# Patient Record
Sex: FEMALE | Race: WHITE | HISPANIC OR LATINO | Employment: FULL TIME | URBAN - METROPOLITAN AREA
[De-identification: names, ages, dates, MRNs, and addresses within clinical notes are randomized per-mention and may not be internally consistent; named-entity substitution may affect disease eponyms.]

---

## 2017-05-09 ENCOUNTER — ALLSCRIPTS OFFICE VISIT (OUTPATIENT)
Dept: OTHER | Facility: OTHER | Age: 37
End: 2017-05-09

## 2017-05-09 DIAGNOSIS — R63.5 ABNORMAL WEIGHT GAIN: ICD-10-CM

## 2017-05-09 DIAGNOSIS — R74.8 ABNORMAL LEVELS OF OTHER SERUM ENZYMES: ICD-10-CM

## 2017-05-09 DIAGNOSIS — E78.1 PURE HYPERGLYCERIDEMIA: ICD-10-CM

## 2017-05-09 DIAGNOSIS — E04.1 NONTOXIC SINGLE THYROID NODULE: ICD-10-CM

## 2017-05-09 DIAGNOSIS — N92.0 EXCESSIVE AND FREQUENT MENSTRUATION WITH REGULAR CYCLE: ICD-10-CM

## 2017-05-09 DIAGNOSIS — N93.9 ABNORMAL UTERINE AND VAGINAL BLEEDING, UNSPECIFIED: ICD-10-CM

## 2017-05-09 DIAGNOSIS — D64.9 ANEMIA: ICD-10-CM

## 2017-05-09 DIAGNOSIS — R53.83 OTHER FATIGUE: ICD-10-CM

## 2017-05-09 DIAGNOSIS — E66.9 OBESITY: ICD-10-CM

## 2017-05-09 LAB — HCG, QUALITATIVE (HISTORICAL): NEGATIVE

## 2017-05-14 ENCOUNTER — HOSPITAL ENCOUNTER (OUTPATIENT)
Facility: HOSPITAL | Age: 37
Setting detail: OBSERVATION
Discharge: HOME | End: 2017-05-15
Attending: EMERGENCY MEDICINE | Admitting: FAMILY MEDICINE
Payer: COMMERCIAL

## 2017-05-14 ENCOUNTER — APPOINTMENT (OUTPATIENT)
Dept: LAB | Age: 37
End: 2017-05-14
Payer: COMMERCIAL

## 2017-05-14 ENCOUNTER — TRANSCRIBE ORDERS (OUTPATIENT)
Dept: ADMINISTRATIVE | Facility: HOSPITAL | Age: 37
End: 2017-05-14

## 2017-05-14 DIAGNOSIS — N93.9 ABNORMAL UTERINE BLEEDING (AUB): ICD-10-CM

## 2017-05-14 DIAGNOSIS — N92.0 EXCESSIVE AND FREQUENT MENSTRUATION WITH REGULAR CYCLE: ICD-10-CM

## 2017-05-14 DIAGNOSIS — N93.8 DYSFUNCTIONAL UTERINE BLEEDING: Primary | ICD-10-CM

## 2017-05-14 DIAGNOSIS — R63.5 ABNORMAL WEIGHT GAIN: ICD-10-CM

## 2017-05-14 DIAGNOSIS — R53.83 OTHER FATIGUE: ICD-10-CM

## 2017-05-14 DIAGNOSIS — D64.9 ANEMIA: ICD-10-CM

## 2017-05-14 DIAGNOSIS — D64.9 SYMPTOMATIC ANEMIA: ICD-10-CM

## 2017-05-14 PROBLEM — F32.A DEPRESSION: Chronic | Status: ACTIVE | Noted: 2017-05-14

## 2017-05-14 PROBLEM — R74.01 TRANSAMINITIS: Chronic | Status: ACTIVE | Noted: 2017-05-14

## 2017-05-14 PROBLEM — E04.1 THYROID NODULE: Chronic | Status: ACTIVE | Noted: 2017-05-14

## 2017-05-14 LAB
ABO GROUP BLD BPU: NORMAL
ABO GROUP BLD: NORMAL
ALBUMIN SERPL BCP-MCNC: 3.4 G/DL (ref 3.5–5)
ALP SERPL-CCNC: 104 U/L (ref 46–116)
ALT SERPL W P-5'-P-CCNC: 98 U/L (ref 12–78)
ANION GAP SERPL CALCULATED.3IONS-SCNC: 6 MMOL/L (ref 4–13)
AST SERPL W P-5'-P-CCNC: 122 U/L (ref 5–45)
ATRIAL RATE: 110 BPM
BASOPHILS # BLD AUTO: 0.03 THOUSANDS/ΜL (ref 0–0.1)
BASOPHILS NFR BLD AUTO: 1 % (ref 0–1)
BILIRUB SERPL-MCNC: 0.37 MG/DL (ref 0.2–1)
BLD GP AB SCN SERPL QL: NEGATIVE
BPU ID: NORMAL
BUN SERPL-MCNC: 9 MG/DL (ref 5–25)
CALCIUM SERPL-MCNC: 8.4 MG/DL (ref 8.3–10.1)
CHLORIDE SERPL-SCNC: 106 MMOL/L (ref 100–108)
CO2 SERPL-SCNC: 27 MMOL/L (ref 21–32)
CREAT SERPL-MCNC: 0.74 MG/DL (ref 0.6–1.3)
EOSINOPHIL # BLD AUTO: 0.2 THOUSAND/ΜL (ref 0–0.61)
EOSINOPHIL NFR BLD AUTO: 3 % (ref 0–6)
ERYTHROCYTE [DISTWIDTH] IN BLOOD BY AUTOMATED COUNT: 16.5 % (ref 11.6–15.1)
EST. AVERAGE GLUCOSE BLD GHB EST-MCNC: 108 MG/DL
GFR SERPL CREATININE-BSD FRML MDRD: >60 ML/MIN/1.73SQ M
GLUCOSE P FAST SERPL-MCNC: 90 MG/DL (ref 65–99)
HBA1C MFR BLD: 5.4 % (ref 4.2–6.3)
HCT VFR BLD AUTO: 23.9 % (ref 34.8–46.1)
HGB BLD-MCNC: 6.8 G/DL (ref 11.5–15.4)
LYMPHOCYTES # BLD AUTO: 1.7 THOUSANDS/ΜL (ref 0.6–4.47)
LYMPHOCYTES NFR BLD AUTO: 28 % (ref 14–44)
MCH RBC QN AUTO: 21.1 PG (ref 26.8–34.3)
MCHC RBC AUTO-ENTMCNC: 28.5 G/DL (ref 31.4–37.4)
MCV RBC AUTO: 74 FL (ref 82–98)
MONOCYTES # BLD AUTO: 0.52 THOUSAND/ΜL (ref 0.17–1.22)
MONOCYTES NFR BLD AUTO: 9 % (ref 4–12)
NEUTROPHILS # BLD AUTO: 3.67 THOUSANDS/ΜL (ref 1.85–7.62)
NEUTS SEG NFR BLD AUTO: 59 % (ref 43–75)
NRBC BLD AUTO-RTO: 0 /100 WBCS
P AXIS: 73 DEGREES
PLATELET # BLD AUTO: 275 THOUSANDS/UL (ref 149–390)
PMV BLD AUTO: 11 FL (ref 8.9–12.7)
POTASSIUM SERPL-SCNC: 4 MMOL/L (ref 3.5–5.3)
PR INTERVAL: 130 MS
PROT SERPL-MCNC: 7.3 G/DL (ref 6.4–8.2)
QRS AXIS: 71 DEGREES
QRSD INTERVAL: 78 MS
QT INTERVAL: 342 MS
QTC INTERVAL: 462 MS
RBC # BLD AUTO: 3.22 MILLION/UL (ref 3.81–5.12)
RH BLD: POSITIVE
SODIUM SERPL-SCNC: 139 MMOL/L (ref 136–145)
SPECIMEN EXPIRATION DATE: NORMAL
T WAVE AXIS: 70 DEGREES
TSH SERPL DL<=0.05 MIU/L-ACNC: 1.33 UIU/ML (ref 0.36–3.74)
UNIT DISPENSE STATUS: NORMAL
UNIT PRODUCT CODE: NORMAL
UNIT RH: NORMAL
VENTRICULAR RATE: 110 BPM
WBC # BLD AUTO: 6.14 THOUSAND/UL (ref 4.31–10.16)

## 2017-05-14 PROCEDURE — 86901 BLOOD TYPING SEROLOGIC RH(D): CPT | Performed by: EMERGENCY MEDICINE

## 2017-05-14 PROCEDURE — 86900 BLOOD TYPING SEROLOGIC ABO: CPT | Performed by: EMERGENCY MEDICINE

## 2017-05-14 PROCEDURE — 36415 COLL VENOUS BLD VENIPUNCTURE: CPT

## 2017-05-14 PROCEDURE — 99284 EMERGENCY DEPT VISIT MOD MDM: CPT

## 2017-05-14 PROCEDURE — 80053 COMPREHEN METABOLIC PANEL: CPT

## 2017-05-14 PROCEDURE — 84443 ASSAY THYROID STIM HORMONE: CPT

## 2017-05-14 PROCEDURE — 36430 TRANSFUSION BLD/BLD COMPNT: CPT

## 2017-05-14 PROCEDURE — 81025 URINE PREGNANCY TEST: CPT | Performed by: EMERGENCY MEDICINE

## 2017-05-14 PROCEDURE — P9021 RED BLOOD CELLS UNIT: HCPCS

## 2017-05-14 PROCEDURE — 86923 COMPATIBILITY TEST ELECTRIC: CPT

## 2017-05-14 PROCEDURE — 83036 HEMOGLOBIN GLYCOSYLATED A1C: CPT

## 2017-05-14 PROCEDURE — 82306 VITAMIN D 25 HYDROXY: CPT

## 2017-05-14 PROCEDURE — 85025 COMPLETE CBC W/AUTO DIFF WBC: CPT

## 2017-05-14 PROCEDURE — 93005 ELECTROCARDIOGRAM TRACING: CPT

## 2017-05-14 PROCEDURE — 86850 RBC ANTIBODY SCREEN: CPT | Performed by: EMERGENCY MEDICINE

## 2017-05-14 RX ORDER — MEDROXYPROGESTERONE ACETATE 10 MG/1
10 TABLET ORAL DAILY
Status: DISCONTINUED | OUTPATIENT
Start: 2017-05-15 | End: 2017-05-15 | Stop reason: HOSPADM

## 2017-05-14 RX ORDER — ESCITALOPRAM OXALATE 10 MG/1
10 TABLET ORAL DAILY
Status: DISCONTINUED | OUTPATIENT
Start: 2017-05-15 | End: 2017-05-15 | Stop reason: HOSPADM

## 2017-05-14 RX ORDER — ESCITALOPRAM OXALATE 10 MG/1
10 TABLET ORAL DAILY
COMMUNITY
End: 2018-03-20 | Stop reason: SDUPTHER

## 2017-05-14 RX ORDER — KETOROLAC TROMETHAMINE 30 MG/ML
15 INJECTION, SOLUTION INTRAMUSCULAR; INTRAVENOUS ONCE
Status: COMPLETED | OUTPATIENT
Start: 2017-05-14 | End: 2017-05-14

## 2017-05-14 RX ADMIN — KETOROLAC TROMETHAMINE 15 MG: 30 INJECTION, SOLUTION INTRAMUSCULAR at 16:56

## 2017-05-15 ENCOUNTER — APPOINTMENT (OUTPATIENT)
Dept: ULTRASOUND IMAGING | Facility: HOSPITAL | Age: 37
End: 2017-05-15
Payer: COMMERCIAL

## 2017-05-15 ENCOUNTER — APPOINTMENT (OUTPATIENT)
Dept: RADIOLOGY | Facility: HOSPITAL | Age: 37
End: 2017-05-15
Attending: FAMILY MEDICINE
Payer: COMMERCIAL

## 2017-05-15 VITALS
TEMPERATURE: 98.9 F | BODY MASS INDEX: 40.41 KG/M2 | DIASTOLIC BLOOD PRESSURE: 59 MMHG | RESPIRATION RATE: 20 BRPM | HEIGHT: 62 IN | OXYGEN SATURATION: 96 % | WEIGHT: 219.58 LBS | HEART RATE: 81 BPM | SYSTOLIC BLOOD PRESSURE: 128 MMHG

## 2017-05-15 LAB
25(OH)D3 SERPL-MCNC: 15 NG/ML (ref 30–100)
ALBUMIN SERPL BCP-MCNC: 3.1 G/DL (ref 3.5–5)
ALP SERPL-CCNC: 96 U/L (ref 46–116)
ALT SERPL W P-5'-P-CCNC: 85 U/L (ref 12–78)
ANION GAP SERPL CALCULATED.3IONS-SCNC: 8 MMOL/L (ref 4–13)
APTT PPP: 33 SECONDS (ref 23–35)
AST SERPL W P-5'-P-CCNC: 97 U/L (ref 5–45)
BASOPHILS # BLD AUTO: 0.02 THOUSANDS/ΜL (ref 0–0.1)
BASOPHILS NFR BLD AUTO: 0 % (ref 0–1)
BILIRUB SERPL-MCNC: 0.56 MG/DL (ref 0.2–1)
BUN SERPL-MCNC: 13 MG/DL (ref 5–25)
CALCIUM SERPL-MCNC: 8.6 MG/DL (ref 8.3–10.1)
CHLORIDE SERPL-SCNC: 108 MMOL/L (ref 100–108)
CO2 SERPL-SCNC: 26 MMOL/L (ref 21–32)
CREAT SERPL-MCNC: 0.71 MG/DL (ref 0.6–1.3)
EOSINOPHIL # BLD AUTO: 0.21 THOUSAND/ΜL (ref 0–0.61)
EOSINOPHIL NFR BLD AUTO: 3 % (ref 0–6)
ERYTHROCYTE [DISTWIDTH] IN BLOOD BY AUTOMATED COUNT: 17.8 % (ref 11.6–15.1)
ERYTHROCYTE [DISTWIDTH] IN BLOOD BY AUTOMATED COUNT: 18.1 % (ref 11.6–15.1)
GFR SERPL CREATININE-BSD FRML MDRD: >60 ML/MIN/1.73SQ M
GLUCOSE SERPL-MCNC: 93 MG/DL (ref 65–140)
HCT VFR BLD AUTO: 25.7 % (ref 34.8–46.1)
HCT VFR BLD AUTO: 25.7 % (ref 34.8–46.1)
HGB BLD-MCNC: 7.6 G/DL (ref 11.5–15.4)
HGB BLD-MCNC: 7.7 G/DL (ref 11.5–15.4)
INR PPP: 1.15 (ref 0.86–1.16)
LYMPHOCYTES # BLD AUTO: 1.65 THOUSANDS/ΜL (ref 0.6–4.47)
LYMPHOCYTES NFR BLD AUTO: 26 % (ref 14–44)
MCH RBC QN AUTO: 22.6 PG (ref 26.8–34.3)
MCH RBC QN AUTO: 23 PG (ref 26.8–34.3)
MCHC RBC AUTO-ENTMCNC: 29.6 G/DL (ref 31.4–37.4)
MCHC RBC AUTO-ENTMCNC: 30 G/DL (ref 31.4–37.4)
MCV RBC AUTO: 76 FL (ref 82–98)
MCV RBC AUTO: 77 FL (ref 82–98)
MONOCYTES # BLD AUTO: 0.44 THOUSAND/ΜL (ref 0.17–1.22)
MONOCYTES NFR BLD AUTO: 7 % (ref 4–12)
NEUTROPHILS # BLD AUTO: 4.07 THOUSANDS/ΜL (ref 1.85–7.62)
NEUTS SEG NFR BLD AUTO: 64 % (ref 43–75)
NRBC BLD AUTO-RTO: 0 /100 WBCS
PLATELET # BLD AUTO: 237 THOUSANDS/UL (ref 149–390)
PLATELET # BLD AUTO: 245 THOUSANDS/UL (ref 149–390)
PMV BLD AUTO: 10.5 FL (ref 8.9–12.7)
PMV BLD AUTO: 10.7 FL (ref 8.9–12.7)
POTASSIUM SERPL-SCNC: 3.6 MMOL/L (ref 3.5–5.3)
PROT SERPL-MCNC: 6.8 G/DL (ref 6.4–8.2)
PROTHROMBIN TIME: 14.7 SECONDS (ref 12.1–14.4)
RBC # BLD AUTO: 3.35 MILLION/UL (ref 3.81–5.12)
RBC # BLD AUTO: 3.37 MILLION/UL (ref 3.81–5.12)
SODIUM SERPL-SCNC: 142 MMOL/L (ref 136–145)
WBC # BLD AUTO: 5.39 THOUSAND/UL (ref 4.31–10.16)
WBC # BLD AUTO: 6.42 THOUSAND/UL (ref 4.31–10.16)

## 2017-05-15 PROCEDURE — 76856 US EXAM PELVIC COMPLETE: CPT

## 2017-05-15 PROCEDURE — P9021 RED BLOOD CELLS UNIT: HCPCS

## 2017-05-15 PROCEDURE — 76830 TRANSVAGINAL US NON-OB: CPT

## 2017-05-15 PROCEDURE — 86704 HEP B CORE ANTIBODY TOTAL: CPT | Performed by: FAMILY MEDICINE

## 2017-05-15 PROCEDURE — 85610 PROTHROMBIN TIME: CPT | Performed by: FAMILY MEDICINE

## 2017-05-15 PROCEDURE — 86705 HEP B CORE ANTIBODY IGM: CPT | Performed by: FAMILY MEDICINE

## 2017-05-15 PROCEDURE — 87340 HEPATITIS B SURFACE AG IA: CPT | Performed by: FAMILY MEDICINE

## 2017-05-15 PROCEDURE — 85025 COMPLETE CBC W/AUTO DIFF WBC: CPT | Performed by: FAMILY MEDICINE

## 2017-05-15 PROCEDURE — 85027 COMPLETE CBC AUTOMATED: CPT | Performed by: FAMILY MEDICINE

## 2017-05-15 PROCEDURE — 86803 HEPATITIS C AB TEST: CPT | Performed by: FAMILY MEDICINE

## 2017-05-15 PROCEDURE — 85730 THROMBOPLASTIN TIME PARTIAL: CPT | Performed by: FAMILY MEDICINE

## 2017-05-15 PROCEDURE — 80053 COMPREHEN METABOLIC PANEL: CPT | Performed by: FAMILY MEDICINE

## 2017-05-15 RX ORDER — FERROUS SULFATE 325(65) MG
325 TABLET ORAL 2 TIMES DAILY WITH MEALS
Qty: 60 TABLET | Refills: 0 | Status: SHIPPED | OUTPATIENT
Start: 2017-05-15 | End: 2018-11-07 | Stop reason: ALTCHOICE

## 2017-05-15 RX ORDER — FERROUS SULFATE 325(65) MG
325 TABLET ORAL 2 TIMES DAILY WITH MEALS
Status: DISCONTINUED | OUTPATIENT
Start: 2017-05-15 | End: 2017-05-15 | Stop reason: HOSPADM

## 2017-05-15 RX ORDER — MEDROXYPROGESTERONE ACETATE 10 MG/1
10 TABLET ORAL DAILY
Qty: 5 TABLET | Refills: 0 | Status: SHIPPED | OUTPATIENT
Start: 2017-05-15 | End: 2018-11-07 | Stop reason: ALTCHOICE

## 2017-05-15 RX ORDER — ERGOCALCIFEROL 1.25 MG/1
50000 CAPSULE ORAL WEEKLY
Qty: 8 CAPSULE | Refills: 0 | Status: SHIPPED | OUTPATIENT
Start: 2017-05-15 | End: 2018-11-07 | Stop reason: ALTCHOICE

## 2017-05-15 RX ORDER — ERGOCALCIFEROL 1.25 MG/1
50000 CAPSULE ORAL WEEKLY
Status: DISCONTINUED | OUTPATIENT
Start: 2017-05-15 | End: 2017-05-15 | Stop reason: HOSPADM

## 2017-05-15 RX ADMIN — MEDROXYPROGESTERONE ACETATE 10 MG: 10 TABLET ORAL at 08:26

## 2017-05-15 RX ADMIN — ESCITALOPRAM OXALATE 10 MG: 10 TABLET ORAL at 08:25

## 2017-05-15 RX ADMIN — ERGOCALCIFEROL 50000 UNITS: 1.25 CAPSULE ORAL at 14:49

## 2017-05-16 LAB
HBV CORE AB SER QL: NORMAL
HBV CORE IGM SER QL: NORMAL
HBV SURFACE AG SER QL: NORMAL
HCV AB SER QL: NORMAL

## 2017-05-17 ENCOUNTER — APPOINTMENT (OUTPATIENT)
Dept: LAB | Age: 37
End: 2017-05-17
Payer: COMMERCIAL

## 2017-05-17 ENCOUNTER — TRANSCRIBE ORDERS (OUTPATIENT)
Dept: ADMINISTRATIVE | Age: 37
End: 2017-05-17

## 2017-05-17 DIAGNOSIS — N93.9 ABNORMAL UTERINE BLEEDING (AUB): ICD-10-CM

## 2017-05-17 DIAGNOSIS — D64.9 SYMPTOMATIC ANEMIA: ICD-10-CM

## 2017-05-17 LAB
BASOPHILS # BLD AUTO: 0.03 THOUSANDS/ΜL (ref 0–0.1)
BASOPHILS NFR BLD AUTO: 1 % (ref 0–1)
EOSINOPHIL # BLD AUTO: 0.25 THOUSAND/ΜL (ref 0–0.61)
EOSINOPHIL NFR BLD AUTO: 4 % (ref 0–6)
ERYTHROCYTE [DISTWIDTH] IN BLOOD BY AUTOMATED COUNT: 18.6 % (ref 11.6–15.1)
HCT VFR BLD AUTO: 25.7 % (ref 34.8–46.1)
HGB BLD-MCNC: 7.6 G/DL (ref 11.5–15.4)
LYMPHOCYTES # BLD AUTO: 1.64 THOUSANDS/ΜL (ref 0.6–4.47)
LYMPHOCYTES NFR BLD AUTO: 25 % (ref 14–44)
MCH RBC QN AUTO: 22.8 PG (ref 26.8–34.3)
MCHC RBC AUTO-ENTMCNC: 29.6 G/DL (ref 31.4–37.4)
MCV RBC AUTO: 77 FL (ref 82–98)
MONOCYTES # BLD AUTO: 0.61 THOUSAND/ΜL (ref 0.17–1.22)
MONOCYTES NFR BLD AUTO: 9 % (ref 4–12)
NEUTROPHILS # BLD AUTO: 4.02 THOUSANDS/ΜL (ref 1.85–7.62)
NEUTS SEG NFR BLD AUTO: 61 % (ref 43–75)
NRBC BLD AUTO-RTO: 0 /100 WBCS
PLATELET # BLD AUTO: 244 THOUSANDS/UL (ref 149–390)
PMV BLD AUTO: 10.7 FL (ref 8.9–12.7)
RBC # BLD AUTO: 3.34 MILLION/UL (ref 3.81–5.12)
WBC # BLD AUTO: 6.58 THOUSAND/UL (ref 4.31–10.16)

## 2017-05-17 PROCEDURE — 36415 COLL VENOUS BLD VENIPUNCTURE: CPT

## 2017-05-17 PROCEDURE — 85025 COMPLETE CBC W/AUTO DIFF WBC: CPT

## 2017-05-18 ENCOUNTER — ALLSCRIPTS OFFICE VISIT (OUTPATIENT)
Dept: OTHER | Facility: OTHER | Age: 37
End: 2017-05-18

## 2017-06-02 LAB
ABO GROUP BLD BPU: NORMAL
BPU ID: NORMAL
UNIT DISPENSE STATUS: NORMAL
UNIT PRODUCT CODE: NORMAL
UNIT RH: NORMAL

## 2017-06-05 ENCOUNTER — APPOINTMENT (OUTPATIENT)
Dept: LAB | Age: 37
End: 2017-06-05
Payer: COMMERCIAL

## 2017-06-05 ENCOUNTER — TRANSCRIBE ORDERS (OUTPATIENT)
Dept: ADMINISTRATIVE | Age: 37
End: 2017-06-05

## 2017-06-05 DIAGNOSIS — N93.9 VAGINAL HEMORRHAGE: Primary | ICD-10-CM

## 2017-06-05 DIAGNOSIS — N93.9 VAGINAL HEMORRHAGE: ICD-10-CM

## 2017-06-05 LAB
ERYTHROCYTE [DISTWIDTH] IN BLOOD BY AUTOMATED COUNT: 22.8 % (ref 11.6–15.1)
HCT VFR BLD AUTO: 29.3 % (ref 34.8–46.1)
HGB BLD-MCNC: 8.5 G/DL (ref 11.5–15.4)
MCH RBC QN AUTO: 23.1 PG (ref 26.8–34.3)
MCHC RBC AUTO-ENTMCNC: 29 G/DL (ref 31.4–37.4)
MCV RBC AUTO: 80 FL (ref 82–98)
PLATELET # BLD AUTO: 331 THOUSANDS/UL (ref 149–390)
PMV BLD AUTO: 11.4 FL (ref 8.9–12.7)
RBC # BLD AUTO: 3.68 MILLION/UL (ref 3.81–5.12)
WBC # BLD AUTO: 9.47 THOUSAND/UL (ref 4.31–10.16)

## 2017-06-05 PROCEDURE — 85027 COMPLETE CBC AUTOMATED: CPT

## 2017-06-05 PROCEDURE — 36415 COLL VENOUS BLD VENIPUNCTURE: CPT

## 2017-06-09 ENCOUNTER — ALLSCRIPTS OFFICE VISIT (OUTPATIENT)
Dept: OTHER | Facility: OTHER | Age: 37
End: 2017-06-09

## 2017-06-09 DIAGNOSIS — D64.9 ANEMIA: ICD-10-CM

## 2017-07-05 ENCOUNTER — ALLSCRIPTS OFFICE VISIT (OUTPATIENT)
Dept: OTHER | Facility: OTHER | Age: 37
End: 2017-07-05

## 2017-10-14 DIAGNOSIS — D64.9 ANEMIA: ICD-10-CM

## 2018-01-13 VITALS
SYSTOLIC BLOOD PRESSURE: 132 MMHG | HEIGHT: 62 IN | WEIGHT: 221 LBS | BODY MASS INDEX: 40.67 KG/M2 | TEMPERATURE: 98 F | HEART RATE: 80 BPM | DIASTOLIC BLOOD PRESSURE: 80 MMHG | RESPIRATION RATE: 20 BRPM

## 2018-01-13 VITALS
HEIGHT: 62 IN | TEMPERATURE: 98.1 F | DIASTOLIC BLOOD PRESSURE: 78 MMHG | WEIGHT: 225.13 LBS | SYSTOLIC BLOOD PRESSURE: 118 MMHG | HEART RATE: 90 BPM | BODY MASS INDEX: 41.43 KG/M2 | RESPIRATION RATE: 16 BRPM

## 2018-01-13 VITALS
DIASTOLIC BLOOD PRESSURE: 76 MMHG | RESPIRATION RATE: 16 BRPM | WEIGHT: 221.38 LBS | HEART RATE: 92 BPM | BODY MASS INDEX: 40.74 KG/M2 | TEMPERATURE: 99.2 F | HEIGHT: 62 IN | SYSTOLIC BLOOD PRESSURE: 126 MMHG

## 2018-01-15 VITALS
SYSTOLIC BLOOD PRESSURE: 124 MMHG | HEART RATE: 86 BPM | BODY MASS INDEX: 40.3 KG/M2 | WEIGHT: 219 LBS | DIASTOLIC BLOOD PRESSURE: 64 MMHG | RESPIRATION RATE: 16 BRPM | TEMPERATURE: 99.1 F | HEIGHT: 62 IN

## 2018-03-20 DIAGNOSIS — F32.A DEPRESSION, UNSPECIFIED DEPRESSION TYPE: Primary | ICD-10-CM

## 2018-03-20 RX ORDER — ESCITALOPRAM OXALATE 10 MG/1
TABLET ORAL
Qty: 90 TABLET | Refills: 1 | Status: SHIPPED | OUTPATIENT
Start: 2018-03-20 | End: 2018-11-07 | Stop reason: ALTCHOICE

## 2018-11-07 ENCOUNTER — TELEPHONE (OUTPATIENT)
Dept: FAMILY MEDICINE CLINIC | Facility: CLINIC | Age: 38
End: 2018-11-07

## 2018-11-07 ENCOUNTER — HOSPITAL ENCOUNTER (INPATIENT)
Facility: HOSPITAL | Age: 38
LOS: 1 days | Discharge: HOME | DRG: 812 | End: 2018-11-09
Attending: EMERGENCY MEDICINE | Admitting: INTERNAL MEDICINE
Payer: COMMERCIAL

## 2018-11-07 ENCOUNTER — APPOINTMENT (EMERGENCY)
Dept: ULTRASOUND IMAGING | Facility: HOSPITAL | Age: 38
DRG: 812 | End: 2018-11-07
Payer: COMMERCIAL

## 2018-11-07 DIAGNOSIS — N93.9 ABNORMAL UTERINE BLEEDING (AUB): ICD-10-CM

## 2018-11-07 DIAGNOSIS — D62 ACUTE BLOOD LOSS ANEMIA: Primary | ICD-10-CM

## 2018-11-07 PROBLEM — D50.9 IRON DEFICIENCY ANEMIA: Status: ACTIVE | Noted: 2018-11-07

## 2018-11-07 LAB
ABO GROUP BLD: NORMAL
ALBUMIN SERPL BCP-MCNC: 3.3 G/DL (ref 3.5–5)
ALP SERPL-CCNC: 112 U/L (ref 46–116)
ALT SERPL W P-5'-P-CCNC: 97 U/L (ref 12–78)
ANION GAP SERPL CALCULATED.3IONS-SCNC: 8 MMOL/L (ref 4–13)
APTT PPP: 35 SECONDS (ref 24–36)
AST SERPL W P-5'-P-CCNC: 82 U/L (ref 5–45)
BACTERIA UR QL AUTO: ABNORMAL /HPF
BASOPHILS # BLD AUTO: 0.01 THOUSANDS/ΜL (ref 0–0.1)
BASOPHILS NFR BLD AUTO: 0 % (ref 0–1)
BILIRUB SERPL-MCNC: 0.3 MG/DL (ref 0.2–1)
BILIRUB UR QL STRIP: NEGATIVE
BLD GP AB SCN SERPL QL: NEGATIVE
BUN SERPL-MCNC: 9 MG/DL (ref 5–25)
CALCIUM SERPL-MCNC: 9.1 MG/DL (ref 8.3–10.1)
CHLORIDE SERPL-SCNC: 104 MMOL/L (ref 100–108)
CLARITY UR: CLEAR
CO2 SERPL-SCNC: 26 MMOL/L (ref 21–32)
COLOR UR: YELLOW
CREAT SERPL-MCNC: 0.66 MG/DL (ref 0.6–1.3)
EOSINOPHIL # BLD AUTO: 0.17 THOUSAND/ΜL (ref 0–0.61)
EOSINOPHIL NFR BLD AUTO: 3 % (ref 0–6)
ERYTHROCYTE [DISTWIDTH] IN BLOOD BY AUTOMATED COUNT: 19.6 % (ref 11.6–15.1)
GFR SERPL CREATININE-BSD FRML MDRD: 112 ML/MIN/1.73SQ M
GLUCOSE SERPL-MCNC: 97 MG/DL (ref 65–140)
GLUCOSE UR STRIP-MCNC: NEGATIVE MG/DL
HCT VFR BLD AUTO: 20.4 % (ref 34.8–46.1)
HCT VFR BLD AUTO: 28.3 % (ref 34.8–46.1)
HGB BLD-MCNC: 5.5 G/DL (ref 11.5–15.4)
HGB BLD-MCNC: 8.3 G/DL (ref 11.5–15.4)
HGB UR QL STRIP.AUTO: ABNORMAL
IMM GRANULOCYTES # BLD AUTO: 0.03 THOUSAND/UL (ref 0–0.2)
IMM GRANULOCYTES NFR BLD AUTO: 0 % (ref 0–2)
INR PPP: 1.15 (ref 0.86–1.17)
KETONES UR STRIP-MCNC: ABNORMAL MG/DL
LEUKOCYTE ESTERASE UR QL STRIP: NEGATIVE
LYMPHOCYTES # BLD AUTO: 1.45 THOUSANDS/ΜL (ref 0.6–4.47)
LYMPHOCYTES NFR BLD AUTO: 21 % (ref 14–44)
MCH RBC QN AUTO: 19.8 PG (ref 26.8–34.3)
MCHC RBC AUTO-ENTMCNC: 27 G/DL (ref 31.4–37.4)
MCV RBC AUTO: 73 FL (ref 82–98)
MONOCYTES # BLD AUTO: 0.63 THOUSAND/ΜL (ref 0.17–1.22)
MONOCYTES NFR BLD AUTO: 9 % (ref 4–12)
NEUTROPHILS # BLD AUTO: 4.5 THOUSANDS/ΜL (ref 1.85–7.62)
NEUTS SEG NFR BLD AUTO: 67 % (ref 43–75)
NITRITE UR QL STRIP: NEGATIVE
NON-SQ EPI CELLS URNS QL MICRO: ABNORMAL /HPF
NRBC BLD AUTO-RTO: 0 /100 WBCS
PH UR STRIP.AUTO: 6 [PH] (ref 4.5–8)
PLATELET # BLD AUTO: 232 THOUSANDS/UL (ref 149–390)
PMV BLD AUTO: 10.1 FL (ref 8.9–12.7)
POTASSIUM SERPL-SCNC: 4.1 MMOL/L (ref 3.5–5.3)
PROT SERPL-MCNC: 7.2 G/DL (ref 6.4–8.2)
PROT UR STRIP-MCNC: NEGATIVE MG/DL
PROTHROMBIN TIME: 14.4 SECONDS (ref 11.8–14.2)
RBC # BLD AUTO: 2.78 MILLION/UL (ref 3.81–5.12)
RBC #/AREA URNS AUTO: ABNORMAL /HPF
RH BLD: POSITIVE
SODIUM SERPL-SCNC: 138 MMOL/L (ref 136–145)
SP GR UR STRIP.AUTO: 1.02 (ref 1–1.03)
SPECIMEN EXPIRATION DATE: NORMAL
TSH SERPL DL<=0.05 MIU/L-ACNC: 2.08 UIU/ML (ref 0.36–3.74)
UROBILINOGEN UR QL STRIP.AUTO: 0.2 E.U./DL
WBC # BLD AUTO: 6.79 THOUSAND/UL (ref 4.31–10.16)
WBC #/AREA URNS AUTO: ABNORMAL /HPF

## 2018-11-07 PROCEDURE — 86901 BLOOD TYPING SEROLOGIC RH(D): CPT | Performed by: EMERGENCY MEDICINE

## 2018-11-07 PROCEDURE — 80053 COMPREHEN METABOLIC PANEL: CPT | Performed by: EMERGENCY MEDICINE

## 2018-11-07 PROCEDURE — 86900 BLOOD TYPING SEROLOGIC ABO: CPT | Performed by: EMERGENCY MEDICINE

## 2018-11-07 PROCEDURE — 84443 ASSAY THYROID STIM HORMONE: CPT | Performed by: PHYSICIAN ASSISTANT

## 2018-11-07 PROCEDURE — 85610 PROTHROMBIN TIME: CPT | Performed by: EMERGENCY MEDICINE

## 2018-11-07 PROCEDURE — 85014 HEMATOCRIT: CPT | Performed by: PHYSICIAN ASSISTANT

## 2018-11-07 PROCEDURE — 85025 COMPLETE CBC W/AUTO DIFF WBC: CPT | Performed by: EMERGENCY MEDICINE

## 2018-11-07 PROCEDURE — 86920 COMPATIBILITY TEST SPIN: CPT

## 2018-11-07 PROCEDURE — 36415 COLL VENOUS BLD VENIPUNCTURE: CPT | Performed by: EMERGENCY MEDICINE

## 2018-11-07 PROCEDURE — 99285 EMERGENCY DEPT VISIT HI MDM: CPT

## 2018-11-07 PROCEDURE — 81001 URINALYSIS AUTO W/SCOPE: CPT | Performed by: EMERGENCY MEDICINE

## 2018-11-07 PROCEDURE — 99220 PR INITIAL OBSERVATION CARE/DAY 70 MINUTES: CPT | Performed by: INTERNAL MEDICINE

## 2018-11-07 PROCEDURE — 86850 RBC ANTIBODY SCREEN: CPT | Performed by: EMERGENCY MEDICINE

## 2018-11-07 PROCEDURE — 85018 HEMOGLOBIN: CPT | Performed by: PHYSICIAN ASSISTANT

## 2018-11-07 PROCEDURE — 85730 THROMBOPLASTIN TIME PARTIAL: CPT | Performed by: EMERGENCY MEDICINE

## 2018-11-07 PROCEDURE — P9021 RED BLOOD CELLS UNIT: HCPCS

## 2018-11-07 PROCEDURE — 76856 US EXAM PELVIC COMPLETE: CPT

## 2018-11-07 PROCEDURE — 76830 TRANSVAGINAL US NON-OB: CPT

## 2018-11-07 RX ORDER — NICOTINE 21 MG/24HR
1 PATCH, TRANSDERMAL 24 HOURS TRANSDERMAL DAILY
Status: DISCONTINUED | OUTPATIENT
Start: 2018-11-07 | End: 2018-11-09 | Stop reason: HOSPADM

## 2018-11-07 RX ORDER — SENNOSIDES 8.6 MG
1 TABLET ORAL DAILY
Status: DISCONTINUED | OUTPATIENT
Start: 2018-11-07 | End: 2018-11-09 | Stop reason: HOSPADM

## 2018-11-07 RX ORDER — ONDANSETRON 2 MG/ML
4 INJECTION INTRAMUSCULAR; INTRAVENOUS EVERY 6 HOURS PRN
Status: DISCONTINUED | OUTPATIENT
Start: 2018-11-07 | End: 2018-11-09 | Stop reason: HOSPADM

## 2018-11-07 RX ORDER — CALCIUM CARBONATE 200(500)MG
1000 TABLET,CHEWABLE ORAL DAILY PRN
Status: DISCONTINUED | OUTPATIENT
Start: 2018-11-07 | End: 2018-11-09 | Stop reason: HOSPADM

## 2018-11-07 NOTE — ED PROVIDER NOTES
History  Chief Complaint   Patient presents with    Vaginal Bleeding     pt presents ambulatory with c/o mentrual cycle x 3 weeks, c/o weakness and dizziness over past few days  hx transfusion      26-year-old female comes in for evaluation of vaginal bleeding  patient states approximately 3 weeks ago she began to have vaginal bleeding as got increasingly worse over the last 3 weeks  Patient had a similar episode approximately 1 year ago she had a Mirena placed and has seem to fix the problem however 3 weeks ago the bleeding began again        History provided by:  Patient   used: No    Vaginal Bleeding   Quality:  Dark red and clots  Severity:  Moderate  Onset quality:  Gradual  Duration:  3 weeks  Timing:  Constant  Progression:  Worsening  Chronicity:  New  Menstrual history:  Irregular  Possible pregnancy: no    Context: spontaneously    Associated symptoms: dizziness and fatigue    Associated symptoms: no abdominal pain, no back pain and no fever    Dizziness:     Severity:  Severe    Duration:  1 day    Timing:  Constant    Progression:  Worsening  Fatigue:     Severity:  Moderate    Duration:  1 day    Timing:  Constant    Progression:  Worsening  Risk factors: no hx of endometriosis, no ovarian torsion and no terminated pregnancies        Prior to Admission Medications   Prescriptions Last Dose Informant Patient Reported? Taking? Fe Cbn-Fe Gluc-FA-B12-C-DSS (FE 90 PLUS PO)   Yes No   Sig: Take by mouth   ergocalciferol (VITAMIN D2) 50,000 units   No No   Sig: Take 1 capsule by mouth once a week for 30 days   escitalopram (LEXAPRO) 10 mg tablet   No No   Sig: TAKE 1 TABLET DAILY     ferrous sulfate 325 (65 Fe) mg tablet   No No   Sig: Take 1 tablet by mouth 2 (two) times a day with meals for 30 days   medroxyPROGESTERone (PROVERA) 10 mg tablet   No No   Sig: Take 1 tablet by mouth daily for 5 days      Facility-Administered Medications: None       Past Medical History:   Diagnosis Date    Anemia        Past Surgical History:   Procedure Laterality Date    TONSILLECTOMY      TUBAL LIGATION      2006       Family History   Problem Relation Age of Onset    Heart disease Father      I have reviewed and agree with the history as documented  Social History   Substance Use Topics    Smoking status: Current Some Day Smoker     Types: Cigarettes    Smokeless tobacco: Never Used    Alcohol use Yes      Comment: on the weekends        Review of Systems   Constitutional: Positive for fatigue  Negative for fever  HENT: Negative for congestion and ear pain  Eyes: Negative for discharge and redness  Respiratory: Negative for apnea, cough, shortness of breath and wheezing  Cardiovascular: Negative for chest pain  Gastrointestinal: Negative for abdominal pain and diarrhea  Endocrine: Negative for cold intolerance and polydipsia  Genitourinary: Positive for vaginal bleeding  Negative for difficulty urinating and hematuria  Musculoskeletal: Negative for arthralgias and back pain  Skin: Negative for color change and rash  Allergic/Immunologic: Negative for environmental allergies and immunocompromised state  Neurological: Positive for dizziness  Negative for numbness and headaches  Hematological: Negative for adenopathy  Does not bruise/bleed easily  Psychiatric/Behavioral: Negative for agitation and behavioral problems  Physical Exam  Physical Exam   Constitutional: She is oriented to person, place, and time  Vital signs are normal  She appears well-developed and well-nourished  Non-toxic appearance  HENT:   Head: Normocephalic and atraumatic  Right Ear: Tympanic membrane and external ear normal    Left Ear: Tympanic membrane and external ear normal    Nose: Nose normal  No rhinorrhea, sinus tenderness or nasal deformity  Mouth/Throat: Uvula is midline and oropharynx is clear and moist  Normal dentition  Eyes: Pupils are equal, round, and reactive to light  Conjunctivae, EOM and lids are normal  Right eye exhibits no discharge  Left eye exhibits no discharge  Neck: Trachea normal and normal range of motion  Neck supple  No JVD present  Carotid bruit is not present  Cardiovascular: Normal rate, regular rhythm, intact distal pulses and normal pulses  No extrasystoles are present  PMI is not displaced  Pulmonary/Chest: Effort normal and breath sounds normal  No accessory muscle usage  No respiratory distress  She has no wheezes  She has no rhonchi  She has no rales  Abdominal: Soft  Normal appearance and bowel sounds are normal  She exhibits no mass  There is no tenderness  There is no rigidity, no rebound and no guarding  Musculoskeletal:        Right shoulder: She exhibits normal range of motion, no bony tenderness, no swelling and no deformity  Cervical back: Normal  She exhibits normal range of motion, no tenderness, no bony tenderness and no deformity  Lymphadenopathy:     She has no cervical adenopathy  She has no axillary adenopathy  Neurological: She is alert and oriented to person, place, and time  She has normal strength and normal reflexes  No cranial nerve deficit or sensory deficit  GCS eye subscore is 4  GCS verbal subscore is 5  GCS motor subscore is 6  Skin: Skin is warm and dry  No rash noted  Psychiatric: She has a normal mood and affect  Her speech is normal and behavior is normal    Nursing note and vitals reviewed        Vital Signs  ED Triage Vitals [11/07/18 1057]   Temp Pulse Respirations Blood Pressure SpO2   -- (!) 106 18 152/72 100 %      Temp Source Heart Rate Source Patient Position - Orthostatic VS BP Location FiO2 (%)   Oral Monitor -- -- --      Pain Score       --           Vitals:    11/07/18 1057 11/07/18 1200   BP: 152/72 128/58   Pulse: (!) 106 90       Visual Acuity      ED Medications  Medications - No data to display    Diagnostic Studies  Results Reviewed     Procedure Component Value Units Date/Time Comprehensive metabolic panel [[id number]]  (Abnormal) Collected:  11/07/18 1135    Lab Status:  Final result Specimen:  Blood from Arm, Left Updated:  11/07/18 1205     Sodium 138 mmol/L      Potassium 4 1 mmol/L      Chloride 104 mmol/L      CO2 26 mmol/L      ANION GAP 8 mmol/L      BUN 9 mg/dL      Creatinine 0 66 mg/dL      Glucose 97 mg/dL      Calcium 9 1 mg/dL      AST 82 (H) U/L      ALT 97 (H) U/L      Alkaline Phosphatase 112 U/L      Total Protein 7 2 g/dL      Albumin 3 3 (L) g/dL      Total Bilirubin 0 30 mg/dL      eGFR 112 ml/min/1 73sq m     Narrative:         National Kidney Disease Education Program recommendations are as follows:  GFR calculation is accurate only with a steady state creatinine  Chronic Kidney disease less than 60 ml/min/1 73 sq  meters  Kidney failure less than 15 ml/min/1 73 sq  meters      Urine Microscopic [[id number]]  (Abnormal) Collected:  11/07/18 1145    Lab Status:  Final result Specimen:  Urine from Urine, Clean Catch Updated:  11/07/18 1159     RBC, UA 20-30 (A) /hpf      WBC, UA 0-1 (A) /hpf      Epithelial Cells Occasional /hpf      Bacteria, UA Occasional /hpf     UA w Reflex to Microscopic w Reflex to Culture [[id number]]  (Abnormal) Collected:  11/07/18 1145    Lab Status:  Final result Specimen:  Urine from Urine, Clean Catch Updated:  11/07/18 1157     Color, UA Yellow     Clarity, UA Clear     Specific Gravity, UA 1 025     pH, UA 6 0     Leukocytes, UA Negative     Nitrite, UA Negative     Protein, UA Negative mg/dl      Glucose, UA Negative mg/dl      Ketones, UA Trace (A) mg/dl      Urobilinogen, UA 0 2 E U /dl      Bilirubin, UA Negative     Blood, UA Large (A)    CBC and differential [[id number]]  (Abnormal) Collected:  11/07/18 1135    Lab Status:  Final result Specimen:  Blood from Arm, Left Updated:  11/07/18 1155     WBC 6 79 Thousand/uL      RBC 2 78 (L) Million/uL      Hemoglobin 5 5 (LL) g/dL      Hematocrit 20 4 (L) %      MCV 73 (L) fL      MCH 19 8 (L) pg      MCHC 27 0 (L) g/dL      RDW 19 6 (H) %      MPV 10 1 fL      Platelets 221 Thousands/uL      nRBC 0 /100 WBCs      Neutrophils Relative 67 %      Immat GRANS % 0 %      Lymphocytes Relative 21 %      Monocytes Relative 9 %      Eosinophils Relative 3 %      Basophils Relative 0 %      Neutrophils Absolute 4 50 Thousands/µL      Immature Grans Absolute 0 03 Thousand/uL      Lymphocytes Absolute 1 45 Thousands/µL      Monocytes Absolute 0 63 Thousand/µL      Eosinophils Absolute 0 17 Thousand/µL      Basophils Absolute 0 01 Thousands/µL     Protime-INR [[id number]]  (Abnormal) Collected:  11/07/18 1135    Lab Status:  Final result Specimen:  Blood from Arm, Left Updated:  11/07/18 1155     Protime 14 4 (H) seconds      INR 1 15    APTT [[id number]]  (Normal) Collected:  11/07/18 1135    Lab Status:  Final result Specimen:  Blood from Arm, Left Updated:  11/07/18 1155     PTT 35 seconds                  US pelvis complete w transvaginal    (Results Pending)              Procedures  Procedures       Phone Contacts  ED Phone Contact    ED Course                               MDM  Number of Diagnoses or Management Options  Abnormal uterine bleeding (AUB): new and requires workup  Acute blood loss anemia: new and requires workup     Amount and/or Complexity of Data Reviewed  Clinical lab tests: ordered and reviewed  Tests in the radiology section of CPT®: ordered and reviewed  Tests in the medicine section of CPT®: ordered and reviewed  Review and summarize past medical records: yes  Discuss the patient with other providers: yes    Patient Progress  Patient progress: stable    CritCare Time    Disposition  Final diagnoses:   Acute blood loss anemia   Abnormal uterine bleeding (AUB)     Time reflects when diagnosis was documented in both MDM as applicable and the Disposition within this note     Time User Action Codes Description Comment    11/7/2018 12:36 PM Agustín SHIPLEY Add [D62] Acute blood loss anemia 11/7/2018 12:36 PM Millie Tejada Add [N93 9] Abnormal uterine bleeding (AUB)       ED Disposition     ED Disposition Condition Comment    Admit  Case was discussed with dr Luz Maria Sal and the patient's admission status was agreed to be Admission Status: observation status to the service of Dr Luz Maria Sal   Follow-up Information    None         Patient's Medications   Discharge Prescriptions    No medications on file     No discharge procedures on file      ED Provider  Electronically Signed by           Sue Ross DO  11/07/18 1246

## 2018-11-07 NOTE — ASSESSMENT & PLAN NOTE
· POA  Intermittent, heavy vaginal bleeding with clots over the last 3 weeks  Patient reports in the last 1 week using 1 full box of tampons and 1 package of sanitary napkins  · Prior hx of same about 1 year ago-- had Mirena placed and sx improved-- has had discussions with her OB about ablation/hysterectomy  · Pelvic US: Top normal slightly heterogeneous somewhat vascular endometrium  Medial related to phase of menstrual cycle  Apparent fundal uterine leiomyoma    · Will consult OB/GYN

## 2018-11-07 NOTE — ASSESSMENT & PLAN NOTE
· POA  Evidenced by fatigue, weakness, tachycardia, SOB   hgb 5 5 on arrival  · Baseline hgb appears to be between 7-8  · Admit  · Transfuse 2U PRBC now; check H/H after; transfuse to keep hgb > or = to 7 5

## 2018-11-07 NOTE — LETTER
[facility]  [address] [address]  Dept: [phone]    November 9, 2018     Patient: Dixon Hernandez   YOB: 1980   Date of Visit: 11/7/2018       To Whom it May Concern:    Dixon Hernandez is under my professional care  She was seen in the hospital from 11/7/2018   to 11/09/18  She may return to work on November 14, 2018  If you have any questions or concerns, please don't hesitate to call           Sincerely,          Paulette Saint, MD

## 2018-11-07 NOTE — ASSESSMENT & PLAN NOTE
· Known prior hx  · Was previously on Rx Iron-- tolerated well until insurance no longer covered-- switched to OTC iron supplements and did not tolerated 2/2 nausea  · Encourage continued PO iron; consider Venofer while inpatient

## 2018-11-07 NOTE — TELEPHONE ENCOUNTER
Spoke with patient, has history of low blood count   We do not have any appointments available, she is going to ER but did schedule f/u with you on Thurs am

## 2018-11-07 NOTE — H&P
H&P- Marvin Mahan 1980, 43 y o  female MRN: [id number]    Unit/Bed#: ED 17 Encounter: [id number]    Primary Care Provider: Ollie Mckinney DO   Date and time admitted to hospital: 11/7/2018 10:59 AM    * Symptomatic anemia   Assessment & Plan    · POA  Evidenced by fatigue, weakness, tachycardia, SOB  hgb 5 5 on arrival  · Baseline hgb appears to be between 7-8  · Admit  · Transfuse 2U PRBC now; check H/H after; transfuse to keep hgb > or = to 7 5     Abnormal uterine bleeding (AUB)   Assessment & Plan    · POA  Intermittent, heavy vaginal bleeding with clots over the last 3 weeks  Patient reports in the last 1 week using 1 full box of tampons and 1 package of sanitary napkins  · Prior hx of same about 1 year ago-- had Mirena placed and sx improved-- has had discussions with her OB about ablation/hysterectomy  · Pelvic US: Top normal slightly heterogeneous somewhat vascular endometrium  Medial related to phase of menstrual cycle  Apparent fundal uterine leiomyoma  · Will consult OB/GYN     Iron deficiency anemia   Assessment & Plan    · Known prior hx  · Was previously on Rx Iron-- tolerated well until insurance no longer covered-- switched to OTC iron supplements and did not tolerated 2/2 nausea  · Encourage continued PO iron; consider Venofer while inpatient          VTE Prophylaxis: Pharmacologic VTE Prophylaxis contraindicated due to vaginal bleeding  / sequential compression device   Code Status: Level 1-- Full Code  POLST: There is no POLST form on file for this patient (pre-hospital)  Discussion with family: family at bedside     Anticipated Length of Stay:  Patient will be admitted on an Observation basis with an anticipated length of stay of  < 2 midnights  Justification for Hospital Stay: tx vaginal bleeding    Total Time for Visit, including Counseling / Coordination of Care: 30 minutes  Greater than 50% of this total time spent on direct patient counseling and coordination of care      Chief Complaint:   Vaginal bleeding    History of Present Illness:    Sherrell Izquierdo is a 43 y o  female with PMH significant for iron deficiency anemia and AUB tx with Mirena presents to the ED for complaints of vaginal bleeding x3 weeks  Patient reports this started as her normal period  Over the last 3 weeks, she has bleeding almost daily  She states that there are a large amount of clots  She repots in the last week, she has gone through 1 box of tampons and 1 package of sanitary napkins  Today, she knew some thing was wrong when she was getting SOB walking up the stairs and feeling like her heart was racing  No associated abdominal pain/vaginal pain aside from some cramping  She also admits to worsening fatigue over the week  She had a similar episode about 1 year ago and the decision was made to place an IUD  Her sx improved for the most part with this intervention  No hx of coagulopathy, or family hx of coagulopathy to patient's knowledge  She had a tubal ligation in 2006  Review of Systems:    Review of Systems   Constitutional: Positive for fatigue  Negative for appetite change  HENT: Negative  Eyes: Negative  Respiratory: Positive for shortness of breath  Cardiovascular: Positive for palpitations  Gastrointestinal: Negative  Endocrine: Negative  Genitourinary: Positive for menstrual problem  Musculoskeletal: Negative  Skin: Positive for pallor  Neurological: Positive for weakness  Hematological: Negative  Psychiatric/Behavioral: Negative  Past Medical and Surgical History:     Past Medical History:   Diagnosis Date    Anemia        Past Surgical History:   Procedure Laterality Date    TONSILLECTOMY      TUBAL LIGATION      2006       Meds/Allergies:    Prior to Admission medications    Medication Sig Start Date End Date Taking?  Authorizing Provider   Ruth Cbn-Fe Gluc-FA-B12-C-DSS (FE 90 PLUS PO) Take by mouth    Historical Provider, MD   ergocalciferol (VITAMIN D2) 50,000 units Take 1 capsule by mouth once a week for 30 days 5/15/17 11/7/18  [facility] DO Lexy   escitalopram (LEXAPRO) 10 mg tablet TAKE 1 TABLET DAILY  3/20/18 11/7/18  Shandra Beaver MD   ferrous sulfate 325 (65 Fe) mg tablet Take 1 tablet by mouth 2 (two) times a day with meals for 30 days 5/15/17 11/7/18  Solo Aldrich DO   medroxyPROGESTERone (PROVERA) 10 mg tablet Take 1 tablet by mouth daily for 5 days 5/15/17 11/7/18  Solo Aldrich DO     I have reviewed home medications with patient personally  Allergies: No Known Allergies    Social History:     Marital Status: /Civil Union   Occupation: employee of [employer]  Patient Pre-hospital Living Situation: home with family  Patient Pre-hospital Level of Mobility: independent  Patient Pre-hospital Diet Restrictions: none  Substance Use History:   History   Alcohol Use    Yes     Comment: on the weekends     History   Smoking Status    Current Some Day Smoker    Types: Cigarettes   Smokeless Tobacco    Never Used     History   Drug Use No       Family History:    non-contributory    Physical Exam:     Vitals:   Blood Pressure: 133/60 (11/07/18 1338)  Pulse: 102 (11/07/18 1338)  Temperature: 99 °F (37 2 °C) (11/07/18 1338)  Temp Source: Oral (11/07/18 1338)  Respirations: 20 (11/07/18 1338)  Weight - Scale: 97 5 kg (215 lb) (11/07/18 1134)  SpO2: 100 % (11/07/18 1338)    Physical Exam   Constitutional: She is oriented to person, place, and time  She appears well-developed and well-nourished  No distress  HENT:   Head: Normocephalic and atraumatic  Mouth/Throat: No oropharyngeal exudate  Eyes: Pupils are equal, round, and reactive to light  Conjunctivae and EOM are normal  No scleral icterus  Conjunctiva pale   Neck: No JVD present  Cardiovascular: Regular rhythm  Murmur (flow murmur) heard  tachycardia   Pulmonary/Chest: Effort normal and breath sounds normal  No respiratory distress  She has no wheezes   She has no rales  Abdominal: Soft  Bowel sounds are normal  She exhibits no distension  There is no tenderness  There is no rebound  Musculoskeletal: She exhibits no edema or tenderness  Neurological: She is alert and oriented to person, place, and time  She displays normal reflexes  No cranial nerve deficit  She exhibits normal muscle tone  Skin: Skin is warm and dry  She is not diaphoretic  There is pallor  Psychiatric: She has a normal mood and affect  Her behavior is normal        Additional Data:     Lab Results: I have personally reviewed pertinent reports  Results from last 7 days  Lab Units 11/07/18  1135   WBC Thousand/uL 6 79   HEMOGLOBIN g/dL 5 5*   HEMATOCRIT % 20 4*   PLATELETS Thousands/uL 232   NEUTROS ABS Thousands/µL 4 50   NEUTROS PCT % 67   LYMPHS PCT % 21   MONOS PCT % 9   EOS PCT % 3       Results from last 7 days  Lab Units 11/07/18  1135   POTASSIUM mmol/L 4 1   CHLORIDE mmol/L 104   CO2 mmol/L 26   BUN mg/dL 9   CREATININE mg/dL 0 66   ANION GAP mmol/L 8   CALCIUM mg/dL 9 1   ALBUMIN g/dL 3 3*   TOTAL BILIRUBIN mg/dL 0 30   ALK PHOS U/L 112   ALT U/L 97*   AST U/L 82*       Results from last 7 days  Lab Units 11/07/18  1135   INR  1 15                   Imaging: I have personally reviewed pertinent reports  US pelvis complete w transvaginal   Final Result by Gigi Márquez MD (11/07 1302)       Top normal slightly heterogeneous somewhat vascular endometrium  Medial related to phase of menstrual cycle  Apparent fundal uterine leiomyoma  Workstation performed: [id number]             EKG, Pathology, and Other Studies Reviewed on Admission:   · EKG: unavailable; telemetry reveals sinus tachycardia    Allscripts / Epic Records Reviewed: Yes     ** Please Note: This note has been constructed using a voice recognition system   **

## 2018-11-08 LAB
ABO GROUP BLD BPU: NORMAL
ABO GROUP BLD BPU: NORMAL
ANION GAP SERPL CALCULATED.3IONS-SCNC: 10 MMOL/L (ref 4–13)
BPU ID: NORMAL
BPU ID: NORMAL
BUN SERPL-MCNC: 8 MG/DL (ref 5–25)
CALCIUM SERPL-MCNC: 9.2 MG/DL (ref 8.3–10.1)
CHLORIDE SERPL-SCNC: 105 MMOL/L (ref 100–108)
CO2 SERPL-SCNC: 25 MMOL/L (ref 21–32)
CREAT SERPL-MCNC: 0.7 MG/DL (ref 0.6–1.3)
CROSSMATCH: NORMAL
CROSSMATCH: NORMAL
ERYTHROCYTE [DISTWIDTH] IN BLOOD BY AUTOMATED COUNT: 20.2 % (ref 11.6–15.1)
GFR SERPL CREATININE-BSD FRML MDRD: 109 ML/MIN/1.73SQ M
GLUCOSE P FAST SERPL-MCNC: 102 MG/DL (ref 65–99)
GLUCOSE SERPL-MCNC: 102 MG/DL (ref 65–140)
HCT VFR BLD AUTO: 27.1 % (ref 34.8–46.1)
HCT VFR BLD AUTO: 31.3 % (ref 34.8–46.1)
HGB BLD-MCNC: 7.9 G/DL (ref 11.5–15.4)
HGB BLD-MCNC: 9.5 G/DL (ref 11.5–15.4)
MCH RBC QN AUTO: 22.5 PG (ref 26.8–34.3)
MCHC RBC AUTO-ENTMCNC: 29.2 G/DL (ref 31.4–37.4)
MCV RBC AUTO: 77 FL (ref 82–98)
PLATELET # BLD AUTO: 235 THOUSANDS/UL (ref 149–390)
PMV BLD AUTO: 10.6 FL (ref 8.9–12.7)
POTASSIUM SERPL-SCNC: 4 MMOL/L (ref 3.5–5.3)
RBC # BLD AUTO: 3.51 MILLION/UL (ref 3.81–5.12)
SODIUM SERPL-SCNC: 140 MMOL/L (ref 136–145)
UNIT DISPENSE STATUS: NORMAL
UNIT DISPENSE STATUS: NORMAL
UNIT PRODUCT CODE: NORMAL
UNIT PRODUCT CODE: NORMAL
UNIT RH: NORMAL
UNIT RH: NORMAL
WBC # BLD AUTO: 6.09 THOUSAND/UL (ref 4.31–10.16)

## 2018-11-08 PROCEDURE — P9040 RBC LEUKOREDUCED IRRADIATED: HCPCS

## 2018-11-08 PROCEDURE — 80048 BASIC METABOLIC PNL TOTAL CA: CPT | Performed by: PHYSICIAN ASSISTANT

## 2018-11-08 PROCEDURE — 85027 COMPLETE CBC AUTOMATED: CPT | Performed by: PHYSICIAN ASSISTANT

## 2018-11-08 PROCEDURE — 85018 HEMOGLOBIN: CPT | Performed by: PHYSICIAN ASSISTANT

## 2018-11-08 PROCEDURE — 85014 HEMATOCRIT: CPT | Performed by: PHYSICIAN ASSISTANT

## 2018-11-08 PROCEDURE — 99232 SBSQ HOSP IP/OBS MODERATE 35: CPT | Performed by: PHYSICIAN ASSISTANT

## 2018-11-08 PROCEDURE — 30233N1 TRANSFUSION OF NONAUTOLOGOUS RED BLOOD CELLS INTO PERIPHERAL VEIN, PERCUTANEOUS APPROACH: ICD-10-PCS | Performed by: INTERNAL MEDICINE

## 2018-11-08 RX ORDER — MEGESTROL ACETATE 40 MG/1
40 TABLET ORAL 4 TIMES DAILY
Status: DISCONTINUED | OUTPATIENT
Start: 2018-11-08 | End: 2018-11-09 | Stop reason: HOSPADM

## 2018-11-08 RX ADMIN — MEGESTROL ACETATE 40 MG: 40 TABLET ORAL at 21:56

## 2018-11-08 RX ADMIN — CONJUGATED ESTROGENS 25 MG: 25 INJECTION, POWDER, LYOPHILIZED, FOR SOLUTION INTRAMUSCULAR; INTRAVENOUS at 16:38

## 2018-11-08 RX ADMIN — MEGESTROL ACETATE 40 MG: 40 TABLET ORAL at 16:42

## 2018-11-08 NOTE — PLAN OF CARE
DISCHARGE PLANNING     Discharge to home or other facility with appropriate resources Progressing        HEMATOLOGIC - ADULT     Maintains hematologic stability Progressing        INFECTION - ADULT     Absence or prevention of progression during hospitalization Progressing     Absence of fever/infection during neutropenic period Progressing        Knowledge Deficit     Patient/family/caregiver demonstrates understanding of disease process, treatment plan, medications, and discharge instructions Progressing        Nutrition/Hydration-ADULT     Nutrient/Hydration intake appropriate for improving, restoring or maintaining nutritional needs Progressing        PAIN - ADULT     Verbalizes/displays adequate comfort level or baseline comfort level Progressing        Potential for Falls     Patient will remain free of falls Progressing        SAFETY ADULT     Maintain or return to baseline ADL function Progressing     Maintain or return mobility status to optimal level Progressing     Patient will remain free of falls Progressing

## 2018-11-08 NOTE — ASSESSMENT & PLAN NOTE
· POA  Evidenced by fatigue, weakness, tachycardia, SOB   hgb 5 5 on arrival-- improved to 8 3, then dropped again to 7 9-- patient is still feeling weak/fatigued and is still having heavy vaginal bleeding  · Baseline hgb appears to be between 7-8  · Admit  · Will give another 1U of PRBC to keep patient's hgb stable

## 2018-11-08 NOTE — ASSESSMENT & PLAN NOTE
· Known prior hx  · Was previously on Rx Iron-- tolerated well until insurance no longer covered-- switched to OTC iron supplements and did not tolerated 2/2 nausea  · Encouraged PO iron

## 2018-11-08 NOTE — CONSULTS
Consultation - Gynecology  [facility] 43 y o  female MRN: [id number]  Unit/Bed#: -01 Encounter: [id number]      Consults      Chief Complaint   Patient presents with    Vaginal Bleeding     pt presents ambulatory with c/o mentrual cycle x 3 weeks, c/o weakness and dizziness over past few days  hx transfusion      HAd Mirena placed about 2 years ago for menorrhagia, helped some  History of Present Illness   Physician Requesting Consult: Litzy Lopez MD  Reason for Consult / Principal Problem: Anemia secondary to menorrhagia  Subspeciality: General GYN        Review of Systems    Neg except as noted:    No no longer with CP or SOB  NO N/V/D  No Abd Pain  No Extremety Pain  No significant weight changes    Historical Information   Past Medical History:   Diagnosis Date    Anemia     Chest tightness or pressure     8/19/2013     Past Surgical History:   Procedure Laterality Date    TONSILLECTOMY      TUBAL LIGATION      2006     OB History   No data available     Family History   Problem Relation Age of Onset    Heart disease Father      Social History   History   Alcohol Use    Yes     Comment: on the weekends/ socially     History   Drug Use No     History   Smoking Status    Current Some Day Smoker    Types: Cigarettes   Smokeless Tobacco    Never Used       Meds/Allergies   Current Facility-Administered Medications   Medication Dose Route Frequency    calcium carbonate (TUMS) chewable tablet 1,000 mg  1,000 mg Oral Daily PRN    megestrol (MEGACE) tablet 40 mg  40 mg Oral 4x Daily    nicotine (NICODERM CQ) 14 mg/24hr TD 24 hr patch 1 patch  1 patch Transdermal Daily    ondansetron (ZOFRAN) injection 4 mg  4 mg Intravenous Q6H PRN    senna (SENOKOT) tablet 8 6 mg  1 tablet Oral Daily         No Known Allergies    Objective   Vitals: Blood pressure 136/70, pulse 87, temperature 98 3 °F (36 8 °C), resp   rate 18, height 5' 1" (1 549 m), weight 101 kg (223 lb 5 2 oz), last menstrual period 10/17/2018, SpO2 97 %  Body mass index is 42 2 kg/m²        Intake/Output Summary (Last 24 hours) at 11/08/18 1640  Last data filed at 11/08/18 1500   Gross per 24 hour   Intake              325 ml   Output                0 ml   Net              325 ml       Invasive Devices     Peripheral Intravenous Line            Peripheral IV 11/08/18 Right Hand less than 1 day                Physical Exam    Lab Results:   Admission on 11/07/2018   Component Date Value    WBC 11/07/2018 6 79     RBC 11/07/2018 2 78*    Hemoglobin 11/07/2018 5 5*    Hematocrit 11/07/2018 20 4*    MCV 11/07/2018 73*    MCH 11/07/2018 19 8*    MCHC 11/07/2018 27 0*    RDW 11/07/2018 19 6*    MPV 11/07/2018 10 1     Platelets 36/78/4141 232     nRBC 11/07/2018 0     Neutrophils Relative 11/07/2018 67     Immat GRANS % 11/07/2018 0     Lymphocytes Relative 11/07/2018 21     Monocytes Relative 11/07/2018 9     Eosinophils Relative 11/07/2018 3     Basophils Relative 11/07/2018 0     Neutrophils Absolute 11/07/2018 4 50     Immature Grans Absolute 11/07/2018 0 03     Lymphocytes Absolute 11/07/2018 1 45     Monocytes Absolute 11/07/2018 0 63     Eosinophils Absolute 11/07/2018 0 17     Basophils Absolute 11/07/2018 0 01     Sodium 11/07/2018 138     Potassium 11/07/2018 4 1     Chloride 11/07/2018 104     CO2 11/07/2018 26     ANION GAP 11/07/2018 8     BUN 11/07/2018 9     Creatinine 11/07/2018 0 66     Glucose 11/07/2018 97     Calcium 11/07/2018 9 1     AST 11/07/2018 82*    ALT 11/07/2018 97*    Alkaline Phosphatase 11/07/2018 112     Total Protein 11/07/2018 7 2     Albumin 11/07/2018 3 3*    Total Bilirubin 11/07/2018 0 30     eGFR 11/07/2018 112     Protime 11/07/2018 14 4*    INR 11/07/2018 1 15     PTT 11/07/2018 35     ABO Grouping 11/07/2018 O     Rh Factor 11/07/2018 Positive     Antibody Screen 11/07/2018 Negative     Specimen Expiration Date 11/07/2018 [id number]     Color, UA 11/07/2018 Yellow     Clarity, UA 11/07/2018 Clear     Specific Gravity, UA 11/07/2018 1 025     pH, UA 11/07/2018 6 0     Leukocytes, UA 11/07/2018 Negative     Nitrite, UA 11/07/2018 Negative     Protein, UA 11/07/2018 Negative     Glucose, UA 11/07/2018 Negative     Ketones, UA 11/07/2018 Trace*    Urobilinogen, UA 11/07/2018 0 2     Bilirubin, UA 11/07/2018 Negative     Blood, UA 11/07/2018 Large*    RBC, UA 11/07/2018 20-30*    WBC, UA 11/07/2018 0-1*    Epithelial Cells 11/07/2018 Occasional     Bacteria, UA 11/07/2018 Occasional     Unit Product Code 11/08/2018 U5271G36     Unit Number 11/08/2018 [id number]     Unit ABO 11/08/2018 O     Unit RH 11/08/2018 POS     Crossmatch 11/08/2018 Compatible     Unit Dispense Status 11/08/2018 Presumed Trans     Unit Product Code 11/08/2018 J9304T71     Unit Number 11/08/2018 [id number]     Unit ABO 11/08/2018 O     Unit RH 11/08/2018 POS     Crossmatch 11/08/2018 Compatible     Unit Dispense Status 11/08/2018 Presumed Trans     TSH 3RD GENERATON 11/07/2018 2 075     Hemoglobin 11/07/2018 8 3*    Hematocrit 11/07/2018 28 3*    WBC 11/08/2018 6 09     RBC 11/08/2018 3 51*    Hemoglobin 11/08/2018 7 9*    Hematocrit 11/08/2018 27 1*    MCV 11/08/2018 77*    MCH 11/08/2018 22 5*    MCHC 11/08/2018 29 2*    RDW 11/08/2018 20 2*    Platelets 38/52/8583 235     MPV 11/08/2018 10 6     Sodium 11/08/2018 140     Potassium 11/08/2018 4 0     Chloride 11/08/2018 105     CO2 11/08/2018 25     ANION GAP 11/08/2018 10     BUN 11/08/2018 8     Creatinine 11/08/2018 0 70     Glucose 11/08/2018 102     Glucose, Fasting 11/08/2018 102*    Calcium 11/08/2018 9 2     eGFR 11/08/2018 109     Unit Product Code 11/08/2018 E7207S45     Unit Number 11/08/2018 [id number]     Unit ABO 11/08/2018 O     Unit RH 11/08/2018 POS     Crossmatch 11/08/2018 Compatible     Unit Dispense Status 11/08/2018 Issued      Imaging Studies: I have personally reviewed pertinent reports  and Images in PACS  EKG, Pathology, and Other Studies: I have personally reviewed pertinent reports  Assessment/Plan     Discussed ineffectiveness of IUD at controlling patient's bleeding and my confirmation of IUD like device  on ultrasound, though not noted by radiologist,    Discussed plan of using 1 dose of IV premarin FOR stabilization of endometrium and then Megace to control bleeding until sampling can be done in office after which plans for definitive management will be evaluated  The patient is in agreement  Counseling / Coordination of Care  Total floor / unit time spent today30 minutes  minutes  Greater than 50% of total time was spent with the patient and / or family counseling and / or coordination of care   A description of the counseling / coordination of care    Juliet Diaz MD  11/8/2018  4:40 PM

## 2018-11-08 NOTE — MALNUTRITION/BMI
This medical record reflects one or more clinical indicators suggestive of malnutrition and/or morbid obesity  BMI Findings:  BMI Classifications: Morbid Obesity 40-44 9     Body mass index is 42 2 kg/m²  See Nutrition note dated 11/8/18 for additional details  Completed nutrition assessment is viewable in the nutrition documentation

## 2018-11-08 NOTE — PROGRESS NOTES
Progress Note - Nicki Georges 1980, 43 y o  female MRN: [id number]    Unit/Bed#: -01 Encounter: [id number]    Primary Care Provider: Evangelist Price DO   Date and time admitted to hospital: 11/7/2018 10:59 AM    * Symptomatic anemia   Assessment & Plan    · POA  Evidenced by fatigue, weakness, tachycardia, SOB  hgb 5 5 on arrival-- improved to 8 3, then dropped again to 7 9-- patient is still feeling weak/fatigued and is still having heavy vaginal bleeding  · Baseline hgb appears to be between 7-8  · Admit  · Will give another 1U of PRBC to keep patient's hgb stable     Abnormal uterine bleeding (AUB)   Assessment & Plan    · POA  Intermittent, heavy vaginal bleeding with clots over the last 3 weeks  Patient reports in the last 1 week using 1 full box of tampons and 1 package of sanitary napkins  Still with heavy bleeding and clots overnight  · Prior hx of same about 1 year ago-- had Mirena placed and sx improved-- has had discussions with her OB about ablation/hysterectomy  · Pelvic US: Top normal slightly heterogeneous somewhat vascular endometrium  Medial related to phase of menstrual cycle  Apparent fundal uterine leiomyoma  · Will consult OB/GYN-- appreciate their input      Iron deficiency anemia   Assessment & Plan    · Known prior hx  · Was previously on Rx Iron-- tolerated well until insurance no longer covered-- switched to OTC iron supplements and did not tolerated 2/2 nausea  · Encouraged PO iron       VTE Pharmacologic Prophylaxis:   Pharmacologic: Pharmacologic VTE Prophylaxis contraindicated due to vaginal bleeding  Mechanical VTE Prophylaxis in Place: No    Patient Centered Rounds: I have performed bedside rounds with nursing staff today  Discussions with Specialists or Other Care Team Provider: CM, OB    Education and Discussions with Family / Patient: patient at bedside    Time Spent for Care: 30 minutes    More than 50% of total time spent on counseling and coordination of care as described above  Current Length of Stay: 0 day(s)    Current Patient Status: Observation   Certification Statement: The patient will continue to require additional inpatient hospital stay due to need for stabilization of hemoglobin, OB/Gyn consult    Discharge Plan: d/c home when medically stable  Pending OB/Gyn evaluation and hemoglobin stabilization  Hopeful d/c 24-48 hours    Code Status: Level 1 - Full Code      Subjective:   Patient feels improved, though still fatigued  Still with very heavy vaginal bleeding with clots-- does not feel there has been any improvement  No chest pains/SOB  Palpitations resolved  Objective:     Vitals:   Temp (24hrs), Av 6 °F (37 °C), Min:98 3 °F (36 8 °C), Max:99 °F (37 2 °C)    Temp:  [98 3 °F (36 8 °C)-99 °F (37 2 °C)] 98 6 °F (37 °C)  HR:  [] 86  Resp:  [18-20] 18  BP: (110-133)/(53-67) 111/59  SpO2:  [96 %-100 %] 98 %  Body mass index is 42 2 kg/m²  Input and Output Summary (last 24 hours): Intake/Output Summary (Last 24 hours) at 18 1140  Last data filed at 18 0001   Gross per 24 hour   Intake                0 ml   Output                0 ml   Net                0 ml       Physical Exam:     Physical Exam   Constitutional: She is oriented to person, place, and time  No distress  HENT:   Head: Normocephalic and atraumatic  Mouth/Throat: No oropharyngeal exudate  Eyes: Pupils are equal, round, and reactive to light  Conjunctivae and EOM are normal  No scleral icterus  Neck: No JVD present  Cardiovascular: Normal rate and regular rhythm  Exam reveals no gallop and no friction rub  No murmur heard  Pulmonary/Chest: Effort normal and breath sounds normal  No respiratory distress  She has no wheezes  She has no rales  Abdominal: Soft  Bowel sounds are normal  She exhibits no distension  There is no tenderness  There is no rebound  Musculoskeletal: She exhibits no edema or tenderness     Neurological: She is alert and oriented to person, place, and time  She displays normal reflexes  No cranial nerve deficit  She exhibits normal muscle tone  Skin: Skin is warm and dry  She is not diaphoretic  There is pallor  Psychiatric: She has a normal mood and affect  Her behavior is normal      Additional Data:     Labs:      Results from last 7 days  Lab Units 11/08/18  0521  11/07/18  1135   WBC Thousand/uL 6 09  --  6 79   HEMOGLOBIN g/dL 7 9*  < > 5 5*   HEMATOCRIT % 27 1*  < > 20 4*   PLATELETS Thousands/uL 235  --  232   NEUTROS PCT %  --   --  67   LYMPHS PCT %  --   --  21   MONOS PCT %  --   --  9   EOS PCT %  --   --  3   < > = values in this interval not displayed  Results from last 7 days  Lab Units 11/08/18  0521 11/07/18  1135   POTASSIUM mmol/L 4 0 4 1   CHLORIDE mmol/L 105 104   CO2 mmol/L 25 26   BUN mg/dL 8 9   CREATININE mg/dL 0 70 0 66   ANION GAP mmol/L 10 8   CALCIUM mg/dL 9 2 9 1   ALBUMIN g/dL  --  3 3*   TOTAL BILIRUBIN mg/dL  --  0 30   ALK PHOS U/L  --  112   ALT U/L  --  97*   AST U/L  --  82*       Results from last 7 days  Lab Units 11/07/18  1135   INR  1 15                       * I Have Reviewed All Lab Data Listed Above  * Additional Pertinent Lab Tests Reviewed: Yris 65 Admission Reviewed    Imaging:    Imaging Reports Reviewed Today Include: transvaginal US  Imaging Personally Reviewed by Myself Includes:  Transvaginal US    Recent Cultures (last 7 days):           Last 24 Hours Medication List:     Current Facility-Administered Medications:  calcium carbonate 1,000 mg Oral Daily PRN Danelle Garibay PA-C   nicotine 1 patch Transdermal Daily Danelle Garibay PA-C   ondansetron 4 mg Intravenous Q6H PRN Danelle Garibay PA-C   senna 1 tablet Oral Daily Danelle Garibay PA-C        Today, Patient Was Seen By: Piter Vogt PA-C    ** Please Note: Dictation voice to text software may have been used in the creation of this document   **

## 2018-11-08 NOTE — ASSESSMENT & PLAN NOTE
· POA  Intermittent, heavy vaginal bleeding with clots over the last 3 weeks  Patient reports in the last 1 week using 1 full box of tampons and 1 package of sanitary napkins  Still with heavy bleeding and clots overnight  · Prior hx of same about 1 year ago-- had Mirena placed and sx improved-- has had discussions with her OB about ablation/hysterectomy  · Pelvic US: Top normal slightly heterogeneous somewhat vascular endometrium  Medial related to phase of menstrual cycle  Apparent fundal uterine leiomyoma    · Will consult OB/GYN-- appreciate their input

## 2018-11-09 VITALS
OXYGEN SATURATION: 99 % | RESPIRATION RATE: 18 BRPM | HEIGHT: 61 IN | SYSTOLIC BLOOD PRESSURE: 125 MMHG | DIASTOLIC BLOOD PRESSURE: 58 MMHG | WEIGHT: 223.33 LBS | TEMPERATURE: 98.7 F | HEART RATE: 77 BPM | BODY MASS INDEX: 42.16 KG/M2

## 2018-11-09 LAB
ABO GROUP BLD BPU: NORMAL
BPU ID: NORMAL
CROSSMATCH: NORMAL
HCT VFR BLD AUTO: 29.9 % (ref 34.8–46.1)
HCT VFR BLD AUTO: 32 % (ref 34.8–46.1)
HGB BLD-MCNC: 9 G/DL (ref 11.5–15.4)
HGB BLD-MCNC: 9.7 G/DL (ref 11.5–15.4)
UNIT DISPENSE STATUS: NORMAL
UNIT PRODUCT CODE: NORMAL
UNIT RH: NORMAL

## 2018-11-09 PROCEDURE — 85018 HEMOGLOBIN: CPT | Performed by: INTERNAL MEDICINE

## 2018-11-09 PROCEDURE — 99238 HOSP IP/OBS DSCHRG MGMT 30/<: CPT | Performed by: INTERNAL MEDICINE

## 2018-11-09 PROCEDURE — 85014 HEMATOCRIT: CPT | Performed by: INTERNAL MEDICINE

## 2018-11-09 RX ORDER — NICOTINE 21 MG/24HR
1 PATCH, TRANSDERMAL 24 HOURS TRANSDERMAL DAILY
Qty: 28 PATCH | Refills: 0 | Status: SHIPPED | OUTPATIENT
Start: 2018-11-10 | End: 2018-11-13

## 2018-11-09 RX ADMIN — MEGESTROL ACETATE 40 MG: 40 TABLET ORAL at 08:31

## 2018-11-09 NOTE — ASSESSMENT & PLAN NOTE
· POA  Intermittent, heavy vaginal bleeding with clots over the last 3 weeks  Patient reports in the last 1 week using 1 full box of tampons and 1 package of sanitary napkins  Still with heavy bleeding and clots overnight  · Prior hx of same about 1 year ago-- had Mirena placed and sx improved-- has had discussions with her OB about ablation/hysterectomy  · Pelvic US: Top normal slightly heterogeneous somewhat vascular endometrium  Medial related to phase of menstrual cycle  Apparent fundal uterine leiomyoma    · Hemoglobin stable  · Case reviewed with Dr babs dodson  · Patient will be followed by Dr Erwin Montero on November 13th for further management

## 2018-11-09 NOTE — DISCHARGE SUMMARY
Discharge- Hira Matters 1980, 43 y o  female MRN: [id number]    Unit/Bed#: -01 Encounter: [id number]    Primary Care Provider: Lewis Prabhakar DO   Date and time admitted to hospital: 11/7/2018 10:59 AM        Abnormal uterine bleeding (AUB)   Assessment & Plan    · POA  Intermittent, heavy vaginal bleeding with clots over the last 3 weeks  Patient reports in the last 1 week using 1 full box of tampons and 1 package of sanitary napkins  Still with heavy bleeding and clots overnight  · Prior hx of same about 1 year ago-- had Mirena placed and sx improved-- has had discussions with her OB about ablation/hysterectomy  · Pelvic US: Top normal slightly heterogeneous somewhat vascular endometrium  Medial related to phase of menstrual cycle  Apparent fundal uterine leiomyoma    · Hemoglobin stable  · Case reviewed with Dr babs dodson  · Patient will be followed by Dr Kitty Pierce on November 13th for further management  · Please note patient had a dramatic and unexpected improvement in her abnormal uterine bleeding with stabilization of her hemoglobin and had drop in bleeding this was unexpected and patient required inpatient hospitalization due to her need for multiple units of packed red blood cell transfusion     * Symptomatic anemia   Assessment & Plan    · Hemoglobin stable  · Patient with outpatient gyn follow-up next week       Discharging Physician / Practitioner: Fabiola Payne MD  PCP: Lewis Prabhakar DO  Admission Date:   Admission Orders     Ordered        11/08/18 1807  Inpatient Admission  Once         11/07/18 1238  Place in Observation (expected length of stay for this patient is less than two midnights)  Once             Discharge Date: 11/09/18    Resolved Problems  Date Reviewed: 11/9/2018    None          Consultations During Hospital Stay:  · Gyn    Procedures Performed:     · Pelvic ultrasound top normal slightly heterogeneous somewhat vascular endometrium apparent fundal uterine leiomyoma    Significant Findings / Test Results:     · None    Incidental Findings:   · None     Test Results Pending at Discharge (will require follow up): · None     Outpatient Tests Requested:  · None    Complications:  None    Reason for Admission:  Vaginal bleeding    Hospital Course:     Ian Gonzalez is a 43 y o  female patient who originally presented to the hospital on 11/7/2018 due to vaginal bleeding  Patient with history of vaginal bleeding off and on for some time  She had IUD placed by family practice physician 2 years goes to [facility] fully controlled the bleeding  Originally this was working well with patient with several weeks of recurrent bleeding  Came to hospital with anemia requiring blood transfusions  Hemoglobin has been stable case was reviewed GI any will follow as outpatient in the office on November 13th for definitive plan regarding her abnormal uterine bleeding    Please see above list of diagnoses and related plan for additional information  Condition at Discharge: good     Discharge Day Visit / Exam:     Subjective:  My bleeding is much better  Vitals: Blood Pressure: 125/58 (11/09/18 0748)  Pulse: 77 (11/09/18 0748)  Temperature: 98 7 °F (37 1 °C) (11/09/18 0748)  Temp Source: Oral (11/09/18 0748)  Respirations: 18 (11/09/18 0748)  Height: 5' 1" (154 9 cm) (11/08/18 1140)  Weight - Scale: 101 kg (223 lb 5 2 oz) (11/07/18 1512)  SpO2: 99 % (11/09/18 0748)  Exam:   Physical Exam   Constitutional: She is oriented to person, place, and time  No distress  Cardiovascular: Normal rate  Exam reveals no gallop and no friction rub  No murmur heard  Pulmonary/Chest: Effort normal  No respiratory distress  She has no wheezes  She has no rales  Abdominal: Soft  She exhibits no distension  There is no tenderness  There is no rebound and no guarding  Musculoskeletal: She exhibits no edema  Neurological: She is alert and oriented to person, place, and time     Skin: She is not diaphoretic  Discharge instructions/Information to patient and family:   See after visit summary for information provided to patient and family  Provisions for Follow-Up Care:  See after visit summary for information related to follow-up care and any pertinent home health orders  Disposition:     Home    For Discharges to [address] [facility]:   · Not Applicable to this Patient - Not Applicable to this Patient    Planned Readmission: no     Discharge Statement:  I spent 30 minutes discharging the patient  This time was spent on the day of discharge  I had direct contact with the patient on the day of discharge  Greater than 50% of the total time was spent examining patient, answering all patient questions, arranging and discussing plan of care with patient as well as directly providing post-discharge instructions  Additional time then spent on discharge activities  Discharge Medications:  See after visit summary for reconciled discharge medications provided to patient and family        ** Please Note: This note has been constructed using a voice recognition system **

## 2018-11-13 ENCOUNTER — TRANSITIONAL CARE MANAGEMENT (OUTPATIENT)
Dept: FAMILY MEDICINE CLINIC | Facility: CLINIC | Age: 38
End: 2018-11-13

## 2018-11-13 ENCOUNTER — OFFICE VISIT (OUTPATIENT)
Dept: OBGYN CLINIC | Facility: CLINIC | Age: 38
End: 2018-11-13
Payer: COMMERCIAL

## 2018-11-13 VITALS
DIASTOLIC BLOOD PRESSURE: 74 MMHG | SYSTOLIC BLOOD PRESSURE: 124 MMHG | WEIGHT: 221 LBS | HEIGHT: 61 IN | BODY MASS INDEX: 41.72 KG/M2

## 2018-11-13 DIAGNOSIS — N93.9 ABNORMAL UTERINE BLEEDING: Primary | ICD-10-CM

## 2018-11-13 DIAGNOSIS — T83.32XA INTRAUTERINE CONTRACEPTIVE DEVICE THREADS LOST, INITIAL ENCOUNTER: ICD-10-CM

## 2018-11-13 DIAGNOSIS — D50.0 IRON DEFICIENCY ANEMIA DUE TO CHRONIC BLOOD LOSS: ICD-10-CM

## 2018-11-13 PROCEDURE — 99214 OFFICE O/P EST MOD 30 MIN: CPT | Performed by: OBSTETRICS & GYNECOLOGY

## 2018-11-13 NOTE — PROGRESS NOTES
Assessment/Plan     ER follow up    1  AUB: Likely expelled IUD  Will return to office for EMB  Patient is strongly considering hysterectomy     2  Anemia: s/p transfusion during hospitalization  Hgb 9 7 at discharge  3  Missing IUD: Not seen on recent [address]  Will order KUB to confirm suspected expulsion      CHIEF COMPLAINT: Hospital f/up for AUB      Subjective     Link Steen is a 43 y o  female who presents for hospital follow up  She was recently admitted for symptomatic anemia with a Hgb of 5 5 requiring blood transfusion and IV estrogen for management of heavy uterine bleeding  She states her periods have been heavy like this for the past 2 years  They last about 15 days on average despite Mirena IUD being in place since July 2017  She passes large clots and requires use of pads and tampons to avoid soaking her clothes  She is very frustrated with her bleeding as this is not the first time she has required hospitalization for this issue  ROS:   She denies fevers, chills, chest pain, shortness of breath, nausea, vomiting, abdominal pain  All other systems negative unless stated in HPI    Patient Active Problem List   Diagnosis    Symptomatic anemia    Abnormal uterine bleeding (AUB)    Thyroid nodule    Transaminitis    Depression    Iron deficiency anemia       The following portions of the patient's history were reviewed and updated as appropriate: allergies, current medications, past family history, past medical history, past social history and past surgical history  /74   Ht 5' 1" (1 549 m)   Wt 100 kg (221 lb)   LMP 10/17/2018   BMI 41 76 kg/m²     GEN: The patient was alert and oriented x3, pleasant well-appearing female in no acute distress  CV: Regular rate   PULM: Nonlabored respirations  : normal appearing external female genitalia  On bimanual exam, no cervical motion or uterine tenderness  Uterus is about 8 weeks in size with excellent lateral mobility   IUD strings not palpable  INTEG: Warm, dry, no rash  NEURO: No focal deficits  PSYCH: Appropriate mood and affect    IMAGING: TVUS images reviewed  IUD not visualized  Small fibroid noted  No other obvious abnormalities present

## 2018-12-04 ENCOUNTER — TELEPHONE (OUTPATIENT)
Dept: OBGYN CLINIC | Facility: CLINIC | Age: 38
End: 2018-12-04

## 2018-12-04 DIAGNOSIS — N93.9 ABNORMAL UTERINE BLEEDING: Primary | ICD-10-CM

## 2018-12-04 RX ORDER — NORGESTIMATE AND ETHINYL ESTRADIOL 0.25-0.035
1 KIT ORAL DAILY
Qty: 84 TABLET | Refills: 1 | Status: SHIPPED | OUTPATIENT
Start: 2018-12-04 | End: 2018-12-31 | Stop reason: HOSPADM

## 2018-12-04 NOTE — TELEPHONE ENCOUNTER
Patient has hx of anemia and is scheduled for [facility] on 12/11/18  States she started bleeding on 11/19 it was just blood tinge until 11/22  On 11/22 she started with heavy vaginal bleeding states it is on and off but at times she is saturating a pad and tampon in one hour  She is not symptomatic at this time but is feeling tired  Would like a call from provider to discuss Embx and what she should do  She believes she cannot have biopsy if she is bleeding  Routing to provider to call patient

## 2018-12-04 NOTE — PROGRESS NOTES
Patient called office, having heavy bleeding since 11/19  Very worried about bleeding so much that she might need another transfusion  Discussed starting an OCP taper to slow her current episode of bleeding and control future bleeding  Patient agreed  Will rescheduled EMB from 12/11 if still having heavy bleeding at that time  Patient cautioned about potential nausea with first 2 days of OCP taper

## 2018-12-05 ENCOUNTER — TELEPHONE (OUTPATIENT)
Dept: OBGYN CLINIC | Facility: CLINIC | Age: 38
End: 2018-12-05

## 2018-12-05 NOTE — TELEPHONE ENCOUNTER
Patient calling states pharmacist told her to check to make sure you wanted her to take OCP  She is a smoker and he was concerned with this  She know its only for a short time period, but wanted to make sure it was okay  Routing to provider for advise

## 2018-12-06 NOTE — TELEPHONE ENCOUNTER
LM on voicemail patient is to take OCP because it is only for a short period of time to get her bleeding to stop  Not planning on keeping on long term

## 2018-12-06 NOTE — TELEPHONE ENCOUNTER
Yes, because its for a short period just to get her bleeding to stop  Not planning on keeping her on it long term

## 2018-12-11 ENCOUNTER — PROCEDURE VISIT (OUTPATIENT)
Dept: OBGYN CLINIC | Facility: CLINIC | Age: 38
End: 2018-12-11
Payer: COMMERCIAL

## 2018-12-11 VITALS — WEIGHT: 222.8 LBS | DIASTOLIC BLOOD PRESSURE: 84 MMHG | SYSTOLIC BLOOD PRESSURE: 150 MMHG | BODY MASS INDEX: 42.1 KG/M2

## 2018-12-11 DIAGNOSIS — N93.9 ABNORMAL UTERINE BLEEDING (AUB): Primary | ICD-10-CM

## 2018-12-11 DIAGNOSIS — Z12.4 SCREENING FOR MALIGNANT NEOPLASM OF CERVIX: ICD-10-CM

## 2018-12-11 LAB — SL AMB POCT URINE HCG: NEGATIVE

## 2018-12-11 PROCEDURE — 88305 TISSUE EXAM BY PATHOLOGIST: CPT | Performed by: PATHOLOGY

## 2018-12-11 PROCEDURE — G0145 SCR C/V CYTO,THINLAYER,RESCR: HCPCS | Performed by: OBSTETRICS & GYNECOLOGY

## 2018-12-11 PROCEDURE — 87624 HPV HI-RISK TYP POOLED RSLT: CPT | Performed by: OBSTETRICS & GYNECOLOGY

## 2018-12-11 PROCEDURE — 58100 BIOPSY OF UTERUS LINING: CPT | Performed by: OBSTETRICS & GYNECOLOGY

## 2018-12-11 PROCEDURE — 81025 URINE PREGNANCY TEST: CPT | Performed by: OBSTETRICS & GYNECOLOGY

## 2018-12-11 NOTE — PROGRESS NOTES
Endometrial biopsy  Date/Time: 12/11/2018 3:47 PM  Performed by: Terese Cruz  Authorized by: Terese Cruz     Consent:     Consent obtained:  Verbal and written    Consent given by:  Patient    Procedural risks discussed:  Bleeding, failure rate, infection and possible continued pain    Patient questions answered: yes      Patient agrees, verbalizes understanding, and wants to proceed: yes    Indication:     Indications: Other disorder of menstruation and other abnormal bleeding from female genital tract    Procedure:     Procedure: endometrial biopsy with Pipelle      A bivalve speculum was placed in the vagina: yes      Cervix cleaned and prepped: yes      Uterus sounded: yes      Uterus sound depth (cm):  8    Specimen collected: specimen collected and sent to pathology      Patient tolerated procedure well with no complications: yes    Findings:     Uterus size:  6-8 weeks    Cervix: normal    Comments:      Pap collected prior to procedure  Patient tolerated procedure well

## 2018-12-21 LAB
HPV HR 12 DNA CVX QL NAA+PROBE: NEGATIVE
HPV16 DNA CVX QL NAA+PROBE: NEGATIVE
HPV18 DNA CVX QL NAA+PROBE: NEGATIVE
LAB AP GYN PRIMARY INTERPRETATION: NORMAL
Lab: NORMAL

## 2018-12-28 ENCOUNTER — HOSPITAL ENCOUNTER (INPATIENT)
Facility: HOSPITAL | Age: 38
LOS: 1 days | Discharge: HOME | DRG: 742 | End: 2018-12-31
Attending: EMERGENCY MEDICINE | Admitting: OBSTETRICS & GYNECOLOGY
Payer: COMMERCIAL

## 2018-12-28 ENCOUNTER — TELEPHONE (OUTPATIENT)
Dept: OBGYN CLINIC | Facility: CLINIC | Age: 38
End: 2018-12-28

## 2018-12-28 ENCOUNTER — APPOINTMENT (EMERGENCY)
Dept: RADIOLOGY | Facility: HOSPITAL | Age: 38
DRG: 742 | End: 2018-12-28
Payer: COMMERCIAL

## 2018-12-28 DIAGNOSIS — D25.1 FIBROIDS, INTRAMURAL: ICD-10-CM

## 2018-12-28 DIAGNOSIS — Z90.710 STATUS POST LAPAROSCOPIC HYSTERECTOMY: Primary | ICD-10-CM

## 2018-12-28 DIAGNOSIS — N93.9 ABNORMAL UTERINE BLEEDING (AUB): ICD-10-CM

## 2018-12-28 DIAGNOSIS — R10.32 ACUTE LEFT LOWER QUADRANT PAIN: ICD-10-CM

## 2018-12-28 DIAGNOSIS — R10.2 PELVIC PAIN IN FEMALE: ICD-10-CM

## 2018-12-28 DIAGNOSIS — D64.9 ANEMIA: ICD-10-CM

## 2018-12-28 DIAGNOSIS — N93.9 VAGINAL BLEEDING: ICD-10-CM

## 2018-12-28 LAB
ALBUMIN SERPL BCP-MCNC: 3.4 G/DL (ref 3.5–5)
ALP SERPL-CCNC: 107 U/L (ref 46–116)
ALT SERPL W P-5'-P-CCNC: 66 U/L (ref 12–78)
ANION GAP SERPL CALCULATED.3IONS-SCNC: 8 MMOL/L (ref 4–13)
AST SERPL W P-5'-P-CCNC: 95 U/L (ref 5–45)
BACTERIA UR QL AUTO: ABNORMAL /HPF
BASOPHILS # BLD AUTO: 0.04 THOUSANDS/ΜL (ref 0–0.1)
BASOPHILS NFR BLD AUTO: 0 % (ref 0–1)
BILIRUB SERPL-MCNC: 0.43 MG/DL (ref 0.2–1)
BILIRUB UR QL STRIP: NEGATIVE
BUN SERPL-MCNC: 10 MG/DL (ref 5–25)
CALCIUM SERPL-MCNC: 8.6 MG/DL (ref 8.3–10.1)
CHLORIDE SERPL-SCNC: 106 MMOL/L (ref 100–108)
CLARITY UR: ABNORMAL
CO2 SERPL-SCNC: 23 MMOL/L (ref 21–32)
COLOR UR: ABNORMAL
COLOR, POC: YELLOW
CREAT SERPL-MCNC: 0.72 MG/DL (ref 0.6–1.3)
EOSINOPHIL # BLD AUTO: 0.24 THOUSAND/ΜL (ref 0–0.61)
EOSINOPHIL NFR BLD AUTO: 2 % (ref 0–6)
ERYTHROCYTE [DISTWIDTH] IN BLOOD BY AUTOMATED COUNT: 17.8 % (ref 11.6–15.1)
EXT PREG TEST URINE: NEGATIVE
GFR SERPL CREATININE-BSD FRML MDRD: 106 ML/MIN/1.73SQ M
GLUCOSE SERPL-MCNC: 98 MG/DL (ref 65–140)
GLUCOSE UR STRIP-MCNC: NEGATIVE MG/DL
HCT VFR BLD AUTO: 27.3 % (ref 34.8–46.1)
HGB BLD-MCNC: 7.7 G/DL (ref 11.5–15.4)
HGB UR QL STRIP.AUTO: ABNORMAL
IMM GRANULOCYTES # BLD AUTO: 0.03 THOUSAND/UL (ref 0–0.2)
IMM GRANULOCYTES NFR BLD AUTO: 0 % (ref 0–2)
KETONES UR STRIP-MCNC: NEGATIVE MG/DL
LEUKOCYTE ESTERASE UR QL STRIP: ABNORMAL
LYMPHOCYTES # BLD AUTO: 1.42 THOUSANDS/ΜL (ref 0.6–4.47)
LYMPHOCYTES NFR BLD AUTO: 12 % (ref 14–44)
MCH RBC QN AUTO: 21 PG (ref 26.8–34.3)
MCHC RBC AUTO-ENTMCNC: 28.2 G/DL (ref 31.4–37.4)
MCV RBC AUTO: 75 FL (ref 82–98)
MONOCYTES # BLD AUTO: 0.86 THOUSAND/ΜL (ref 0.17–1.22)
MONOCYTES NFR BLD AUTO: 7 % (ref 4–12)
NEUTROPHILS # BLD AUTO: 9.11 THOUSANDS/ΜL (ref 1.85–7.62)
NEUTS SEG NFR BLD AUTO: 79 % (ref 43–75)
NITRITE UR QL STRIP: NEGATIVE
NON-SQ EPI CELLS URNS QL MICRO: ABNORMAL /HPF
NRBC BLD AUTO-RTO: 0 /100 WBCS
PH UR STRIP.AUTO: 5.5 [PH] (ref 4.5–8)
PLATELET # BLD AUTO: 208 THOUSANDS/UL (ref 149–390)
PMV BLD AUTO: 10.8 FL (ref 8.9–12.7)
POTASSIUM SERPL-SCNC: 3.7 MMOL/L (ref 3.5–5.3)
PROT SERPL-MCNC: 7.9 G/DL (ref 6.4–8.2)
PROT UR STRIP-MCNC: ABNORMAL MG/DL
RBC # BLD AUTO: 3.66 MILLION/UL (ref 3.81–5.12)
RBC #/AREA URNS AUTO: ABNORMAL /HPF
SODIUM SERPL-SCNC: 137 MMOL/L (ref 136–145)
SP GR UR STRIP.AUTO: >=1.03 (ref 1–1.03)
UROBILINOGEN UR QL STRIP.AUTO: 0.2 E.U./DL
WBC # BLD AUTO: 11.7 THOUSAND/UL (ref 4.31–10.16)
WBC #/AREA URNS AUTO: ABNORMAL /HPF

## 2018-12-28 PROCEDURE — 36415 COLL VENOUS BLD VENIPUNCTURE: CPT | Performed by: EMERGENCY MEDICINE

## 2018-12-28 PROCEDURE — 76856 US EXAM PELVIC COMPLETE: CPT

## 2018-12-28 PROCEDURE — 96374 THER/PROPH/DIAG INJ IV PUSH: CPT

## 2018-12-28 PROCEDURE — 99285 EMERGENCY DEPT VISIT HI MDM: CPT

## 2018-12-28 PROCEDURE — 81001 URINALYSIS AUTO W/SCOPE: CPT

## 2018-12-28 PROCEDURE — 74177 CT ABD & PELVIS W/CONTRAST: CPT

## 2018-12-28 PROCEDURE — 81025 URINE PREGNANCY TEST: CPT | Performed by: EMERGENCY MEDICINE

## 2018-12-28 PROCEDURE — 85025 COMPLETE CBC W/AUTO DIFF WBC: CPT | Performed by: EMERGENCY MEDICINE

## 2018-12-28 PROCEDURE — 96361 HYDRATE IV INFUSION ADD-ON: CPT

## 2018-12-28 PROCEDURE — 87086 URINE CULTURE/COLONY COUNT: CPT

## 2018-12-28 PROCEDURE — 76830 TRANSVAGINAL US NON-OB: CPT

## 2018-12-28 PROCEDURE — 80053 COMPREHEN METABOLIC PANEL: CPT | Performed by: EMERGENCY MEDICINE

## 2018-12-28 PROCEDURE — 96375 TX/PRO/DX INJ NEW DRUG ADDON: CPT

## 2018-12-28 RX ORDER — OXYCODONE HYDROCHLORIDE 10 MG/1
10 TABLET ORAL EVERY 4 HOURS PRN
Status: DISCONTINUED | OUTPATIENT
Start: 2018-12-28 | End: 2018-12-31 | Stop reason: HOSPADM

## 2018-12-28 RX ORDER — HYDROMORPHONE HCL/PF 1 MG/ML
1 SYRINGE (ML) INJECTION EVERY 4 HOURS PRN
Status: DISCONTINUED | OUTPATIENT
Start: 2018-12-28 | End: 2018-12-28

## 2018-12-28 RX ORDER — INDOMETHACIN 25 MG/1
25 CAPSULE ORAL
Status: DISCONTINUED | OUTPATIENT
Start: 2018-12-28 | End: 2018-12-30

## 2018-12-28 RX ORDER — HYDROMORPHONE HCL/PF 1 MG/ML
0.5 SYRINGE (ML) INJECTION EVERY 4 HOURS PRN
Status: DISCONTINUED | OUTPATIENT
Start: 2018-12-28 | End: 2018-12-28

## 2018-12-28 RX ORDER — HYDROMORPHONE HCL/PF 1 MG/ML
1 SYRINGE (ML) INJECTION ONCE
Status: COMPLETED | OUTPATIENT
Start: 2018-12-28 | End: 2018-12-28

## 2018-12-28 RX ORDER — ACETAMINOPHEN 325 MG/1
975 TABLET ORAL EVERY 8 HOURS SCHEDULED
Status: DISCONTINUED | OUTPATIENT
Start: 2018-12-28 | End: 2018-12-30

## 2018-12-28 RX ORDER — HYDROMORPHONE HCL/PF 1 MG/ML
1 SYRINGE (ML) INJECTION EVERY 4 HOURS PRN
Status: DISCONTINUED | OUTPATIENT
Start: 2018-12-28 | End: 2018-12-29

## 2018-12-28 RX ORDER — FENTANYL CITRATE 50 UG/ML
75 INJECTION, SOLUTION INTRAMUSCULAR; INTRAVENOUS ONCE
Status: COMPLETED | OUTPATIENT
Start: 2018-12-28 | End: 2018-12-28

## 2018-12-28 RX ORDER — ONDANSETRON 2 MG/ML
4 INJECTION INTRAMUSCULAR; INTRAVENOUS ONCE
Status: COMPLETED | OUTPATIENT
Start: 2018-12-28 | End: 2018-12-28

## 2018-12-28 RX ORDER — OXYCODONE HYDROCHLORIDE 5 MG/1
5 TABLET ORAL EVERY 4 HOURS PRN
Status: DISCONTINUED | OUTPATIENT
Start: 2018-12-28 | End: 2018-12-31 | Stop reason: HOSPADM

## 2018-12-28 RX ORDER — KETOROLAC TROMETHAMINE 30 MG/ML
15 INJECTION, SOLUTION INTRAMUSCULAR; INTRAVENOUS ONCE
Status: COMPLETED | OUTPATIENT
Start: 2018-12-28 | End: 2018-12-28

## 2018-12-28 RX ADMIN — FENTANYL CITRATE 75 MCG: 50 INJECTION, SOLUTION INTRAMUSCULAR; INTRAVENOUS at 13:35

## 2018-12-28 RX ADMIN — SODIUM CHLORIDE 1000 ML: 0.9 INJECTION, SOLUTION INTRAVENOUS at 11:57

## 2018-12-28 RX ADMIN — HYDROMORPHONE HYDROCHLORIDE 1 MG: 1 INJECTION, SOLUTION INTRAMUSCULAR; INTRAVENOUS; SUBCUTANEOUS at 11:59

## 2018-12-28 RX ADMIN — IOHEXOL 100 ML: 350 INJECTION, SOLUTION INTRAVENOUS at 15:26

## 2018-12-28 RX ADMIN — KETOROLAC TROMETHAMINE 15 MG: 30 INJECTION, SOLUTION INTRAMUSCULAR at 15:42

## 2018-12-28 RX ADMIN — CEFTRIAXONE SODIUM 1000 MG: 10 INJECTION, POWDER, FOR SOLUTION INTRAVENOUS at 20:09

## 2018-12-28 RX ADMIN — ACETAMINOPHEN 975 MG: 325 TABLET, FILM COATED ORAL at 17:48

## 2018-12-28 RX ADMIN — OXYCODONE HYDROCHLORIDE 10 MG: 10 TABLET ORAL at 23:20

## 2018-12-28 RX ADMIN — HYDROMORPHONE HYDROCHLORIDE 1 MG: 1 INJECTION, SOLUTION INTRAMUSCULAR; INTRAVENOUS; SUBCUTANEOUS at 17:48

## 2018-12-28 RX ADMIN — OXYCODONE HYDROCHLORIDE 10 MG: 10 TABLET ORAL at 19:09

## 2018-12-28 RX ADMIN — ONDANSETRON 4 MG: 2 INJECTION INTRAMUSCULAR; INTRAVENOUS at 11:58

## 2018-12-28 RX ADMIN — HYDROMORPHONE HYDROCHLORIDE 1 MG: 1 INJECTION, SOLUTION INTRAMUSCULAR; INTRAVENOUS; SUBCUTANEOUS at 22:15

## 2018-12-28 NOTE — ED ATTENDING ATTESTATION
Facundo Coels MD, saw and evaluated the patient  I have discussed the patient with the resident/non-physician practitioner and agree with the resident's/non-physician practitioner's findings, Plan of Care, and MDM as documented in the resident's/non-physician practitioner's note, except where noted  All available labs and Radiology studies were reviewed  At this point I agree with the current assessment done in the Emergency Department  I have conducted an independent evaluation of this patient a history and physical is as follows: 42 y/o female with hx of anemia from dysmenorrhea  Patient with severe left abdominal pain today  Heart RRR, lungs CTA, tender in LLQ        Critical Care Time  CritCare Time    Procedures

## 2018-12-28 NOTE — TELEPHONE ENCOUNTER
Pt called and states that she saw Dr Cheryle Wang a couple of weeks ago for a pap and biopsy  Both tests were normal  She was given birth control to try and help with her periods and cramping  She is currently on her sugar week and has a light period now  She states the cramping is unbearable  She took 800mg of Motrin this morning and is still in pain  Per   [address] , Pt can alternate with 625mg Tylenol every 3-4 hours and use a heating pad  If the pain does not get better, she can go to the [place] [address] for observation  Pt understands

## 2018-12-28 NOTE — ED NOTES
Patient back from [address] [place] [address],[address] at this time        Lis Garcia  12/28/18 3722

## 2018-12-28 NOTE — ED PROVIDER NOTES
History  Chief Complaint   Patient presents with    Abdominal Pain     lower abd pain increasing over past 24 hours     30-year-old female with past medical history of anemia and dysfunctional uterine bleeding presents emergency department with 2 day history of intermittent severe left sided pelvic pain which radiates along her left flank and left thigh  Patient also states that she has vaginal bleeding with reports of heavy irregular menses  Patient was recently started on a new oral contraceptive and thought the symptoms was related to this however when pain worsen she came to the emergency department  Patient denies any trauma/injury, headache, vision changes, dizziness/lightheadedness/syncope, neck pain/back pain chest pain, dyspnea, cough/cold symptoms, fever/chills, nausea/vomiting/diarrhea, urinary symptoms, purulent vaginal discharge, rash, peripheral edema or focal neuro deficits  Patient has past surgical history of tonsillectomy and tubal ligation  Patient is a current smoker, social EtOH use, denies any recreational drug use  Patient has been taking Motrin and Tylenol interchangeably  No additional interventions prior to arrival, no other complaints this time  Prior to Admission Medications   Prescriptions Last Dose Informant Patient Reported? Taking? Fe Cbn-Fe Gluc-FA-B12-C-DSS (FE 90 PLUS PO)   Yes Yes   Sig: Take by mouth   norgestimate-ethinyl estradiol (ORTHO-CYCLEN) 0 25-35 MG-MCG per tablet   No Yes   Sig: Take 1 tablet by mouth daily Take 4 pills one day, then 3 pills the next day, then 2, then 1 daily   Finish 1st pack and start next pack      Facility-Administered Medications: None       Past Medical History:   Diagnosis Date    Anemia     Chest tightness or pressure     8/19/2013       Past Surgical History:   Procedure Laterality Date    CYSTOSCOPY N/A 12/30/2018    Procedure: CYSTOSCOPY;  Surgeon: Alvin Summers DO;  Location: [facility];  Service: Gynecology   [address] [place] [address] HYSTERECTOMY N/A 12/30/2018    Procedure: HYSTERECTOMY LAPAROSCOPIC TOTAL ([address]); Surgeon: Vivek Juarez DO;  Location[facility];  Service: Gynecology    VT LAP,RMV  ADNEXAL STRUCTURE Bilateral 12/30/2018    Procedure: SALPINGECTOMY, LAPAROSCOPIC;  Surgeon: Vivek Juarez DO;  [facility];  Service: Gynecology    TONSILLECTOMY      TUBAL LIGATION      2006       Family History   Problem Relation Age of Onset    Heart disease Father      I have reviewed and agree with the history as documented  Social History   Substance Use Topics    Smoking status: Current Some Day Smoker     Types: Cigarettes    Smokeless tobacco: Never Used      Comment: social smoker    Alcohol use Yes      Comment: on the weekends/ socially        Review of Systems   Constitutional: Negative for chills, fatigue and fever  HENT: Negative for congestion, rhinorrhea and sore throat  Eyes: Negative for pain, redness and visual disturbance  Respiratory: Negative for cough, chest tightness, shortness of breath, wheezing and stridor  Cardiovascular: Negative for chest pain, palpitations and leg swelling  Gastrointestinal: Positive for abdominal pain (L pelvic/LLQ abdominal pain radiates to L flank/L thigh)  Negative for blood in stool, constipation, diarrhea, nausea and vomiting  Genitourinary: Positive for vaginal bleeding  Negative for dysuria, frequency, hematuria and urgency  Musculoskeletal: Negative for back pain and neck pain  Skin: Negative for pallor and rash  Neurological: Negative for dizziness, seizures, syncope, weakness, light-headedness and headaches  Psychiatric/Behavioral: Negative for agitation and confusion         Physical Exam  ED Triage Vitals   Temperature Pulse Respirations Blood Pressure SpO2   12/28/18 1107 12/28/18 1107 12/28/18 1107 12/28/18 1107 12/28/18 1107   97 7 °F (36 5 °C) 100 20 (!) 200/86 100 %      Temp Source Heart Rate Source Patient Position - Orthostatic VS BP Location FiO2 (%)   12/28/18 2320 12/28/18 1107 12/28/18 1215 12/28/18 1107 --   Oral Monitor Sitting Right arm       Pain Score       12/28/18 1107       9           Orthostatic Vital Signs  Vitals:    12/30/18 2100 12/30/18 2124 12/30/18 2306 12/31/18 0256   BP: 125/72 148/62 144/63 130/61   Pulse: 73 74 79 92   Patient Position - Orthostatic VS:  Lying  Lying       Physical Exam   Constitutional: She is oriented to person, place, and time  She appears well-developed and well-nourished  She appears distressed (moderate distress 2/2 pain)  Pt crying and holding abdomen during exam   HENT:   Head: Normocephalic and atraumatic  Nose: Nose normal    Mouth/Throat: Oropharynx is clear and moist  No oropharyngeal exudate  Eyes: Pupils are equal, round, and reactive to light  Conjunctivae and EOM are normal  Right eye exhibits no discharge  Left eye exhibits no discharge  Neck: Normal range of motion  Neck supple  No JVD present  No tracheal deviation present  Cardiovascular: Normal rate, regular rhythm, normal heart sounds and intact distal pulses  Exam reveals no gallop and no friction rub  No murmur heard  Pulmonary/Chest: Effort normal and breath sounds normal  No stridor  No respiratory distress  She has no wheezes  She has no rales  She exhibits no tenderness  Abdominal: Soft  Bowel sounds are normal  She exhibits no distension  There is tenderness (LLQ abdominal tenderness)  There is guarding  There is no rebound  Musculoskeletal: Normal range of motion  She exhibits no edema, tenderness or deformity  Neurological: She is alert and oriented to person, place, and time  No cranial nerve deficit  Skin: Skin is warm and dry  Capillary refill takes less than 2 seconds  No rash noted  She is not diaphoretic  Psychiatric: She has a normal mood and affect  Nursing note and vitals reviewed        ED Medications  Medications   oxyCODONE (ROXICODONE) IR tablet 5 mg ( Oral MAR Unhold 12/30/18 2121) oxyCODONE (ROXICODONE) immediate release tablet 10 mg ( Oral MAR Unhold 12/30/18 2121)   ondansetron (ZOFRAN) injection 4 mg ( Intravenous MAR Unhold 12/30/18 2121)   ferrous sulfate tablet 325 mg (325 mg Oral Given 12/31/18 0601)   ropivacaine 0 1% and fentaNYL 2 mcg/mL PCEA ( Epidural Stopped 12/31/18 0700)   lactated ringers infusion (0 mL/hr Intravenous Stopped 12/31/18 0738)   docusate sodium (COLACE) capsule 100 mg (100 mg Oral Given 12/31/18 0818)   acetaminophen (TYLENOL) tablet 650 mg (not administered)   ibuprofen (MOTRIN) tablet 600 mg (600 mg Oral Given 12/31/18 0738)   polyethylene glycol (MIRALAX) packet 17 g (17 g Oral Given 12/31/18 0818)   sodium chloride 0 9 % bolus 1,000 mL (0 mL Intravenous Stopped 12/28/18 1541)   ondansetron (ZOFRAN) injection 4 mg (4 mg Intravenous Given 12/28/18 1158)   HYDROmorphone (DILAUDID) injection 1 mg (1 mg Intravenous Given 12/28/18 1159)   fentanyl citrate (PF) 100 MCG/2ML 75 mcg (75 mcg Intravenous Given 12/28/18 1335)   ketorolac (TORADOL) injection 15 mg (15 mg Intravenous Given 12/28/18 1542)   iohexol (OMNIPAQUE) 350 MG/ML injection (MULTI-DOSE) 100 mL (100 mL Intravenous Given 12/28/18 1526)   ceftriaxone (ROCEPHIN) 1 g/50 mL in dextrose IVPB (0 mg Intravenous Stopped 12/28/18 2214)   tranexamic Acid 1,000 mg in sodium chloride 0 9 % 100 mL IVPB Loading Dose (1,000 mg Intravenous Given 12/29/18 1149)   norgestrel-ethinyl estradiol (LO/OVRAL) 0 3 mg-30 mcg per tablet 1 tablet (1 tablet Oral Given 12/30/18 0512)   iron sucrose (VENOFER) 200 mg in sodium chloride 0 9 % 100 mL IVPB (0 mg Intravenous Stopped 12/29/18 1150)   HYDROmorphone (DILAUDID) injection 1 mg (1 mg Intravenous Given 12/29/18 1009)   HYDROmorphone (DILAUDID) injection 1 mg (1 mg Intravenous Given 12/29/18 1522)   HYDROmorphone (DILAUDID) injection 1 mg (1 mg Intravenous Given 12/30/18 0548)   diphenhydrAMINE (BENADRYL) tablet 25 mg (25 mg Oral Given 12/30/18 1231)   ceFAZolin (ANCEF) IVPB (premix) 2,000 mg (2,000 mg Intravenous Given 12/30/18 1538)   lactated ringers bolus 500 mL (0 mL Intravenous Stopped 12/31/18 0348)   lactated ringers bolus 500 mL (500 mL Intravenous New Bag 12/31/18 0738)       Diagnostic Studies  Results Reviewed     Procedure Component Value Units Date/Time    Urine culture [[id number]] Collected:  12/28/18 1209    Lab Status:  Final result Specimen:  Urine from Urine, Clean Catch Updated:  12/29/18 1236     Urine Culture 10,000-19,000 cfu/ml     Comprehensive metabolic panel [[id number]]  (Abnormal) Collected:  12/29/18 5902    Lab Status:  Final result Specimen:  Blood from Hand, Right Updated:  12/29/18 0713     Sodium 135 (L) mmol/L      Potassium 3 8 mmol/L      Chloride 104 mmol/L      CO2 23 mmol/L      ANION GAP 8 mmol/L      BUN 7 mg/dL      Creatinine 0 74 mg/dL      Glucose 110 mg/dL      Glucose, Fasting 110 (H) mg/dL      Calcium 8 5 mg/dL      AST 41 U/L      ALT 53 U/L      Alkaline Phosphatase 100 U/L      Total Protein 7 5 g/dL      Albumin 3 0 (L) g/dL      Total Bilirubin 0 44 mg/dL      eGFR 102 ml/min/1 73sq m     Narrative:         National Kidney Disease Education Program recommendations are as follows:  GFR calculation is accurate only with a steady state creatinine  Chronic Kidney disease less than 60 ml/min/1 73 sq  meters  Kidney failure less than 15 ml/min/1 73 sq  meters      CBC and differential [[id number]]  (Abnormal) Collected:  12/29/18 0633    Lab Status:  Final result Specimen:  Blood from Hand, Right Updated:  12/29/18 0651     WBC 12 24 (H) Thousand/uL      RBC 3 44 (L) Million/uL      Hemoglobin 7 0 (L) g/dL      Hematocrit 25 7 (L) %      MCV 75 (L) fL      MCH 20 3 (L) pg      MCHC 27 2 (L) g/dL      RDW 17 9 (H) %      MPV 11 5 fL      Platelets 665 Thousands/uL      nRBC 0 /100 WBCs      Neutrophils Relative 77 (H) %      Immat GRANS % 0 %      Lymphocytes Relative 14 %      Monocytes Relative 7 %      Eosinophils Relative 2 % Basophils Relative 0 %      Neutrophils Absolute 9 39 (H) Thousands/µL      Immature Grans Absolute 0 04 Thousand/uL      Lymphocytes Absolute 1 69 Thousands/µL      Monocytes Absolute 0 82 Thousand/µL      Eosinophils Absolute 0 27 Thousand/µL      Basophils Absolute 0 03 Thousands/µL     Comprehensive metabolic panel [[id number]]  (Abnormal) Collected:  12/28/18 1201    Lab Status:  Final result Specimen:  Blood from Arm, Left Updated:  12/28/18 1234     Sodium 137 mmol/L      Potassium 3 7 mmol/L      Chloride 106 mmol/L      CO2 23 mmol/L      ANION GAP 8 mmol/L      BUN 10 mg/dL      Creatinine 0 72 mg/dL      Glucose 98 mg/dL      Calcium 8 6 mg/dL      AST 95 (H) U/L      ALT 66 U/L      Alkaline Phosphatase 107 U/L      Total Protein 7 9 g/dL      Albumin 3 4 (L) g/dL      Total Bilirubin 0 43 mg/dL      eGFR 106 ml/min/1 73sq m     Narrative:         National Kidney Disease Education Program recommendations are as follows:  GFR calculation is accurate only with a steady state creatinine  Chronic Kidney disease less than 60 ml/min/1 73 sq  meters  Kidney failure less than 15 ml/min/1 73 sq  meters      Urine Microscopic [[id number]]  (Abnormal) Collected:  12/28/18 1209    Lab Status:  Final result Specimen:  Urine from Urine, Clean Catch Updated:  12/28/18 1232     RBC, UA None Seen /hpf      WBC, UA 20-30 (A) /hpf      Epithelial Cells Occasional /hpf      Bacteria, UA Occasional /hpf     POCT urinalysis dipstick [[id number]]  (Normal) Resulted:  12/28/18 1209    Lab Status:  Final result Specimen:  Urine Updated:  12/28/18 1209     Color, UA yellow    CBC and differential [[id number]]  (Abnormal) Collected:  12/28/18 1201    Lab Status:  Final result Specimen:  Blood from Arm, Left Updated:  12/28/18 1209     WBC 11 70 (H) Thousand/uL      RBC 3 66 (L) Million/uL      Hemoglobin 7 7 (L) g/dL      Hematocrit 27 3 (L) %      MCV 75 (L) fL      MCH 21 0 (L) pg      MCHC 28 2 (L) g/dL      RDW 17 8 (H) %      MPV 10 8 fL      Platelets 009 Thousands/uL      nRBC 0 /100 WBCs      Neutrophils Relative 79 (H) %      Immat GRANS % 0 %      Lymphocytes Relative 12 (L) %      Monocytes Relative 7 %      Eosinophils Relative 2 %      Basophils Relative 0 %      Neutrophils Absolute 9 11 (H) Thousands/µL      Immature Grans Absolute 0 03 Thousand/uL      Lymphocytes Absolute 1 42 Thousands/µL      Monocytes Absolute 0 86 Thousand/µL      Eosinophils Absolute 0 24 Thousand/µL      Basophils Absolute 0 04 Thousands/µL     POCT pregnancy, urine [[id number]]  (Normal) Resulted:  12/28/18 1209    Lab Status:  Final result Updated:  12/28/18 1209     EXT PREG TEST UR (Ref: Negative) negative    ED Urine Macroscopic [[id number]]  (Abnormal) Collected:  12/28/18 1209    Lab Status:  Final result Specimen:  Urine Updated:  12/28/18 1208     Color, UA Liz     Clarity, UA Slightly Cloudy     pH, UA 5 5     Leukocytes, UA Trace (A)     Nitrite, UA Negative     Protein, UA 30 (1+) (A) mg/dl      Glucose, UA Negative mg/dl      Ketones, UA Negative mg/dl      Urobilinogen, UA 0 2 E U /dl      Bilirubin, UA Negative     Blood, UA Large (A)     Specific Gravity, UA >=1 030    Narrative:       CLINITEK RESULT                 US pelvis complete w transvaginal   Final Result by Jose Rafael Lacy MD (12/29 2206)      1  Heterogeneous appearing uterus with prominent intramural fibroid near the uterine fundus  2   Heterogeneous appearance of the endometrial stripe in this patient with active bleeding  3  Interval development of a small fluid collection anterior to the uterus with low level internal echoes  This appears new from prior examination and may represent a small amount of blood within the pelvis  4   No evidence of acute ovarian torsion  Workstation performed: [id number]         CT abdomen pelvis with contrast   Final Result by Daquan De Leon MD (12/28 1539)      No acute inflammatory process identified  Hepatomegaly with steatosis  Splenomegaly  Nonspecific heterogeneous appearance of the uterus  Previous ultrasound demonstrated possible focal adenomyosis versus fibroid  Pelvic MRI could be obtained for further evaluation  Workstation performed: [id number]         US pelvis complete w transvaginal   Final Result by Jose Gallo MD (12/28 9457)       1  Unremarkable ovaries though limited Doppler evaluation of the left ovary  Torsion is unlikely given normal size and grayscale appearance  2   Anterior myometrial heterogeneity with cystic foci and refractory shadowing  This was measured as a fibroid on the previous study though borders are ill-defined and this may represent adenomyosis  If there is clinical concern, these entities would be    best differentiated with MRI  Workstation performed: [id number]               Procedures  Procedures      Phone Consults  ED Phone Contact    ED Course         Ultrasound was limited secondary to high position of L ovary in pelvis, spoke with radiologist who stated that no signs of ovarian torsion despite limited exam are present  Reevaluated patient who continues to have severe intractable pain will obtain CT abdomen/pelvis for further evaluation, additional analgesics provided  No additional cause of patient discomfort found on CT abdomen/pelvis, spoke with Dr Ed Davis and consulted OBGYN for evaluation and admission for further evaluation of intractable pelvic pain and vaginal bleeding  Patient admitted to OBGYN with continuation of patient care by Dr Ed Davis                   [facility]  CritCare Time    Disposition  Final diagnoses:   Anemia   Acute left lower quadrant pain   Vaginal bleeding     Time reflects when diagnosis was documented in both MDM as applicable and the Disposition within this note     Time User Action Codes Description Comment    12/28/2018  4:45 PM Macky Holstein Add [D64 9] Anemia     12/28/2018 4:45 PM Eneida Gandhi Add [R10 32] Acute left lower quadrant pain     12/28/2018  4:45 PM Eneida Gandhi Add [N93 9] Vaginal bleeding     75/80/3126 79:21 AM [facility] Add [P02 4] Pelvic pain in female     51/43/4317 25:72 AM [facility] Modify [U28 7] Pelvic pain in female     09/82/7302 84:55 AM [facility], [facility] M Add [H06 1] Abnormal uterine bleeding (AUB)     01/42/7652 04:69 AM Laura Bal Add [K76 3] Fibroids, intramural     12/30/2018  6:32 PM Con Chas Modify [R10 2] Pelvic pain in female     12/30/2018  6:32 PM Con Chas Modify [N93 9] Abnormal uterine bleeding (AUB)     12/30/2018  6:32 PM Marli CULP Modify [D25 1] Fibroids, intramural     12/30/2018 10:16 PM Rosette Steven Modify [D64 9] Anemia     12/30/2018 10:16 PM Lum, Janeth Mckusick Modify [R10 2] Pelvic pain in female     12/30/2018 10:16 PM Rosette Steven Modify [N93 9] Abnormal uterine bleeding (AUB)     12/30/2018 10:16 PM Lum, Janeth Mckusick Modify [D25 1] Fibroids, intramural     12/30/2018 10:16 PM Lum, Janeth Mckusick Add [Z90 710] Status post laparoscopic hysterectomy       ED Disposition     None      Follow-up Information     Follow up With Specialties Details Why Contact Treva Watson DO Obstetrics and Gynecology, Obstetrics, Gynecology Schedule an appointment as soon as possible for a visit in 2 week(s) Please call the office to schedule a postoperative appointment in 2 weeks [address] [facility]  [phone]            Current Discharge Medication List      START taking these medications    Details   oxyCODONE (ROXICODONE) 5 mg immediate release tablet Take 1 tablet (5 mg total) by mouth every 6 (six) hours as needed for moderate pain or severe pain for up to 18 doses Max Daily Amount: 20 mg  Qty: 30 tablet, Refills: 0    Associated Diagnoses: Status post laparoscopic hysterectomy         CONTINUE these medications which have NOT CHANGED    Details   Fe Cbn-Fe Gluc-FA-B12-C-DSS (FE 90 PLUS PO) Take by mouth         STOP taking these medications       norgestimate-ethinyl estradiol (ORTHO-CYCLEN) 0 25-35 MG-MCG per tablet Comments:   Reason for Stopping:             No discharge procedures on file  ED Provider  Attending physically available and evaluated Maury Torres I managed the patient along with the ED Attending      Electronically Signed by         Melida Mckeon DO  12/31/18 5904

## 2018-12-28 NOTE — H&P
H&P Exam - Gynecology   Link Steen 43 y o  female MRN: [id number]  Unit/Bed#: [facility] 611-01 Encounter: [id number]    Assessment/Plan     Assessment:  42 yo P3 with midline pelvic pain for 2 days that has intensified since last night, currently in stable condition     Plan:  Admit for pain control and serial abdominal exams   Pelvic pain: Neg preg test, afebrile admit for 23 h obs, no signs of acute abdomen, no signs of torsion, normal US and CT showing adenomyosis  Possible denegerating fibroid??,   Tylenol ATC, Indocin 25mg TID, oxy PRN and dilaudid BT   Anemia: Hb 7 7, VSS, no free fluid on CT or pelvic US suggestive of intraabdominal bleeding  continue to trend, possible transfusion if symptomatic   FEN: LR, NPO   PPx: SCDs    History of Present Illness   HX and PE limited by:   HPI:  Link Steen is a 43 y o  female P3 with history of abnormal uterine bleeding currently on OCPs in her placebo week  Patient states that she started to have pelvic midline pain 2 days ago that has slowly intensified until it became unbearable last night  Patient has also been having some vaginal bleeding, admits to have irregular periods, denies any heavy vaginal bleeding  Denies fever, chills, diarrhea, states she has mild nausea with no vomiting  She has had normal bowel movements  On admission she had CT of the abdomen and pelvis as well as pelvic ultrasound showing heterogeneous uterus suggestive of adenomyosis versus fibroid, no signs of ovarian torsion  Review of Systems   Constitutional: Negative  Negative for chills and fever  Respiratory: Negative  Cardiovascular: Negative  Gastrointestinal: Positive for abdominal pain and nausea  Negative for diarrhea  Genitourinary: Positive for pelvic pain and vaginal bleeding  Negative for dysuria  Midline pelvic pain since 2 days ago    All other systems reviewed and are negative        Historical Information   Past Medical History:   Diagnosis Date    Anemia  Chest tightness or pressure     2013     Past Surgical History:   Procedure Laterality Date    TONSILLECTOMY      TUBAL LIGATION           OB/GYN History:   Family History   Problem Relation Age of Onset    Heart disease Father      Social History   History   Alcohol Use    Yes     Comment: on the weekends/ socially     History   Drug Use No     History   Smoking Status    Current Some Day Smoker    Types: Cigarettes   Smokeless Tobacco    Never Used     Comment: social smoker       Meds/Allergies   all current active meds have been reviewed  No Known Allergies    Objective   Vitals: Blood pressure 162/67, pulse 98, temperature 97 7 °F (36 5 °C), resp  rate 20, height 5' 1" (1 549 m), weight 97 1 kg (214 lb), SpO2 97 %  Intake/Output Summary (Last 24 hours) at 18 1716  Last data filed at 18 1541   Gross per 24 hour   Intake             1000 ml   Output                0 ml   Net             1000 ml       Invasive Devices: Invasive Devices     Peripheral Intravenous Line            Peripheral IV 18 Left Antecubital less than 1 day                Physical Exam   Constitutional: She is oriented to person, place, and time  She appears well-developed and well-nourished  She appears distressed  Seems uncomfortable with the pain    HENT:   Head: Normocephalic and atraumatic  Cardiovascular: Normal rate, regular rhythm and normal heart sounds  Pulmonary/Chest: Effort normal and breath sounds normal    Abdominal: Soft  Bowel sounds are normal  She exhibits no distension and no mass  There is tenderness  There is no rebound and no guarding  Deep palpation at midline in suprapubic region    Genitourinary: Vagina normal and uterus normal    Genitourinary Comments: Minimal to mod amount of VB in the vaginal vault  Normal appearing cervix, no CMT   Neurological: She is alert and oriented to person, place, and time         Lab Results: I have personally reviewed pertinent reports  Imaging: I have personally reviewed pertinent reports  EKG, Pathology, and Other Studies: I have personally reviewed pertinent reports  Code Status: Prior  Advance Directive and Living Will:      Power of :    POLST:      Counseling / Coordination of Care  Total floor / unit time spent today 30 minutes  Greater than 50% of total time was spent with the patient and / or family counseling and / or coordination of care  A description of the counseling / coordination of care: Bhargav Jenkins

## 2018-12-29 ENCOUNTER — APPOINTMENT (OUTPATIENT)
Dept: RADIOLOGY | Facility: HOSPITAL | Age: 38
DRG: 742 | End: 2018-12-29
Payer: COMMERCIAL

## 2018-12-29 PROBLEM — R10.2 PELVIC PAIN IN FEMALE: Chronic | Status: ACTIVE | Noted: 2018-12-28

## 2018-12-29 LAB
ALBUMIN SERPL BCP-MCNC: 3 G/DL (ref 3.5–5)
ALP SERPL-CCNC: 100 U/L (ref 46–116)
ALT SERPL W P-5'-P-CCNC: 53 U/L (ref 12–78)
ANION GAP SERPL CALCULATED.3IONS-SCNC: 8 MMOL/L (ref 4–13)
AST SERPL W P-5'-P-CCNC: 41 U/L (ref 5–45)
BACTERIA UR CULT: NORMAL
BASOPHILS # BLD AUTO: 0.03 THOUSANDS/ΜL (ref 0–0.1)
BASOPHILS NFR BLD AUTO: 0 % (ref 0–1)
BILIRUB SERPL-MCNC: 0.44 MG/DL (ref 0.2–1)
BUN SERPL-MCNC: 7 MG/DL (ref 5–25)
CALCIUM SERPL-MCNC: 8.5 MG/DL (ref 8.3–10.1)
CHLORIDE SERPL-SCNC: 104 MMOL/L (ref 100–108)
CO2 SERPL-SCNC: 23 MMOL/L (ref 21–32)
CREAT SERPL-MCNC: 0.74 MG/DL (ref 0.6–1.3)
EOSINOPHIL # BLD AUTO: 0.27 THOUSAND/ΜL (ref 0–0.61)
EOSINOPHIL NFR BLD AUTO: 2 % (ref 0–6)
ERYTHROCYTE [DISTWIDTH] IN BLOOD BY AUTOMATED COUNT: 17.9 % (ref 11.6–15.1)
GFR SERPL CREATININE-BSD FRML MDRD: 102 ML/MIN/1.73SQ M
GLUCOSE P FAST SERPL-MCNC: 110 MG/DL (ref 65–99)
GLUCOSE SERPL-MCNC: 110 MG/DL (ref 65–140)
HCT VFR BLD AUTO: 25.7 % (ref 34.8–46.1)
HGB BLD-MCNC: 7 G/DL (ref 11.5–15.4)
IMM GRANULOCYTES # BLD AUTO: 0.04 THOUSAND/UL (ref 0–0.2)
IMM GRANULOCYTES NFR BLD AUTO: 0 % (ref 0–2)
LYMPHOCYTES # BLD AUTO: 1.69 THOUSANDS/ΜL (ref 0.6–4.47)
LYMPHOCYTES NFR BLD AUTO: 14 % (ref 14–44)
MCH RBC QN AUTO: 20.3 PG (ref 26.8–34.3)
MCHC RBC AUTO-ENTMCNC: 27.2 G/DL (ref 31.4–37.4)
MCV RBC AUTO: 75 FL (ref 82–98)
MONOCYTES # BLD AUTO: 0.82 THOUSAND/ΜL (ref 0.17–1.22)
MONOCYTES NFR BLD AUTO: 7 % (ref 4–12)
NEUTROPHILS # BLD AUTO: 9.39 THOUSANDS/ΜL (ref 1.85–7.62)
NEUTS SEG NFR BLD AUTO: 77 % (ref 43–75)
NRBC BLD AUTO-RTO: 0 /100 WBCS
PLATELET # BLD AUTO: 207 THOUSANDS/UL (ref 149–390)
PMV BLD AUTO: 11.5 FL (ref 8.9–12.7)
POTASSIUM SERPL-SCNC: 3.8 MMOL/L (ref 3.5–5.3)
PROT SERPL-MCNC: 7.5 G/DL (ref 6.4–8.2)
RBC # BLD AUTO: 3.44 MILLION/UL (ref 3.81–5.12)
SODIUM SERPL-SCNC: 135 MMOL/L (ref 136–145)
WBC # BLD AUTO: 12.24 THOUSAND/UL (ref 4.31–10.16)

## 2018-12-29 PROCEDURE — 76830 TRANSVAGINAL US NON-OB: CPT

## 2018-12-29 PROCEDURE — 85025 COMPLETE CBC W/AUTO DIFF WBC: CPT | Performed by: OBSTETRICS & GYNECOLOGY

## 2018-12-29 PROCEDURE — 76856 US EXAM PELVIC COMPLETE: CPT

## 2018-12-29 PROCEDURE — 80053 COMPREHEN METABOLIC PANEL: CPT | Performed by: OBSTETRICS & GYNECOLOGY

## 2018-12-29 PROCEDURE — 99222 1ST HOSP IP/OBS MODERATE 55: CPT | Performed by: OBSTETRICS & GYNECOLOGY

## 2018-12-29 RX ORDER — SODIUM CHLORIDE, SODIUM LACTATE, POTASSIUM CHLORIDE, CALCIUM CHLORIDE 600; 310; 30; 20 MG/100ML; MG/100ML; MG/100ML; MG/100ML
125 INJECTION, SOLUTION INTRAVENOUS CONTINUOUS
Status: DISCONTINUED | OUTPATIENT
Start: 2018-12-29 | End: 2018-12-31

## 2018-12-29 RX ORDER — ONDANSETRON 2 MG/ML
4 INJECTION INTRAMUSCULAR; INTRAVENOUS EVERY 4 HOURS PRN
Status: DISCONTINUED | OUTPATIENT
Start: 2018-12-29 | End: 2018-12-31 | Stop reason: HOSPADM

## 2018-12-29 RX ORDER — HYDROMORPHONE HCL/PF 1 MG/ML
1 SYRINGE (ML) INJECTION ONCE
Status: COMPLETED | OUTPATIENT
Start: 2018-12-29 | End: 2018-12-29

## 2018-12-29 RX ADMIN — INDOMETHACIN 25 MG: 25 CAPSULE ORAL at 16:45

## 2018-12-29 RX ADMIN — ONDANSETRON 4 MG: 2 INJECTION INTRAMUSCULAR; INTRAVENOUS at 12:18

## 2018-12-29 RX ADMIN — INDOMETHACIN 25 MG: 25 CAPSULE ORAL at 08:14

## 2018-12-29 RX ADMIN — HYDROMORPHONE HYDROCHLORIDE 1 MG: 1 INJECTION, SOLUTION INTRAMUSCULAR; INTRAVENOUS; SUBCUTANEOUS at 06:24

## 2018-12-29 RX ADMIN — ONDANSETRON 4 MG: 2 INJECTION INTRAMUSCULAR; INTRAVENOUS at 04:02

## 2018-12-29 RX ADMIN — OXYCODONE HYDROCHLORIDE 10 MG: 10 TABLET ORAL at 03:32

## 2018-12-29 RX ADMIN — ACETAMINOPHEN 975 MG: 325 TABLET, FILM COATED ORAL at 01:01

## 2018-12-29 RX ADMIN — NORGESTREL AND ETHINYL ESTRADIOL 1 TABLET: KIT at 15:29

## 2018-12-29 RX ADMIN — HYDROMORPHONE HYDROCHLORIDE 1 MG: 1 INJECTION, SOLUTION INTRAMUSCULAR; INTRAVENOUS; SUBCUTANEOUS at 02:18

## 2018-12-29 RX ADMIN — NORGESTREL AND ETHINYL ESTRADIOL 1 TABLET: KIT at 12:07

## 2018-12-29 RX ADMIN — OXYCODONE HYDROCHLORIDE 10 MG: 10 TABLET ORAL at 22:12

## 2018-12-29 RX ADMIN — OXYCODONE HYDROCHLORIDE 10 MG: 10 TABLET ORAL at 12:06

## 2018-12-29 RX ADMIN — OXYCODONE HYDROCHLORIDE 10 MG: 10 TABLET ORAL at 17:56

## 2018-12-29 RX ADMIN — SODIUM CHLORIDE, SODIUM LACTATE, POTASSIUM CHLORIDE, AND CALCIUM CHLORIDE 125 ML/HR: .6; .31; .03; .02 INJECTION, SOLUTION INTRAVENOUS at 01:28

## 2018-12-29 RX ADMIN — NORGESTREL AND ETHINYL ESTRADIOL 1 TABLET: KIT at 17:58

## 2018-12-29 RX ADMIN — INDOMETHACIN 25 MG: 25 CAPSULE ORAL at 12:07

## 2018-12-29 RX ADMIN — SODIUM CHLORIDE, SODIUM LACTATE, POTASSIUM CHLORIDE, AND CALCIUM CHLORIDE 125 ML/HR: .6; .31; .03; .02 INJECTION, SOLUTION INTRAVENOUS at 08:18

## 2018-12-29 RX ADMIN — HYDROMORPHONE HYDROCHLORIDE 1 MG: 1 INJECTION, SOLUTION INTRAMUSCULAR; INTRAVENOUS; SUBCUTANEOUS at 10:09

## 2018-12-29 RX ADMIN — IRON SUCROSE 200 MG: 20 INJECTION, SOLUTION INTRAVENOUS at 09:41

## 2018-12-29 RX ADMIN — OXYCODONE HYDROCHLORIDE 10 MG: 10 TABLET ORAL at 08:14

## 2018-12-29 RX ADMIN — HYDROMORPHONE HYDROCHLORIDE 1 MG: 1 INJECTION, SOLUTION INTRAMUSCULAR; INTRAVENOUS; SUBCUTANEOUS at 15:22

## 2018-12-29 RX ADMIN — NORGESTREL AND ETHINYL ESTRADIOL 1 TABLET: KIT at 22:12

## 2018-12-29 RX ADMIN — TRANEXAMIC ACID 1000 MG: 1 INJECTION, SOLUTION INTRAVENOUS at 11:49

## 2018-12-29 RX ADMIN — TRANEXAMIC ACID 1000 MG: 1 INJECTION, SOLUTION INTRAVENOUS at 12:08

## 2018-12-29 RX ADMIN — ONDANSETRON 4 MG: 2 INJECTION INTRAMUSCULAR; INTRAVENOUS at 19:23

## 2018-12-29 NOTE — PROGRESS NOTES
Gyn Oncology Progress note   Laurie Jones 43 y o  female MRN: [id number]  Unit/Bed#: [facility] 554-77 Encounter: [id number]    Laurie Jones has no current complaints  Pain is 9/10 this AM, states the pain changes in intensity with periods in which she feels very comfortable, pain mostly associated with passage of large clots, states it feels like labor pains  Patient is currently voiding  She is ambulating  Patient is currently passing flatus and has had no bowel movement  She is tolerating NPO, and denies vomiting but complains of mild nausea  Patient denies fever, chills, chest pain, shortness of breath, or calf tenderness  /63 (BP Location: Left arm)   Pulse 85   Temp 98 2 °F (36 8 °C)   Resp 18   Ht 5' 1" (1 549 m)   Wt 97 1 kg (214 lb)   SpO2 96%   BMI 40 43 kg/m²     I/O last 3 completed shifts: In: 1597 9 [I V :597 9; IV Piggyback:1000]  Out: 725 [Urine:725]  No intake/output data recorded  Lab Results   Component Value Date    WBC 12 24 (H) 12/29/2018    HGB 7 0 (L) 12/29/2018    HCT 25 7 (L) 12/29/2018    MCV 75 (L) 12/29/2018     12/29/2018       Lab Results   Component Value Date    GLUCOSE 102 11/17/2015    CALCIUM 8 5 12/29/2018     11/17/2015    K 3 8 12/29/2018    CO2 23 12/29/2018     12/29/2018    BUN 7 12/29/2018    CREATININE 0 74 12/29/2018       No results found for: MG    No results found for: POCGLU    Physical Exam  Gen: AAOx3, NAD, comfortable in bed   CVS:  RRR, no murmurs or gallops  Lungs: CTA B/L, no rales or rhonchi,  normal respiratory effort and rate  Abdomen: soft, mildly tender to palpation at suprapubic region and lower quadrants  Extremities: SCDs on and on, non tender, no edema, negative Homans  A/P: 43 y o  with adenomyosis and possible degenerating fibroid now Hospital Day:  and stable    Pelvic pain: on and off associated with passage of large clots   Will focus on decreasing amount of bleeding with OCPs q 4h for the 1st 24 hours as well as 1 dose of TXA IV  Will continue serial abdominal exams  Labs stable, benign abdomen  Patient desires hysterectomy as definitive treatment, she will have appt at her drs office to schedule  Anemia: Hb 7 0 today, VSS, no free fluid on CT or pelvic US suggestive of intraabdominal bleeding  continue to trend, will order venofer while in hospital, then switch to PO iron     FEN: LR, regular   PPx: SCDs

## 2018-12-29 NOTE — PROGRESS NOTES
Came to evaluate patient with speculum exam to look for any signs of large clot in the cervical canal the cool potentially explain her persistent intermittent pelvic pain  Minimal to moderate amount of blood was found in the vaginal vault, no clots seen in the cervix  Patient remains hemodynamically stable, currently taking OCPs to decrease amount of bleeding and status post 1 dose of tranexamic acid and Venofer  Abdomen remains benign on physical exam      Given the patient continues to require multiple doses of Dilaudid for pain control we will order repeat pelvic ultrasound to reassess for possible ovarian torsion  Patient agreeable to the plan    /76   Pulse 82   Temp 98 2 °F (36 8 °C)   Resp 18   Ht 5' 1" (1 549 m)   Wt 97 1 kg (214 lb)   SpO2 96%   BMI 40 43 kg/m²

## 2018-12-29 NOTE — PROGRESS NOTES
Physician Progress Note - GYN     Perez Fox 43 y o  female MRN: [id number] 2018  Unit/Bed#: [facility] 611-01 Encounter: [id number]          ASSESSMENT:  43 y o  with adenomyosis and possible degenerating fibroid now Hospital Day: 1 and stable    PLAN:   Pelvic pain: no signs of acute abdomen; Tylenol ATC, Indocin 25mg TID, oxy PRN and dilaudid BT   Anemia: Hb 7 7, VSS, no free fluid on CT or pelvic US suggestive of intraabdominal bleeding  continue to trend: f/u am CBC; transfusion if symptomatic   FEN: LR, NPO  PPx: SCDs      SUBJECTIVE:    Pain:    At home: 10/10   In ED: 10/10   In room earlier this evenin/10   Now: 9/10  Tolerating Oral Intake: NPO  Voiding: yes - spontaneously  Ambulating: yes  Chest Pain: no  Shortness of Breath: no  Leg Pain/Discomfort: no        OBJECTIVE:     Vitals:   Patient Vitals for the past 24 hrs:   BP Temp Pulse Resp SpO2 Height Weight   18 2119 - 98 6 °F (37 °C) - - - - -   18 1644 162/67 - 98 20 97 % - -   18 1614 153/69 - 82 20 97 % - -   18 1558 162/83 - (!) 107 20 99 % - -   18 1526 154/83 - 84 20 98 % - -   18 1458 152/58 - 71 20 99 % - -   18 1400 123/58 - 86 20 98 % - -   18 1330 130/58 - 86 20 98 % - -   18 1303 157/71 - 89 20 99 % - -   18 1215 143/65 - 75 18 100 % - -   18 1107 (!) 200/86 97 7 °F (36 5 °C) 100 20 100 % 5' 1" (1 549 m) 97 1 kg (214 lb)     Oxygen Therapy  SpO2: 97 %      I/O        07 -  0700  07 -  0700    P  O   0    IV Piggyback  1000    Total Intake(mL/kg)  1000 (10 3)    Urine (mL/kg/hr)  0    Total Output   0    Net   +1000                Intake/Output Summary (Last 24 hours) at 18 6741  Last data filed at 18   Gross per 24 hour   Intake             1000 ml   Output                0 ml   Net             1000 ml       Physical Exam:  Physical Exam:   Cardiovascular: regular rate, regular rhythm, no murmurs, rubs, or gallops   Lungs: clear to auscultation bilaterally, no wheezing, rhonchi, or rales   Abdomen: no rebound, no guarding    +tender to palpation in lower abdominal midline   Lower Extremities: negative Jana's sign bilaterally    Laboratory Studies:      Results from last 7 days  Lab Units 12/28/18  1201   WBC Thousand/uL 11 70*   NEUTROS ABS Thousands/µL 9 11*   HEMOGLOBIN g/dL 7 7*   MCV fL 75*   PLATELETS Thousands/uL 208       Results from last 7 days  Lab Units 12/28/18  1201   NEUTROS PCT % 79*   MONOS PCT % 7   EOS PCT % 2       Chemistry & Renal Function:    Results from last 7 days  Lab Units 12/28/18  1201   POTASSIUM mmol/L 3 7   CHLORIDE mmol/L 106   CO2 mmol/L 23   BUN mg/dL 10   CREATININE mg/dL 0 72   EGFR ml/min/1 73sq m 088       Metabolic:    Results from last 7 days  Lab Units 12/28/18  1201   AST U/L 95*   ALT U/L 66   ALK PHOS U/L 107                   Nutrition:    Results from last 7 days  Lab Units 12/28/18  1201   ALBUMIN g/dL 3 4*       Urinalysis:     Results from last 7 days  Lab Units 12/28/18  1209   CLARITY UA  Slightly Cloudy   SPEC GRAV UA  >=1 030   PH UA  5 5   LEUKOCYTES UA  Trace*   NITRITE UA  Negative   GLUCOSE UA mg/dl Negative   KETONES UA mg/dl Negative   UROBILINOGEN UA E U /dl 0 2   BILIRUBIN UA  Negative   BLOOD UA  Large*       Results from last 7 days  Lab Units 12/28/18  1209   RBC UA /hpf None Seen   WBC UA /hpf 20-30*   EPITHELIAL CELLS WET PREP /hpf Occasional   BACTERIA UA /hpf Occasional     Medications:  Continuous Infusions:       Scheduled Meds:    Current Facility-Administered Medications:  acetaminophen 975 mg Oral Q8H YANIQUE Latoya Salines   HYDROmorphone 1 mg Intravenous Q4H PRN Latoya Salines   indomethacin 25 mg Oral TID With Meals Latoya Salines   oxyCODONE 10 mg Oral Q4H PRN Latoya Salines   oxyCODONE 5 mg Oral Q4H PRN Latoya Salines       PRN Meds:  HYDROmorphone    oxyCODONE    oxyCODONE    Medication Doses in Trailing 24 hours: Medication Administration - last 24 hours from 12/27/2018 2215 to 12/28/2018 2215       Date/Time Order Dose Route Action Action by     12/28/2018 1541 sodium chloride 0 9 % bolus 1,000 mL 0 mL Intravenous Stopped Castro Kowalski RN     12/28/2018 1157 sodium chloride 0 9 % bolus 1,000 mL 1,000 mL Intravenous [address] Castro Kowalski RN     12/28/2018 1158 ondansetron (ZOFRAN) injection 4 mg 4 mg Intravenous Given Castro Kowalski RN     12/28/2018 1159 HYDROmorphone (DILAUDID) injection 1 mg 1 mg Intravenous Given Castro Kowalski RN     12/28/2018 1335 fentanyl citrate (PF) 100 MCG/2ML 75 mcg 75 mcg Intravenous Given Castro Kowalski RN     12/28/2018 1542 ketorolac (TORADOL) injection 15 mg 15 mg Intravenous Given Castro Kowalski RN     12/28/2018 1526 iohexol (OMNIPAQUE) 350 MG/ML injection (MULTI-DOSE) 100 mL 100 mL Intravenous Given Thalia West     12/28/2018 2204 indomethacin (INDOCIN) capsule 25 mg 25 mg Oral Not Given Mykel Aguilar RN     12/28/2018 1748 acetaminophen (TYLENOL) tablet 975 mg 975 mg Oral Given Stephanie Traylor RN     12/28/2018 2215 HYDROmorphone (DILAUDID) injection 1 mg 1 mg Intravenous Given Mykel Aguilar RN     12/28/2018 1748 HYDROmorphone (DILAUDID) injection 1 mg 1 mg Intravenous Given Stephanie Traylor RN     12/28/2018 1909 oxyCODONE (ROXICODONE) immediate release tablet 10 mg 10 mg Oral Given Stephanie Traylor RN     12/28/2018 2214 ceftriaxone (ROCEPHIN) 1 g/50 mL in dextrose IVPB 0 mg Intravenous Stopped Mykel Aguilar RN     12/28/2018 2009 ceftriaxone (ROCEPHIN) 1 g/50 mL in dextrose IVPB 1,000 mg Intravenous New Bag Mykel Aguilar RN          Invasive  Devices:   Invasive Devices     Peripheral Intravenous Line            Peripheral IV 12/28/18 Left Antecubital less than 1 day                    Additional Vitals:    Temp  Min: 97 7 °F (36 5 °C)  Max: 98 6 °F (37 °C)    IBW: 47 8 kg            Invasive/non-invasive ventilation settings:  Respiratory    Lab Data (Last 4 hours)    None         O2/Vent Data (Last 4 hours)    None              Code Status: Prior  Advance Directive and Living Will:      Power of :    POLST:        Past Medical History:   Diagnosis Date    Anemia     Chest tightness or pressure     2013     Past Surgical History:   Procedure Laterality Date    TONSILLECTOMY      TUBAL LIGATION      2006     OB History    Para Term  AB Living   3 3   3   3   SAB TAB Ectopic Multiple Live Births           3      # Outcome Date GA Lbr Rhys/2nd Weight Sex Delivery Anes PTL Lv   3      M Vag-Spont   SHU   2      F Vag-Spont   SHU   1      F Vag-Spont   SHU         reports that she has been smoking Cigarettes  She has never used smokeless tobacco  She reports that she drinks alcohol  She reports that she does not use drugs  There is no immunization history on file for this patient  No Known Allergies  Current Discharge Medication List      CONTINUE these medications which have NOT CHANGED    Details   Fe Cbn-Fe Gluc-FA-B12-C-DSS (FE 90 PLUS PO) Take by mouth      norgestimate-ethinyl estradiol (ORTHO-CYCLEN) 0 25-35 MG-MCG per tablet Take 1 tablet by mouth daily Take 4 pills one day, then 3 pills the next day, then 2, then 1 daily   Finish 1st pack and start next pack  Qty: 84 tablet, Refills: 1    Associated Diagnoses: Abnormal uterine bleeding             Signature / Title: Hector Andrade MD, MD, Resident - Ob/Gyn

## 2018-12-29 NOTE — NURSING NOTE
Spoke to Khushboo Spears MD to make him aware of patients red urine with clots present  Patient does state that she is on her period currently   Wanted to make him aware due to decreased hem level of 7 7 Dr Sanchez Jones said we will monitor patients vitals and reassess hem level in the AM

## 2018-12-29 NOTE — UTILIZATION REVIEW
Initial Clinical Review    Admission: Date/Time/Statement: 12/28/18 @ 1658 -- OBS    Orders Placed This Encounter   Procedures    Place in Observation     Standing Status:   Standing     Number of Occurrences:   1     Order Specific Question:   Admitting Physician     Answer:   Bacilio Marcano     Order Specific Question:   Level of Care     Answer:   Med Surg [16]       ED: Date/Time/Mode of Arrival:   ED Arrival Information     Expected Arrival Acuity Means of Arrival Escorted By Service Admission Type    - 12/28/2018 11:02 Urgent Walk-In Self OB/GYN Urgent    Arrival Complaint    abdominal pain          Chief Complaint:   Chief Complaint   Patient presents with    Abdominal Pain     lower abd pain increasing over past 24 hours       History of Illness: Annabelle Arguello is a 43 y o  female P3 with history of abnormal uterine bleeding currently on OCPs in her placebo week  Patient states that she started to have pelvic midline pain 2 days ago that has slowly intensified until it became unbearable last night  Patient has also been having some vaginal bleeding, admits to have irregular periods, denies any heavy vaginal bleeding  Denies fever, chills, diarrhea, states she has mild nausea with no vomiting  She has had normal bowel movements  On admission she had CT of the abdomen and pelvis as well as pelvic ultrasound showing heterogeneous uterus suggestive of adenomyosis versus fibroid, no signs of ovarian torsion       ED Vital Signs:   ED Triage Vitals   Temperature Pulse Respirations Blood Pressure SpO2   12/28/18 1107 12/28/18 1107 12/28/18 1107 12/28/18 1107 12/28/18 1107   97 7 °F (36 5 °C) 100 20 (!) 200/86 100 %      Temp Source Heart Rate Source Patient Position - Orthostatic VS BP Location FiO2 (%)   12/28/18 2320 12/28/18 1107 12/28/18 1215 12/28/18 1107 --   Oral Monitor Sitting Right arm       Pain Score       12/28/18 1107       9        Wt Readings from Last 1 Encounters:   12/28/18 97 1 kg (214 lb)       Vital Signs (abnormal):  x1    Abnormal Labs/Diagnostic Test Results:    Ref Range & Units 12/28/18 1201   WBC 4 31 - 10 16 Thousand/uL 11 70     RBC 3 81 - 5 12 Million/uL 3 66     Hemoglobin 11 5 - 15 4 g/dL 7 7     Hematocrit 34 8 - 46 1 % 27 3     MCV 82 - 98 fL 75     MCH 26 8 - 34 3 pg 21 0     MCHC 31 4 - 37 4 g/dL 28 2     RDW 11 6 - 15 1 % 17 8     MPV 8 9 - 12 7 fL 10 8    Platelets 147 - 021 Thousands/uL 208      AST 95,, Albumin 3 4  UA -- + lge blood, tr leuks, 1+ protein    TV US abd -- 1  Unremarkable ovaries though limited Doppler evaluation of the left ovary  Torsion is unlikely given normal size and grayscale appearance    2   Anterior myometrial heterogeneity with cystic foci and refractory shadowing  This was measured as a fibroid on the previous study though borders are ill-defined and this may represent adenomyosis  If there is clinical concern, these entities would be best differentiated with MRI  CT a/p -- No acute inflammatory process identified  Hepatomegaly with steatosis  Splenomegaly  Nonspecific heterogeneous appearance of the uterus  Previous ultrasound demonstrated possible focal adenomyosis versus fibroid  Pelvic MRI could be obtained for further evaluation  ED Treatment:   Medication Administration from 12/28/2018 1102 to 12/28/2018 1715       Date/Time Order Dose Route Action     12/28/2018 1157 sodium chloride 0 9 % bolus 1,000 mL 1,000 mL Intravenous New Bag     12/28/2018 1158 ondansetron (ZOFRAN) injection 4 mg 4 mg Intravenous Given     12/28/2018 1159 HYDROmorphone (DILAUDID) injection 1 mg 1 mg Intravenous Given     12/28/2018 1335 fentanyl citrate (PF) 100 MCG/2ML 75 mcg 75 mcg Intravenous Given     12/28/2018 1542 ketorolac (TORADOL) injection 15 mg 15 mg Intravenous Given     12/28/2018 1526 iohexol (OMNIPAQUE) 350 MG/ML injection (MULTI-DOSE) 100 mL 100 mL Intravenous Given          Past Medical/Surgical History:    Active Ambulatory Problems     Diagnosis Date Noted    Symptomatic anemia 05/14/2017    Abnormal uterine bleeding (AUB) 05/14/2017    Thyroid nodule 05/14/2017    Transaminitis 05/14/2017    Depression 05/14/2017    Iron deficiency anemia 11/07/2018     Past Medical History:   Diagnosis Date    Anemia     Chest tightness or pressure        Admitting Diagnosis: Vaginal bleeding [N93 9]  Abdominal pain [R10 9]  Anemia [D64 9]  Acute left lower quadrant pain [R10 32]    Age/Sex: 43 y o  female    Assessment/Plan:   Assessment:  44 yo P3 with midline pelvic pain for 2 days that has intensified since last night, currently in stable condition      Plan:  Admit for pain control and serial abdominal exams   Pelvic pain: Neg preg test, afebrile admit for 23 h obs, no signs of acute abdomen, no signs of torsion, normal US and CT showing adenomyosis  Possible denegerating fibroid??,   Tylenol ATC, Indocin 25mg TID, oxy PRN and dilaudid BT   Anemia: Hb 7 7, VSS, no free fluid on CT or pelvic US suggestive of intraabdominal bleeding   continue to trend, possible transfusion if symptomatic   FEN: LR, NPO   PPx: SCDs       Admission Orders:  Scheduled Meds:   Current Facility-Administered Medications:  acetaminophen 975 mg Oral Q8H [facility]   indomethacin 25 mg Oral TID With Meals   iron sucrose 200 mg Intravenous Once   lactated ringers 125 mL/hr Intravenous Continuous   norgestrel-ethinyl estradiol 1 tablet Oral Q4H   ondansetron 4 mg Intravenous Q4H PRN 12/29 x1   oxyCODONE 10 mg Oral Q4H PRN 12/28 x2, 12/29 x2   oxyCODONE 5 mg Oral Q4H PRN   tranexamic acid (FOR TRAUMA) 100 mL loading IVPB 1,000 mg Intravenous Once   Followed by      tranexamic acid (CYKLOKAPRON) 500 mL IVPB infusion 1,000 mg Intravenous Once   Hydromorphone 1 mg IV 12/28 x2, 12/29 x2 --> d/c'd    Consult OB/Gyne  Reg diet  Ucx -- (P)  SCC's  Up as adrian

## 2018-12-30 ENCOUNTER — ANESTHESIA (INPATIENT)
Dept: PERIOP | Facility: HOSPITAL | Age: 38
DRG: 742 | End: 2018-12-30
Payer: COMMERCIAL

## 2018-12-30 ENCOUNTER — ANESTHESIA EVENT (INPATIENT)
Dept: OBGYN CLINIC | Facility: HOSPITAL | Age: 38
DRG: 742 | End: 2018-12-30
Payer: COMMERCIAL

## 2018-12-30 ENCOUNTER — ANESTHESIA EVENT (INPATIENT)
Dept: PERIOP | Facility: HOSPITAL | Age: 38
DRG: 742 | End: 2018-12-30
Payer: COMMERCIAL

## 2018-12-30 ENCOUNTER — ANESTHESIA (INPATIENT)
Dept: OBGYN CLINIC | Facility: HOSPITAL | Age: 38
DRG: 742 | End: 2018-12-30
Payer: COMMERCIAL

## 2018-12-30 PROBLEM — D25.1 FIBROIDS, INTRAMURAL: Status: ACTIVE | Noted: 2018-12-28

## 2018-12-30 LAB
ABO GROUP BLD: NORMAL
ALBUMIN SERPL BCP-MCNC: 2.8 G/DL (ref 3.5–5)
ALP SERPL-CCNC: 89 U/L (ref 46–116)
ALT SERPL W P-5'-P-CCNC: 36 U/L (ref 12–78)
ANION GAP SERPL CALCULATED.3IONS-SCNC: 8 MMOL/L (ref 4–13)
AST SERPL W P-5'-P-CCNC: 18 U/L (ref 5–45)
BASOPHILS # BLD AUTO: 0.03 THOUSANDS/ΜL (ref 0–0.1)
BASOPHILS NFR BLD AUTO: 0 % (ref 0–1)
BILIRUB SERPL-MCNC: 0.4 MG/DL (ref 0.2–1)
BLD GP AB SCN SERPL QL: NEGATIVE
BUN SERPL-MCNC: 6 MG/DL (ref 5–25)
CALCIUM SERPL-MCNC: 8.2 MG/DL (ref 8.3–10.1)
CHLORIDE SERPL-SCNC: 103 MMOL/L (ref 100–108)
CO2 SERPL-SCNC: 26 MMOL/L (ref 21–32)
CREAT SERPL-MCNC: 0.72 MG/DL (ref 0.6–1.3)
EOSINOPHIL # BLD AUTO: 0.2 THOUSAND/ΜL (ref 0–0.61)
EOSINOPHIL NFR BLD AUTO: 2 % (ref 0–6)
ERYTHROCYTE [DISTWIDTH] IN BLOOD BY AUTOMATED COUNT: 18 % (ref 11.6–15.1)
GFR SERPL CREATININE-BSD FRML MDRD: 106 ML/MIN/1.73SQ M
GLUCOSE P FAST SERPL-MCNC: 100 MG/DL (ref 65–99)
GLUCOSE SERPL-MCNC: 100 MG/DL (ref 65–140)
GLUCOSE SERPL-MCNC: 106 MG/DL (ref 65–140)
HCT VFR BLD AUTO: 22.8 % (ref 34.8–46.1)
HCT VFR BLD AUTO: 30.4 % (ref 34.8–46.1)
HGB BLD-MCNC: 6.4 G/DL (ref 11.5–15.4)
HGB BLD-MCNC: 8.8 G/DL (ref 11.5–15.4)
IMM GRANULOCYTES # BLD AUTO: 0.07 THOUSAND/UL (ref 0–0.2)
IMM GRANULOCYTES NFR BLD AUTO: 1 % (ref 0–2)
LYMPHOCYTES # BLD AUTO: 1.46 THOUSANDS/ΜL (ref 0.6–4.47)
LYMPHOCYTES NFR BLD AUTO: 12 % (ref 14–44)
MCH RBC QN AUTO: 20.8 PG (ref 26.8–34.3)
MCHC RBC AUTO-ENTMCNC: 28.1 G/DL (ref 31.4–37.4)
MCV RBC AUTO: 74 FL (ref 82–98)
MONOCYTES # BLD AUTO: 0.95 THOUSAND/ΜL (ref 0.17–1.22)
MONOCYTES NFR BLD AUTO: 8 % (ref 4–12)
NEUTROPHILS # BLD AUTO: 9.36 THOUSANDS/ΜL (ref 1.85–7.62)
NEUTS SEG NFR BLD AUTO: 77 % (ref 43–75)
NRBC BLD AUTO-RTO: 0 /100 WBCS
PLATELET # BLD AUTO: 201 THOUSANDS/UL (ref 149–390)
PMV BLD AUTO: 11.7 FL (ref 8.9–12.7)
POTASSIUM SERPL-SCNC: 3.3 MMOL/L (ref 3.5–5.3)
PROT SERPL-MCNC: 6.8 G/DL (ref 6.4–8.2)
RBC # BLD AUTO: 3.08 MILLION/UL (ref 3.81–5.12)
RH BLD: POSITIVE
SODIUM SERPL-SCNC: 137 MMOL/L (ref 136–145)
SPECIMEN EXPIRATION DATE: NORMAL
WBC # BLD AUTO: 12.07 THOUSAND/UL (ref 4.31–10.16)

## 2018-12-30 PROCEDURE — 85014 HEMATOCRIT: CPT | Performed by: NURSE ANESTHETIST, CERTIFIED REGISTERED

## 2018-12-30 PROCEDURE — 0UT74ZZ RESECTION OF BILATERAL FALLOPIAN TUBES, PERCUTANEOUS ENDOSCOPIC APPROACH: ICD-10-PCS | Performed by: OBSTETRICS & GYNECOLOGY

## 2018-12-30 PROCEDURE — 0UT94ZZ RESECTION OF UTERUS, PERCUTANEOUS ENDOSCOPIC APPROACH: ICD-10-PCS | Performed by: OBSTETRICS & GYNECOLOGY

## 2018-12-30 PROCEDURE — 86850 RBC ANTIBODY SCREEN: CPT | Performed by: OBSTETRICS & GYNECOLOGY

## 2018-12-30 PROCEDURE — 0TJB8ZZ INSPECTION OF BLADDER, VIA NATURAL OR ARTIFICIAL OPENING ENDOSCOPIC: ICD-10-PCS | Performed by: OBSTETRICS & GYNECOLOGY

## 2018-12-30 PROCEDURE — 85025 COMPLETE CBC W/AUTO DIFF WBC: CPT | Performed by: OBSTETRICS & GYNECOLOGY

## 2018-12-30 PROCEDURE — P9016 RBC LEUKOCYTES REDUCED: HCPCS

## 2018-12-30 PROCEDURE — 85018 HEMOGLOBIN: CPT | Performed by: NURSE ANESTHETIST, CERTIFIED REGISTERED

## 2018-12-30 PROCEDURE — 86901 BLOOD TYPING SEROLOGIC RH(D): CPT | Performed by: OBSTETRICS & GYNECOLOGY

## 2018-12-30 PROCEDURE — 86923 COMPATIBILITY TEST ELECTRIC: CPT

## 2018-12-30 PROCEDURE — 82948 REAGENT STRIP/BLOOD GLUCOSE: CPT

## 2018-12-30 PROCEDURE — 88307 TISSUE EXAM BY PATHOLOGIST: CPT | Performed by: PATHOLOGY

## 2018-12-30 PROCEDURE — 86900 BLOOD TYPING SEROLOGIC ABO: CPT | Performed by: OBSTETRICS & GYNECOLOGY

## 2018-12-30 PROCEDURE — 58573 TLH W/T/O UTERUS OVER 250 G: CPT | Performed by: OBSTETRICS & GYNECOLOGY

## 2018-12-30 PROCEDURE — 80053 COMPREHEN METABOLIC PANEL: CPT | Performed by: OBSTETRICS & GYNECOLOGY

## 2018-12-30 PROCEDURE — 30233N1 TRANSFUSION OF NONAUTOLOGOUS RED BLOOD CELLS INTO PERIPHERAL VEIN, PERCUTANEOUS APPROACH: ICD-10-PCS | Performed by: OBSTETRICS & GYNECOLOGY

## 2018-12-30 RX ORDER — BUPIVACAINE HYDROCHLORIDE AND EPINEPHRINE 2.5; 5 MG/ML; UG/ML
INJECTION, SOLUTION INFILTRATION; PERINEURAL AS NEEDED
Status: DISCONTINUED | OUTPATIENT
Start: 2018-12-30 | End: 2018-12-30 | Stop reason: HOSPADM

## 2018-12-30 RX ORDER — ONDANSETRON 2 MG/ML
4 INJECTION INTRAMUSCULAR; INTRAVENOUS EVERY 6 HOURS PRN
Status: DISCONTINUED | OUTPATIENT
Start: 2018-12-30 | End: 2018-12-30 | Stop reason: HOSPADM

## 2018-12-30 RX ORDER — LIDOCAINE HYDROCHLORIDE 10 MG/ML
INJECTION, SOLUTION INFILTRATION; PERINEURAL AS NEEDED
Status: DISCONTINUED | OUTPATIENT
Start: 2018-12-30 | End: 2018-12-30 | Stop reason: SURG

## 2018-12-30 RX ORDER — SODIUM CHLORIDE, SODIUM LACTATE, POTASSIUM CHLORIDE, CALCIUM CHLORIDE 600; 310; 30; 20 MG/100ML; MG/100ML; MG/100ML; MG/100ML
125 INJECTION, SOLUTION INTRAVENOUS CONTINUOUS
Status: DISCONTINUED | OUTPATIENT
Start: 2018-12-30 | End: 2018-12-31 | Stop reason: HOSPADM

## 2018-12-30 RX ORDER — ROCURONIUM BROMIDE 10 MG/ML
INJECTION, SOLUTION INTRAVENOUS AS NEEDED
Status: DISCONTINUED | OUTPATIENT
Start: 2018-12-30 | End: 2018-12-30 | Stop reason: SURG

## 2018-12-30 RX ORDER — ACETAMINOPHEN 325 MG/1
650 TABLET ORAL ONCE
Status: DISCONTINUED | OUTPATIENT
Start: 2018-12-30 | End: 2018-12-30

## 2018-12-30 RX ORDER — ACETAMINOPHEN 325 MG/1
650 TABLET ORAL EVERY 4 HOURS PRN
Status: DISCONTINUED | OUTPATIENT
Start: 2018-12-30 | End: 2018-12-31 | Stop reason: HOSPADM

## 2018-12-30 RX ORDER — IBUPROFEN 600 MG/1
600 TABLET ORAL EVERY 6 HOURS PRN
Status: DISCONTINUED | OUTPATIENT
Start: 2018-12-30 | End: 2018-12-31 | Stop reason: HOSPADM

## 2018-12-30 RX ORDER — POLYETHYLENE GLYCOL 3350 17 G/17G
17 POWDER, FOR SOLUTION ORAL DAILY
Status: DISCONTINUED | OUTPATIENT
Start: 2018-12-31 | End: 2018-12-31 | Stop reason: HOSPADM

## 2018-12-30 RX ORDER — DIPHENHYDRAMINE HCL 25 MG
25 TABLET ORAL ONCE
Status: COMPLETED | OUTPATIENT
Start: 2018-12-30 | End: 2018-12-30

## 2018-12-30 RX ORDER — DOCUSATE SODIUM 100 MG/1
100 CAPSULE, LIQUID FILLED ORAL 2 TIMES DAILY
Status: DISCONTINUED | OUTPATIENT
Start: 2018-12-30 | End: 2018-12-31 | Stop reason: HOSPADM

## 2018-12-30 RX ORDER — PROPOFOL 10 MG/ML
INJECTION, EMULSION INTRAVENOUS AS NEEDED
Status: DISCONTINUED | OUTPATIENT
Start: 2018-12-30 | End: 2018-12-30 | Stop reason: SURG

## 2018-12-30 RX ORDER — HYDROMORPHONE HCL/PF 1 MG/ML
0.5 SYRINGE (ML) INJECTION EVERY 2 HOUR PRN
Status: DISCONTINUED | OUTPATIENT
Start: 2018-12-30 | End: 2018-12-30

## 2018-12-30 RX ORDER — NEOSTIGMINE METHYLSULFATE 1 MG/ML
INJECTION INTRAVENOUS AS NEEDED
Status: DISCONTINUED | OUTPATIENT
Start: 2018-12-30 | End: 2018-12-30 | Stop reason: SURG

## 2018-12-30 RX ORDER — MIDAZOLAM HYDROCHLORIDE 1 MG/ML
INJECTION INTRAMUSCULAR; INTRAVENOUS AS NEEDED
Status: DISCONTINUED | OUTPATIENT
Start: 2018-12-30 | End: 2018-12-30 | Stop reason: SURG

## 2018-12-30 RX ORDER — SUCCINYLCHOLINE CHLORIDE 20 MG/ML
INJECTION INTRAMUSCULAR; INTRAVENOUS AS NEEDED
Status: DISCONTINUED | OUTPATIENT
Start: 2018-12-30 | End: 2018-12-30 | Stop reason: SURG

## 2018-12-30 RX ORDER — FERROUS SULFATE 325(65) MG
325 TABLET ORAL
Status: DISCONTINUED | OUTPATIENT
Start: 2018-12-30 | End: 2018-12-31 | Stop reason: HOSPADM

## 2018-12-30 RX ORDER — HYDROMORPHONE HCL/PF 1 MG/ML
SYRINGE (ML) INJECTION AS NEEDED
Status: DISCONTINUED | OUTPATIENT
Start: 2018-12-30 | End: 2018-12-30 | Stop reason: SURG

## 2018-12-30 RX ORDER — SODIUM CHLORIDE, SODIUM LACTATE, POTASSIUM CHLORIDE, CALCIUM CHLORIDE 600; 310; 30; 20 MG/100ML; MG/100ML; MG/100ML; MG/100ML
50 INJECTION, SOLUTION INTRAVENOUS CONTINUOUS
Status: DISCONTINUED | OUTPATIENT
Start: 2018-12-30 | End: 2018-12-30

## 2018-12-30 RX ORDER — DIPHENHYDRAMINE HYDROCHLORIDE 50 MG/ML
12.5 INJECTION INTRAMUSCULAR; INTRAVENOUS EVERY 6 HOURS PRN
Status: DISCONTINUED | OUTPATIENT
Start: 2018-12-30 | End: 2018-12-30 | Stop reason: HOSPADM

## 2018-12-30 RX ORDER — CEFAZOLIN SODIUM 2 G/50ML
2000 SOLUTION INTRAVENOUS ONCE
Status: COMPLETED | OUTPATIENT
Start: 2018-12-30 | End: 2018-12-30

## 2018-12-30 RX ORDER — FENTANYL CITRATE 50 UG/ML
INJECTION, SOLUTION INTRAMUSCULAR; INTRAVENOUS AS NEEDED
Status: DISCONTINUED | OUTPATIENT
Start: 2018-12-30 | End: 2018-12-30 | Stop reason: SURG

## 2018-12-30 RX ORDER — SODIUM CHLORIDE, SODIUM LACTATE, POTASSIUM CHLORIDE, CALCIUM CHLORIDE 600; 310; 30; 20 MG/100ML; MG/100ML; MG/100ML; MG/100ML
125 INJECTION, SOLUTION INTRAVENOUS CONTINUOUS
Status: DISCONTINUED | OUTPATIENT
Start: 2018-12-30 | End: 2018-12-30

## 2018-12-30 RX ORDER — MAGNESIUM HYDROXIDE 1200 MG/15ML
LIQUID ORAL AS NEEDED
Status: DISCONTINUED | OUTPATIENT
Start: 2018-12-30 | End: 2018-12-30 | Stop reason: HOSPADM

## 2018-12-30 RX ORDER — HYDROMORPHONE HCL/PF 1 MG/ML
1 SYRINGE (ML) INJECTION ONCE
Status: COMPLETED | OUTPATIENT
Start: 2018-12-30 | End: 2018-12-30

## 2018-12-30 RX ORDER — GLYCOPYRROLATE 0.2 MG/ML
INJECTION INTRAMUSCULAR; INTRAVENOUS AS NEEDED
Status: DISCONTINUED | OUTPATIENT
Start: 2018-12-30 | End: 2018-12-30 | Stop reason: SURG

## 2018-12-30 RX ORDER — FENTANYL CITRATE/PF 50 MCG/ML
50 SYRINGE (ML) INJECTION
Status: DISCONTINUED | OUTPATIENT
Start: 2018-12-30 | End: 2018-12-30 | Stop reason: HOSPADM

## 2018-12-30 RX ORDER — SODIUM CHLORIDE 9 MG/ML
INJECTION, SOLUTION INTRAVENOUS CONTINUOUS PRN
Status: DISCONTINUED | OUTPATIENT
Start: 2018-12-30 | End: 2018-12-30 | Stop reason: SURG

## 2018-12-30 RX ORDER — OXYCODONE HYDROCHLORIDE 5 MG/1
5 TABLET ORAL EVERY 6 HOURS PRN
Qty: 30 TABLET | Refills: 0 | Status: SHIPPED | OUTPATIENT
Start: 2018-12-30 | End: 2019-01-22

## 2018-12-30 RX ORDER — SODIUM CHLORIDE, SODIUM LACTATE, POTASSIUM CHLORIDE, CALCIUM CHLORIDE 600; 310; 30; 20 MG/100ML; MG/100ML; MG/100ML; MG/100ML
INJECTION, SOLUTION INTRAVENOUS CONTINUOUS PRN
Status: DISCONTINUED | OUTPATIENT
Start: 2018-12-30 | End: 2018-12-30 | Stop reason: SURG

## 2018-12-30 RX ADMIN — PROPOFOL 200 MG: 10 INJECTION, EMULSION INTRAVENOUS at 15:35

## 2018-12-30 RX ADMIN — Medication: at 12:30

## 2018-12-30 RX ADMIN — DOCUSATE SODIUM 100 MG: 100 CAPSULE, LIQUID FILLED ORAL at 21:53

## 2018-12-30 RX ADMIN — ROCURONIUM BROMIDE 10 MG: 10 INJECTION INTRAVENOUS at 16:22

## 2018-12-30 RX ADMIN — FENTANYL CITRATE 50 MCG: 50 INJECTION, SOLUTION INTRAMUSCULAR; INTRAVENOUS at 15:35

## 2018-12-30 RX ADMIN — DEXAMETHASONE SODIUM PHOSPHATE 4 MG: 10 INJECTION INTRAMUSCULAR; INTRAVENOUS at 16:05

## 2018-12-30 RX ADMIN — DIPHENHYDRAMINE HCL 25 MG: 25 TABLET ORAL at 12:31

## 2018-12-30 RX ADMIN — SODIUM CHLORIDE, SODIUM LACTATE, POTASSIUM CHLORIDE, AND CALCIUM CHLORIDE: .6; .31; .03; .02 INJECTION, SOLUTION INTRAVENOUS at 15:25

## 2018-12-30 RX ADMIN — FENTANYL CITRATE 50 MCG: 50 INJECTION, SOLUTION INTRAMUSCULAR; INTRAVENOUS at 16:19

## 2018-12-30 RX ADMIN — OXYCODONE HYDROCHLORIDE 5 MG: 5 TABLET ORAL at 01:21

## 2018-12-30 RX ADMIN — ACETAMINOPHEN 975 MG: 325 TABLET, FILM COATED ORAL at 12:32

## 2018-12-30 RX ADMIN — INDOMETHACIN 25 MG: 25 CAPSULE ORAL at 09:02

## 2018-12-30 RX ADMIN — HYDROMORPHONE HYDROCHLORIDE 0.5 MG: 1 INJECTION, SOLUTION INTRAMUSCULAR; INTRAVENOUS; SUBCUTANEOUS at 10:16

## 2018-12-30 RX ADMIN — OXYCODONE HYDROCHLORIDE 10 MG: 10 TABLET ORAL at 09:02

## 2018-12-30 RX ADMIN — NORGESTREL AND ETHINYL ESTRADIOL 1 TABLET: KIT at 05:12

## 2018-12-30 RX ADMIN — HYDROMORPHONE HYDROCHLORIDE 0.5 MG: 1 INJECTION, SOLUTION INTRAMUSCULAR; INTRAVENOUS; SUBCUTANEOUS at 19:30

## 2018-12-30 RX ADMIN — Medication 12 ML/HR: at 15:27

## 2018-12-30 RX ADMIN — HYDROMORPHONE HYDROCHLORIDE 1 MG: 1 INJECTION, SOLUTION INTRAMUSCULAR; INTRAVENOUS; SUBCUTANEOUS at 05:48

## 2018-12-30 RX ADMIN — INDOMETHACIN 25 MG: 25 CAPSULE ORAL at 12:33

## 2018-12-30 RX ADMIN — MIDAZOLAM 2 MG: 1 INJECTION INTRAMUSCULAR; INTRAVENOUS at 15:27

## 2018-12-30 RX ADMIN — NEOSTIGMINE METHYLSULFATE 5 MG: 1 INJECTION INTRAVENOUS at 19:30

## 2018-12-30 RX ADMIN — FERROUS SULFATE TAB 325 MG (65 MG ELEMENTAL FE) 325 MG: 325 (65 FE) TAB at 09:02

## 2018-12-30 RX ADMIN — NORGESTREL AND ETHINYL ESTRADIOL 1 TABLET: KIT at 02:26

## 2018-12-30 RX ADMIN — SODIUM CHLORIDE, SODIUM LACTATE, POTASSIUM CHLORIDE, AND CALCIUM CHLORIDE 125 ML/HR: .6; .31; .03; .02 INJECTION, SOLUTION INTRAVENOUS at 10:21

## 2018-12-30 RX ADMIN — ROCURONIUM BROMIDE 40 MG: 10 INJECTION INTRAVENOUS at 15:35

## 2018-12-30 RX ADMIN — ROCURONIUM BROMIDE 20 MG: 10 INJECTION INTRAVENOUS at 17:10

## 2018-12-30 RX ADMIN — OXYCODONE HYDROCHLORIDE 10 MG: 10 TABLET ORAL at 05:12

## 2018-12-30 RX ADMIN — FENTANYL CITRATE 50 MCG: 50 INJECTION, SOLUTION INTRAMUSCULAR; INTRAVENOUS at 20:31

## 2018-12-30 RX ADMIN — SODIUM CHLORIDE, SODIUM LACTATE, POTASSIUM CHLORIDE, AND CALCIUM CHLORIDE 125 ML/HR: .6; .31; .03; .02 INJECTION, SOLUTION INTRAVENOUS at 00:44

## 2018-12-30 RX ADMIN — LIDOCAINE HYDROCHLORIDE 50 MG: 10 INJECTION, SOLUTION INFILTRATION; PERINEURAL at 15:35

## 2018-12-30 RX ADMIN — FENTANYL CITRATE 50 MCG: 50 INJECTION, SOLUTION INTRAMUSCULAR; INTRAVENOUS at 20:41

## 2018-12-30 RX ADMIN — ROCURONIUM BROMIDE 10 MG: 10 INJECTION INTRAVENOUS at 16:19

## 2018-12-30 RX ADMIN — GLYCOPYRROLATE 0.8 MG: 0.2 INJECTION, SOLUTION INTRAMUSCULAR; INTRAVENOUS at 19:30

## 2018-12-30 RX ADMIN — SODIUM CHLORIDE: 0.9 INJECTION, SOLUTION INTRAVENOUS at 15:38

## 2018-12-30 RX ADMIN — SUCCINYLCHOLINE CHLORIDE 100 MG: 20 INJECTION, SOLUTION INTRAMUSCULAR; INTRAVENOUS at 15:35

## 2018-12-30 RX ADMIN — CEFAZOLIN SODIUM 2000 MG: 2 SOLUTION INTRAVENOUS at 15:38

## 2018-12-30 RX ADMIN — SODIUM CHLORIDE, SODIUM LACTATE, POTASSIUM CHLORIDE, AND CALCIUM CHLORIDE 125 ML/HR: .6; .31; .03; .02 INJECTION, SOLUTION INTRAVENOUS at 20:43

## 2018-12-30 NOTE — PROGRESS NOTES
Patient resting comfortably at this time  Call bell and personal care items within reach  Will continue to monitor

## 2018-12-30 NOTE — PROGRESS NOTES
Gyn Progress note   Miladis Child 43 y o  female MRN: [id number]  Unit/Bed#: [facility] 839-46 Encounter: [id number]    Miladis Child has no current complaints  Pain 10/10, states pain was almost gone overnight until now  Patient is currently voiding  She is ambulating  Patient is currently passing flatus  She is tolerating PO, and denies nausea or vomitting  Patient denies fever, chills, chest pain, shortness of breath, or calf tenderness  /72   Pulse 84   Temp 98 6 °F (37 °C)   Resp 18   Ht 5' 1" (1 549 m)   Wt 97 1 kg (214 lb)   SpO2 95%   BMI 40 43 kg/m²     I/O last 3 completed shifts: In: 2016 7 [I V :1016 7; IV Piggyback:1000]  Out: 6565 [OKSLX:5013]  I/O this shift:  In: 1155 4 [P O :120; I V :1035 4]  Out: 700 [Urine:700]    Lab Results   Component Value Date    WBC 12 24 (H) 12/29/2018    HGB 7 0 (L) 12/29/2018    HCT 25 7 (L) 12/29/2018    MCV 75 (L) 12/29/2018     12/29/2018       Lab Results   Component Value Date    GLUCOSE 102 11/17/2015    CALCIUM 8 5 12/29/2018     11/17/2015    K 3 8 12/29/2018    CO2 23 12/29/2018     12/29/2018    BUN 7 12/29/2018    CREATININE 0 74 12/29/2018       No results found for: MG    No results found for: POCGLU    Physical Exam  Gen: AAOx3, NAD, comfortable in bed   CVS:  RRR, no murmurs or gallops  Lungs: CTA B/L, no rales or rhonchi,  normal respiratory effort and rate  Abdomen: soft, ND, + BS, mildly tender to palpation in suprapubic region, no rebound  Extremities: SCDs on and on, non tender, no edema, negative Homans  A/P: 43 y o  with adenomyosis and possible degenerating fibroid now Hospital Day 2:      Pelvic pain: melvin c/o severe pain, states it has been almost resolved overnight and now has started again at 10/10 intensity  Still benign abdomen, she has been passing gas and tolerating PO  VSS     Repeat US yesterday evening negative for torsion with 6x5 cm heterogenous fibroid and minimal free fluid in the posterior ann marie de sac    currenlty on OCPs q 4 hrs ro reduce bleeding, s/p 1 dose of TXA, will order a 1 time dose of dilaudid 1mg  continue current regimen for now  Most likely representing a degenerating fibroid, continue pain control for now       Anemia: last  Hb 7 0, f/u AM labs, VSS, s/p one dose of venofer      FEN: LR, regular     PPx: SCDs

## 2018-12-30 NOTE — MALNUTRITION/BMI
This medical record reflects one or more clinical indicators suggestive of morbid obesity  Malnutrition Findings:              BMI Findings:  BMI Classifications: Morbid Obesity 40-44 9     Body mass index is 40 43 kg/m²  See Nutrition note dated 12/30/18 for additional details  Completed nutrition assessment is viewable in the nutrition documentation

## 2018-12-30 NOTE — OR NURSING
Pt arrived to holding area  at [phone], timeout performed prior to epidural with Dr Nicole Noel, pt left holding area at [phone] With transport, report given to her nurse

## 2018-12-30 NOTE — ANESTHESIA PREPROCEDURE EVALUATION
Review of Systems/Medical History          Cardiovascular   Pulmonary       GI/Hepatic            Endo/Other  History of thyroid disease ,   Obesity  morbid obesity   GYN       Hematology  Anemia acute blood loss anemia,     Musculoskeletal       Neurology   Psychology   Depression ,              Physical Exam    Airway    Mallampati score: II         Dental   No notable dental hx     Cardiovascular      Pulmonary      Other Findings        Anesthesia Plan  ASA Score- 3     Anesthesia Type- general with ASA Monitors  Additional Monitors:   Airway Plan: ETT  Comment: I, Dr Christina Walker, the attending physician, have personally seen and evaluated the patient prior to anesthetic care  I have reviewed the pre-anesthetic record, and other medical records if appropriate to the anesthetic care  If a CRNA is involved in the case, I have reviewed the CRNA assessment, if present, and agree  The patient is in a suitable condition to proceed with my formulated anesthetic plan        Plan Factors-    Induction- intravenous  Postoperative Plan-     Informed Consent- Anesthetic plan and risks discussed with patient  I personally reviewed this patient with the CRNA  Discussed and agreed on the Anesthesia Plan with the CRNA  Chhaya Higginbotham

## 2018-12-30 NOTE — QUICK NOTE
Patient seen and examined  She did well overnight with pain however the pain returned around 5/530 this am and is again 10/10  She has a little relief with dilaudid however has failed conservative management for pain control  She continues to bleed vaginally  We discussed options at this point  As we are not able to control her symptoms with conservative measure, we discussed proceeding with hysterectomy, which was the ultimate plan for the patient  Patient would like to proceed as she has had little relief of her pain with conservative measures  The risks, benefits and alternatives to the procedure were discussed  We discussed the risks of pain, bleeding, blood clot, infection, allergic reaction, neurovascular injury, injury to uterus, injury to surrounding structures such as bowel, bladder and/or ureters, and possibility of inability to complete the procedure  We discussed code status - full code  Blood transfusion is acceptable  We discussed resident physician participation in the procedure, including pelvic exam   All questions answered, consent obtained  Patient will get an epidural for pain management pre-operatively  All questions answered, anesthesia aware of plan  Laura August DO, [facility] [facility]  [phone]

## 2018-12-30 NOTE — ANESTHESIA PROCEDURE NOTES
Epidural Block    Patient location during procedure: holding area  Start time: 12/30/2018 11:40 AM  Reason for block: procedure for pain and at surgeon's request  Staffing  Anesthesiologist: Yeyo Molina Checklist  Completed: patient identified, site marked, surgical consent, pre-op evaluation, timeout performed, IV checked, risks and benefits discussed and monitors and equipment checked  Epidural  Patient position: sitting  Prep: Betadine  Patient monitoring: continuous pulse ox and frequent blood pressure checks  Approach: midline  Location: thoracic (1-12) (T12)  Injection technique: MATTHEW saline  Needle  Needle type: Tuohy   Needle gauge: 20 G  Catheter type: end hole  Catheter size: 20 G  Catheter at skin depth: 13 cm  Assessment  Sensory level: [id number] aspiration for CSF and negative aspiration for heme  patient tolerated the procedure well with no immediate complications

## 2018-12-31 VITALS
OXYGEN SATURATION: 94 % | DIASTOLIC BLOOD PRESSURE: 61 MMHG | WEIGHT: 214 LBS | HEIGHT: 61 IN | HEART RATE: 92 BPM | TEMPERATURE: 99.7 F | RESPIRATION RATE: 16 BRPM | BODY MASS INDEX: 40.4 KG/M2 | SYSTOLIC BLOOD PRESSURE: 130 MMHG

## 2018-12-31 LAB
ABO GROUP BLD BPU: NORMAL
ABO GROUP BLD BPU: NORMAL
ANION GAP SERPL CALCULATED.3IONS-SCNC: 7 MMOL/L (ref 4–13)
BPU ID: NORMAL
BPU ID: NORMAL
BUN SERPL-MCNC: 6 MG/DL (ref 5–25)
CALCIUM SERPL-MCNC: 7.8 MG/DL (ref 8.3–10.1)
CHLORIDE SERPL-SCNC: 105 MMOL/L (ref 100–108)
CO2 SERPL-SCNC: 25 MMOL/L (ref 21–32)
CREAT SERPL-MCNC: 0.77 MG/DL (ref 0.6–1.3)
ERYTHROCYTE [DISTWIDTH] IN BLOOD BY AUTOMATED COUNT: 18.8 % (ref 11.6–15.1)
GFR SERPL CREATININE-BSD FRML MDRD: 98 ML/MIN/1.73SQ M
GLUCOSE SERPL-MCNC: 79 MG/DL (ref 65–140)
HCT VFR BLD AUTO: 26.2 % (ref 34.8–46.1)
HGB BLD-MCNC: 7.4 G/DL (ref 11.5–15.4)
MCH RBC QN AUTO: 22.6 PG (ref 26.8–34.3)
MCHC RBC AUTO-ENTMCNC: 28.2 G/DL (ref 31.4–37.4)
MCV RBC AUTO: 80 FL (ref 82–98)
PLATELET # BLD AUTO: 127 THOUSANDS/UL (ref 149–390)
POTASSIUM SERPL-SCNC: 3.6 MMOL/L (ref 3.5–5.3)
RBC # BLD AUTO: 3.28 MILLION/UL (ref 3.81–5.12)
SODIUM SERPL-SCNC: 137 MMOL/L (ref 136–145)
UNIT DISPENSE STATUS: NORMAL
UNIT DISPENSE STATUS: NORMAL
UNIT PRODUCT CODE: NORMAL
UNIT PRODUCT CODE: NORMAL
UNIT RH: NORMAL
UNIT RH: NORMAL
WBC # BLD AUTO: 10.94 THOUSAND/UL (ref 4.31–10.16)

## 2018-12-31 PROCEDURE — 85027 COMPLETE CBC AUTOMATED: CPT | Performed by: OBSTETRICS & GYNECOLOGY

## 2018-12-31 PROCEDURE — 94762 N-INVAS EAR/PLS OXIMTRY CONT: CPT

## 2018-12-31 PROCEDURE — 99024 POSTOP FOLLOW-UP VISIT: CPT | Performed by: OBSTETRICS & GYNECOLOGY

## 2018-12-31 PROCEDURE — 80048 BASIC METABOLIC PNL TOTAL CA: CPT | Performed by: OBSTETRICS & GYNECOLOGY

## 2018-12-31 RX ADMIN — SODIUM CHLORIDE, SODIUM LACTATE, POTASSIUM CHLORIDE, AND CALCIUM CHLORIDE 500 ML: .6; .31; .03; .02 INJECTION, SOLUTION INTRAVENOUS at 07:38

## 2018-12-31 RX ADMIN — IBUPROFEN 600 MG: 600 TABLET ORAL at 07:38

## 2018-12-31 RX ADMIN — DOCUSATE SODIUM 100 MG: 100 CAPSULE, LIQUID FILLED ORAL at 08:18

## 2018-12-31 RX ADMIN — POLYETHYLENE GLYCOL 3350 17 G: 17 POWDER, FOR SOLUTION ORAL at 08:18

## 2018-12-31 RX ADMIN — FERROUS SULFATE TAB 325 MG (65 MG ELEMENTAL FE) 325 MG: 325 (65 FE) TAB at 06:01

## 2018-12-31 RX ADMIN — SODIUM CHLORIDE, SODIUM LACTATE, POTASSIUM CHLORIDE, AND CALCIUM CHLORIDE 500 ML: .6; .31; .03; .02 INJECTION, SOLUTION INTRAVENOUS at 03:18

## 2018-12-31 RX ADMIN — OXYCODONE HYDROCHLORIDE 5 MG: 5 TABLET ORAL at 09:40

## 2018-12-31 RX ADMIN — SODIUM CHLORIDE, SODIUM LACTATE, POTASSIUM CHLORIDE, AND CALCIUM CHLORIDE 125 ML/HR: .6; .31; .03; .02 INJECTION, SOLUTION INTRAVENOUS at 09:36

## 2018-12-31 NOTE — PROGRESS NOTES
Patient seen and evaluated at 61 with Dr Lus Hashimoto  Post Op Check  Demar Reeder 43 y o  female MRN: [id number]  Unit/Bed#: [facility] 611-01 Encounter: [id number]    Subjective: Patient has no complaints at this time  She is tolerating clear fluids but has not eaten much solid food yet  She has ambulated with assistance to the bathroom but was unable to void  Her pain is well controlled with epidural  She has not passed gas yet  /63   Pulse 79   Temp 99 3 °F (37 4 °C)   Resp 20   Ht 5' 1" (1 549 m)   Wt 97 1 kg (214 lb)   SpO2 96%   BMI 40 43 kg/m²     I/O this shift:  In: 1197 9 [I V :1197 9]  Out: 450 [Urine:350; Blood:100]    Lab Results   Component Value Date    WBC 12 07 (H) 12/30/2018    HGB 8 8 (L) 12/30/2018    HCT 30 4 (L) 12/30/2018    MCV 74 (L) 12/30/2018     12/30/2018       Lab Results   Component Value Date    GLUCOSE 102 11/17/2015    CALCIUM 8 2 (L) 12/30/2018     11/17/2015    K 3 3 (L) 12/30/2018    CO2 26 12/30/2018     12/30/2018    BUN 6 12/30/2018    CREATININE 0 72 12/30/2018     Physical Exam  Gen: not in acute distress, comfortable appearing  CVS: reg rate, nontender  Abdomen: soft, nontender, incisions clean/dry/intact  Extremities: SCDs in place  Assessment:  37yo with AUB-L and intractable pelvic pain now s/p TLH, BS, and cystoscopy  POD 0  Currently in stable condition  Plan:  1  Postoperative care  - pain well controlled with epidural in place  Continue epidural until morning  -PO analgesics ordered if needed: ibuprofen, tylenol, and oxycodone  Will continue for pain control after epidural pulled  - Rx provided and in chart for oxycodone 5mg prn upon discharge home  - tolerating clear fluids on assessment  Advance diet as tolerated  - encourage incentive spirometry and ambulation with assistance  - UOP -- maldonado pulled intraoperatively  Due to void in 6-8 hours  No void upon assessment   If unable to void, will straight catheterize with maldonado  If excessive amount catheterized, will leave maldonado for a few hours and attempt void trial again  FEN: regular diet, LR at 75ml/hr  DVT ppx: SCDs bilaterally and encourage ambulation    Sera Christopher MD  OBGYN, PGY4  12/31/2018 1:17 AM

## 2018-12-31 NOTE — ADDENDUM NOTE
Addendum  created 12/31/18 [id number] by Franca Israel MD    Anesthesia Intra LDAs edited, LDA properties accepted, Sign clinical note

## 2018-12-31 NOTE — SOCIAL WORK
CM met with Pt at bedside to discuss CM role at d/c  Pt reported to live with her  and 3 children in a 2 story house with 1 FARTUN  Pt reported to be IPTA, does not use DME, and can drive  No VNA  Pt denied MH dx and no drug/etoh tx  [facility] uses [facility] on [facility]  PCP is at [facility]  No POA reported  Pt reported her  will be able to transport home upon d/c      CM reviewed d/c planning process including the following: identifying help at home, patient preference for d/c planning needs, Discharge Lounge, [facility] Meds to Bed program, availability of treatment team to discuss questions or concerns patient and/or family may have regarding understanding medications and recognizing signs and symptoms once discharged  CM also encouraged patient to follow up with all recommended appointments after discharge  Patient advised of importance for patient and family to participate in managing patients medical well being

## 2018-12-31 NOTE — NURSING NOTE
Pt DTV additional 100 cc by 0400  Pt states she does "not feel" like she has to urinate  Bladder scanned pt for 217 cc  Dr Naida Nicholson aware, will order IVF bolus and increase IVF rate  Will continue to monitor

## 2018-12-31 NOTE — PROGRESS NOTES
Epidural removed, tip intact  Resume pain management by primary team  Thank you  Page the acute pain service for any questions       Lydia Raines

## 2018-12-31 NOTE — ANESTHESIA POSTPROCEDURE EVALUATION
Post-Op Assessment Note      CV Status:  Stable    Mental Status:  Alert and awake    Hydration Status:  Euvolemic    PONV Controlled:  Controlled    Airway Patency:  Patent    Post Op Vitals Reviewed: Yes          Staff: CRNA           /53 (12/30/18 1959)    Temp 97 8 °F (36 6 °C) (12/30/18 1959)    Pulse 75 (12/30/18 1959)   Resp 16 (12/30/18 1959)    SpO2 100 % (12/30/18 1959)

## 2018-12-31 NOTE — UTILIZATION REVIEW
Continued Stay Review    [address]  Utilization Review Department  Phone: [phone]; Fax [phone]  [email or web address]  ATTENTION: Please call with any questions or concerns to [phone]  and carefully listen to the prompts so that you are directed to the right person  Send all requests for admission clinical reviews, approved or denied determinations and any other requests to fax [phone]  All voicemails are confidential     Date: 12/30/2019    Patient status  - Pt placed in Observation status on 12/28 @ 16:58 - changed to Inpatient  admission  On 12/30 @ 06:15 - due to anemia- and need to Blood Transfusion     12/30/18 0615  Inpatient Admission     Transfer Service: OB/GYN    Expected Discharge Date: 12/30/18       Question Answer   Admitting Physician Dorie Todd   Level of Care Med Surg   Estimated length of stay More than 2 Midnights   Certification I certify that inpatient services are medically necessary for this patient for a duration of greater than two midnights  See H&P and MD Progress Notes for additional information about the patient's course of treatment  Vital Signs: /72   Pulse 84   Temp 98 6 °F (37 °C)   Resp 18   Ht 5' 1" (1 549 m)   Wt 97 1 kg (214 lb)   SpO2 95%   BMI 40 43 kg/m²      I/O last 3 completed shifts: In: 2016 7 [I V :1016 7; IV Piggyback:1000]  Out: 1811 [YSGPV:2852]  I/O this shift:  In: 1155 4 [P O :120;  I V :1035 4]  Out: 700 [Urine:700]    Medications:   Scheduled Meds:   Current Facility-Administered Medications:  acetaminophen 650 mg Oral Q4H PRN    docusate sodium 100 mg Oral BID    ferrous sulfate 325 mg Oral BID AC    ibuprofen 600 mg Oral Q6H PRN    ondansetron 4 mg Intravenous Q4H PRN    oxyCODONE 10 mg Oral Q4H PRN    oxyCODONE 5 mg Oral Q4H PRN    polyethylene glycol 17 g Oral Daily      Continuous Infusions:   lactated ringers 125 mL/hr   ropivacaine 0 1% and fentaNYL 2 mcg/mL  epidural      2 units of PRBC's on     Abnormal Labs/Diagnostic Results:  - K 3 3 - Glu 100 - Wbc 12 07 - H/H 6  8 -    - 2039 - H/H 8  4     - Wbc 10 94 - H/H 7  2 - Plt 127    Age/Sex: 43 y o  female     Assessment/Plan: Likely degenerating fibroid  Drop in hgb likely related to her heavy period  Abdominal exam remains benign, no rebound or guarding  Will transfuse 2 units today and start PO iron as patient is moving towards hysterectomy for definitive treatment       Will monitor bleeding today  If no decrease in flow, will consider alternate therapy such as norethindrone  However I expect to see her bleeding decrease today as we are in second day of treatment with high dose OCPs     Will monitor, continue pain control  Op report on  - 4 pm - Procedure(s) (LRB):  HYSTERECTOMY LAPAROSCOPIC TOTAL (LTH) (N/A)  SALPINGECTOMY, LAPAROSCOPIC (Bilateral)  CYSTOSCOPY (N/A)   Anesthesia Type:   General w/ Epidural     Operative Indications:  Pelvic pain in female [R10 2]  Abnormal uterine bleeding (AUB) [N93 9]  Fibroids, intramural [D25 1]     41yo  with AUB-L presenting with heavy menstrual bleeding, symptomatic anemia, and intractable pelvic pain  Patient continued to have heavy menstrual bleeding despite management with high dose oral estrogen and progesterone hormonal medication  She required 2U PRBCs due to her symptomatic anemia and continued bleeding  Her pain was intractable even with IV analgesics and finally controlled with an epidural placement      Operative Findings:  1  External genitalia grossly normal in appearance  No lacerations, no lesions, no ulcerations observed  Uterus approximately 10 weeks in size, freely mobile, anteverted in position  Cervix dilated approximately 1cm  Notable amount of clots and bleeding with bimanual exam and within vaginal vault  No adnexal masses palpated bilaterally  2  Uterus sounded to 10cm     3  Laparoscopic evaluation noted enlarged uterus, globular in shape  Area of blanched tissue at fundus, possibly secondary to degenerating intramural fibroid  No abnormal contours appreciated  Bilateral ovaries and fallopian tubes grossly normal in appearance  Area of  fallopian tube secondary to past tubal ligation observed  Small piece of fallope ring visualized upon removal of fimbriae and remaining L fallopian tube  4  No evidence of injury to bowel or vasculature upon entrance into peritoneum and again at conclusion of the procedure  5  Bilateral ureters were visualized laparoscopically and bilateral ureteral jets were visualized on cystoscopy  No evidence of bladder injury observed on cystoscopy  Discharge Plan:  TBD     Hospital Course:  1  AUB/Pelvic pain  - patient had been evaluated for pelvic pain with plans for outpatient hysterectomy  EMB benign with early secretory endometrium, no hyperplasia or malignancy  - pain treated with indomethacin, tylenol, oxycodone, and finally dilaudid with minimal relief  Epidural placed for pain control   - heavy vaginal bleeding consistent with patient's previous menses noted with symptomatic anemia  Decision was made on 12/30/18 to proceed with a total laparoscopic hysterectomy and bilateral salpingectomy due to continued heavy bleeding requiring transfusion and intractable pelvic pain  Procedure was performed as discussed above  2  Postoperative care:  - pain was initially controlled postoperatively with epidural until it was discontinued on POD 1  Pain was controlled afterward with PO analgesics (ibuprofen, tylenol, and oxycodone prn)  - patient tolerated oral intake which was advanced from clears to regular diet as tolerated  She was ambulating and voiding without difficulty  3  Anemia:  - patient noted to be anemic on admission with Hgb 7 7  - Hgb trended: 7 7 -> 7 0 -> 6 4   She was symptomatic from anemia and received 2U PRBC on day of decision to proceed with hysterectomy  Immediate postoperative Hgb 8 8 in PACU  AM CBC was collected with Hgb 7  4

## 2018-12-31 NOTE — PROGRESS NOTES
Was notified by nursing of patient voiding 100ml of dark urine with hematuria  Bladder scan following showed only 100ml within bladder  /61 (BP Location: Left arm)   Pulse 92   Temp 99 7 °F (37 6 °C) (Oral)   Resp 16   Ht 5' 1" (1 549 m)   Wt 97 1 kg (214 lb)   SpO2 94%   BMI 40 43 kg/m²      Will administer a 500ml bolus of lactated ringers and increase maintenance IVF to 125ml/hr  Can extend voiding trial time for a couple of hours considering void and possible dehydration secondary to heavy menstrual bleeding and NPO status prior to surgery  Sera More MD  OBGYN, [facility] [facility]  12/31/2018 3:41 AM

## 2018-12-31 NOTE — PROGRESS NOTES
Gyn Oncology Progress note   Shanita Face 43 y o  female MRN: [id number]  Unit/Bed#: [facility] 716-62 Encounter: [id number]    Shanita Face has no current complaints  Pain is none  Patient is currently voiding  She is ambulating  Patient is currently passing flatus  She is tolerating PO, and denies nausea or vomitting  Patient denies fever, chills, chest pain, shortness of breath, or calf tenderness  /61 (BP Location: Left arm)   Pulse 92   Temp 99 7 °F (37 6 °C) (Oral)   Resp 16   Ht 5' 1" (1 549 m)   Wt 97 1 kg (214 lb)   SpO2 94%   BMI 40 43 kg/m²     I/O last 3 completed shifts: In: 3083 8 [P O :440; I V :2293 8; Blood:350]  Out: 3142 [HHNBI:9776; Blood:500]  I/O this shift:  In: 2811 4 [I V :2811 4]  Out: 500 [Urine:400; Blood:100]    Lab Results   Component Value Date    WBC 12 07 (H) 12/30/2018    HGB 8 8 (L) 12/30/2018    HCT 30 4 (L) 12/30/2018    MCV 74 (L) 12/30/2018     12/30/2018       Lab Results   Component Value Date    GLUCOSE 102 11/17/2015    CALCIUM 7 8 (L) 12/31/2018     11/17/2015    K 3 6 12/31/2018    CO2 25 12/31/2018     12/31/2018    BUN 6 12/31/2018    CREATININE 0 77 12/31/2018       No results found for: MG    Lab Results   Component Value Date/Time    POCGLU 106 12/30/2018 08:06 PM       Physical Exam  Gen: AAOx3, NAD, comfortable in bed   CVS:  RRR, no murmurs or gallops  Lungs: CTA B/L, no rales or rhonchi,  normal respiratory effort and rate  Abdomen: soft, non tender, incisions C/D/I  Extremities: SCDs on and on, non tender, no edema, negative Homans  A/P: Shanita Face s/p TLH and BS POD# 1 for pelvic pain and AUB, currently in stable condition   AUB/Pelvic pain: S/p TLH, BS, cysto  Pain: well controlled w/ epidural in place  D/c epidural today with transition to PO meds today     Low urine output: s/p 1/2 lt bolus last night with improvement but still below 0 5ml/kg/hr, no signs of internal bleeding, minimal vaginal bleeding, will rebolus with another 1/2 lt of LR     Anemia: Hb 7 7 -> 7 0 -> 6 4 -> 2UPRBC/Hyst -> 8 8  FEN: regular, LR @125ml/hr   DVT PPx: SCDs    Dispo: possible DC home today afternoon

## 2018-12-31 NOTE — DISCHARGE INSTRUCTIONS
You have been provided a prescription for Oxycodone 5mg to be taken every 6 hours as needed for moderate or severe pain  You may also utilize over the counter ibuprofen (take 3, 200mg tablets every 6 hours for mild pain) and over the counter tylenol (take 2, 325mg tablets every 4 hours for mild pain) as needed  If you are taking the oxycodone more regularly, you may experience some constipation  You may take an over the counter stool softener, such as docusate sodium (Colace) 100mg bid daily to help prevent constipation  A daily dose of Miralax can help to move your bowels if constipation does occur  Hysterectomy Discharge Instructions    WHAT YOU NEED TO KNOW:   A hysterectomy is surgery to remove your uterus  Your ovaries, fallopian tubes, cervix, or part of your vagina may also need to be removed  The organs and tissue that will be removed depends on your medical condition  After a hysterectomy, you will not be able to become pregnant  If your ovaries are removed, you will go through menopause if you have not already  DISCHARGE INSTRUCTIONS:   Contact your doctor at the number above if:   · You have a fever over 101o  · You have nausea or are vomiting that does not improve after a light meal    · Your pain is getting worse, even after you take medicine  · You feel pain or burning when you urinate, or you have trouble urinating  · You have pus or a foul-smelling odor coming from your vagina  · Your wound is red, swollen, or draining pus  · You see new or an increased amount of bright red blood coming from your vagina or your incisions  · You have questions or concerns about your condition or care  Seek care immediately:   · Your arm or leg feels warm, tender, and painful  It may look swollen and red  · You have increasing abdominal or pelvic pain  · You have heavy vaginal bleeding that fills 1 or more sanitary pads in 1 hour      Call 911 for any of the following:   · You feel lightheaded, short of breath, and have chest pain  · You cough up blood  Medicines: You may need any of the following:  · Prescription medicine may be given  You may receive a prescription for pain medication or be advised to use over the counter (OTC) pain medication such as acetaminophen (Tylenol) or ibuprofen (Advil, Motrin)  Ask your healthcare provider how to take this medicine safely  · NSAIDs , such as ibuprofen, help decrease swelling, pain, and fever  NSAIDs can cause stomach bleeding or kidney problems in certain people  If you take blood thinner medicine, always ask your healthcare provider if NSAIDs are safe for you  Always read the medicine label and follow directions  · Stool softeners help treat or prevent constipation  · Take your medicine as directed  Contact your healthcare provider if you think your medicine is not helping or if you have side effects  Tell him or her if you are allergic to any medicine  Keep a list of the medicines, vitamins, and herbs you take  Include the amounts, and when and why you take them  Bring the list or the pill bottles to follow-up visits  Carry your medicine list with you in case of an emergency  Activity:   · Rest as needed  Get up and move around as directed to help prevent blood clots  Start with short walks and slowly increase the distance every day  Limit the number of times you climb stairs to 2 times each day for the first week  Plan most of your daily activities on one level of your home  · Do not lift objects heavier than 10 pounds for 6 weeks  Avoid strenuous activity for 2 weeks  · Do not strain during bowel movements  High-fiber foods and extra liquids can help you prevent constipation  Examples of high-fiber foods are fruit and bran  Prune juice and water are good liquids to drink  · Do not have sex, use tampons, or douche for up to 8 weeks  You may shower as soon as the day after surgery    Tub baths can be taken starting 2 weeks after surgery  Do not go into pools or hot tubs until cleared by your doctor  · Ask when it is safe for you to drive  It is generally safe to drive after 2 weeks and when no longer taking prescription pain medication  · Ask when you may return to work and to other regular activities  Wound care: Care for your abdominal incisions as directed  Carefully wash around the wound with soap and water  If you have Hibiclens or medicated soap that you were instructed to use before surgery, you may use that to wash with for up to 2 days after surgery  If not, any mild non-scented, non-abrasive soap is safe  It is okay to let the soap and water run over your incision  Do not scrub your incision  Dry the area and put on new, clean bandages as directed  Change your bandages when they get wet or dirty  If you have strips of medical tape, let them fall off on their own  It may take 7 to 14 days for them to fall off  Check your incision every day for redness, swelling, or pus  Deep breathing: Take deep breaths and cough 10 times each hour  This will decrease your risk for a lung infection  Take a deep breath and hold it for as long as you can  Let the air out and then cough strongly  Deep breaths help open your airway  You may be given an incentive spirometer to help you take deep breaths  Put the plastic piece in your mouth and take a slow, deep breath, then let the air out and cough  Repeat these steps 10 times every hour  Get support: This surgery may be life-changing for you and your family  You will no longer be able to get pregnant  Sudden changes in the levels of your hormones may occur and cause mood swings and depression  You may feel angry, sad, or frightened, or cry frequently and unexpectedly  These feelings are normal  Talk to your healthcare provider about where you can get support  You can also ask if hormone replacement medicine is right for you     Follow up with your healthcare provider or gynecologist as directed: You may need to return to have stitches removed, and for other tests  Write down your questions so you remember to ask them during your visits  © 2017 [employer] [employer] Information is for End User's use only and may not be sold, redistributed or otherwise used for commercial purposes  All illustrations and images included in CareNotes® are the copyrighted property of [employer]  or Rickie Floyd  The above information is an  only  It is not intended as medical advice for individual conditions or treatments  Talk to your doctor, nurse or pharmacist before following any medical regimen to see if it is safe and effective for you

## 2018-12-31 NOTE — OP NOTE
OPERATIVE REPORT  PATIENT NAME: Marisol Butler    :  1980  MRN: [id number]  Pt Location: [facility] OR ROOM 08    SURGERY DATE: 2018    Surgeon(s) and Role:     * Stephanie Muñiz DO - Primary     * Andre Gonzalez MD - Assisting    Preop Diagnosis:  Pelvic pain in female [R10 2]  Abnormal uterine bleeding (AUB) [N93 9]  Fibroids, intramural [D25 1]    Post-Op Diagnosis Codes:     * Pelvic pain in female [R10 2]     * Abnormal uterine bleeding (AUB) [N93 9]     * Fibroids, intramural [D25 1]    Procedure(s) (LRB):  HYSTERECTOMY LAPAROSCOPIC TOTAL (LTH) (N/A)  SALPINGECTOMY, LAPAROSCOPIC (Bilateral)  CYSTOSCOPY (N/A)    Specimen(s):  ID Type Source Tests Collected by Time Destination   1 : uterus, cervix, bilateral tubes Tissue Uterus TISSUE EXAM Laura Bal DO  [id number]        Estimated Blood Loss:   600 mL    Drains:  [REMOVED] NG/OG/Enteral Tube Orogastric 18 Fr Center mouth (Removed)   Number of days: 0       [REMOVED] Urethral Catheter Non-latex 16 Fr  (Removed)   Number of days: 0       Anesthesia Type:   General w/ Epidural    Operative Indications:  Pelvic pain in female [R10 2]  Abnormal uterine bleeding (AUB) [N93 9]  Fibroids, intramural [D25 1]    41yo  with AUB-L presenting with heavy menstrual bleeding, symptomatic anemia, and intractable pelvic pain  Patient continued to have heavy menstrual bleeding despite management with high dose oral estrogen and progesterone hormonal medication  She required 2U PRBCs due to her symptomatic anemia and continued bleeding  Her pain was intractable even with IV analgesics and finally controlled with an epidural placement  Operative Findings:  1  External genitalia grossly normal in appearance  No lacerations, no lesions, no ulcerations observed  Uterus approximately 10 weeks in size, freely mobile, anteverted in position  Cervix dilated approximately 1cm  Notable amount of clots and bleeding with bimanual exam and within vaginal vault   No adnexal masses palpated bilaterally  2  Uterus sounded to 10cm  3  Laparoscopic evaluation noted enlarged uterus, globular in shape  Area of blanched tissue at fundus, possibly secondary to degenerating intramural fibroid  No abnormal contours appreciated  Bilateral ovaries and fallopian tubes grossly normal in appearance  Area of  fallopian tube secondary to past tubal ligation observed  Small piece of fallope ring visualized upon removal of fimbriae and remaining L fallopian tube  4  No evidence of injury to bowel or vasculature upon entrance into peritoneum and again at conclusion of the procedure  5  Bilateral ureters were visualized laparoscopically and bilateral ureteral jets were visualized on cystoscopy  No evidence of bladder injury observed on cystoscopy  Complications:   None    Procedure and Technique:  The patient was taken to the operating room where she was properly identified and general anesthesia was obtained without difficulty  The patient was given prophylactic intravenous antibiotics  A Rolando hugger was placed to maintain control of core body temperature  The patient was prepped and draped in the usual sterile manner in the dorsal lithotomy position using the stirrups  Care was taken to avoid excessive flexion or extension of the patient's hips and knees and pressure on her extremities  A time out was performed and the above information confirmed  A maldonado catheter was inserted into the bladder and a weighted speculum was placed into the vagina  The weighted retractor was placed into the vagina, a JAIME retractor was placed to retract the anterior vaginal wall, and the anterior portion of the cervix was grasped with a single tooth tenaculum  The uterus sounded to 10cm  Stay suture were placed on the cervix on the R lateral aspect, and the Brigida manipulator was placed  The single tooth tenaculum and the weighted speculum were then removed from the vagina    The vaginal cuff occluder was inflated  Surgeons gloves were then changed, and attention was then turned to the abdomen, Where 5cc of local was injected infraumbilically  The veres needle was then introduced at a 45 degree angle while tenting the abdominal wall  Intraperitoneal placement was confirmed with a drop in pressure while CO2 was flowing  This was used to obtain adequate pneumoperitoneum  A small incision was then made infraumbilically, and a 25YY trocar and sleeve were inserted through the incision into the peritoneum without difficulty  Laparoscopic visualization confirmed the intraperitoneal insertion of the port  Pneumoperitoneum was then maintained using carbon dioxide  Subsequently, after infiltrating the areas with local, two additional small incisions were made in both the right and left lower quadrants, approximately 3 cm above and 3 cm medial to the anterior superior iliac spine  Two ports (5mm, 5mm) were introduced under direct visualization  The patient was placed in trendelenburg, and attention was then turned to the uterine fundus, which was grasped at the level of the fimbriae of the R fallopian tube  The mesosalpinx was then coagulated and cut using the Enseal dissecting instrument  The fallopian tube was ligated at the site of the previous tubal ligation and the tube was removed through the 11mm trocar port  The Enseal dissecting instrument was then used to coagulate and cut the round ligament with subsequent coagulation and cutting of the right utero-ovarian ligament  The broad ligament was then coagulated and cut to the level of the uterine arteries  The Enseal was then used to coagulate the uterine artery along the right  A bladder flap was then created and dissected approximately half of the way across the uterus at the level of the lower uterine segment  The bladder was pushed down        The instruments were then switched in the ports and using the Enseal, the left fallopian tube was coagulated and cut  Again the tube was ligated at the site of previous tubal ligation and was removed from the abdomen via the R lateral trocar port  We continued using the Enseal to coagulate and cut the round ligament and then coagulated and cut down the left utero-ovarian ligament  The broad ligament was then coagulated and cut to the level of the uterine arteries  The Enseal was used to coagulate the uterine artery on the left  A bladder flap was then completed using both the Enseal and hydrodissection with a laparoscopic  coming across the uterus to meet the incision from the right side  The cardinal ligaments were then cauterized and cut used the Enseal dissecting instrument  At this point, the uterus was visualized and noted to be blanched  The colpotomy was performed with the monopolar spatula  Once the colpotomy was completed, the attention was then turned back to the perineum where the vaginal cuff occluder was deflated, and the uterus and cervix were removed vaginally  A sterile glove with a lap sponge was placed in the vagina to maintain pneumoperitoneum  At this point, attention was then turned back to the abdomen where a stratifix suture was placed into the abdomen through the 10 port, and the vaginal cuff was closed laparoscopically in a running fashion with 2 back stitches placed  The suture was cut, and the needle and suture was removed from the body  The abdomen and pelvis was visualized, irrigated, and good hemostasis was noted  Good hemostasis was once again noted  Pneumoperitoneum was then evacuated and the trocars were removed  The trochar incisions were closed using 4 0 monocryl and Histoacryl was placed  The maldonado was removed and cystoscopy was performed  Bilateral ureteral jets were visualized, as was the bladder dome  No damage to the bladder was visualized on cystoscopy  The bladder was drained and the cystoscope was removed   All instruments were then removed from the vagina  The patient tolerated the procedure well and was transferred to the recovery room in stable condition  All needle, sponge, and instrument counts were noted to be correct x 2 at the end of the procedure  Dr Rene Beard was present and participated in all key portions of the procedure        Patient Disposition:  PACU  and extubated and stable    SIGNATURE: Danette Augustine MD  DATE: December 31, 2018  TIME: 1:27 AM

## 2018-12-31 NOTE — NURSING NOTE
Pt arrived to floor with epidural in place  Scant amount of fresh bloody drainage noted under dressing around epidural   Dressing still intact however  Pt pain well controlled with epidural infusing  Dr Mayra Connors with anesthesia made aware  At bedside to evaluate, no new orders at this time  Will continue to monitor

## 2018-12-31 NOTE — DISCHARGE SUMMARY
Discharge Summary - Gynecology  Marisol Butler 43 y o  female MRN: [id number]  Unit/Bed#: [facility] 003-59 Encounter: [id number]    Admission Date: 2018   Discharge Date: 2019    Attending Physician:   Stephanie Muñiz DO    Consulting Physician(s):   None    Admitting Diagnosis:   Pelvic pain  Abnormal uterine bleeding  Uterine fibroids  Anemia    Discharge Diagnosis:   Same as above  Status post laparoscopic total hysterectomy    Procedures Performed: Total laparoscopic hysterectomy, bilateral salpingectomy, and cystoscopy  HPI:  Patient is a 41yo  who presented with a 2 day history of midline pelvic pain that became progressively worse until prompting evaluation in the emergency department  Patient has a known history of abnormal uterine bleeding secondary to known uterine fibroid that was recently treated with a tapering course of oral contraceptives  Patient was noted to have some vaginal bleeding and stated that she is currently on her placebo week of OCPs and was likely starting her menses  Patient has been hospitalized previously during her menses secondary to heavy bleeding and requiring blood transfusion for symptomatic anemia  On assessment she did not present with an acute abdomen and CT imaging of abdomen and pelvis showed no signs of ovarian torsion, however uterus noted to have heterogenous mass suggestive of adenomyosis vs degenerating leiomyoma  Hospital Course:  1  AUB/Pelvic pain  - patient had been evaluated for pelvic pain with plans for outpatient hysterectomy  EMB benign with early secretory endometrium, no hyperplasia or malignancy  - pain treated with indomethacin, tylenol, oxycodone, and finally dilaudid with minimal relief  Epidural placed for pain control   - heavy vaginal bleeding consistent with patient's previous menses noted with symptomatic anemia     Decision was made on 18 to proceed with a total laparoscopic hysterectomy and bilateral salpingectomy due to continued heavy bleeding requiring transfusion and intractable pelvic pain  Procedure was performed as discussed above  2  Postoperative care:  - pain was initially controlled postoperatively with epidural until it was discontinued on POD 1  Pain was controlled afterward with PO analgesics (ibuprofen, tylenol, and oxycodone prn)  - patient tolerated oral intake which was advanced from clears to regular diet as tolerated  She was ambulating and voiding without difficulty  3  Anemia:  - patient noted to be anemic on admission with Hgb 7 7  - Hgb trended: 7 7 -> 7 0 -> 6 4  She was symptomatic from anemia and received 2U PRBC on day of decision to proceed with hysterectomy  Immediate postoperative Hgb 8 8 in PACU  AM CBC was collected with Hgb 7 4  Lab Results:   Lab Results   Component Value Date    WBC 12 07 (H) 12/30/2018    HGB 8 8 (L) 12/30/2018    HCT 30 4 (L) 12/30/2018    MCV 74 (L) 12/30/2018     12/30/2018     Lab Results   Component Value Date    GLUCOSE 102 11/17/2015    CALCIUM 8 2 (L) 12/30/2018     11/17/2015    K 3 3 (L) 12/30/2018    CO2 26 12/30/2018     12/30/2018    BUN 6 12/30/2018    CREATININE 0 72 12/30/2018     Lab Results   Component Value Date/Time    POCGLU 106 12/30/2018 08:06 PM     Lab Results   Component Value Date    PTT 35 11/07/2018     Lab Results   Component Value Date    INR 1 15 11/07/2018    INR 1 15 05/15/2017    PROTIME 14 4 (H) 11/07/2018    PROTIME 14 7 (H) 56/85/4838       Complications:   None    Condition at Discharge:   stable     Discharge Medications:   See after visit summary for reconciled discharge medications provided to patient and family  Discharge instructions: See after visit summary for complete information  Do not place anything (no partner, tampons or douche) in your vagina for 6 weeks    You may walk for exercise for the first 6 weeks then gradually return to your usual activities     Please do not drive until tolerating pain and off of narcotics     You may take baths or shower per your preference     Please look at your incision in the mirror daily and call for redness or tenderness or increased warmth   Call us for increasing redness or steady drainage from the incision     Please call us for temperature > 100 4*F or 38* C, worsening pain or a foul discharge  Provisions for Follow-Up Care:   See after visit summary for information related to follow-up care and any pertinent home health orders  Disposition: Home  Planned Readmission: No    Code Status: Level 1 Full Code  Discharge Statement   I spent 20 minutes discharging the patient  This time was spent on the day of discharge  I had direct contact with the patient on the day of discharge  Additional documentation is required if more than 30 minutes were spent on discharge

## 2019-01-02 ENCOUNTER — TRANSITIONAL CARE MANAGEMENT (OUTPATIENT)
Dept: FAMILY MEDICINE CLINIC | Facility: CLINIC | Age: 39
End: 2019-01-02

## 2019-01-02 ENCOUNTER — TELEPHONE (OUTPATIENT)
Dept: FAMILY MEDICINE CLINIC | Facility: CLINIC | Age: 39
End: 2019-01-02

## 2019-01-07 ENCOUNTER — TELEPHONE (OUTPATIENT)
Dept: OBGYN CLINIC | Facility: CLINIC | Age: 39
End: 2019-01-07

## 2019-01-07 DIAGNOSIS — B37.3 CANDIDAL VULVOVAGINITIS: Primary | ICD-10-CM

## 2019-01-07 RX ORDER — FLUCONAZOLE 150 MG/1
150 TABLET ORAL ONCE
Qty: 1 TABLET | Refills: 0 | Status: SHIPPED | OUTPATIENT
Start: 2019-01-07 | End: 2019-01-07

## 2019-01-07 RX ORDER — NYSTATIN AND TRIAMCINOLONE ACETONIDE 100000; 1 [USP'U]/G; MG/G
OINTMENT TOPICAL 2 TIMES DAILY
Qty: 30 G | Refills: 0 | Status: SHIPPED | OUTPATIENT
Start: 2019-01-07 | End: 2020-01-08

## 2019-01-07 NOTE — TELEPHONE ENCOUNTER
Patient had hysterectomy on 12/30 and called today stating she has a yeast infection and would normaly take OTC remedy but since she just a recent surgery she wanted to make that was ok or to see if you wanted to send RX to pharmacy  She had itching, is raw, has a white milky discharge but no odor    Please advise if she should use OTC or if you are sending RX to pharmacy([facility])

## 2019-01-08 ENCOUNTER — OFFICE VISIT (OUTPATIENT)
Dept: OBGYN CLINIC | Facility: CLINIC | Age: 39
End: 2019-01-08

## 2019-01-08 DIAGNOSIS — N93.9 ABNORMAL UTERINE BLEEDING: Primary | ICD-10-CM

## 2019-01-22 ENCOUNTER — OFFICE VISIT (OUTPATIENT)
Dept: OBGYN CLINIC | Facility: CLINIC | Age: 39
End: 2019-01-22
Payer: COMMERCIAL

## 2019-01-22 VITALS — DIASTOLIC BLOOD PRESSURE: 72 MMHG | WEIGHT: 221 LBS | BODY MASS INDEX: 41.76 KG/M2 | SYSTOLIC BLOOD PRESSURE: 122 MMHG

## 2019-01-22 DIAGNOSIS — R30.0 DYSURIA: ICD-10-CM

## 2019-01-22 DIAGNOSIS — Z48.89 POSTOPERATIVE VISIT: Primary | ICD-10-CM

## 2019-01-22 PROBLEM — N92.6 IRREGULAR MENSTRUAL CYCLE: Status: ACTIVE | Noted: 2017-05-09

## 2019-01-22 PROBLEM — E55.9 VITAMIN D DEFICIENCY: Status: ACTIVE | Noted: 2017-05-15

## 2019-01-22 PROBLEM — R63.5 WEIGHT GAIN: Status: ACTIVE | Noted: 2017-05-09

## 2019-01-22 LAB
SL AMB  POCT GLUCOSE, UA: NORMAL
SL AMB LEUKOCYTE ESTERASE,UA: NORMAL
SL AMB POCT BILIRUBIN,UA: NORMAL
SL AMB POCT BLOOD,UA: NORMAL
SL AMB POCT CLARITY,UA: NORMAL
SL AMB POCT COLOR,UA: YELLOW
SL AMB POCT KETONES,UA: NORMAL
SL AMB POCT NITRITE,UA: NORMAL
SL AMB POCT PH,UA: 6
SL AMB POCT SPECIFIC GRAVITY,UA: 1.03
SL AMB POCT URINE PROTEIN: NORMAL
SL AMB POCT UROBILINOGEN: NORMAL

## 2019-01-22 PROCEDURE — 87086 URINE CULTURE/COLONY COUNT: CPT | Performed by: OBSTETRICS & GYNECOLOGY

## 2019-01-22 PROCEDURE — 81003 URINALYSIS AUTO W/O SCOPE: CPT | Performed by: OBSTETRICS & GYNECOLOGY

## 2019-01-22 PROCEDURE — 99024 POSTOP FOLLOW-UP VISIT: CPT | Performed by: OBSTETRICS & GYNECOLOGY

## 2019-01-22 NOTE — PROGRESS NOTES
Assessment     Doing well postoperatively  Operative findings again reviewed  OR photos reviewed  Pathology report discussed  Plan    1  Continue any current medications  2  Wound care discussed  3  Activity restrictions: no heavy lifting, nothing in vagina  4  Anticipated return to work: 4weeks  5  Follow up: 4 weeks for post op follow up  Jose R Conte is a 43 y o  y o  female who presents to the clinic 2 weeks status post TLH, BS for abnormal uterine bleeding and pelvic pain  Eating a regular diet without difficulty  Bowel movements are normal  The patient is not having any pain  Pt complains of being very tired  She also feels pressure when she urinates, but not often and with no pattern  Urine dip done for Pt -  needs to increase po hydration  Otherwise, feels good  Operative findings, photos and pathology discussed with patient  The following portions of the patient's history were reviewed and updated as appropriate: allergies, current medications, past family history, past medical history, past social history, past surgical history and problem list       Patient has no known allergies  Current Outpatient Prescriptions:     Fe Cbn-Fe Gluc-FA-B12-C-DSS (FE 90 PLUS PO), Take by mouth, Disp: , Rfl:     nystatin-triamcinolone (MYCOLOG-II) ointment, Apply topically 2 (two) times a day, Disp: 30 g, Rfl: 0      Review of Systems  Denies Fevers/chills/N/V/Constipation/Vaginal bleeding/excessive pain/dysuria/frequency of urination  Also as noted in HPI  Objective   /72   Wt 100 kg (221 lb)   BMI 41 76 kg/m²     General: alert and oriented, in no acute distress  Abdomen:soft, non-tender  Incision: healing well, no drainage, no erythema, no hernia, no seroma, no swelling, no dehiscence, incision well approximated  Speculum Exam: Cuff intact  No bleeding

## 2019-01-24 LAB — BACTERIA UR CULT: NORMAL

## 2019-01-29 ENCOUNTER — TELEPHONE (OUTPATIENT)
Dept: OBGYN CLINIC | Facility: CLINIC | Age: 39
End: 2019-01-29

## 2019-01-29 NOTE — TELEPHONE ENCOUNTER
Message left for patient to move her 2/22 appointment up to 2/11 or 2/12 and we will give her the return to work note then (we can also fax it)  Dr Alon August wants to see her before the return to work date of 2/13

## 2019-01-29 NOTE — TELEPHONE ENCOUNTER
She should be fine to go back 2/13  I think she has one more appt with me? We can further discuss then  Most women are good by 6 weeks

## 2019-01-29 NOTE — TELEPHONE ENCOUNTER
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

## 2019-02-13 ENCOUNTER — OFFICE VISIT (OUTPATIENT)
Dept: OBGYN CLINIC | Facility: CLINIC | Age: 39
End: 2019-02-13

## 2019-02-13 VITALS — DIASTOLIC BLOOD PRESSURE: 60 MMHG | BODY MASS INDEX: 41.76 KG/M2 | SYSTOLIC BLOOD PRESSURE: 102 MMHG | WEIGHT: 221 LBS

## 2019-02-13 DIAGNOSIS — D50.9 IRON DEFICIENCY ANEMIA, UNSPECIFIED IRON DEFICIENCY ANEMIA TYPE: ICD-10-CM

## 2019-02-13 DIAGNOSIS — Z48.89 POSTOPERATIVE VISIT: Primary | ICD-10-CM

## 2019-02-13 PROBLEM — N93.9 ABNORMAL UTERINE BLEEDING (AUB): Status: RESOLVED | Noted: 2017-05-14 | Resolved: 2019-02-13

## 2019-02-13 PROBLEM — D25.1 FIBROIDS, INTRAMURAL: Status: RESOLVED | Noted: 2018-12-28 | Resolved: 2019-02-13

## 2019-02-13 PROBLEM — R10.2 PELVIC PAIN IN FEMALE: Chronic | Status: RESOLVED | Noted: 2018-12-28 | Resolved: 2019-02-13

## 2019-02-13 PROBLEM — N92.6 IRREGULAR MENSTRUAL CYCLE: Status: RESOLVED | Noted: 2017-05-09 | Resolved: 2019-02-13

## 2019-02-13 PROCEDURE — 99024 POSTOP FOLLOW-UP VISIT: CPT | Performed by: OBSTETRICS & GYNECOLOGY

## 2019-02-13 NOTE — PROGRESS NOTES
Assessment   S/p TLH/BS/cystoscopy for AUB and adenomyosis    Doing well postoperatively  Plan     1  Continue any current medications  2  Wound care discussed  3  Activity restrictions: nothing per vagina for 6 weeks (no intercourse/tampons)  4  Anticipated return to work: now  5  Follow up: annual exam  6  CBC ordered to check hgb/anemia          Subjective    Marisol Butler is a 43 y o  y o  female who presents to the clinic 6 weeks status post TLH/BS/cystoscopy for abnormal uterine bleeding and adenomyosis  Eating a regular diet without difficulty  Bowel movements are normal  The patient is not having any pain  Still tired, but improving slowly  Not taking iron  Will check CBC, Recommend MVI  The following portions of the patient's history were reviewed and updated as appropriate: allergies, current medications, past family history, past medical history, past social history, past surgical history and problem list       Patient has no known allergies  Current Outpatient Medications:     Fe Cbn-Fe Gluc-FA-B12-C-DSS (FE 90 PLUS PO), Take by mouth, Disp: , Rfl:     nystatin-triamcinolone (MYCOLOG-II) ointment, Apply topically 2 (two) times a day, Disp: 30 g, Rfl: 0      Review of Systems  Denies Fevers/chills/N/V/Constipation/Vaginal bleeding/excessive pain/dysuria/frequency of urination  Also as noted in HPI  Objective   /60   Wt 100 kg (221 lb)   BMI 41 76 kg/m²     General: alert and oriented, in no acute distress  Abdomen:soft, non-tender  Incision: healing well, no drainage, no erythema, no hernia, no seroma, no swelling, no dehiscence, incision well approximated  Speculum Exam: Cuff intact  No bleeding

## 2019-06-03 ENCOUNTER — OFFICE VISIT (OUTPATIENT)
Dept: FAMILY MEDICINE CLINIC | Facility: CLINIC | Age: 39
End: 2019-06-03

## 2019-06-03 VITALS
BODY MASS INDEX: 39.84 KG/M2 | HEART RATE: 90 BPM | RESPIRATION RATE: 20 BRPM | DIASTOLIC BLOOD PRESSURE: 90 MMHG | HEIGHT: 61 IN | SYSTOLIC BLOOD PRESSURE: 138 MMHG | TEMPERATURE: 99.4 F | WEIGHT: 211 LBS

## 2019-06-03 DIAGNOSIS — L81.9 HYPERPIGMENTATION: Primary | ICD-10-CM

## 2019-06-03 PROCEDURE — 99213 OFFICE O/P EST LOW 20 MIN: CPT | Performed by: FAMILY MEDICINE

## 2019-10-03 NOTE — TELEPHONE ENCOUNTER
Pt called and said she was dealing with her menstrual being on and off for about 3 weeks, in the past she needed a blood transfusion due to this, and right now shes starting to feel the way she did when she needed the blood transfusion, pt is feeling very tired and weak, while walking she was feeling her heart beating really fast, I suggested pt to come in to be seen but she would like someone to give her a call cause she feels maybe she should go to Emergency room or maybe be sent for lab work or something  Please call pt    Pt seen Dr Delia Drew 7/5/2017 Patient is pregnant regardless of trimester (administer thimerosal-free vaccine)

## 2019-10-29 ENCOUNTER — HOSPITAL ENCOUNTER (EMERGENCY)
Facility: HOSPITAL | Age: 39
Discharge: HOME | End: 2019-10-29
Attending: EMERGENCY MEDICINE | Admitting: EMERGENCY MEDICINE
Payer: COMMERCIAL

## 2019-10-29 VITALS
SYSTOLIC BLOOD PRESSURE: 184 MMHG | HEART RATE: 87 BPM | WEIGHT: 201 LBS | TEMPERATURE: 98 F | RESPIRATION RATE: 16 BRPM | BODY MASS INDEX: 37.98 KG/M2 | DIASTOLIC BLOOD PRESSURE: 104 MMHG | OXYGEN SATURATION: 99 %

## 2019-10-29 DIAGNOSIS — I10 HYPERTENSION: Primary | ICD-10-CM

## 2019-10-29 DIAGNOSIS — M79.602 LEFT ARM PAIN: ICD-10-CM

## 2019-10-29 LAB
ANION GAP SERPL CALCULATED.3IONS-SCNC: 8 MMOL/L (ref 4–13)
ATRIAL RATE: 83 BPM
BASOPHILS # BLD AUTO: 0.03 THOUSANDS/ΜL (ref 0–0.1)
BASOPHILS NFR BLD AUTO: 0 % (ref 0–1)
BUN SERPL-MCNC: 9 MG/DL (ref 5–25)
CALCIUM SERPL-MCNC: 9.8 MG/DL (ref 8.3–10.1)
CHLORIDE SERPL-SCNC: 104 MMOL/L (ref 100–108)
CO2 SERPL-SCNC: 25 MMOL/L (ref 21–32)
CREAT SERPL-MCNC: 0.84 MG/DL (ref 0.6–1.3)
EOSINOPHIL # BLD AUTO: 0.13 THOUSAND/ΜL (ref 0–0.61)
EOSINOPHIL NFR BLD AUTO: 2 % (ref 0–6)
ERYTHROCYTE [DISTWIDTH] IN BLOOD BY AUTOMATED COUNT: 13.6 % (ref 11.6–15.1)
GFR SERPL CREATININE-BSD FRML MDRD: 87 ML/MIN/1.73SQ M
GLUCOSE SERPL-MCNC: 108 MG/DL (ref 65–140)
HCT VFR BLD AUTO: 45 % (ref 34.8–46.1)
HGB BLD-MCNC: 14.7 G/DL (ref 11.5–15.4)
IMM GRANULOCYTES # BLD AUTO: 0.03 THOUSAND/UL (ref 0–0.2)
IMM GRANULOCYTES NFR BLD AUTO: 0 % (ref 0–2)
LYMPHOCYTES # BLD AUTO: 1.94 THOUSANDS/ΜL (ref 0.6–4.47)
LYMPHOCYTES NFR BLD AUTO: 24 % (ref 14–44)
MCH RBC QN AUTO: 29.6 PG (ref 26.8–34.3)
MCHC RBC AUTO-ENTMCNC: 32.7 G/DL (ref 31.4–37.4)
MCV RBC AUTO: 91 FL (ref 82–98)
MONOCYTES # BLD AUTO: 0.54 THOUSAND/ΜL (ref 0.17–1.22)
MONOCYTES NFR BLD AUTO: 7 % (ref 4–12)
NEUTROPHILS # BLD AUTO: 5.44 THOUSANDS/ΜL (ref 1.85–7.62)
NEUTS SEG NFR BLD AUTO: 67 % (ref 43–75)
NRBC BLD AUTO-RTO: 0 /100 WBCS
P AXIS: 54 DEGREES
PLATELET # BLD AUTO: 236 THOUSANDS/UL (ref 149–390)
PMV BLD AUTO: 10.9 FL (ref 8.9–12.7)
POTASSIUM SERPL-SCNC: 3.5 MMOL/L (ref 3.5–5.3)
PR INTERVAL: 154 MS
QRS AXIS: 75 DEGREES
QRSD INTERVAL: 80 MS
QT INTERVAL: 372 MS
QTC INTERVAL: 437 MS
RBC # BLD AUTO: 4.97 MILLION/UL (ref 3.81–5.12)
SODIUM SERPL-SCNC: 137 MMOL/L (ref 136–145)
T WAVE AXIS: 38 DEGREES
TROPONIN I SERPL-MCNC: <0.02 NG/ML
VENTRICULAR RATE: 83 BPM
WBC # BLD AUTO: 8.11 THOUSAND/UL (ref 4.31–10.16)

## 2019-10-29 PROCEDURE — 99284 EMERGENCY DEPT VISIT MOD MDM: CPT | Performed by: EMERGENCY MEDICINE

## 2019-10-29 PROCEDURE — 36415 COLL VENOUS BLD VENIPUNCTURE: CPT | Performed by: EMERGENCY MEDICINE

## 2019-10-29 PROCEDURE — 84484 ASSAY OF TROPONIN QUANT: CPT | Performed by: EMERGENCY MEDICINE

## 2019-10-29 PROCEDURE — 93010 ELECTROCARDIOGRAM REPORT: CPT | Performed by: INTERNAL MEDICINE

## 2019-10-29 PROCEDURE — 85025 COMPLETE CBC W/AUTO DIFF WBC: CPT | Performed by: EMERGENCY MEDICINE

## 2019-10-29 PROCEDURE — 93005 ELECTROCARDIOGRAM TRACING: CPT

## 2019-10-29 PROCEDURE — 80048 BASIC METABOLIC PNL TOTAL CA: CPT | Performed by: EMERGENCY MEDICINE

## 2019-10-29 PROCEDURE — 99283 EMERGENCY DEPT VISIT LOW MDM: CPT

## 2019-10-29 NOTE — ED PROVIDER NOTES
History  Chief Complaint   Patient presents with    Arm Pain     Pain to back of pts L arm  Denies injury/ trauma  Started around 8am after waking up  Went to work and pain is still persisting  Radiates down finger tips  50-year-old female presenting emergency department for evaluation of left upper arm pain that started this morning around 8:00 a m  Stephani Green She describes it as a cramping like sensation that has been gradually worsening throughout the day  She notes some paresthesias in her left hand as well that has been intermittent  She also states she feels off since this morning and has difficult time describing this  She says it feels like she may be lightheaded intermittently  She is presenting now because she is concerned about possible cardiac disease or MI  She states her father had a heart attack at age 40  She denies any chest pain or shortness of breath  No other symptoms on review of systems  Her pain is not exacerbated with exertion and is not positional   She denies any trauma the onset of this pain  There is no rashes or tenderness over the arm  She has no difficulty ranging the arm  She denies any numbness or weakness in the arm  Prior to Admission Medications   Prescriptions Last Dose Informant Patient Reported? Taking? Fe Cbn-Fe Gluc-FA-B12-C-DSS (FE 90 PLUS PO)   Yes No   Sig: Take by mouth   nystatin-triamcinolone (MYCOLOG-II) ointment   No No   Sig: Apply topically 2 (two) times a day   Patient not taking: Reported on 6/3/2019      Facility-Administered Medications: None       Past Medical History:   Diagnosis Date    Anemia     Chest tightness or pressure     8/19/2013       Past Surgical History:   Procedure Laterality Date    CYSTOSCOPY N/A 12/30/2018    Procedure: Leigh Ann Brochure;  Surgeon: Margaret Bacon DO;  Location: [facility];  Service: Gynecology    HYSTERECTOMY N/A 12/30/2018    Procedure: HYSTERECTOMY LAPAROSCOPIC TOTAL ([address]);   Surgeon: Margaret Bacon DO; Location: [facility];  Service: Gynecology    NM LAP,RMV  ADNEXAL STRUCTURE Bilateral 12/30/2018    Procedure: SALPINGECTOMY, LAPAROSCOPIC;  Surgeon: Chago Mehta DO;  Location: [facility];  Service: Gynecology    TONSILLECTOMY      TUBAL LIGATION      2006       Family History   Problem Relation Age of Onset    Heart disease Father      I have reviewed and agree with the history as documented  Social History     Tobacco Use    Smoking status: Current Some Day Smoker     Types: Cigarettes    Smokeless tobacco: Never Used    Tobacco comment: social smoker   Substance Use Topics    Alcohol use: Not Currently     Comment: on the weekends/ socially    Drug use: No        Review of Systems   Constitutional: Negative for chills and fever  HENT: Negative for congestion and sore throat  Eyes: Negative for visual disturbance  Respiratory: Negative for cough and shortness of breath  Cardiovascular: Negative for chest pain, palpitations and leg swelling  Gastrointestinal: Negative for abdominal pain, diarrhea, nausea and vomiting  Genitourinary: Negative for difficulty urinating and dysuria  Musculoskeletal: Positive for myalgias  Skin: Negative for rash  Neurological: Negative for weakness, light-headedness, numbness and headaches         Physical Exam  ED Triage Vitals   Temperature Pulse Respirations Blood Pressure SpO2   10/29/19 1623 10/29/19 1623 10/29/19 1623 10/29/19 1623 10/29/19 1623   98 °F (36 7 °C) 86 17 (!) 216/112 98 %      Temp Source Heart Rate Source Patient Position - Orthostatic VS BP Location FiO2 (%)   10/29/19 1623 10/29/19 1623 10/29/19 1641 10/29/19 1641 --   Oral Monitor Sitting Right arm       Pain Score       10/29/19 1623       6             Orthostatic Vital Signs  Vitals:    10/29/19 1623 10/29/19 1641 10/29/19 1747   BP: (!) 216/112 (!) 202/112 (!) 184/104   Pulse: 86 87    Patient Position - Orthostatic VS:  Sitting        Physical Exam   Constitutional: She is oriented to person, place, and time  She appears well-developed and well-nourished  No distress  HENT:   Head: Normocephalic and atraumatic  Mouth/Throat: Oropharynx is clear and moist    Eyes: Pupils are equal, round, and reactive to light  EOM are normal    Neck: Normal range of motion  Neck supple  Cardiovascular: Normal rate, regular rhythm and normal heart sounds  Pulmonary/Chest: Effort normal and breath sounds normal  No respiratory distress  Abdominal: Soft  Bowel sounds are normal  There is no tenderness  Musculoskeletal: Normal range of motion  She exhibits no edema, tenderness or deformity  Normal strength and range of motion at the shoulder, elbow, and wrist of bilateral upper extremities  Neurological: She is alert and oriented to person, place, and time  No cranial nerve deficit or sensory deficit  She exhibits normal muscle tone  Normal sensation bilateral upper extremities  Skin: Skin is warm and dry  Capillary refill takes less than 2 seconds  Psychiatric: She has a normal mood and affect  Nursing note and vitals reviewed        ED Medications  Medications - No data to display    Diagnostic Studies  Results Reviewed     Procedure Component Value Units Date/Time    Troponin I [[id number]]  (Normal) Collected:  10/29/19 1704    Lab Status:  Final result Specimen:  Blood from Arm, Right Updated:  10/29/19 1740     Troponin I <0 02 ng/mL     Basic metabolic panel [[id number]] Collected:  10/29/19 1704    Lab Status:  Final result Specimen:  Blood from Arm, Right Updated:  10/29/19 1734     Sodium 137 mmol/L      Potassium 3 5 mmol/L      Chloride 104 mmol/L      CO2 25 mmol/L      ANION GAP 8 mmol/L      BUN 9 mg/dL      Creatinine 0 84 mg/dL      Glucose 108 mg/dL      Calcium 9 8 mg/dL      eGFR 87 ml/min/1 73sq m     Narrative:       [place] guidelines for Chronic Kidney Disease (CKD):     Stage 1 with normal or high GFR (GFR > 90 mL/min/1 73 square meters)    Stage 2 Mild CKD (GFR = 60-89 mL/min/1 73 square meters)    Stage 3A Moderate CKD (GFR = 45-59 mL/min/1 73 square meters)    Stage 3B Moderate CKD (GFR = 30-44 mL/min/1 73 square meters)    Stage 4 Severe CKD (GFR = 15-29 mL/min/1 73 square meters)    Stage 5 End Stage CKD (GFR <15 mL/min/1 73 square meters)  Note: GFR calculation is accurate only with a steady state creatinine    CBC and differential [[id number]] Collected:  10/29/19 1704    Lab Status:  Final result Specimen:  Blood from Arm, Right Updated:  10/29/19 1721     WBC 8 11 Thousand/uL      RBC 4 97 Million/uL      Hemoglobin 14 7 g/dL      Hematocrit 45 0 %      MCV 91 fL      MCH 29 6 pg      MCHC 32 7 g/dL      RDW 13 6 %      MPV 10 9 fL      Platelets 504 Thousands/uL      nRBC 0 /100 WBCs      Neutrophils Relative 67 %      Immat GRANS % 0 %      Lymphocytes Relative 24 %      Monocytes Relative 7 %      Eosinophils Relative 2 %      Basophils Relative 0 %      Neutrophils Absolute 5 44 Thousands/µL      Immature Grans Absolute 0 03 Thousand/uL      Lymphocytes Absolute 1 94 Thousands/µL      Monocytes Absolute 0 54 Thousand/µL      Eosinophils Absolute 0 13 Thousand/µL      Basophils Absolute 0 03 Thousands/µL                  No orders to display         Procedures  Procedures        ED Course  ED Course as of Oct 31 1759   Tue Oct 29, 2019   1728 This EKG was interpreted by me  The EKG demonstrates Normal sinus rhythm, normal intervals and axis, normal QRS, no acute ST changes present                HEART Risk Score      Most Recent Value   History  0 Filed at: 10/29/2019 1656   ECG  0 Filed at: 10/29/2019 1656   Age  0 Filed at: 10/29/2019 1656   Risk Factors  1 Filed at: 10/29/2019 1656   Troponin  0 Filed at: 10/29/2019 1656   Heart Score Risk Calculator   History  0 Filed at: 10/29/2019 1656   ECG  0 Filed at: 10/29/2019 1656   Age  0 Filed at: 10/29/2019 1656   Risk Factors  1 Filed at: 10/29/2019 1656   Troponin  0 Filed at: 10/29/2019 1656   HEART Score  1 Filed at: 10/29/2019 1656   HEART Score  1 Filed at: 10/29/2019 1656                            [facility]  Number of Diagnoses or Management Options  Hypertension:   Diagnosis management comments: 65-year-old female with a family history of cardiac disease presenting for evaluation nonspecific left arm pain without any obvious source  She is concerned about cardiac disease given her family history  Her blood pressure is notably elevated in the emergency room  Will do a cardiac workup  Patient states the pain has been constant for at least the last 9 hours  Will do a single troponin discharge with primary care follow-up if negative  Disposition  Final diagnoses:   Hypertension   Left arm pain     Time reflects when diagnosis was documented in both MDM as applicable and the Disposition within this note     Time User Action Codes Description Comment    10/29/2019  4:43 PM Shaun Older Add [I10] Hypertension     10/29/2019  4:58 PM Bernladan Older Add [T27 [id number]] Left arm pain       ED Disposition     ED Disposition Condition Date/Time Comment    Discharge Stable Tue Oct 29, 2019  4:58 PM Kuldip Hernandez discharge to home/self care  Follow-up Information     Follow up With Specialties Details Why Contact Info    PCP:  Isabella Beauchamp ([phone])  Schedule an appointment as soon as possible for a visit in 3 days            Discharge Medication List as of 10/29/2019  5:48 PM      CONTINUE these medications which have NOT CHANGED    Details   Fe Cbn-Fe Gluc-FA-B12-C-DSS (FE 90 PLUS PO) Take by mouth, Until Discontinued, Historical Med      nystatin-triamcinolone (MYCOLOG-II) ointment Apply topically 2 (two) times a day, Starting Mon 1/7/2019, Normal           No discharge procedures on file  ED Provider  Attending physically available and evaluated Kuldip Hernandez I managed the patient along with the ED Attending      Electronically Signed by         Gary Gloria MD  10/31/19 1800

## 2019-10-29 NOTE — ED ATTENDING ATTESTATION
10/29/2019  I, Raysa Jackson MD, saw and evaluated the patient  I have discussed the patient with the resident/non-physician practitioner and agree with the resident's/non-physician practitioner's findings, Plan of Care, and MDM as documented in the resident's/non-physician practitioner's note, except where noted  All available labs and Radiology studies were reviewed  I was present for key portions of any procedure(s) performed by the resident/non-physician practitioner and I was immediately available to provide assistance  At this point I agree with the current assessment done in the Emergency Department  I have conducted an independent evaluation of this patient a history and physical is as follows:    Final Diagnosis:  1  Hypertension    2  Left arm pain      Chief Complaint   Patient presents with    Arm Pain     Pain to back of pts L arm  Denies injury/ trauma  Started around 8am after waking up  Went to work and pain is still persisting  Radiates down finger tips  This is a 58-year-old female who presents with left arm pain  The patient woke up from sleep at 5:30 a m  This morning with an annoying pain in her left arm  The pain has been constant throughout the day today  Denies any alleviating or exacerbating factors  The pain is associated with a pins and needle sensation in left hand  She has never experienced this type of pain before  No inciting event or injury  She states that she is concerned about a heart attack because her father had his 1st MI when he was 40  Denies history of hypertension, hyperlipidemia, diabetes, obesity, tobacco use (within last 3 months), personal history of MI, PAD, CVA    Denies fever/chills, nausea/vomiting, lightheadedness/dizziness, numbness/weakness, headache, change in vision, URI symptoms, neck pain, chest pain, palpitations, shortness of breath, cough, back pain, flank pain, abdominal pain, diarrhea, hematochezia, melena, dysuria, hematuria, abnormal vaginal discharge/bleeding  PMH:  - not applicable  PSH:  - not applicable    PE:   Vitals:    10/29/19 1623 10/29/19 1641 10/29/19 1747   BP: (!) 216/112 (!) 202/112 (!) 184/104   BP Location:  Right arm    Pulse: 86 87    Resp: 17 16 16   Temp: 98 °F (36 7 °C)     TempSrc: Oral     SpO2: 98% 99%    Weight: 91 2 kg (201 lb)       Constitutional: Vital signs are normal  She appears well-developed  She is cooperative  No distress  HENT:   Mouth/Throat: Uvula is midline, oropharynx is clear and moist and mucous membranes are normal    Eyes: Pupils are equal, round, and reactive to light  Conjunctivae and EOM are normal    Neck: Trachea normal  No thyroid mass and no thyromegaly present  Cardiovascular: Normal rate, regular rhythm, normal heart sounds, intact distal pulses and normal pulses  No murmur heard  Pulmonary/Chest: Effort normal and breath sounds normal    Abdominal: Soft  Normal appearance and bowel sounds are normal  There is no tenderness  There is no rebound, no guarding and no CVA tenderness  Neurological: She is alert  MSK:  No signs of trauma or swelling to left upper extremity  No tenderness throughout left upper extremity  5/5 strength left upper extremity with full sensation  Radial pulse is 2 +  Skin: Skin is warm, dry and intact  Psychiatric: She has a normal mood and affect  Her speech is normal and behavior is normal  Thought content normal        A:  - this is a 79-year-old female who presents with left arm pain  P:  - likely musculoskeletal arm pain, however given her family history, will check labs, EKG, chest x-ray  Disposition pending results  Patient should follow up with her family doctor for repeat blood pressure  - 13 point ROS was performed and all are normal unless stated in the history above  - Nursing note reviewed  Vitals reviewed     - Orders placed by myself and/or advanced practitioner / resident     - Previous chart was reviewed  - No language barrier    - History obtained from patient  - There are no limitations to the history obtained  - Critical care time: Not applicable for this patient  Medications - No data to display  No orders to display     Orders Placed This Encounter   Procedures    CBC and differential    Basic metabolic panel    Troponin I    ECG 12 lead     Labs Reviewed   TROPONIN I - Normal       Result Value Ref Range Status    Troponin I <0 02  <=0 04 ng/mL Final    Comment:   [employer] Chemistry analyzer 99% cutoff is > 0 04 ng/mL in network labs     o cTnI 99% cutoff is useful only when applied to patients in the clinical setting of myocardial ischemia   o cTnI 99% cutoff should be interpreted in the context of clinical history, ECG findings and possibly cardiac imaging to establish correct diagnosis  o cTnI 99% cutoff may be suggestive but clearly not indicative of a coronary event without the clinical setting of myocardial ischemia       CBC AND DIFFERENTIAL    WBC 8 11  4 31 - 10 16 Thousand/uL Final    RBC 4 97  3 81 - 5 12 Million/uL Final    Hemoglobin 14 7  11 5 - 15 4 g/dL Final    Hematocrit 45 0  34 8 - 46 1 % Final    MCV 91  82 - 98 fL Final    MCH 29 6  26 8 - 34 3 pg Final    MCHC 32 7  31 4 - 37 4 g/dL Final    RDW 13 6  11 6 - 15 1 % Final    MPV 10 9  8 9 - 12 7 fL Final    Platelets 065  769 - 390 Thousands/uL Final    nRBC 0  /100 WBCs Final    Neutrophils Relative 67  43 - 75 % Final    Immat GRANS % 0  0 - 2 % Final    Lymphocytes Relative 24  14 - 44 % Final    Monocytes Relative 7  4 - 12 % Final    Eosinophils Relative 2  0 - 6 % Final    Basophils Relative 0  0 - 1 % Final    Neutrophils Absolute 5 44  1 85 - 7 62 Thousands/µL Final    Immature Grans Absolute 0 03  0 00 - 0 20 Thousand/uL Final    Lymphocytes Absolute 1 94  0 60 - 4 47 Thousands/µL Final    Monocytes Absolute 0 54  0 17 - 1 22 Thousand/µL Final    Eosinophils Absolute 0 13  0 00 - 0 61 Thousand/µL Final    Basophils Absolute 0 03  0 00 - 0 10 Thousands/µL Final   BASIC METABOLIC PANEL    Sodium 145  136 - 145 mmol/L Final    Potassium 3 5  3 5 - 5 3 mmol/L Final    Chloride 104  100 - 108 mmol/L Final    CO2 25  21 - 32 mmol/L Final    ANION GAP 8  4 - 13 mmol/L Final    BUN 9  5 - 25 mg/dL Final    Creatinine 0 84  0 60 - 1 30 mg/dL Final    Comment: Standardized to IDMS reference method    Glucose 108  65 - 140 mg/dL Final    Comment:   If the patient is fasting, the ADA then defines impaired fasting glucose as > 100 mg/dL and diabetes as > or equal to 123 mg/dL  Specimen collection should occur prior to Sulfasalazine administration due to the potential for falsely depressed results  Specimen collection should occur prior to Sulfapyridine administration due to the potential for falsely elevated results  Calcium 9 8  8 3 - 10 1 mg/dL Final    eGFR 87  ml/min/1 73sq m Final    Narrative:     [place] guidelines for Chronic Kidney Disease (CKD):     Stage 1 with normal or high GFR (GFR > 90 mL/min/1 73 square meters)    Stage 2 Mild CKD (GFR = 60-89 mL/min/1 73 square meters)    Stage 3A Moderate CKD (GFR = 45-59 mL/min/1 73 square meters)    Stage 3B Moderate CKD (GFR = 30-44 mL/min/1 73 square meters)    Stage 4 Severe CKD (GFR = 15-29 mL/min/1 73 square meters)    Stage 5 End Stage CKD (GFR <15 mL/min/1 73 square meters)  Note: GFR calculation is accurate only with a steady state creatinine     Time reflects when diagnosis was documented in both MDM as applicable and the Disposition within this note     Time User Action Codes Description Comment    10/29/2019  4:43 PM Troy George Add [I10] Hypertension     10/29/2019  4:58 PM Troy George Add [O11 [id number]] Left arm pain       ED Disposition     ED Disposition Condition Date/Time Comment    Discharge Stable Tue Oct 29, 2019  4:58 PM Sophy Camacho discharge to home/self care              Follow-up Information     Follow up With Specialties Details Why Contact Info    PCP:  Rhianna Caldwell ([phone])  Schedule an appointment as soon as possible for a visit in 3 days          Patient's Medications   Discharge Prescriptions    No medications on file     No discharge procedures on file  Prior to Admission Medications   Prescriptions Last Dose Informant Patient Reported? Taking? Fe Cbn-Fe Gluc-FA-B12-C-DSS (FE 90 PLUS PO)   Yes No   Sig: Take by mouth   nystatin-triamcinolone (MYCOLOG-II) ointment   No No   Sig: Apply topically 2 (two) times a day   Patient not taking: Reported on 6/3/2019      Facility-Administered Medications: None       Portions of the record may have been created with voice recognition software  Occasional wrong word or "sound a like" substitutions may have occurred due to the inherent limitations of voice recognition software  Read the chart carefully and recognize, using context, where substitutions have occurred        ED Course         Critical Care Time  Procedures

## 2019-10-30 ENCOUNTER — OFFICE VISIT (OUTPATIENT)
Dept: FAMILY MEDICINE CLINIC | Facility: CLINIC | Age: 39
End: 2019-10-30

## 2019-10-30 VITALS
HEART RATE: 84 BPM | SYSTOLIC BLOOD PRESSURE: 166 MMHG | TEMPERATURE: 97.7 F | HEIGHT: 61 IN | BODY MASS INDEX: 38.14 KG/M2 | WEIGHT: 202 LBS | DIASTOLIC BLOOD PRESSURE: 100 MMHG

## 2019-10-30 DIAGNOSIS — F41.8 DEPRESSION WITH ANXIETY: Primary | ICD-10-CM

## 2019-10-30 DIAGNOSIS — Z72.0 TOBACCO USE: ICD-10-CM

## 2019-10-30 DIAGNOSIS — F41.8 ANXIETY ABOUT HEALTH: ICD-10-CM

## 2019-10-30 DIAGNOSIS — I10 ESSENTIAL HYPERTENSION: Primary | ICD-10-CM

## 2019-10-30 PROCEDURE — 99213 OFFICE O/P EST LOW 20 MIN: CPT | Performed by: FAMILY MEDICINE

## 2019-10-30 PROCEDURE — 3008F BODY MASS INDEX DOCD: CPT | Performed by: FAMILY MEDICINE

## 2019-10-30 RX ORDER — HYDROCHLOROTHIAZIDE 25 MG/1
25 TABLET ORAL DAILY
Qty: 90 TABLET | Refills: 5 | Status: SHIPPED | OUTPATIENT
Start: 2019-10-30 | End: 2020-01-08

## 2019-10-30 NOTE — PATIENT INSTRUCTIONS
Start blood pressure medication daily  Continue lifestyle modifications as we discussed  Please reach out to therapy to discuss anxiety concerns

## 2019-10-30 NOTE — PROGRESS NOTES
Cori Almendarez 1980 female MRN: [id number]    Family Medicine Acute Visit    ASSESSMENT/PLAN  Diagnoses and all orders for this visit:    Essential hypertension  -     Lipid panel; Future  -     hydrochlorothiazide (HYDRODIURIL) 25 mg tablet; Take 1 tablet (25 mg total) by mouth daily  - Counseled lifestyle changes and patient appears encouraged to work on these modifications  Follow up in 1-2 months  May need to add another type of antihypertensive if still not at goal  - Last A1c 5 4 in May 2017; would check again in May 2020    BMI 37 0-37 9, adult  -     Lipid panel; Future    Anxiety about health      Tobacco use   - Contemplative about changing at this time    In addition to the above, the patient was counseled on general preventative health care subjects, including but not limited to:  - Nutrition, healthy weight, aerobic and weight-bearing exercise  - Mental health, social support, and self care  - Patient made aware of  services at the office  Future Appointments   Date Time Provider Gisele Marquis   11/27/2019  8:30 AM Armando Van MD [facility] Mercedes          SUBJECTIVE  CC: ER FOLLOW UP ON HYPERTENSION      HPI:  Cori Almendarez is a 35 y o  female who presents for follow-up from the ER in regards to elevated blood pressure  On 10/29/2019 patient reported nonspecific left upper extremity discomfort and paresthesias prompted her to be evaluated in the emergency room  Cardiac enzymes and lab work were done which were unremarkable  EKG done which was unremarkable  She did have a blood pressure of 216/112 and 184/104 on repeat  Patient reports today being under a lot of anxiety since turning 35years old recently  She is worried as her father had an MI when he was 40years old  She is concerned about her high blood pressure and other risk factors       Other history:  Smokes half a pack on the weekends for two years  Last lipid panel in 06/14/2016 which was unremarkable  Last A1c 05/14/2017 which is 5 4  Diet: Doesn't drink a lot of soda, but eats a lot of frozen/canned foods/fast foods for a majority of days of the week  Activity: Her work involves a lot of activity, but she does not exercise much outside of work        Review of Systems   Constitutional: Negative for chills, fatigue and fever  HENT: Negative for hearing loss, nosebleeds and sore throat  Eyes: Negative for pain, redness and visual disturbance  Respiratory: Negative for cough, shortness of breath and wheezing  Cardiovascular: Negative for chest pain, palpitations and leg swelling  Gastrointestinal: Negative for abdominal pain, nausea and vomiting  Endocrine: Negative for polydipsia and polyuria  Genitourinary: Negative for difficulty urinating and hematuria  Musculoskeletal: Negative for arthralgias, gait problem and joint swelling  Skin: Negative for rash and wound  Neurological: Negative for weakness and numbness  Psychiatric/Behavioral: Negative for decreased concentration and suicidal ideas  The patient is nervous/anxious  Historical Information   The patient history was reviewed as follows:  Past Medical History:   Diagnosis Date    Anemia     Chest tightness or pressure     8/19/2013         Past Surgical History:   Procedure Laterality Date    CYSTOSCOPY N/A 12/30/2018    Procedure: CYSTOSCOPY;  Surgeon: Rachna Kaur DO;  [facility];  Service: Gynecology    HYSTERECTOMY N/A 12/30/2018    Procedure: HYSTERECTOMY LAPAROSCOPIC TOTAL ([address] [place] [address]);   Surgeon: Rachna Kaur DO;  [facility];  Service: Gynecology    [place] LAP,RMV  ADNEXAL STRUCTURE Bilateral 12/30/2018    Procedure: SALPINGECTOMY, LAPAROSCOPIC;  Surgeon: Rachna Kaur DO;  [facility];  Service: Gynecology    TONSILLECTOMY      TUBAL LIGATION      2006     Family History   Problem Relation Age of Onset    Heart disease Father       Social History   Social History Substance and Sexual Activity   Alcohol Use Not Currently    Comment: on the weekends/ socially     Social History     Substance and Sexual Activity   Drug Use No     Social History     Tobacco Use   Smoking Status Current Some Day Smoker    Types: Cigarettes   Smokeless Tobacco Never Used   Tobacco Comment    social smoker       Medications:     Current Outpatient Medications:     Fe Cbn-Fe Gluc-FA-B12-C-DSS (FE 90 PLUS PO), Take by mouth, Disp: , Rfl:     hydrochlorothiazide (HYDRODIURIL) 25 mg tablet, Take 1 tablet (25 mg total) by mouth daily, Disp: 90 tablet, Rfl: 5    nystatin-triamcinolone (MYCOLOG-II) ointment, Apply topically 2 (two) times a day (Patient not taking: Reported on 6/3/2019), Disp: 30 g, Rfl: 0    No Known Allergies    OBJECTIVE  Vitals:   Vitals:    10/30/19 0930   BP: 166/100   BP Location: Left arm   Patient Position: Sitting   Cuff Size: Large   Pulse: 84   Temp: 97 7 °F (36 5 °C)   TempSrc: Tympanic   Weight: 91 6 kg (202 lb)   Height: 5' 1" (1 549 m)         Physical Exam   Constitutional: She is oriented to person, place, and time  She appears well-developed  No distress  obese   HENT:   Head: Normocephalic and atraumatic  Right Ear: External ear normal    Left Ear: External ear normal    Eyes: Conjunctivae and EOM are normal  No scleral icterus  Neck: Normal range of motion  Neck supple  Cardiovascular: Normal rate, regular rhythm and normal heart sounds  Exam reveals no gallop and no friction rub  No murmur heard  Pulmonary/Chest: Effort normal and breath sounds normal  No respiratory distress  She has no wheezes  Abdominal: Soft  Neurological: She is alert and oriented to person, place, and time  She displays normal reflexes  No cranial nerve deficit  Skin: Skin is warm and dry     Psychiatric:   tearful                  Yokasta Lazcano MD  11/1/2019

## 2019-11-01 PROBLEM — Z72.0 TOBACCO USE: Status: ACTIVE | Noted: 2019-11-01

## 2019-11-05 ENCOUNTER — TRANSCRIBE ORDERS (OUTPATIENT)
Dept: FAMILY MEDICINE CLINIC | Facility: CLINIC | Age: 39
End: 2019-11-05

## 2019-11-05 DIAGNOSIS — F32.A DEPRESSION: Primary | Chronic | ICD-10-CM

## 2019-11-18 NOTE — UTILIZATION REVIEW
[address]  Utilization Review Department  Phone: [phone]; Fax [phone]  [email or web address]  ATTENTION: Please call with any questions or concerns to [phone]  and carefully listen to the prompts so that you are directed to the right person  Send all requests for admission clinical reviews, approved or denied determinations and any other requests to fax [phone]  All voicemails are confidential   Initial Clinical Review    Admission: Date/Time/Statement: OBSERVATION ADMISSION 11/07/18 1238 CONVERTED TO INPATIENT ADMISSION DUE TO HEAVY VAGINAL BLEEDING REQUIRING BLOOD TRANSFUSION & HGB/HCT SURVEILLANCE WITH HORMONAL TREATMENT      11/08/18 1807  Inpatient Admission Once     Transfer Service: General Medicine       Question Answer Comment   Admitting Physician LAY MOODY    Level of Care Med Surg    Estimated length of stay More than 2 Midnights    Certification I certify that inpatient services are medically necessary for this patient for a duration of greater than two midnights  See H&P and MD Progress Notes for additional information about the patient's course of treatment  11/08/18 1807         ED: Date/Time/Mode of Arrival:   ED Arrival Information     Expected Arrival Acuity Means of Arrival Escorted By Service Admission Type    - 11/7/2018 10:41 Emergent Walk-In Family Member General Medicine Emergency    Arrival Complaint    vaginal bleeding/weakness          Chief Complaint:   Chief Complaint   Patient presents with    Vaginal Bleeding     pt presents ambulatory with c/o mentrual cycle x 3 weeks, c/o weakness and dizziness over past few days  hx transfusion        History of Illness: 43 y o  female with PMH significant for iron deficiency anemia and AUB tx with Mirena presents to the ED for complaints of vaginal bleeding x3 weeks  Patient reports this started as her normal period   Over the last 3 weeks, she has bleeding almost ED Provider Note    CHIEF COMPLAINT  Chief Complaint   Patient presents with   • Suicidal Ideation       HPI  Kat Devlin is a 28 y.o. female who presents with history of depression, seen here earlier for folliculitis, given antibiotics. Out in the lobby when she is being discharged she stated that she was sexually hurting herself killing herself. No specific plan. Long history of depression. Denies hallucination denies any suicidal ideation.    REVIEW OF SYSTEMS  See HPI for further details. History of chronic depression diabetes obesity migraine headaches polycystic ovaries recent treatment for folliculitis Denies other G.I., G.U.. endrocine, cardiovascular, respriatory or neurological problems. All other systems are negative.     PAST MEDICAL HISTORY  Past Medical History   Diagnosis Date   • Migraines    • Obesity    • Back pain 6/21/2011   • Headache 7/25/2011   • Pain 1/2012     bilat. feet 6/10   • Arthritis      knee and back   • Type II or unspecified type diabetes mellitus without mention of complication, not stated as uncontrolled      diet and oral meds   • Psychiatric disorder      depression and anxiety   • ASTHMA      rarely. uses inhaler exercise inducted   • PCOS (polycystic ovarian syndrome)        FAMILY HISTORY  Family History   Problem Relation Age of Onset   • Non-contributory Mother    • Hyperlipidemia Mother    • Non-contributory Father    • Alcohol/Drug Father    • Arthritis Maternal Grandmother    • Hyperlipidemia Maternal Grandmother    • Heart Disease Maternal Grandfather        SOCIAL HISTORY  Social History     Social History   • Marital Status: Single     Spouse Name: N/A   • Number of Children: N/A   • Years of Education: N/A     Social History Main Topics   • Smoking status: Current Every Day Smoker -- 1.00 packs/day for 18 years     Types: Cigarettes   • Smokeless tobacco: Never Used   • Alcohol Use: No   • Drug Use: Yes     Special: Intravenous      Comment: Heroin injects in  "arms only   • Sexual Activity:     Partners: Male     Other Topics Concern   • Not on file     Social History Narrative    ** Merged History Encounter **            SURGICAL HISTORY  Past Surgical History   Procedure Laterality Date   • Pr  delivery only     • Repeat c section  2012     Performed by DESIREE TUCKER at LABOR AND DELIVERY   • Other       bilateral knee surgery   • Other  2014     oral surgery in office   • Tubal coagulation laparoscopic bilateral  2015     Performed by Juan R Sutton M.D. at [facility]   • Dilation and curettage  2015     Performed by Juan R Sutton M.D. [facility]   • Hysteroscopy novasure-2  2015     Performed by Juan R Sutton M.D. at [facility]       CURRENT MEDICATIONS  Home Medications     **Home medications have not yet been reviewed for this encounter**          ALLERGIES  No Known Allergies    PHYSICAL EXAM  VITAL SIGNS: /69 mmHg  Pulse 104  Temp(Src) 36.6 °C (97.8 °F)  Resp 16  Ht 1.753 m (5' 9\")  Wt 95.8 kg (211 lb 3.2 oz)  BMI 31.17 kg/m2  SpO2 97%  Constitutional: Well developed, Well nourished, No acute distress, Non-toxic appearance.   HENT: Normocephalic, Atraumatic, Bilateral external ears normal, Oropharynx moist, No oral exudates, Nose normal.   Eyes: PERRL, EOMI, Conjunctiva normal, No discharge.   Neck: Normal range of motion, No tenderness, Supple, No stridor.   Lymphatic: No lymphadenopathy noted.   Cardiovascular: Normal heart rate, Normal rhythm, No murmurs, No rubs, No gallops.   Thorax & Lungs: Normal breath sounds, No respiratory distress, No wheezing, No chest tenderness.   Abdomen:  No tenderness, no guarding no rigidity and the abdomen is soft.  No masses, No pulsatile masses.  Skin: Warm, Dry, No erythema, No rash.   Back: No tenderness, No CVA tenderness.   Extremities: Intact distal pulses, No edema, No tenderness, No cyanosis, No clubbing. " daily, with increasing fatigue  She states that there are a large amount of clots  She reports in the last week, she has gone through 1 box of tampons and 1 package of sanitary napkins  Today, she knew some thing was wrong when she was getting SOB walking up the stairs and feeling like her heart was racing  No associated abdominal pain/vaginal pain   ED Vital Signs:   ED Triage Vitals   Temperature Pulse Respirations Blood Pressure SpO2   11/07/18 1134 11/07/18 1057 11/07/18 1057 11/07/18 1057 11/07/18 1057   98 4 °F (36 9 °C) (!) 106 18 152/72 100 %      Temp Source Heart Rate Source Patient Position - Orthostatic VS BP Location FiO2 (%)   11/07/18 1057 11/07/18 1057 11/07/18 1338 11/07/18 1338 --   Oral Monitor Lying Right arm       Pain Score       11/07/18 1518       5        Wt Readings from Last 1 Encounters:   11/07/18 101 kg (223 lb 5 2 oz)       Vital Signs (abnormal): 11/7     Abnormal Labs/Diagnostic Test Results:   AST 82     ALT 97     Albumin 3 3       RBC 2 78     Hemoglobin 5 5     Hematocrit 20 4     protime 14 4, urinalysis ketones trace,blood large, RBC 20-30, WBC 0-1,;   PELVIS ULTRASOUND: Apparent fundal uterine leiomyoma    11/08/2018 glucose 102,   RBC 3 51     Hemoglobin 7 9     Hematocrit 27 1          ED Treatment:   Medication Administration from 11/07/2018 1041 to 11/07/2018 1500     None          Past Medical/Surgical History:    Active Ambulatory Problems     Diagnosis Date Noted    Symptomatic anemia 05/14/2017    Abnormal uterine bleeding (AUB) 05/14/2017    Thyroid nodule 05/14/2017    Transaminitis 05/14/2017    Depression 05/14/2017       Past Medical History:   Diagnosis Date    Anemia     Chest tightness or pressure        Admitting Diagnosis: Acute blood loss anemia [D62]  Vaginal bleeding [N93 9]  Abnormal uterine bleeding (AUB) [N93 9]    Age/Sex: 43 y o  female    Assessment/Plan: 43 y o  female with PMH significant for iron deficiency anemia and AUB tx with   Musculoskeletal: Good range of motion in all major joints. No tenderness to palpation or major deformities noted.   Neurologic: Alert & oriented x 3, Normal motor function, Normal sensory function, No focal deficits noted.   Psychiatric: Affect is one of depression and mood is one of sadness and she has no specific plan to hurt herself, feels occasionally that she does not want to live.      RADIOLOGY/PROCEDURES      COURSE & MEDICAL DECISION MAKING  Pertinent Labs & Imaging studies reviewed. (See chart for details)    He has been evaluated by psychiatric team, we feel that she is not currently endangering herself not suicidal or homicidal. Has been given resources for follow-up.  FINAL IMPRESSION  1.   1. Depression, unspecified depression type        2.   3.     Disposition  Discharge instructions are understood. This patient is to return if fever vomiting or no better in 12 hours. Follow up with the [facility] clinic or private physician. Information sheets on depression Electronically signed by: Galileo Altamirano, 7/31/2017 7:38 AM     Mirena presents to the ED for complaints of vaginal bleeding x3 weeks  Patient reports this started as her normal period  Over the last 3 weeks, she has bleeding almost daily, with increasing fatigue  She states that there are a large amount of clots  Presents on admission with fatigue, weakness & nzlpnddxapy-QJU-mxzdgdnph 2 Units PRBC now, maintain H&Htansfuse for hgb >+7 5  Pelvic ultrasound, appreciate Gynecological/Obstetric input for heavyvaginal bleeding with clots-history of Mirena with improved symptoms  Encourage PO iron    Admission Orders:  Scheduled Meds:   Current Facility-Administered Medications:  calcium carbonate 1,000 mg Oral Daily PRN   nicotine 1 patch Transdermal Daily   ondansetron 4 mg Intravenous Q6H PRN   senna 1 tablet Oral Daily     Continuous Infusions:    PRN Meds:   Peripheral IV  Transfuse 2 units PRBC  Vitals per transfusion protocol then Q 8 hr  Inpatient consult Obstetric/Gynocology  Regular diet    11/8/2018 GYN/OB NOTE:  Discussed ineffectiveness of IUD at controlling patient's bleeding and my confirmation of IUD like device  on ultrasound, though not noted by radiologist,     Discussed plan of using 1 dose of IV premarin FOR stabilization of endometrium and then Megace to control bleeding until sampling can be done in office after which plans for definitive management will be evaluated  The patient is in agreement  no

## 2019-11-26 ENCOUNTER — PATIENT OUTREACH (OUTPATIENT)
Dept: FAMILY MEDICINE CLINIC | Facility: CLINIC | Age: 39
End: 2019-11-26

## 2019-12-10 ENCOUNTER — PATIENT OUTREACH (OUTPATIENT)
Dept: FAMILY MEDICINE CLINIC | Facility: CLINIC | Age: 39
End: 2019-12-10

## 2020-01-08 ENCOUNTER — OFFICE VISIT (OUTPATIENT)
Dept: FAMILY MEDICINE CLINIC | Facility: CLINIC | Age: 40
End: 2020-01-08

## 2020-01-08 VITALS
RESPIRATION RATE: 18 BRPM | TEMPERATURE: 97.8 F | HEART RATE: 64 BPM | HEIGHT: 61 IN | SYSTOLIC BLOOD PRESSURE: 144 MMHG | DIASTOLIC BLOOD PRESSURE: 90 MMHG | BODY MASS INDEX: 37.27 KG/M2 | WEIGHT: 197.4 LBS

## 2020-01-08 DIAGNOSIS — F41.8 DEPRESSION WITH ANXIETY: ICD-10-CM

## 2020-01-08 DIAGNOSIS — R53.83 FATIGUE, UNSPECIFIED TYPE: ICD-10-CM

## 2020-01-08 DIAGNOSIS — Z23 ENCOUNTER FOR IMMUNIZATION: ICD-10-CM

## 2020-01-08 DIAGNOSIS — E66.09 CLASS 2 OBESITY DUE TO EXCESS CALORIES WITH BODY MASS INDEX (BMI) OF 38.0 TO 38.9 IN ADULT, UNSPECIFIED WHETHER SERIOUS COMORBIDITY PRESENT: ICD-10-CM

## 2020-01-08 DIAGNOSIS — I10 ESSENTIAL HYPERTENSION: Primary | ICD-10-CM

## 2020-01-08 PROCEDURE — 90686 IIV4 VACC NO PRSV 0.5 ML IM: CPT | Performed by: FAMILY MEDICINE

## 2020-01-08 PROCEDURE — 3008F BODY MASS INDEX DOCD: CPT | Performed by: FAMILY MEDICINE

## 2020-01-08 PROCEDURE — 90715 TDAP VACCINE 7 YRS/> IM: CPT | Performed by: FAMILY MEDICINE

## 2020-01-08 PROCEDURE — 90471 IMMUNIZATION ADMIN: CPT | Performed by: FAMILY MEDICINE

## 2020-01-08 PROCEDURE — 90472 IMMUNIZATION ADMIN EACH ADD: CPT | Performed by: FAMILY MEDICINE

## 2020-01-08 PROCEDURE — 99213 OFFICE O/P EST LOW 20 MIN: CPT | Performed by: FAMILY MEDICINE

## 2020-01-08 RX ORDER — HYDROCHLOROTHIAZIDE 50 MG/1
50 TABLET ORAL DAILY
Qty: 30 TABLET | Refills: 1 | Status: SHIPPED | OUTPATIENT
Start: 2020-01-08 | End: 2020-02-10

## 2020-01-08 RX ORDER — ESCITALOPRAM OXALATE 10 MG/1
10 TABLET ORAL DAILY
Qty: 30 TABLET | Refills: 1 | Status: SHIPPED | OUTPATIENT
Start: 2020-01-08 | End: 2020-02-10

## 2020-01-08 NOTE — PATIENT INSTRUCTIONS
Start lexapro daily at the same time everyday; will take about 4 weeks for full effect  Increased you blood pressure medication (hydrochlorothiazide) from 25mg to 50mg   Please get labwork done after 1 week  Make sure to practice good sleep hygiene (google insomnia from [facility]  org)    Follow up in 4 weeks

## 2020-01-08 NOTE — PROGRESS NOTES
BMI Counseling: Body mass index is 37 3 kg/m²  The BMI is above normal  Nutrition recommendations include reducing portion sizes, 3-5 servings of fruits/vegetables daily, reducing fast food intake and decreasing soda and/or juice intake  Exercise recommendations include exercising 3-5 times per week

## 2020-01-08 NOTE — PROGRESS NOTES
Gale Mayorga 1980 female MRN: [id number]    Family Medicine Acute Visit    ASSESSMENT/PLAN  Diagnoses and all orders for this visit:    Essential hypertension  -     Lipid panel; Future  -     Basic metabolic panel; Future  -     hydrochlorothiazide (HYDRODIURIL) 50 mg tablet; Take 1 tablet (50 mg total) by mouth daily    Class 2 obesity due to excess calories with body mass index (BMI) of 38 0 to 38 9 in adult, unspecified whether serious comorbidity present  -     Lipid panel; Future  -     Basic metabolic panel; Future  -     TSH, 3rd generation with Free T4 reflex; Future    Encounter for immunization  -     influenza vaccine, [id number], quadrivalent, 0 5 mL, preservative-free, for adult and pediatric patients 6 mos+ (AFLURIA, FLUARIX, FLULAVAL, FLUZONE)  -     TDAP VACCINE GREATER THAN OR EQUAL TO 6YO IM    Fatigue, unspecified type  -     TSH, 3rd generation with Free T4 reflex; Future    Depression with anxiety  -     escitalopram (LEXAPRO) 10 mg tablet; Take 1 tablet (10 mg total) by mouth daily        Patient follow-up in approximately 4 weeks to readdress mood symptoms while on the Lexapro had to follow-up on blood pressure, lab work  Patient reports having phone number for behavioral health, in which I strongly encouraged along side the starting Lexapro  No future appointments  SUBJECTIVE  CC: Hypertension      HPI:  Gale Mayorga is a 35 y o  female who presents for follow-up evaluation of high blood pressure and anxiety/depression    Last evaluated on 10/30/2019 which lifestyle changes were discussed as well as starting hydrochlorothiazide 25 mg daily  She reports taking her blood pressure at home at least once a day in which her SBP ranged from at 130-140  She has worked on lifestyle changes to her diet, she but is frustrated the she has trouble losing weight  She has not gotten her blood work from last visit      In regards to her anxiety/depression, she was referred to [facility] last visit, however she declined on follow-up due to "not having time" despite admitting she would benefit  YADIRA 8/PHQ 12 today  Most of her anxiety pertains to her turning 35years old and concern for her health (father had MI at 40years old and mom  at early age from AIDS)  She had been on lexapro in the past which helped and is wiling to try again  She reports good social support in her family and friends  The additionally is willing to have a flu vaccine today as well as a DTAP booster  Review of Systems   Constitutional: Positive for fatigue  Negative for chills and fever  HENT: Negative for sore throat  Respiratory: Negative for cough, shortness of breath and wheezing  Cardiovascular: Negative for chest pain and palpitations  Gastrointestinal: Negative for abdominal pain, constipation, diarrhea, nausea and vomiting  Musculoskeletal: Negative for arthralgias  Skin: Negative for rash and wound  Neurological: Negative for weakness and numbness  Psychiatric/Behavioral: Positive for behavioral problems and dysphoric mood  Negative for suicidal ideas  The patient is nervous/anxious  Historical Information   The patient history was reviewed as follows:  Past Medical History:   Diagnosis Date    Anemia     Chest tightness or pressure     2013         Past Surgical History:   Procedure Laterality Date    CYSTOSCOPY N/A 2018    Procedure: CYSTOSCOPY;  Surgeon: Gisell Moulton DO;  [facility];  Service: Gynecology    HYSTERECTOMY N/A 2018    Procedure: HYSTERECTOMY [address]AROSCOPIC TOTAL ([address]);   Surgeon: Gisell Moulton DO;  [facility];  Service: Gynecology    [place] LAP,RMV  ADNEXAL STRUCTURE Bilateral 2018    Procedure: SALPINGECTOMY, LAPAROSCOPIC;  Surgeon: Gisell Moulton DO;  [facility];  Service: Gynecology    TONSILLECTOMY      TUBAL LIGATION           Family History   Problem Relation Age of Onset    Heart disease Father       Social History   Social History     Substance and Sexual Activity   Alcohol Use Not Currently    Comment: on the weekends/ socially     Social History     Substance and Sexual Activity   Drug Use No     Social History     Tobacco Use   Smoking Status Current Some Day Smoker    Types: Cigarettes   Smokeless Tobacco Never Used   Tobacco Comment    social smoker       Medications:     Current Outpatient Medications:     escitalopram (LEXAPRO) 10 mg tablet, Take 1 tablet (10 mg total) by mouth daily, Disp: 30 tablet, Rfl: 1    hydrochlorothiazide (HYDRODIURIL) 50 mg tablet, Take 1 tablet (50 mg total) by mouth daily, Disp: 30 tablet, Rfl: 1    No Known Allergies    OBJECTIVE  Vitals:   Vitals:    01/08/20 0828 01/08/20 0917   BP: 170/100 144/90   BP Location: Left arm Left arm   Patient Position: Sitting Sitting   Cuff Size: Large Large   Pulse: 64    Resp: 18    Temp: 97 8 °F (36 6 °C)    TempSrc: Tympanic    Weight: 89 5 kg (197 lb 6 4 oz)    Height: 5' 1" (1 549 m)          Physical Exam   Constitutional: She appears well-developed  No distress  Obese   HENT:   Head: Normocephalic and atraumatic  Right Ear: External ear normal    Left Ear: External ear normal    Nose: Nose normal    Eyes: Conjunctivae and EOM are normal  No scleral icterus  Neck: Neck supple  Cardiovascular: Normal rate, regular rhythm and normal heart sounds  Exam reveals no gallop and no friction rub  No murmur heard  Pulmonary/Chest: Effort normal and breath sounds normal  No respiratory distress  She has no wheezes  Abdominal: Soft  Bowel sounds are normal  There is no tenderness  Neurological: She is alert  Skin: Skin is warm and dry     Psychiatric: Her behavior is normal    Patient becomes tearful when talking about her family                   Ivonne Sam MD  1/8/2020

## 2020-02-08 DIAGNOSIS — I10 ESSENTIAL HYPERTENSION: ICD-10-CM

## 2020-02-08 DIAGNOSIS — F41.8 DEPRESSION WITH ANXIETY: ICD-10-CM

## 2020-02-10 RX ORDER — ESCITALOPRAM OXALATE 10 MG/1
TABLET ORAL
Qty: 30 TABLET | Refills: 1 | Status: SHIPPED | OUTPATIENT
Start: 2020-02-10 | End: 2020-04-02 | Stop reason: SDUPTHER

## 2020-02-10 RX ORDER — HYDROCHLOROTHIAZIDE 50 MG/1
TABLET ORAL
Qty: 30 TABLET | Refills: 1 | Status: SHIPPED | OUTPATIENT
Start: 2020-02-10 | End: 2020-04-02 | Stop reason: SDUPTHER

## 2020-04-01 ENCOUNTER — TELEPHONE (OUTPATIENT)
Dept: FAMILY MEDICINE CLINIC | Facility: CLINIC | Age: 40
End: 2020-04-01

## 2020-04-02 DIAGNOSIS — I10 ESSENTIAL HYPERTENSION: ICD-10-CM

## 2020-04-02 DIAGNOSIS — F41.8 DEPRESSION WITH ANXIETY: ICD-10-CM

## 2020-04-02 RX ORDER — HYDROCHLOROTHIAZIDE 50 MG/1
50 TABLET ORAL DAILY
Qty: 90 TABLET | Refills: 1 | Status: SHIPPED | OUTPATIENT
Start: 2020-04-02 | End: 2020-10-07

## 2020-04-02 RX ORDER — ESCITALOPRAM OXALATE 10 MG/1
10 TABLET ORAL DAILY
Qty: 90 TABLET | Refills: 1 | Status: SHIPPED | OUTPATIENT
Start: 2020-04-02 | End: 2020-07-15

## 2020-04-07 ENCOUNTER — TELEMEDICINE (OUTPATIENT)
Dept: FAMILY MEDICINE CLINIC | Facility: CLINIC | Age: 40
End: 2020-04-07

## 2020-04-07 VITALS — SYSTOLIC BLOOD PRESSURE: 177 MMHG | DIASTOLIC BLOOD PRESSURE: 110 MMHG

## 2020-04-07 DIAGNOSIS — Z20.822 CLOSE EXPOSURE TO COVID-19 VIRUS: Primary | ICD-10-CM

## 2020-04-07 PROCEDURE — 99213 OFFICE O/P EST LOW 20 MIN: CPT | Performed by: FAMILY MEDICINE

## 2020-04-09 ENCOUNTER — TELEPHONE (OUTPATIENT)
Dept: FAMILY MEDICINE CLINIC | Facility: CLINIC | Age: 40
End: 2020-04-09

## 2020-05-13 ENCOUNTER — TELEMEDICINE (OUTPATIENT)
Dept: FAMILY MEDICINE CLINIC | Facility: CLINIC | Age: 40
End: 2020-05-13

## 2020-05-13 VITALS — TEMPERATURE: 97.8 F | HEIGHT: 61 IN | BODY MASS INDEX: 38.14 KG/M2 | WEIGHT: 202 LBS

## 2020-05-13 DIAGNOSIS — K13.0 CRACKED LIP: Primary | ICD-10-CM

## 2020-05-13 DIAGNOSIS — K13.0 CRACKED LIPS: ICD-10-CM

## 2020-05-13 PROCEDURE — 3008F BODY MASS INDEX DOCD: CPT | Performed by: PHYSICIAN ASSISTANT

## 2020-05-13 PROCEDURE — 99213 OFFICE O/P EST LOW 20 MIN: CPT | Performed by: FAMILY MEDICINE

## 2020-05-13 PROCEDURE — 3008F BODY MASS INDEX DOCD: CPT | Performed by: FAMILY MEDICINE

## 2020-05-13 RX ORDER — VITAMIN B COMPLEX
1 CAPSULE ORAL DAILY
Qty: 30 CAPSULE | Refills: 0 | Status: SHIPPED | OUTPATIENT
Start: 2020-05-13 | End: 2020-07-15

## 2020-06-21 ENCOUNTER — NURSE TRIAGE (OUTPATIENT)
Dept: OTHER | Facility: OTHER | Age: 40
End: 2020-06-21

## 2020-06-21 ENCOUNTER — TELEPHONE (OUTPATIENT)
Dept: OTHER | Facility: HOSPITAL | Age: 40
End: 2020-06-21

## 2020-06-21 DIAGNOSIS — B37.3 VAGINAL CANDIDA: Primary | ICD-10-CM

## 2020-06-21 RX ORDER — FLUCONAZOLE 150 MG/1
150 TABLET ORAL ONCE
Qty: 1 TABLET | Refills: 1 | Status: SHIPPED | OUTPATIENT
Start: 2020-06-21 | End: 2020-06-21

## 2020-07-15 ENCOUNTER — OFFICE VISIT (OUTPATIENT)
Dept: FAMILY MEDICINE CLINIC | Facility: CLINIC | Age: 40
End: 2020-07-15

## 2020-07-15 VITALS
HEART RATE: 88 BPM | WEIGHT: 207.8 LBS | SYSTOLIC BLOOD PRESSURE: 132 MMHG | DIASTOLIC BLOOD PRESSURE: 88 MMHG | BODY MASS INDEX: 39.26 KG/M2 | RESPIRATION RATE: 18 BRPM | TEMPERATURE: 99.6 F

## 2020-07-15 DIAGNOSIS — Z20.822 COVID-19 RULED OUT: ICD-10-CM

## 2020-07-15 DIAGNOSIS — E04.1 THYROID NODULE: Chronic | ICD-10-CM

## 2020-07-15 DIAGNOSIS — E55.9 VITAMIN D DEFICIENCY: ICD-10-CM

## 2020-07-15 DIAGNOSIS — Z00.00 HEALTHCARE MAINTENANCE: Primary | ICD-10-CM

## 2020-07-15 DIAGNOSIS — E78.1 HIGH TRIGLYCERIDES: ICD-10-CM

## 2020-07-15 DIAGNOSIS — Z11.4 SCREENING FOR HIV (HUMAN IMMUNODEFICIENCY VIRUS): ICD-10-CM

## 2020-07-15 DIAGNOSIS — I10 ESSENTIAL HYPERTENSION: ICD-10-CM

## 2020-07-15 PROCEDURE — 99396 PREV VISIT EST AGE 40-64: CPT | Performed by: PHYSICIAN ASSISTANT

## 2020-07-15 PROCEDURE — 3075F SYST BP GE 130 - 139MM HG: CPT | Performed by: PHYSICIAN ASSISTANT

## 2020-07-15 PROCEDURE — 3079F DIAST BP 80-89 MM HG: CPT | Performed by: PHYSICIAN ASSISTANT

## 2020-07-15 NOTE — PROGRESS NOTES
Assessment/Plan:    Vitamin D deficiency  Currently not taking vitamin-D  Will check vitamin-D level    High triglycerides  Will check lipid panel    Essential hypertension  Stable on HCTZ 50 mg daily  Advised to continue    COVID-19 ruled out  COVID 19 test order required for travel  To be completed 10 days prior to travel  Healthcare maintenance  No abnormal findings on physical exam  Will check routine labs  Screening mammogram ordered    BMI Counseling: Body mass index is 39 26 kg/m²  The BMI is above normal  Nutrition recommendations include reducing portion sizes, decreasing overall calorie intake, 3-5 servings of fruits/vegetables daily, moderation in carbohydrate intake and increasing intake of lean protein  Exercise recommendations include exercising 3-5 times per week  Thyroid nodule  Last thyroid ultrasound 2016  Will repeat thyroid ultrasound      Diagnoses and all orders for this visit:    Healthcare maintenance  -     Mammo screening bilateral w 3d & cad; Future  -     Comprehensive metabolic panel; Future  -     CBC and differential; Future    Essential hypertension  -     Comprehensive metabolic panel; Future    Thyroid nodule  -     US thyroid; Future    High triglycerides  -     Lipid panel; Future  -     CBC and differential; Future    Screening for HIV (human immunodeficiency virus)  -     HIV 1/2 Antigen/Antibody (4th Generation) w Reflex [facility]; Future    Vitamin D deficiency  -     Vitamin D 25 hydroxy; Future    COVID-19 ruled out  -     MISC COVID-19 TEST; Future        Subjective:      Patient ID: Perez Fox is a 35 y o  female for physical exam and needs COVID testing for travel  HPI   HTN- takes hydrochlorothiazide 50 mg daily  Bp stable  Exercises 3 days per week and has limited carbs/sweets  Thyroid nodule- h/o thyroid nodule  Can't recall last ultrasounds or if advise to repeat  Had TSH ordered in 1/2020 by Dr Sade Chan, has yet to have labs drawn      Traveling to [facility] 8/7/2020, advised by her  a screening COVID 19 test was required 10 days prior to travel  Pt is asymptomatic  Has not been in contact with anyone who has been diagnosed or under investigation for COVID-19  Doing well no complaints or concerns voiced at this time  The following portions of the patient's history were reviewed and updated as appropriate: allergies, current medications, past family history, past medical history, past social history, past surgical history and problem list     Review of Systems   Constitutional: Negative for chills, fatigue and fever  Respiratory: Negative for cough, chest tightness, shortness of breath and wheezing  Cardiovascular: Negative for chest pain and palpitations  Gastrointestinal: Negative for abdominal pain, constipation, diarrhea, nausea and vomiting  Genitourinary: Negative for difficulty urinating  Musculoskeletal: Negative for arthralgias, myalgias, neck pain and neck stiffness  Skin: Negative for rash and wound  Neurological: Negative for dizziness, weakness, light-headedness, numbness and headaches  Psychiatric/Behavioral: Negative for agitation and behavioral problems  The patient is not nervous/anxious  Objective:      /88 (BP Location: Left arm, Patient Position: Sitting, Cuff Size: Large)   Pulse 88   Temp 99 6 °F (37 6 °C) (Tympanic Core)   Resp 18   Wt 94 3 kg (207 lb 12 8 oz)   LMP 10/17/2018   BMI 39 26 kg/m²          Physical Exam   Constitutional: She is oriented to person, place, and time  She appears well-developed and well-nourished  No distress  HENT:   Head: Normocephalic and atraumatic  Right Ear: External ear normal    Left Ear: External ear normal    Nose: Nose normal    Mouth/Throat: Oropharynx is clear and moist    Eyes: Pupils are equal, round, and reactive to light  Conjunctivae and EOM are normal    Neck: Normal range of motion  Neck supple  No thyroid mass present  Cardiovascular: Normal rate, regular rhythm, S1 normal and normal heart sounds  No murmur heard  Pulmonary/Chest: Effort normal and breath sounds normal  No respiratory distress  She has no wheezes  She has no rales  Abdominal: Soft  Bowel sounds are normal  She exhibits no distension and no mass  There is no hepatosplenomegaly  There is no tenderness  There is no rebound and no guarding  Musculoskeletal: Normal range of motion  She exhibits no edema or deformity  Lymphadenopathy:     She has no cervical adenopathy  Neurological: She is alert and oriented to person, place, and time  Skin: Skin is warm and dry  Capillary refill takes less than 2 seconds  Psychiatric: She has a normal mood and affect   Her speech is normal and behavior is normal  Thought content normal

## 2020-07-15 NOTE — ASSESSMENT & PLAN NOTE
No abnormal findings on physical exam  Will check routine labs  Screening mammogram ordered    BMI Counseling: Body mass index is 39 26 kg/m²  The BMI is above normal  Nutrition recommendations include reducing portion sizes, decreasing overall calorie intake, 3-5 servings of fruits/vegetables daily, moderation in carbohydrate intake and increasing intake of lean protein  Exercise recommendations include exercising 3-5 times per week

## 2020-07-19 ENCOUNTER — NURSE TRIAGE (OUTPATIENT)
Dept: OTHER | Facility: OTHER | Age: 40
End: 2020-07-19

## 2020-07-19 DIAGNOSIS — B37.3 VAGINAL CANDIDA: Primary | ICD-10-CM

## 2020-07-19 RX ORDER — FLUCONAZOLE 150 MG/1
150 TABLET ORAL ONCE
Qty: 1 TABLET | Refills: 0 | Status: SHIPPED | OUTPATIENT
Start: 2020-07-19 | End: 2020-07-19

## 2020-07-19 NOTE — TELEPHONE ENCOUNTER
Regarding: vagina issues  ----- Message from Kartik Michaels sent at 7/19/2020  9:38 AM EDT -----  'I have a itchy vagina, no foul smell, no burning, my   used the wrong detergent in the wash

## 2020-07-31 DIAGNOSIS — Z20.822 COVID-19 RULED OUT: ICD-10-CM

## 2020-07-31 PROCEDURE — U0003 INFECTIOUS AGENT DETECTION BY NUCLEIC ACID (DNA OR RNA); SEVERE ACUTE RESPIRATORY SYNDROME CORONAVIRUS 2 (SARS-COV-2) (CORONAVIRUS DISEASE [COVID-19]), AMPLIFIED PROBE TECHNIQUE, MAKING USE OF HIGH THROUGHPUT TECHNOLOGIES AS DESCRIBED BY CMS-2020-01-R: HCPCS

## 2020-08-01 LAB — SARS-COV-2 RNA SPEC QL NAA+PROBE: NOT DETECTED

## 2020-08-04 ENCOUNTER — TELEPHONE (OUTPATIENT)
Dept: FAMILY MEDICINE CLINIC | Facility: CLINIC | Age: 40
End: 2020-08-04

## 2020-08-05 ENCOUNTER — AMB VIDEO VISIT (OUTPATIENT)
Dept: OTHER | Facility: HOSPITAL | Age: 40
End: 2020-08-05
Payer: COMMERCIAL

## 2020-08-05 DIAGNOSIS — B37.3 YEAST VAGINITIS: Primary | ICD-10-CM

## 2020-08-05 PROCEDURE — 99201 PR OFFICE OUTPATIENT NEW 10 MINUTES: CPT | Performed by: FAMILY MEDICINE

## 2020-08-05 RX ORDER — FLUCONAZOLE 150 MG/1
150 TABLET ORAL
Qty: 2 TABLET | Refills: 0 | Status: SHIPPED | OUTPATIENT
Start: 2020-08-05 | End: 2020-08-09

## 2020-08-05 NOTE — TELEPHONE ENCOUNTER
Regarding: yeast infection   ----- Message from Jodi Gibbons sent at 8/5/2020  5:11 PM EDT -----  " I think I have a yeast infection "

## 2020-08-05 NOTE — CARE ANYWHERE EVISITS
Visit Summary for HERSON AGRRIDO - Gender: Female - Date of Birth: [id number]  Date: [id number] - Duration: 8 minutes  Patient: Cuate Berger  Provider: Debora Jimenez [facility]    Patient Contact Information  Address  [address] [facility]; [place]  [phone]    Visit Topics  Yeast infection  [Added By: Self - 2020-08-05]    Triage Questions   What is your current physical address in the event of a medical emergency? Answer []  Are you allergic to any medications? Answer []  Are you now or could you be pregnant? Answer []  Do you have any immune system compromise or chronic lung   disease? Answer []  Do you have any vulnerable family members in the home (infant, pregnant, cancer, elderly)? Answer []     Conversation Transcripts  [0A][0A] [Notification] You are connected with [facility] [0A][Notification] Michael Premont is located in [place]  [0A][Notification] [place] Premont has shared health history  Steve Trotter  [0A]    Diagnosis  Acute vaginitis    Procedures  Value: 00518 Code: CPT-4 UNLISTED E&M SERVICE    Medications Prescribed    Diflucan      Frequency :   Patient Instructions : take one tablet now, may repeat after 3 days  Refills : 0  Instructions to the Pharmacist : Substitutions allowed      Provider Notes  [0A][0A] We strongly encourage you to share the following record of today's visit with your primary care physician  [0A]Mode of Communication: mobile[0A]HPI:[0A]Reports white  vaginal discharge, burning, itching  x 1 day[0A]Denies fever, abdominal pain,   sores in the labial area  [0A]No new sexual partners  No concern for pregnancy  [0A]No dysuria, urinary frequency  [0A]Used new soap[0A]Recent antibx use â no[0A]Used diflucan last month which cleared up symptoms  [0A]PMH: HTN[0A]Meds: HCTZ[0A]Allergies:   none[0A]Surgery: hysterectomy, tonsils[0A]Social: occasional  smoking, alcohol occasionally[0A]Exam: [0A]Gen: Alert, normal mental status and interaction, no visible distress, non- toxic appearance  [0A]Under directed self-exam, there is no abdominal   tenderness anywhere  No lumps or tenderness along the crease between the upper leg and torso from the pubic bone to the outside of the leg on either side  [0A]Assessment: vaginitis likely yeast[0A]Plan: [0A]1  I am sending you a prescription as   described below  [0A]â¢ fluconazole : 150 mg/dose orally as a single dose[0A]2  Discussed precautions  [0A]Discussed with patient regarding the limited ability to accurately diagnose cause of vaginitis via telemedicine and discussed alternative options   for in person evaluation  Patient understands that risks of empiric treatment at this time without an in-person evaluation, include misdiagnosis, cost of unnecessary medications, adverse reaction to unnecessary medications, and delay of and interference   with identifying primary cause of symptoms  After this discussion, patient still prefers empiric treatment  [0A]If your symptoms do not respond within 3 day of starting treatment, get worse, or return after treatment, it is important you have an   in-person exam to follow up and more accurately identify the cause of your symptoms and best treatment  [0A]Follow up immediately for care if experiencing any: Fever with vaginal discharge; Moderate or severe abdominal or pelvic pain with vaginal   discharge; Heavy vaginal bleeding  [0A]Follow up:[0A]1  If there are any questions or problems with the prescription, call [phone] anytime for assistance  [0A]2  Please re-connect for another online visit or see an in-person provider should your   symptoms worsen or persist  [0A]3  Taking a probiotic (either in pill form or by eating yogurt that contains probiotics) while using antibiotics can help prevent some of the troublesome side effects that antibiotics can sometimes cause  [0A]4   Please   print a copy of this note and send it to your regular doctor or take it to your next visit so it may be included in your medical record  [0A]Patient voiced understanding and agrees to plan  [0A]Please see your PCP on an annual basis  [0A][0A]    Electronically signed by: Siria Corey(NPI [id number])

## 2020-08-05 NOTE — TELEPHONE ENCOUNTER
Reason for Disposition   [1] Symptoms of a yeast infection (i e , itchy, white discharge, not bad smelling) AND [2] feels like prior vaginal yeast infections    Answer Assessment - Initial Assessment Questions  1  SYMPTOM: "What's the main symptom you're concerned about?" (e g , pain, itching, dryness)  Vaginal itching, white discharge        2  LOCATION: "Where is the  located?" (e g , inside/outside, left/right)      Vaginal    3  ONSET: "When did the  start?"      7/19/20 build up of symptoms, was treated with antifungal then symptoms went away  Then the symptoms started up again 8/5/20  4  PAIN: "Is there any pain?" If so, ask: "How bad is it?" (Scale: 1-10; mild, moderate, severe)     Itchiness is 6/10         5  ITCHING: "Is there any itching?" If so, ask: "How bad is it?" (Scale: 1-10; mild, moderate, severe)      Itchiness is 6/10       6  CAUSE: "What do you think is causing the discharge?" "Have you had the same problem before? What happened then?"     Yeast infection, same issue in mid-july        7  OTHER SYMPTOMS: "Do you have any other symptoms?" (e g , fever, itching, vaginal bleeding, pain with urination, injury to genital area, vaginal foreign body)     No other symptoms     8   PREGNANCY: "Is there any chance you are pregnant?" "When was your last menstrual period?"      hysterectomy    Protocols used: [facility]

## 2020-10-06 DIAGNOSIS — I10 ESSENTIAL HYPERTENSION: ICD-10-CM

## 2020-10-07 RX ORDER — HYDROCHLOROTHIAZIDE 50 MG/1
TABLET ORAL
Qty: 90 TABLET | Refills: 1 | Status: SHIPPED | OUTPATIENT
Start: 2020-10-07 | End: 2020-11-30

## 2020-10-16 ENCOUNTER — TELEMEDICINE (OUTPATIENT)
Dept: FAMILY MEDICINE CLINIC | Facility: CLINIC | Age: 40
End: 2020-10-16

## 2020-10-16 VITALS — HEIGHT: 61 IN | WEIGHT: 203 LBS | BODY MASS INDEX: 38.33 KG/M2

## 2020-10-16 DIAGNOSIS — Z20.822 COVID-19 RULED OUT: Primary | ICD-10-CM

## 2020-10-16 PROCEDURE — 3008F BODY MASS INDEX DOCD: CPT | Performed by: FAMILY MEDICINE

## 2020-10-16 PROCEDURE — 99213 OFFICE O/P EST LOW 20 MIN: CPT | Performed by: FAMILY MEDICINE

## 2020-10-22 DIAGNOSIS — Z20.822 COVID-19 RULED OUT: ICD-10-CM

## 2020-10-22 PROCEDURE — U0003 INFECTIOUS AGENT DETECTION BY NUCLEIC ACID (DNA OR RNA); SEVERE ACUTE RESPIRATORY SYNDROME CORONAVIRUS 2 (SARS-COV-2) (CORONAVIRUS DISEASE [COVID-19]), AMPLIFIED PROBE TECHNIQUE, MAKING USE OF HIGH THROUGHPUT TECHNOLOGIES AS DESCRIBED BY CMS-2020-01-R: HCPCS | Performed by: FAMILY MEDICINE

## 2020-10-23 LAB — SARS-COV-2 RNA SPEC QL NAA+PROBE: NOT DETECTED

## 2020-11-30 ENCOUNTER — OFFICE VISIT (OUTPATIENT)
Dept: FAMILY MEDICINE CLINIC | Facility: CLINIC | Age: 40
End: 2020-11-30

## 2020-11-30 VITALS
HEART RATE: 88 BPM | HEIGHT: 61 IN | WEIGHT: 209.2 LBS | BODY MASS INDEX: 39.5 KG/M2 | RESPIRATION RATE: 20 BRPM | TEMPERATURE: 96.7 F | DIASTOLIC BLOOD PRESSURE: 90 MMHG | OXYGEN SATURATION: 97 % | SYSTOLIC BLOOD PRESSURE: 130 MMHG

## 2020-11-30 DIAGNOSIS — I10 ESSENTIAL HYPERTENSION: Primary | ICD-10-CM

## 2020-11-30 DIAGNOSIS — Z23 ENCOUNTER FOR IMMUNIZATION: ICD-10-CM

## 2020-11-30 PROCEDURE — 90471 IMMUNIZATION ADMIN: CPT | Performed by: FAMILY MEDICINE

## 2020-11-30 PROCEDURE — 3008F BODY MASS INDEX DOCD: CPT | Performed by: FAMILY MEDICINE

## 2020-11-30 PROCEDURE — 99213 OFFICE O/P EST LOW 20 MIN: CPT | Performed by: FAMILY MEDICINE

## 2020-11-30 PROCEDURE — 90686 IIV4 VACC NO PRSV 0.5 ML IM: CPT | Performed by: FAMILY MEDICINE

## 2020-11-30 RX ORDER — HYDROCHLOROTHIAZIDE 25 MG/1
25 TABLET ORAL DAILY
Qty: 30 TABLET | Refills: 0 | Status: SHIPPED | OUTPATIENT
Start: 2020-11-30 | End: 2020-12-23 | Stop reason: SDUPTHER

## 2020-12-01 ENCOUNTER — APPOINTMENT (OUTPATIENT)
Dept: LAB | Age: 40
End: 2020-12-01
Payer: COMMERCIAL

## 2020-12-01 ENCOUNTER — TRANSCRIBE ORDERS (OUTPATIENT)
Dept: ADMINISTRATIVE | Age: 40
End: 2020-12-01

## 2020-12-01 DIAGNOSIS — Z00.00 HEALTHCARE MAINTENANCE: ICD-10-CM

## 2020-12-01 DIAGNOSIS — E55.9 VITAMIN D DEFICIENCY: ICD-10-CM

## 2020-12-01 DIAGNOSIS — E78.1 HIGH TRIGLYCERIDES: ICD-10-CM

## 2020-12-01 DIAGNOSIS — R53.83 FATIGUE, UNSPECIFIED TYPE: ICD-10-CM

## 2020-12-01 DIAGNOSIS — E66.09 CLASS 2 OBESITY DUE TO EXCESS CALORIES WITH BODY MASS INDEX (BMI) OF 38.0 TO 38.9 IN ADULT, UNSPECIFIED WHETHER SERIOUS COMORBIDITY PRESENT: ICD-10-CM

## 2020-12-01 DIAGNOSIS — I10 ESSENTIAL HYPERTENSION: ICD-10-CM

## 2020-12-01 DIAGNOSIS — Z11.4 SCREENING FOR HIV (HUMAN IMMUNODEFICIENCY VIRUS): ICD-10-CM

## 2020-12-01 LAB
25(OH)D3 SERPL-MCNC: 17 NG/ML (ref 30–100)
ALBUMIN SERPL BCP-MCNC: 3.6 G/DL (ref 3.5–5)
ALP SERPL-CCNC: 100 U/L (ref 46–116)
ALT SERPL W P-5'-P-CCNC: 101 U/L (ref 12–78)
ANION GAP SERPL CALCULATED.3IONS-SCNC: 7 MMOL/L (ref 4–13)
AST SERPL W P-5'-P-CCNC: 71 U/L (ref 5–45)
BASOPHILS # BLD AUTO: 0.02 THOUSANDS/ΜL (ref 0–0.1)
BASOPHILS NFR BLD AUTO: 0 % (ref 0–1)
BILIRUB SERPL-MCNC: 0.74 MG/DL (ref 0.2–1)
BUN SERPL-MCNC: 11 MG/DL (ref 5–25)
CALCIUM SERPL-MCNC: 9.6 MG/DL (ref 8.3–10.1)
CHLORIDE SERPL-SCNC: 101 MMOL/L (ref 100–108)
CHOLEST SERPL-MCNC: 204 MG/DL (ref 50–200)
CO2 SERPL-SCNC: 30 MMOL/L (ref 21–32)
CREAT SERPL-MCNC: 0.71 MG/DL (ref 0.6–1.3)
EOSINOPHIL # BLD AUTO: 0.29 THOUSAND/ΜL (ref 0–0.61)
EOSINOPHIL NFR BLD AUTO: 3 % (ref 0–6)
ERYTHROCYTE [DISTWIDTH] IN BLOOD BY AUTOMATED COUNT: 12.4 % (ref 11.6–15.1)
GFR SERPL CREATININE-BSD FRML MDRD: 106 ML/MIN/1.73SQ M
GLUCOSE P FAST SERPL-MCNC: 98 MG/DL (ref 65–99)
HCT VFR BLD AUTO: 45.6 % (ref 34.8–46.1)
HDLC SERPL-MCNC: 44 MG/DL
HGB BLD-MCNC: 15.9 G/DL (ref 11.5–15.4)
IMM GRANULOCYTES # BLD AUTO: 0.03 THOUSAND/UL (ref 0–0.2)
IMM GRANULOCYTES NFR BLD AUTO: 0 % (ref 0–2)
LDLC SERPL CALC-MCNC: 100 MG/DL (ref 0–100)
LYMPHOCYTES # BLD AUTO: 1.59 THOUSANDS/ΜL (ref 0.6–4.47)
LYMPHOCYTES NFR BLD AUTO: 18 % (ref 14–44)
MCH RBC QN AUTO: 31.5 PG (ref 26.8–34.3)
MCHC RBC AUTO-ENTMCNC: 34.9 G/DL (ref 31.4–37.4)
MCV RBC AUTO: 90 FL (ref 82–98)
MONOCYTES # BLD AUTO: 0.63 THOUSAND/ΜL (ref 0.17–1.22)
MONOCYTES NFR BLD AUTO: 7 % (ref 4–12)
NEUTROPHILS # BLD AUTO: 6.44 THOUSANDS/ΜL (ref 1.85–7.62)
NEUTS SEG NFR BLD AUTO: 72 % (ref 43–75)
NONHDLC SERPL-MCNC: 160 MG/DL
NRBC BLD AUTO-RTO: 0 /100 WBCS
PLATELET # BLD AUTO: 264 THOUSANDS/UL (ref 149–390)
PMV BLD AUTO: 10.9 FL (ref 8.9–12.7)
POTASSIUM SERPL-SCNC: 3 MMOL/L (ref 3.5–5.3)
PROT SERPL-MCNC: 7.8 G/DL (ref 6.4–8.2)
RBC # BLD AUTO: 5.05 MILLION/UL (ref 3.81–5.12)
SODIUM SERPL-SCNC: 138 MMOL/L (ref 136–145)
TRIGL SERPL-MCNC: 300 MG/DL
TSH SERPL DL<=0.05 MIU/L-ACNC: 2.1 UIU/ML (ref 0.36–3.74)
WBC # BLD AUTO: 9 THOUSAND/UL (ref 4.31–10.16)

## 2020-12-01 PROCEDURE — 87389 HIV-1 AG W/HIV-1&-2 AB AG IA: CPT

## 2020-12-01 PROCEDURE — 80053 COMPREHEN METABOLIC PANEL: CPT

## 2020-12-01 PROCEDURE — 84443 ASSAY THYROID STIM HORMONE: CPT

## 2020-12-01 PROCEDURE — 80061 LIPID PANEL: CPT

## 2020-12-01 PROCEDURE — 36415 COLL VENOUS BLD VENIPUNCTURE: CPT

## 2020-12-01 PROCEDURE — 82306 VITAMIN D 25 HYDROXY: CPT

## 2020-12-01 PROCEDURE — 85025 COMPLETE CBC W/AUTO DIFF WBC: CPT

## 2020-12-02 ENCOUNTER — TELEPHONE (OUTPATIENT)
Dept: FAMILY MEDICINE CLINIC | Facility: CLINIC | Age: 40
End: 2020-12-02

## 2020-12-02 DIAGNOSIS — E55.9 VITAMIN D DEFICIENCY: ICD-10-CM

## 2020-12-02 DIAGNOSIS — E87.6 HYPOKALEMIA: Primary | ICD-10-CM

## 2020-12-02 LAB — HIV 1+2 AB+HIV1 P24 AG SERPL QL IA: NORMAL

## 2020-12-02 RX ORDER — POTASSIUM CHLORIDE 20 MEQ/1
TABLET, EXTENDED RELEASE ORAL
Qty: 4 TABLET | Refills: 0 | Status: SHIPPED | OUTPATIENT
Start: 2020-12-02 | End: 2020-12-15 | Stop reason: SDUPTHER

## 2020-12-02 RX ORDER — ERGOCALCIFEROL 1.25 MG/1
50000 CAPSULE ORAL WEEKLY
Qty: 8 CAPSULE | Refills: 0 | Status: SHIPPED | OUTPATIENT
Start: 2020-12-02 | End: 2021-09-21

## 2020-12-02 NOTE — TELEPHONE ENCOUNTER
Spoke with patient made aware of low vitamin-D level  Will start patient on vitamin-D 25235 units weekly x 8 weeks  Potassium 3 0 start K-Dur or 20 mEq x 4 days  Recheck BMP in 2 weeks  Discussed lipid panel, encouraged to increase physical activity and follow a low-fat diet  Discussed eating foods high in omega 3  Recheck in 1 yr  Will discusses ASCVD risk score at next visit 12/15/2020

## 2020-12-13 ENCOUNTER — LAB (OUTPATIENT)
Dept: LAB | Age: 40
End: 2020-12-13
Payer: COMMERCIAL

## 2020-12-13 DIAGNOSIS — E87.6 HYPOKALEMIA: ICD-10-CM

## 2020-12-13 LAB
ANION GAP SERPL CALCULATED.3IONS-SCNC: 7 MMOL/L (ref 4–13)
BUN SERPL-MCNC: 11 MG/DL (ref 5–25)
CALCIUM SERPL-MCNC: 9.6 MG/DL (ref 8.3–10.1)
CHLORIDE SERPL-SCNC: 105 MMOL/L (ref 100–108)
CO2 SERPL-SCNC: 29 MMOL/L (ref 21–32)
CREAT SERPL-MCNC: 0.79 MG/DL (ref 0.6–1.3)
GFR SERPL CREATININE-BSD FRML MDRD: 93 ML/MIN/1.73SQ M
GLUCOSE P FAST SERPL-MCNC: 91 MG/DL (ref 65–99)
POTASSIUM SERPL-SCNC: 3.3 MMOL/L (ref 3.5–5.3)
SODIUM SERPL-SCNC: 141 MMOL/L (ref 136–145)

## 2020-12-13 PROCEDURE — 36415 COLL VENOUS BLD VENIPUNCTURE: CPT

## 2020-12-13 PROCEDURE — 80048 BASIC METABOLIC PNL TOTAL CA: CPT

## 2020-12-15 ENCOUNTER — OFFICE VISIT (OUTPATIENT)
Dept: FAMILY MEDICINE CLINIC | Facility: CLINIC | Age: 40
End: 2020-12-15

## 2020-12-15 ENCOUNTER — TELEPHONE (OUTPATIENT)
Dept: FAMILY MEDICINE CLINIC | Facility: CLINIC | Age: 40
End: 2020-12-15

## 2020-12-15 VITALS
HEIGHT: 61 IN | TEMPERATURE: 99 F | HEART RATE: 71 BPM | OXYGEN SATURATION: 99 % | RESPIRATION RATE: 18 BRPM | BODY MASS INDEX: 39.61 KG/M2 | WEIGHT: 209.8 LBS | DIASTOLIC BLOOD PRESSURE: 70 MMHG | SYSTOLIC BLOOD PRESSURE: 110 MMHG

## 2020-12-15 DIAGNOSIS — I10 ESSENTIAL HYPERTENSION: ICD-10-CM

## 2020-12-15 DIAGNOSIS — E87.6 HYPOKALEMIA: ICD-10-CM

## 2020-12-15 PROBLEM — Z20.822 COVID-19 RULED OUT: Status: RESOLVED | Noted: 2020-07-15 | Resolved: 2020-12-15

## 2020-12-15 PROCEDURE — 99213 OFFICE O/P EST LOW 20 MIN: CPT | Performed by: FAMILY MEDICINE

## 2020-12-15 PROCEDURE — 3078F DIAST BP <80 MM HG: CPT | Performed by: FAMILY MEDICINE

## 2020-12-15 PROCEDURE — 3074F SYST BP LT 130 MM HG: CPT | Performed by: FAMILY MEDICINE

## 2020-12-15 PROCEDURE — 3008F BODY MASS INDEX DOCD: CPT | Performed by: FAMILY MEDICINE

## 2020-12-15 RX ORDER — POTASSIUM CHLORIDE 20 MEQ/1
TABLET, EXTENDED RELEASE ORAL
Qty: 6 TABLET | Refills: 0 | Status: SHIPPED | OUTPATIENT
Start: 2020-12-15 | End: 2021-09-21 | Stop reason: ALTCHOICE

## 2020-12-23 DIAGNOSIS — I10 ESSENTIAL HYPERTENSION: ICD-10-CM

## 2020-12-23 RX ORDER — HYDROCHLOROTHIAZIDE 25 MG/1
25 TABLET ORAL DAILY
Qty: 30 TABLET | Refills: 5 | Status: SHIPPED | OUTPATIENT
Start: 2020-12-23 | End: 2021-06-22

## 2020-12-28 DIAGNOSIS — I10 ESSENTIAL HYPERTENSION: ICD-10-CM

## 2021-01-06 ENCOUNTER — OFFICE VISIT (OUTPATIENT)
Dept: FAMILY MEDICINE CLINIC | Facility: CLINIC | Age: 41
End: 2021-01-06

## 2021-01-06 VITALS — TEMPERATURE: 98.2 F

## 2021-01-06 DIAGNOSIS — Z20.822 COVID-19 RULED OUT: Primary | ICD-10-CM

## 2021-01-06 DIAGNOSIS — Z20.822 COVID-19 RULED OUT: ICD-10-CM

## 2021-01-06 PROCEDURE — 99213 OFFICE O/P EST LOW 20 MIN: CPT | Performed by: PHYSICIAN ASSISTANT

## 2021-01-06 PROCEDURE — U0005 INFEC AGEN DETEC AMPLI PROBE: HCPCS | Performed by: PHYSICIAN ASSISTANT

## 2021-01-06 PROCEDURE — U0003 INFECTIOUS AGENT DETECTION BY NUCLEIC ACID (DNA OR RNA); SEVERE ACUTE RESPIRATORY SYNDROME CORONAVIRUS 2 (SARS-COV-2) (CORONAVIRUS DISEASE [COVID-19]), AMPLIFIED PROBE TECHNIQUE, MAKING USE OF HIGH THROUGHPUT TECHNOLOGIES AS DESCRIBED BY CMS-2020-01-R: HCPCS | Performed by: PHYSICIAN ASSISTANT

## 2021-01-06 NOTE — PROGRESS NOTES
COVID-19 Virtual Visit     Assessment/Plan:    Problem List Items Addressed This Visit        Other    COVID-19 ruled out - Primary     Will send for COVID testing at centralized site  Advised to self quarantine at home until results are received  Advised to wear mask when around other people in her home  Frequent hand washing  Avoid sharing personal household items  Discussed disinfecting all common areas  Contact our office if symptoms worsen         Relevant Orders    Novel Coronavirus (COVID-19), PCR [facility] - Collected at [employer] or [facility]         Disposition:     I referred patient to one of our centralized sites for a COVID-19 swab  I have spent 10 minutes directly with the patient  Encounter provider Keith Manzanares PA-C    Provider located at [facility] [address] [facility] [address] [phone]    Recent Visits  No visits were found meeting these conditions  Showing recent visits within past 7 days and meeting all other requirements     Today's Visits  Date Type Provider Dept   01/06/21 Office Visit Keith Manzanares, [address]   Showing today's visits and meeting all other requirements     Future Appointments  No visits were found meeting these conditions  Showing future appointments within next 150 days and meeting all other requirements        Patient agrees to participate in a virtual check in via telephone or video visit instead of presenting to the office to address urgent/immediate medical needs  Patient is aware this is a billable service  After connecting through Telephone, the patient was identified by name and date of birth  Sophy Camacho was informed that this was a telemedicine visit and that the exam was being conducted confidentially over secure lines  My office door was closed  No one else was in the room  Sophy Camacho acknowledged consent and understanding of privacy and security of the telemedicine visit  I informed the patient that I have reviewed her record in Epic and presented the opportunity for her to ask any questions regarding the visit today  The patient agreed to participate  It was my intent to perform this visit via video technology but the patient was not able to do a video connection so the visit was completed via audio telephone only  Subjective:   Abdoulaye Ruvalcaba is a 38 y o  female who is concerned about COVID-19  Date of symptom onset: 1/4/2021    Patient's symptoms include chills, fatigue, sore throat and myalgias  Patient denies fever, malaise, congestion, rhinorrhea, anosmia, loss of taste, cough, shortness of breath, chest tightness, abdominal pain, nausea, vomiting, diarrhea and headaches       Exposure:   Contact with a person who is under investigation (PUI) for or who is positive for COVID-19 within the last 14 days?: No    Hospitalized recently for fever and/or lower respiratory symptoms?: No      Currently a healthcare worker that is involved in direct patient care?: No      Works in a special setting where the risk of COVID-19 transmission may be high? (this may include long-term care, correctional and shelter facilities; homeless shelters; assisted-living facilities and group homes ): No      Resident in a special setting where the risk of COVID-19 transmission may be high? (this may include long-term care, correctional and shelter facilities; homeless shelters; assisted-living facilities and group homes ): No      Lab Results   Component Value Date    Marletta Ebbing Not Detected 10/22/2020     Past Medical History:   Diagnosis Date    Anemia     Chest tightness or pressure     8/19/2013    Hypertension     Vitamin D deficiency      Past Surgical History:   Procedure Laterality Date    CYSTOSCOPY N/A 12/30/2018    Procedure: CYSTOSCOPY;  Surgeon: Edwardo Chaney DO;  [facility];  Service: Gynecology    HYSTERECTOMY N/A 12/30/2018    Procedure: HYSTERECTOMY LAPAROSCOPIC TOTAL ([address]); Surgeon: Meghan Landrum DO;  [facility];  Service: Gynecology    AR LAP,RMV  ADNEXAL STRUCTURE Bilateral 12/30/2018    Procedure: SALPINGECTOMY, LAPAROSCOPIC;  Surgeon: Meghan Landrum DO;  [facility];  Service: Gynecology    TONSILLECTOMY      TUBAL LIGATION      2006     Current Outpatient Medications   Medication Sig Dispense Refill    ergocalciferol (VITAMIN D2) 50,000 units Take 1 capsule (50,000 Units total) by mouth once a week Take 1 capsule by mouth once weekly for 8 weeks 8 capsule 0    hydrochlorothiazide (HYDRODIURIL) 25 mg tablet Take 1 tablet (25 mg total) by mouth daily 30 tablet 5    metoprolol tartrate (LOPRESSOR) 25 mg tablet Take 1 tablet (25 mg total) by mouth every 12 (twelve) hours 60 tablet 5    potassium chloride (K-DUR,KLOR-CON) 20 mEq tablet Take 1 tab po BID x 3 days (Patient not taking: Reported on 1/6/2021) 6 tablet 0     No current facility-administered medications for this visit  No Known Allergies    Review of Systems   Constitutional: Positive for chills and fatigue  Negative for fever  HENT: Positive for sore throat  Negative for congestion and rhinorrhea  Respiratory: Negative for cough, chest tightness and shortness of breath  Gastrointestinal: Negative for abdominal pain, diarrhea, nausea and vomiting  Musculoskeletal: Positive for myalgias  Neurological: Negative for headaches  Objective:    Vitals:    01/06/21 0935   Temp: 98 2 °F (36 8 °C)   TempSrc: Temporal       Physical Exam  VIRTUAL VISIT DISCLAIMER    Vito Matthews acknowledges that she has consented to an online visit or consultation  She understands that the online visit is based solely on information provided by her, and that, in the absence of a face-to-face physical evaluation by the physician, the diagnosis she receives is both limited and provisional in terms of accuracy and completeness   This is not intended to replace a full medical face-to-face evaluation by the physician  Vito Matthews understands and accepts these terms

## 2021-01-06 NOTE — ASSESSMENT & PLAN NOTE
Will send for COVID testing at centralized site  Advised to self quarantine at home until results are received  Advised to wear mask when around other people in her home  Frequent hand washing  Avoid sharing personal household items  Discussed disinfecting all common areas  Contact our office if symptoms worsen

## 2021-01-07 LAB — SARS-COV-2 RNA SPEC QL NAA+PROBE: NOT DETECTED

## 2021-03-10 DIAGNOSIS — Z23 ENCOUNTER FOR IMMUNIZATION: ICD-10-CM

## 2021-03-24 ENCOUNTER — IMMUNIZATIONS (OUTPATIENT)
Dept: FAMILY MEDICINE CLINIC | Facility: HOSPITAL | Age: 41
End: 2021-03-24

## 2021-03-24 DIAGNOSIS — Z23 ENCOUNTER FOR IMMUNIZATION: Primary | ICD-10-CM

## 2021-03-24 PROCEDURE — 91300 SARS-COV-2 / COVID-19 MRNA VACCINE (PFIZER-BIONTECH) 30 MCG: CPT

## 2021-03-24 PROCEDURE — 0001A SARS-COV-2 / COVID-19 MRNA VACCINE (PFIZER-BIONTECH) 30 MCG: CPT

## 2021-04-14 ENCOUNTER — IMMUNIZATIONS (OUTPATIENT)
Dept: FAMILY MEDICINE CLINIC | Facility: HOSPITAL | Age: 41
End: 2021-04-14

## 2021-04-14 DIAGNOSIS — Z23 ENCOUNTER FOR IMMUNIZATION: Primary | ICD-10-CM

## 2021-04-14 PROCEDURE — 91300 SARS-COV-2 / COVID-19 MRNA VACCINE (PFIZER-BIONTECH) 30 MCG: CPT

## 2021-04-14 PROCEDURE — 0002A SARS-COV-2 / COVID-19 MRNA VACCINE (PFIZER-BIONTECH) 30 MCG: CPT

## 2021-04-23 ENCOUNTER — TELEMEDICINE (OUTPATIENT)
Dept: FAMILY MEDICINE CLINIC | Facility: CLINIC | Age: 41
End: 2021-04-23

## 2021-04-23 VITALS — BODY MASS INDEX: 37.6 KG/M2 | WEIGHT: 199 LBS | TEMPERATURE: 98.8 F

## 2021-04-23 DIAGNOSIS — R05.9 COUGH: Primary | ICD-10-CM

## 2021-04-23 PROCEDURE — 99212 OFFICE O/P EST SF 10 MIN: CPT | Performed by: FAMILY MEDICINE

## 2021-04-23 RX ORDER — CETIRIZINE HYDROCHLORIDE 10 MG/1
10 TABLET ORAL DAILY
Qty: 30 TABLET | Refills: 0 | Status: SHIPPED | OUTPATIENT
Start: 2021-04-23 | End: 2021-05-17 | Stop reason: SDUPTHER

## 2021-04-23 RX ORDER — BENZONATATE 100 MG/1
100 CAPSULE ORAL 3 TIMES DAILY PRN
Qty: 20 CAPSULE | Refills: 3 | Status: SHIPPED | OUTPATIENT
Start: 2021-04-23 | End: 2021-09-21

## 2021-04-23 NOTE — ASSESSMENT & PLAN NOTE
Acute, 2 day duration  - Dry, persistent cough  - H/o seasonal allergies, asthma as child  - Received 2nd COVID vaccine on 04/14  - No sick contacts  - Most likely allergy-related/weather-related vs acute viral URI  - Will order Tessalon Perles for patient along with trial of Zyrtec daily for allergy relief  - Discussed that if she is to develop fever/chills, wheezing, or SOB to call back over the weekend to discuss with the on-call physician or to report to the ED for further workup and testing  - Follow-up on Monday if symptoms not improving

## 2021-04-23 NOTE — PROGRESS NOTES
Virtual Brief Visit    Assessment/Plan:    Problem List Items Addressed This Visit        Other    Cough - Primary     Acute, 2 day duration  - Dry, persistent cough  - H/o seasonal allergies, asthma as child  - Received 2nd COVID vaccine on 04/14  - No sick contacts  - Most likely allergy-related/weather-related vs acute viral URI  - Will order Tessalon Perles for patient along with trial of Zyrtec daily for allergy relief  - Discussed that if she is to develop fever/chills, wheezing, or SOB to call back over the weekend to discuss with the on-call physician or to report to the ED for further workup and testing  - Follow-up on Monday if symptoms not improving         Relevant Medications    benzonatate (TESSALON PERLES) 100 mg capsule    cetirizine (ZyrTEC) 10 mg tablet                Reason for visit is   Chief Complaint   Patient presents with    Cough     dry/wheezing/ patient wants to get tested    Shortness of Breath    Sore Throat    Virtual Brief Visit        Encounter provider Mora Zayas MD    Provider located at [address] [place] [address]   [phone]    Recent Visits  No visits were found meeting these conditions  Showing recent visits within past 7 days and meeting all other requirements     Today's Visits  Date Type Provider Dept   04/23/21 Telemedicine Mora Zayas MD [facility] today's visits and meeting all other requirements     Future Appointments  No visits were found meeting these conditions  Showing future appointments within next 150 days and meeting all other requirements        After connecting through [email or web address] and patient was informed that this is a secure, HIPAA-compliant platform  She agrees to proceed  , the patient was identified by name and date of birth   Edwin Edouard was informed that this is a telemedicine visit and that the visit is being conducted through [facility] and patient was informed that this is a secure, HIPAA-compliant platform  She agrees to proceed     My office door was closed  No one else was in the room  She acknowledged consent and understanding of privacy and security of the platform  The patient has agreed to participate and understands she can discontinue the visit at any time  Patient is aware this is a billable service  Subjective    Wan Christiansen is a 38 y o  female :   Started having cough with some wheezing over the last 24-48 hours  Reports the cough is on/off and that she feels like her throat is becoming irritated from coughing so much  She reports it is a dry cough  She denies any sick contacts as of recent and she has received her first and second shots against COVID for which she got her second shot last Wednesday  She endorses a history of asthma when she was younger but that she grew out of it  She endorses a history of seasonal allergies during the spring and fall  She denies fevers or chills, runny nose, or sneezing  Past Medical History:   Diagnosis Date    Anemia     Chest tightness or pressure     8/19/2013    Hypertension     Vitamin D deficiency        Past Surgical History:   Procedure Laterality Date    CYSTOSCOPY N/A 12/30/2018    Procedure: CYSTOSCOPY;  Surgeon: Julio César Carbajal DO;  [facility];  Service: Gynecology    HYSTERECTOMY N/A 12/30/2018    Procedure: HYSTERECTOMY LAPAROSCOPIC TOTAL ([address]);   Surgeon: Julio César Carbajal DO;  [facility];  Service: Gynecology    ME LAP,RMV  ADNEXAL STRUCTURE Bilateral 12/30/2018    Procedure: SALPINGECTOMY, LAPAROSCOPIC;  Surgeon: Julio César Carbajal DO;  [facility];  Service: Gynecology    TONSILLECTOMY      TUBAL LIGATION      2006       Current Outpatient Medications   Medication Sig Dispense Refill    hydrochlorothiazide (HYDRODIURIL) 25 mg tablet Take 1 tablet (25 mg total) by mouth daily 30 tablet 5    metoprolol tartrate (LOPRESSOR) 25 mg tablet Take 1 tablet (25 mg total) by mouth every 12 (twelve) hours 60 tablet 5    benzonatate (TESSALON PERLES) 100 mg capsule Take 1 capsule (100 mg total) by mouth 3 (three) times a day as needed for cough 20 capsule 3    cetirizine (ZyrTEC) 10 mg tablet Take 1 tablet (10 mg total) by mouth daily 30 tablet 0    ergocalciferol (VITAMIN D2) 50,000 units Take 1 capsule (50,000 Units total) by mouth once a week Take 1 capsule by mouth once weekly for 8 weeks (Patient not taking: Reported on 4/23/2021) 8 capsule 0    potassium chloride (K-DUR,KLOR-CON) 20 mEq tablet Take 1 tab po BID x 3 days (Patient not taking: Reported on 1/6/2021) 6 tablet 0     No current facility-administered medications for this visit  No Known Allergies    Review of Systems   Constitutional: Negative for chills, fatigue and fever  HENT: Positive for sore throat  Negative for congestion, postnasal drip and rhinorrhea  Eyes: Negative  Respiratory: Positive for cough and wheezing  Negative for chest tightness and shortness of breath  Cardiovascular: Negative for chest pain and palpitations  Gastrointestinal: Negative for diarrhea and nausea  Genitourinary: Negative  Skin: Negative  Allergic/Immunologic: Positive for environmental allergies  Neurological: Negative  Psychiatric/Behavioral: Negative  Vitals:    04/23/21 0850   Temp: 98 8 °F (37 1 °C)   TempSrc: Temporal   Weight: 90 3 kg (199 lb)     It was my intent to perform this visit via video technology but the patient was not able to do a video connection so the visit was completed via audio telephone only  I spent 10 minutes directly with the patient during this visit    VIRTUAL VISIT DISCLAIMER    Sherrell Felecia acknowledges that she has consented to an online visit or consultation   She understands that the online visit is based solely on information provided by her, and that, in the absence of a face-to-face physical evaluation by the physician, the diagnosis she receives is both limited and provisional in terms of accuracy and completeness  This is not intended to replace a full medical face-to-face evaluation by the physician  Tyler Connell understands and accepts these terms

## 2021-04-29 ENCOUNTER — PATIENT MESSAGE (OUTPATIENT)
Dept: FAMILY MEDICINE CLINIC | Facility: CLINIC | Age: 41
End: 2021-04-29

## 2021-05-03 ENCOUNTER — TELEMEDICINE (OUTPATIENT)
Dept: FAMILY MEDICINE CLINIC | Facility: CLINIC | Age: 41
End: 2021-05-03

## 2021-05-03 VITALS — TEMPERATURE: 97.2 F

## 2021-05-03 DIAGNOSIS — R05.9 COUGH: Primary | ICD-10-CM

## 2021-05-03 PROCEDURE — 99213 OFFICE O/P EST LOW 20 MIN: CPT | Performed by: FAMILY MEDICINE

## 2021-05-03 NOTE — PROGRESS NOTES
COVID-19 Outpatient Progress Note    Assessment/Plan:    Problem List Items Addressed This Visit        Other    Cough - Primary    Relevant Orders    Novel Coronavirus (Covid-19),PCR [facility] - Collected at [facility] or [facility]         Disposition:     I referred patient to one of our centralized sites for a COVID-19 swab  Most likely viral or seasonal allergies but not improving on allergy medications  Testing for COVID warranted so sent for testing and will follow up for further treatment if negative  OTC cough treatments suggested  I have spent 10 minutes directly with the patient  Encounter provider Leighann Arellano    Provider located at [address] [place] [id number] [facility]   [phone]    Recent Visits  No visits were found meeting these conditions  Showing recent visits within past 7 days and meeting all other requirements     Today's Visits  Date Type Provider Dept   05/03/21 [address] [place] [address],[address], [facility] today's visits and meeting all other requirements     Future Appointments  No visits were found meeting these conditions  Showing future appointments within next 150 days and meeting all other requirements      This virtual check-in was done via [employer] and patient was informed that this is a secure, HIPAA-compliant platform  She agrees to proceed  Patient agrees to participate in a virtual check in via telephone or video visit instead of presenting to the office to address urgent/immediate medical needs  Patient is aware this is a billable service  After connecting through [facility], the patient was identified by name and date of birth  Star Fuentes was informed that this was a telemedicine visit and that the exam was being conducted confidentially over secure lines  My office door was closed  No one else was in the room   Star Fuentes acknowledged consent and understanding of privacy and security of the telemedicine visit  I informed the patient that I have reviewed her record in Epic and presented the opportunity for her to ask any questions regarding the visit today  The patient agreed to participate  Subjective:   Ian Gonzalez is a 38 y o  female who is concerned about COVID-19  Patient's symptoms include cough  Patient denies fever and chills  Date of symptom onset: 4/28/2021    Has seasonal allergies and has taken zyrtec and another cough medicine but that hasn't helped  Lab Results   Component Value Date    SARSCOV2 Not Detected 01/06/2021     Past Medical History:   Diagnosis Date    Anemia     Chest tightness or pressure     8/19/2013    Hypertension     Vitamin D deficiency      Past Surgical History:   Procedure Laterality Date    CYSTOSCOPY N/A 12/30/2018    Procedure: CYSTOSCOPY;  Surgeon: Maggy Baxter DO;  [facility];  Service: Gynecology    HYSTERECTOMY N/A 12/30/2018    Procedure: HYSTERECTOMY LAPAROSCOPIC TOTAL ([address]);   Surgeon: Maggy Baxter DO;  [facility];  Service: Gynecology    NV LAP,RMV  ADNEXAL STRUCTURE Bilateral 12/30/2018    Procedure: SALPINGECTOMY, LAPAROSCOPIC;  Surgeon: Maggy Baxter DO;  [facility];  Service: Gynecology    TONSILLECTOMY      TUBAL LIGATION      2006     Current Outpatient Medications   Medication Sig Dispense Refill    benzonatate (TESSALON PERLES) 100 mg capsule Take 1 capsule (100 mg total) by mouth 3 (three) times a day as needed for cough 20 capsule 3    cetirizine (ZyrTEC) 10 mg tablet Take 1 tablet (10 mg total) by mouth daily 30 tablet 0    ergocalciferol (VITAMIN D2) 50,000 units Take 1 capsule (50,000 Units total) by mouth once a week Take 1 capsule by mouth once weekly for 8 weeks (Patient not taking: Reported on 4/23/2021) 8 capsule 0    hydrochlorothiazide (HYDRODIURIL) 25 mg tablet Take 1 tablet (25 mg total) by mouth daily 30 tablet 5    metoprolol tartrate (LOPRESSOR) 25 mg tablet Take 1 tablet (25 mg total) by mouth every 12 (twelve) hours 60 tablet 5    potassium chloride (K-DUR,KLOR-CON) 20 mEq tablet Take 1 tab po BID x 3 days (Patient not taking: Reported on 1/6/2021) 6 tablet 0     No current facility-administered medications for this visit  No Known Allergies    Review of Systems   Constitutional: Negative for chills and fever  Respiratory: Positive for cough  Objective: There were no vitals filed for this visit  Physical Exam  Constitutional:       Appearance: Normal appearance  Neck:      Musculoskeletal: Normal range of motion  Pulmonary:      Effort: Pulmonary effort is normal    Neurological:      Mental Status: She is alert and oriented to person, place, and time  Psychiatric:         Mood and Affect: Mood normal          Behavior: Behavior normal          Thought Content: Thought content normal          Judgment: Judgment normal        VIRTUAL VISIT DISCLAIMER    Maria C Elizalde acknowledges that she has consented to an online visit or consultation  She understands that the online visit is based solely on information provided by her, and that, in the absence of a face-to-face physical evaluation by the physician, the diagnosis she receives is both limited and provisional in terms of accuracy and completeness  This is not intended to replace a full medical face-to-face evaluation by the physician  Maria C Elizalde understands and accepts these terms

## 2021-05-04 DIAGNOSIS — R05.9 COUGH: ICD-10-CM

## 2021-05-04 LAB — SARS-COV-2 RNA RESP QL NAA+PROBE: NEGATIVE

## 2021-05-04 PROCEDURE — U0003 INFECTIOUS AGENT DETECTION BY NUCLEIC ACID (DNA OR RNA); SEVERE ACUTE RESPIRATORY SYNDROME CORONAVIRUS 2 (SARS-COV-2) (CORONAVIRUS DISEASE [COVID-19]), AMPLIFIED PROBE TECHNIQUE, MAKING USE OF HIGH THROUGHPUT TECHNOLOGIES AS DESCRIBED BY CMS-2020-01-R: HCPCS | Performed by: FAMILY MEDICINE

## 2021-05-04 PROCEDURE — U0005 INFEC AGEN DETEC AMPLI PROBE: HCPCS | Performed by: FAMILY MEDICINE

## 2021-05-17 DIAGNOSIS — R05.9 COUGH: ICD-10-CM

## 2021-05-18 RX ORDER — CETIRIZINE HYDROCHLORIDE 10 MG/1
10 TABLET ORAL DAILY
Qty: 30 TABLET | Refills: 2 | Status: SHIPPED | OUTPATIENT
Start: 2021-05-18

## 2021-06-20 DIAGNOSIS — I10 ESSENTIAL HYPERTENSION: ICD-10-CM

## 2021-06-22 RX ORDER — HYDROCHLOROTHIAZIDE 25 MG/1
TABLET ORAL
Qty: 90 TABLET | Refills: 1 | Status: SHIPPED | OUTPATIENT
Start: 2021-06-22 | End: 2021-12-15

## 2021-06-23 ENCOUNTER — OFFICE VISIT (OUTPATIENT)
Dept: FAMILY MEDICINE CLINIC | Facility: CLINIC | Age: 41
End: 2021-06-23

## 2021-06-23 VITALS
HEART RATE: 76 BPM | OXYGEN SATURATION: 99 % | HEIGHT: 61 IN | TEMPERATURE: 96.4 F | WEIGHT: 182.8 LBS | SYSTOLIC BLOOD PRESSURE: 130 MMHG | RESPIRATION RATE: 18 BRPM | BODY MASS INDEX: 34.51 KG/M2 | DIASTOLIC BLOOD PRESSURE: 70 MMHG

## 2021-06-23 DIAGNOSIS — N30.00 ACUTE CYSTITIS WITHOUT HEMATURIA: Primary | ICD-10-CM

## 2021-06-23 LAB
SL AMB  POCT GLUCOSE, UA: NEGATIVE
SL AMB LEUKOCYTE ESTERASE,UA: ABNORMAL
SL AMB POCT BILIRUBIN,UA: NEGATIVE
SL AMB POCT BLOOD,UA: ABNORMAL
SL AMB POCT CLARITY,UA: ABNORMAL
SL AMB POCT COLOR,UA: ABNORMAL
SL AMB POCT KETONES,UA: NEGATIVE
SL AMB POCT NITRITE,UA: POSITIVE
SL AMB POCT PH,UA: 6.5
SL AMB POCT SPECIFIC GRAVITY,UA: 1.01
SL AMB POCT URINE PROTEIN: NEGATIVE
SL AMB POCT UROBILINOGEN: 0.2

## 2021-06-23 PROCEDURE — 87186 SC STD MICRODIL/AGAR DIL: CPT | Performed by: PHYSICIAN ASSISTANT

## 2021-06-23 PROCEDURE — 3008F BODY MASS INDEX DOCD: CPT | Performed by: PHYSICIAN ASSISTANT

## 2021-06-23 PROCEDURE — 87086 URINE CULTURE/COLONY COUNT: CPT | Performed by: PHYSICIAN ASSISTANT

## 2021-06-23 PROCEDURE — 3725F SCREEN DEPRESSION PERFORMED: CPT | Performed by: PHYSICIAN ASSISTANT

## 2021-06-23 PROCEDURE — 99213 OFFICE O/P EST LOW 20 MIN: CPT | Performed by: PHYSICIAN ASSISTANT

## 2021-06-23 PROCEDURE — 87077 CULTURE AEROBIC IDENTIFY: CPT | Performed by: PHYSICIAN ASSISTANT

## 2021-06-23 PROCEDURE — 3078F DIAST BP <80 MM HG: CPT | Performed by: PHYSICIAN ASSISTANT

## 2021-06-23 PROCEDURE — 3075F SYST BP GE 130 - 139MM HG: CPT | Performed by: PHYSICIAN ASSISTANT

## 2021-06-23 PROCEDURE — 81002 URINALYSIS NONAUTO W/O SCOPE: CPT | Performed by: PHYSICIAN ASSISTANT

## 2021-06-23 RX ORDER — SULFAMETHOXAZOLE AND TRIMETHOPRIM 800; 160 MG/1; MG/1
1 TABLET ORAL EVERY 12 HOURS SCHEDULED
Qty: 6 TABLET | Refills: 0 | Status: SHIPPED | OUTPATIENT
Start: 2021-06-23 | End: 2021-06-26

## 2021-06-23 NOTE — ASSESSMENT & PLAN NOTE
Urine dipstick shows negative for bacteria, glucose, protein, ketones, urobilinogen, positive for nitrites, leukocytes, red blood cells  Will start bactrim DS 1 tab po bid x 3 days  Send urine culture  Advised to increase p o  fluids   Discussed wiping from front to back  Avoid holding urine  ED precautions given for worsening sx

## 2021-06-23 NOTE — PROGRESS NOTES
Assessment/Plan:    Acute cystitis without hematuria  Urine dipstick shows negative for bacteria, glucose, protein, ketones, urobilinogen, positive for nitrites, leukocytes, red blood cells  Will start bactrim DS 1 tab po bid x 3 days  Send urine culture  Advised to increase p o  fluids   Discussed wiping from front to back  Avoid holding urine  ED precautions given for worsening sx        Diagnoses and all orders for this visit:    Acute cystitis without hematuria  -     sulfamethoxazole-trimethoprim (BACTRIM DS) 800-160 mg per tablet; Take 1 tablet by mouth every 12 (twelve) hours for 3 days  -     Urine culture; Future  -     Urine culture  -     POCT urine dip        Subjective:      Patient ID: Alisha Verduzco is a 38 y o  female presents today for c/o dysuria x 3 days  Difficulty Urinating   This is a new problem  The current episode started in the past 7 days (3 days)  The problem occurs every urination  The problem has been gradually worsening  The quality of the pain is described as burning (Pressure)  The pain is at a severity of 6/10  There has been no fever  She is sexually active  There is no history of pyelonephritis  Associated symptoms include frequency, hesitancy and urgency  Pertinent negatives include no chills, discharge, flank pain, hematuria, nausea, possible pregnancy or vomiting  She has tried nothing for the symptoms  There is no history of recurrent UTIs  The following portions of the patient's history were reviewed and updated as appropriate: allergies, current medications, past family history, past medical history, past social history, past surgical history and problem list     Review of Systems   Constitutional: Negative for chills and fever  Gastrointestinal: Negative for nausea and vomiting  Genitourinary: Positive for dysuria, frequency, hesitancy and urgency  Negative for flank pain and hematuria  All other systems reviewed and are negative          Objective:      BP 130/70 (BP Location: Left arm, Patient Position: Sitting, Cuff Size: Standard)   Pulse 76   Temp (!) 96 4 °F (35 8 °C) (Temporal)   Resp 18   Ht 5' 1" (1 549 m)   Wt 82 9 kg (182 lb 12 8 oz)   LMP 10/17/2018   SpO2 99%   BMI 34 54 kg/m²          Physical Exam  Constitutional:       General: She is not in acute distress  Appearance: Normal appearance  She is well-developed  HENT:      Head: Normocephalic and atraumatic  Cardiovascular:      Rate and Rhythm: Normal rate and regular rhythm  Heart sounds: Normal heart sounds  No murmur heard  Pulmonary:      Effort: Pulmonary effort is normal  No respiratory distress  Breath sounds: Normal breath sounds  No wheezing  Musculoskeletal:         General: No deformity  Cervical back: Normal range of motion and neck supple  Neurological:      Mental Status: She is alert and oriented to person, place, and time  Psychiatric:         Mood and Affect: Mood normal          Behavior: Behavior normal          Thought Content:  Thought content normal          Judgment: Judgment normal

## 2021-06-23 NOTE — PATIENT INSTRUCTIONS
Dysuria   AMBULATORY CARE:   Dysuria  is trouble urinating, or pain, burning, or discomfort when you urinate  Dysuria is usually a symptom of another problem, such as a blockage or urinary tract infection  Common symptoms include the following:   · Fever     · Cloudy, bad smelling urine     · Urge to urinate often but urinating little     · Back, side, or abdominal pain     · Blood in your urine     · Discharge that smells bad     · Itching    Seek care immediately if:   · You have severe back, side, or abdominal pain  · You have fever and shaking chills  · You vomit several times in a row  Contact your healthcare provider if:   · Your symptoms do not go away, even after treatment  · You have questions or concerns about your condition or care  Treatment for dysuria  may include medicines to treat a bacterial infection or help decrease bladder spasms  Manage your dysuria:   · Drink more liquids  Liquids help flush out bacteria that may be causing an infection  Ask your healthcare provider how much liquid to drink each day and which liquids are best for you  · Take sitz baths as directed  Fill a bathtub with 4 to 6 inches of warm water  You may also use a sitz bath pan that fits over a toilet  Sit in the sitz bath for 20 minutes  Do this 2 to 3 times a day, or as directed  The warm water can help decrease pain and swelling  Follow up with your healthcare provider as directed:  Write down your questions so you remember to ask them during your visits  © Copyright [address] Information is for End User's use only and may not be sold, redistributed or otherwise used for commercial purposes  All illustrations and images included in CareNotes® are the copyrighted property of [employer]  or [facility] Tonia Jimenez   The above information is an  only  It is not intended as medical advice for individual conditions or treatments   Talk to your doctor, nurse or pharmacist before following any medical regimen to see if it is safe and effective for you

## 2021-06-26 LAB — BACTERIA UR CULT: ABNORMAL

## 2021-07-15 DIAGNOSIS — I10 ESSENTIAL HYPERTENSION: ICD-10-CM

## 2021-09-21 ENCOUNTER — OFFICE VISIT (OUTPATIENT)
Dept: FAMILY MEDICINE CLINIC | Facility: CLINIC | Age: 41
End: 2021-09-21

## 2021-09-21 VITALS
SYSTOLIC BLOOD PRESSURE: 152 MMHG | TEMPERATURE: 96.5 F | OXYGEN SATURATION: 99 % | WEIGHT: 170.2 LBS | BODY MASS INDEX: 32.13 KG/M2 | HEIGHT: 61 IN | DIASTOLIC BLOOD PRESSURE: 100 MMHG | HEART RATE: 93 BPM | RESPIRATION RATE: 18 BRPM

## 2021-09-21 DIAGNOSIS — I10 ESSENTIAL HYPERTENSION: Primary | ICD-10-CM

## 2021-09-21 DIAGNOSIS — E78.1 HIGH TRIGLYCERIDES: ICD-10-CM

## 2021-09-21 DIAGNOSIS — R07.89 CHEST DISCOMFORT: ICD-10-CM

## 2021-09-21 DIAGNOSIS — E55.9 VITAMIN D DEFICIENCY: ICD-10-CM

## 2021-09-21 PROBLEM — Z20.822 COVID-19 RULED OUT: Status: RESOLVED | Noted: 2020-07-15 | Resolved: 2021-09-21

## 2021-09-21 PROBLEM — Z20.822 CLOSE EXPOSURE TO COVID-19 VIRUS: Status: RESOLVED | Noted: 2020-04-07 | Resolved: 2021-09-21

## 2021-09-21 PROBLEM — Z00.00 HEALTHCARE MAINTENANCE: Status: RESOLVED | Noted: 2020-07-15 | Resolved: 2021-09-21

## 2021-09-21 PROBLEM — R05.9 COUGH: Status: RESOLVED | Noted: 2021-04-23 | Resolved: 2021-09-21

## 2021-09-21 PROBLEM — K13.0 CRACKED LIPS: Status: RESOLVED | Noted: 2020-05-13 | Resolved: 2021-09-21

## 2021-09-21 PROCEDURE — 93000 ELECTROCARDIOGRAM COMPLETE: CPT | Performed by: PHYSICIAN ASSISTANT

## 2021-09-21 PROCEDURE — 3008F BODY MASS INDEX DOCD: CPT | Performed by: PHYSICIAN ASSISTANT

## 2021-09-21 PROCEDURE — 3080F DIAST BP >= 90 MM HG: CPT | Performed by: PHYSICIAN ASSISTANT

## 2021-09-21 PROCEDURE — 99214 OFFICE O/P EST MOD 30 MIN: CPT | Performed by: PHYSICIAN ASSISTANT

## 2021-09-21 PROCEDURE — 3077F SYST BP >= 140 MM HG: CPT | Performed by: PHYSICIAN ASSISTANT

## 2021-09-21 RX ORDER — LOSARTAN POTASSIUM 25 MG/1
25 TABLET ORAL DAILY
Qty: 90 TABLET | Refills: 1 | Status: SHIPPED | OUTPATIENT
Start: 2021-09-21 | End: 2021-10-06

## 2021-09-21 NOTE — PROGRESS NOTES
Assessment/Plan:    Chest discomfort  Likely reflux  EKG-NSR  Will check labs  Advised to take Prilosec 20 mg po qd x 2 weeks  Avoid tomato based foods, spicy, caffeine, fried fatty foods, or acidic foods  ED precautions given      Essential hypertension  Will discontinue metoprolol due to reported low pulse   Will add Losartan 25 mg po qd, advised may increase to 50 mg po qd if BP > 140/90  Continue hydrochlorothiazide 25 mg daily  Continue following a low-sodium diet and exercising   Check bp regularly, record readings and bring to next visit  F/u in 2 weeks    High triglycerides  Check lipid panel    Vitamin D deficiency  Check vitamin D level    BMI Counseling: Body mass index is 32 16 kg/m²  The BMI is above normal  Patient courage to continue following heart healthy/ low-sodium diet and continue exercising regularly  Diagnoses and all orders for this visit:    Essential hypertension  -     POCT ECG  -     Lipid panel; Future  -     losartan (COZAAR) 25 mg tablet; Take 1 tablet (25 mg total) by mouth daily    Chest discomfort  -     POCT ECG  -     Comprehensive metabolic panel; Future  -     CBC and differential; Future  -     TSH, 3rd generation with Free T4 reflex; Future    High triglycerides  -     Lipid panel; Future    Vitamin D deficiency  -     Vitamin D 25 hydroxy; Future        Subjective:      Patient ID: Suzi Queen is a 38 y o  female presents today with c/o elevated bp and chest discomfort  C/o chest discomfort that began Saturday  Discomfort is at epigastric region   Does not radiate nor reproducible  Felt clammy x 1 Saturday  BP has been elevated in the 160s/ 100s  + headaches and palpitations  Self medicated with Prilosec with some relief reported  Denies any arm/jaw pain, anxious, depression,N/V/C/D, or SOB  Taking her BP regularly, noted pulse 48-57 most of the times  Taking hydrochlorothiazide 25 mg daily along with metoprolol 25 mg BID    Following a low sodium diet and exercising  The following portions of the patient's history were reviewed and updated as appropriate: allergies, current medications, past family history, past medical history, past social history, past surgical history and problem list     Review of Systems   Constitutional: Negative for chills, fatigue and fever  Respiratory: Negative for chest tightness, shortness of breath and wheezing  Cardiovascular: Positive for palpitations  Chest discomfort   Gastrointestinal: Negative for abdominal pain, constipation, diarrhea, nausea and vomiting  Psychiatric/Behavioral: Negative for dysphoric mood  The patient is not nervous/anxious  Objective:      /100 (BP Location: Left arm, Patient Position: Sitting, Cuff Size: Large)   Pulse 93   Temp (!) 96 5 °F (35 8 °C) (Temporal)   Resp 18   Ht 5' 1" (1 549 m)   Wt 77 2 kg (170 lb 3 2 oz)   LMP 10/17/2018   SpO2 99%   BMI 32 16 kg/m²          Physical Exam  Constitutional:       General: She is not in acute distress  Appearance: Normal appearance  She is well-developed  HENT:      Head: Normocephalic and atraumatic  Eyes:      Conjunctiva/sclera: Conjunctivae normal    Cardiovascular:      Rate and Rhythm: Normal rate and regular rhythm  Heart sounds: Normal heart sounds  No murmur heard  Pulmonary:      Effort: Pulmonary effort is normal  No respiratory distress  Breath sounds: Normal breath sounds  No wheezing  Abdominal:      General: Bowel sounds are normal  There is no distension  Palpations: Abdomen is soft  There is no mass  Tenderness: There is no abdominal tenderness  There is no rebound  Musculoskeletal:         General: No deformity  Cervical back: Normal range of motion and neck supple  Lymphadenopathy:      Cervical: No cervical adenopathy  Neurological:      Mental Status: She is alert and oriented to person, place, and time     Psychiatric:         Mood and Affect: Mood normal  Behavior: Behavior normal          Thought Content:  Thought content normal          Judgment: Judgment normal

## 2021-09-21 NOTE — ASSESSMENT & PLAN NOTE
Likely reflux  EKG-NSR  Will check labs  Advised to take Prilosec 20 mg po qd x 2 weeks  Avoid tomato based foods, spicy, caffeine, fried fatty foods, or acidic foods  ED precautions given

## 2021-09-21 NOTE — ASSESSMENT & PLAN NOTE
Will discontinue metoprolol due to reported low pulse   Will add Losartan 25 mg po qd, advised may increase to 50 mg po qd if BP > 140/90  Continue hydrochlorothiazide 25 mg daily  Continue following a low-sodium diet and exercising   Check bp regularly, record readings and bring to next visit  F/u in 2 weeks

## 2021-09-26 ENCOUNTER — APPOINTMENT (OUTPATIENT)
Dept: LAB | Age: 41
End: 2021-09-26
Payer: COMMERCIAL

## 2021-09-26 DIAGNOSIS — I10 ESSENTIAL HYPERTENSION: ICD-10-CM

## 2021-09-26 DIAGNOSIS — E55.9 VITAMIN D DEFICIENCY: ICD-10-CM

## 2021-09-26 DIAGNOSIS — R07.89 CHEST DISCOMFORT: ICD-10-CM

## 2021-09-26 DIAGNOSIS — E87.6 HYPOKALEMIA: ICD-10-CM

## 2021-09-26 DIAGNOSIS — E78.1 HIGH TRIGLYCERIDES: ICD-10-CM

## 2021-09-26 LAB
25(OH)D3 SERPL-MCNC: 23.7 NG/ML (ref 30–100)
ALBUMIN SERPL BCP-MCNC: 3.5 G/DL (ref 3.5–5)
ALP SERPL-CCNC: 104 U/L (ref 46–116)
ALT SERPL W P-5'-P-CCNC: 22 U/L (ref 12–78)
ANION GAP SERPL CALCULATED.3IONS-SCNC: 2 MMOL/L (ref 4–13)
AST SERPL W P-5'-P-CCNC: 16 U/L (ref 5–45)
BASOPHILS # BLD AUTO: 0.04 THOUSANDS/ΜL (ref 0–0.1)
BASOPHILS NFR BLD AUTO: 1 % (ref 0–1)
BILIRUB SERPL-MCNC: 0.39 MG/DL (ref 0.2–1)
BUN SERPL-MCNC: 14 MG/DL (ref 5–25)
CALCIUM SERPL-MCNC: 9.4 MG/DL (ref 8.3–10.1)
CHLORIDE SERPL-SCNC: 103 MMOL/L (ref 100–108)
CHOLEST SERPL-MCNC: 181 MG/DL (ref 50–200)
CO2 SERPL-SCNC: 32 MMOL/L (ref 21–32)
CREAT SERPL-MCNC: 0.68 MG/DL (ref 0.6–1.3)
EOSINOPHIL # BLD AUTO: 0.3 THOUSAND/ΜL (ref 0–0.61)
EOSINOPHIL NFR BLD AUTO: 4 % (ref 0–6)
ERYTHROCYTE [DISTWIDTH] IN BLOOD BY AUTOMATED COUNT: 12.3 % (ref 11.6–15.1)
GFR SERPL CREATININE-BSD FRML MDRD: 109 ML/MIN/1.73SQ M
GLUCOSE P FAST SERPL-MCNC: 91 MG/DL (ref 65–99)
HCT VFR BLD AUTO: 47.7 % (ref 34.8–46.1)
HDLC SERPL-MCNC: 39 MG/DL
HGB BLD-MCNC: 16.6 G/DL (ref 11.5–15.4)
IMM GRANULOCYTES # BLD AUTO: 0.02 THOUSAND/UL (ref 0–0.2)
IMM GRANULOCYTES NFR BLD AUTO: 0 % (ref 0–2)
LDLC SERPL CALC-MCNC: 89 MG/DL (ref 0–100)
LYMPHOCYTES # BLD AUTO: 2.51 THOUSANDS/ΜL (ref 0.6–4.47)
LYMPHOCYTES NFR BLD AUTO: 32 % (ref 14–44)
MCH RBC QN AUTO: 32.5 PG (ref 26.8–34.3)
MCHC RBC AUTO-ENTMCNC: 34.8 G/DL (ref 31.4–37.4)
MCV RBC AUTO: 94 FL (ref 82–98)
MONOCYTES # BLD AUTO: 0.58 THOUSAND/ΜL (ref 0.17–1.22)
MONOCYTES NFR BLD AUTO: 7 % (ref 4–12)
NEUTROPHILS # BLD AUTO: 4.41 THOUSANDS/ΜL (ref 1.85–7.62)
NEUTS SEG NFR BLD AUTO: 56 % (ref 43–75)
NONHDLC SERPL-MCNC: 142 MG/DL
NRBC BLD AUTO-RTO: 0 /100 WBCS
PLATELET # BLD AUTO: 265 THOUSANDS/UL (ref 149–390)
PMV BLD AUTO: 11.5 FL (ref 8.9–12.7)
POTASSIUM SERPL-SCNC: 3.5 MMOL/L (ref 3.5–5.3)
PROT SERPL-MCNC: 7.7 G/DL (ref 6.4–8.2)
RBC # BLD AUTO: 5.1 MILLION/UL (ref 3.81–5.12)
SODIUM SERPL-SCNC: 137 MMOL/L (ref 136–145)
TRIGL SERPL-MCNC: 263 MG/DL
TSH SERPL DL<=0.05 MIU/L-ACNC: 2.26 UIU/ML (ref 0.36–3.74)
WBC # BLD AUTO: 7.86 THOUSAND/UL (ref 4.31–10.16)

## 2021-09-26 PROCEDURE — 85025 COMPLETE CBC W/AUTO DIFF WBC: CPT

## 2021-09-26 PROCEDURE — 80053 COMPREHEN METABOLIC PANEL: CPT

## 2021-09-26 PROCEDURE — 82306 VITAMIN D 25 HYDROXY: CPT

## 2021-09-26 PROCEDURE — 36415 COLL VENOUS BLD VENIPUNCTURE: CPT

## 2021-09-26 PROCEDURE — 80061 LIPID PANEL: CPT

## 2021-09-26 PROCEDURE — 84443 ASSAY THYROID STIM HORMONE: CPT

## 2021-09-29 ENCOUNTER — RA CDI HCC (OUTPATIENT)
Dept: OTHER | Facility: HOSPITAL | Age: 41
End: 2021-09-29

## 2021-10-06 ENCOUNTER — OFFICE VISIT (OUTPATIENT)
Dept: FAMILY MEDICINE CLINIC | Facility: CLINIC | Age: 41
End: 2021-10-06

## 2021-10-06 VITALS
BODY MASS INDEX: 31.57 KG/M2 | TEMPERATURE: 97.6 F | WEIGHT: 167.2 LBS | HEIGHT: 61 IN | HEART RATE: 80 BPM | DIASTOLIC BLOOD PRESSURE: 70 MMHG | SYSTOLIC BLOOD PRESSURE: 130 MMHG | RESPIRATION RATE: 14 BRPM

## 2021-10-06 DIAGNOSIS — F41.9 ANXIETY: ICD-10-CM

## 2021-10-06 DIAGNOSIS — I10 ESSENTIAL HYPERTENSION: ICD-10-CM

## 2021-10-06 DIAGNOSIS — E78.1 HIGH TRIGLYCERIDES: ICD-10-CM

## 2021-10-06 DIAGNOSIS — E55.9 VITAMIN D DEFICIENCY: ICD-10-CM

## 2021-10-06 DIAGNOSIS — R42 DIZZINESS: Primary | ICD-10-CM

## 2021-10-06 PROBLEM — D64.9 SYMPTOMATIC ANEMIA: Status: RESOLVED | Noted: 2017-05-14 | Resolved: 2021-10-06

## 2021-10-06 PROBLEM — R74.01 TRANSAMINITIS: Chronic | Status: RESOLVED | Noted: 2017-05-14 | Resolved: 2021-10-06

## 2021-10-06 PROBLEM — N30.00 ACUTE CYSTITIS WITHOUT HEMATURIA: Status: RESOLVED | Noted: 2021-06-23 | Resolved: 2021-10-06

## 2021-10-06 PROBLEM — E87.6 HYPOKALEMIA: Status: RESOLVED | Noted: 2020-12-15 | Resolved: 2021-10-06

## 2021-10-06 PROCEDURE — 3078F DIAST BP <80 MM HG: CPT | Performed by: PHYSICIAN ASSISTANT

## 2021-10-06 PROCEDURE — 3008F BODY MASS INDEX DOCD: CPT | Performed by: PHYSICIAN ASSISTANT

## 2021-10-06 PROCEDURE — 3075F SYST BP GE 130 - 139MM HG: CPT | Performed by: PHYSICIAN ASSISTANT

## 2021-10-06 PROCEDURE — 99214 OFFICE O/P EST MOD 30 MIN: CPT | Performed by: PHYSICIAN ASSISTANT

## 2021-10-06 RX ORDER — LOSARTAN POTASSIUM 50 MG/1
50 TABLET ORAL DAILY
Qty: 90 TABLET | Refills: 1 | Status: SHIPPED | OUTPATIENT
Start: 2021-10-06 | End: 2022-03-17

## 2021-10-06 RX ORDER — MECLIZINE HYDROCHLORIDE 25 MG/1
25 TABLET ORAL EVERY 8 HOURS PRN
Qty: 30 TABLET | Refills: 0 | Status: SHIPPED | OUTPATIENT
Start: 2021-10-06 | End: 2022-07-19

## 2021-10-06 RX ORDER — ERGOCALCIFEROL 1.25 MG/1
CAPSULE ORAL
Qty: 4 CAPSULE | Refills: 1 | Status: SHIPPED | OUTPATIENT
Start: 2021-10-06 | End: 2021-10-26

## 2021-10-26 DIAGNOSIS — E55.9 VITAMIN D DEFICIENCY: ICD-10-CM

## 2021-10-26 RX ORDER — ERGOCALCIFEROL 1.25 MG/1
CAPSULE ORAL
Qty: 4 CAPSULE | Refills: 1 | Status: SHIPPED | OUTPATIENT
Start: 2021-10-26 | End: 2021-11-26

## 2021-11-26 DIAGNOSIS — E55.9 VITAMIN D DEFICIENCY: ICD-10-CM

## 2021-11-26 RX ORDER — ERGOCALCIFEROL 1.25 MG/1
CAPSULE ORAL
Qty: 4 CAPSULE | Refills: 1 | Status: SHIPPED | OUTPATIENT
Start: 2021-11-26 | End: 2022-07-19

## 2021-12-15 DIAGNOSIS — I10 ESSENTIAL HYPERTENSION: ICD-10-CM

## 2021-12-15 RX ORDER — HYDROCHLOROTHIAZIDE 25 MG/1
TABLET ORAL
Qty: 90 TABLET | Refills: 1 | Status: SHIPPED | OUTPATIENT
Start: 2021-12-15 | End: 2022-03-17 | Stop reason: SDUPTHER

## 2022-01-21 ENCOUNTER — CONSULT (OUTPATIENT)
Dept: CARDIOLOGY CLINIC | Facility: CLINIC | Age: 42
End: 2022-01-21
Payer: COMMERCIAL

## 2022-01-21 VITALS
HEIGHT: 61 IN | HEART RATE: 93 BPM | OXYGEN SATURATION: 100 % | WEIGHT: 169 LBS | SYSTOLIC BLOOD PRESSURE: 142 MMHG | DIASTOLIC BLOOD PRESSURE: 96 MMHG | BODY MASS INDEX: 31.91 KG/M2

## 2022-01-21 DIAGNOSIS — R42 LIGHTHEADEDNESS: ICD-10-CM

## 2022-01-21 DIAGNOSIS — R00.2 PALPITATIONS: ICD-10-CM

## 2022-01-21 DIAGNOSIS — I10 ESSENTIAL HYPERTENSION: Primary | ICD-10-CM

## 2022-01-21 PROCEDURE — 3008F BODY MASS INDEX DOCD: CPT | Performed by: INTERNAL MEDICINE

## 2022-01-21 PROCEDURE — 99204 OFFICE O/P NEW MOD 45 MIN: CPT | Performed by: INTERNAL MEDICINE

## 2022-01-21 NOTE — PROGRESS NOTES
[facility]    CHIEF COMPLAINT:   Chief Complaint   Patient presents with    New Patient Visit     establish cardiac care, refer by PCP for Dizziness     Dizziness     from time to time, at random, with turning or movement     Palpitations     feels flutter at times, at rest and at random     Slow Heart Rate     heart rate drops in the 50s at times, is usually at 75       HPI:  Annabelle Arguello is a 42 y o  female with a past medical history of obesity, hypertenson and family history of premature CAD who presents with complaints of palpitations, lightheadedness  Reports lightheadedness symptoms which have been ongoing for a long time (years) but began to feel worse in Sept/Oct 2021  She reports one episode where she was sitting in the cafeteria and all of a sudden felt lightheaded  This can occur randomly and states the it does not occur with turning her head left or right  Describes this sensation more as lightheadedness rather than vertigo  She does not feel that palpitations are usually associated this but they may be at times  She denies any associated chest pain or shortness of breath  Denies fever, chills, visual changes, orthopnea, LE swelling  She was sitting at rest last evening and states that her watch read a heart rate of 53 bpm  Does not know much in the way of her mother's family history but reports that her father had a myocardial infarction and stents at the age of 40  She is a current everyday smoker       The following portions of the patient's history were reviewed and updated as appropriate: allergies, current medications, past family history, past medical history, past social history, past surgical history, and problem list     SINCE LAST OV I REVIEWED WITH THE PATIENT THE INTERIM LABS, TEST RESULTS, CONSULTANT(S) NOTES AND PERFORMED AN INTERIM REVIEW OF HISTORY    Past Medical History:   Diagnosis Date    Anemia     Chest tightness or pressure     8/19/2013    Hypertension  Vitamin D deficiency        Past Surgical History:   Procedure Laterality Date    CYSTOSCOPY N/A 12/30/2018    Procedure: CYSTOSCOPY;  Surgeon: Neri Sharif DO;  [facility];  Service: Gynecology    HYSTERECTOMY N/A 12/30/2018    Procedure: HYSTERECTOMY LAPAROSCOPIC TOTAL ([address]);   Surgeon: Neri Sharif DO;  [facility];  Service: Gynecology    AR LAP,RMV  ADNEXAL STRUCTURE Bilateral 12/30/2018    Procedure: SALPINGECTOMY, LAPAROSCOPIC;  Surgeon: Neri Sharif DO;  [facility];  Service: Gynecology    TONSILLECTOMY      TUBAL LIGATION      2006       Social History     Socioeconomic History    Marital status: /Civil Union     Spouse name: Not on file    Number of children: Not on file    Years of education: Not on file    Highest education level: Not on file   Occupational History    Not on file   Tobacco Use    Smoking status: Light Tobacco Smoker     Packs/day: 0 50     Years: 35 00     Pack years: 17 50     Types: Cigarettes    Smokeless tobacco: Never Used    Tobacco comment: social smoker, 1 pack per weekend    Vaping Use    Vaping Use: Never used   Substance and Sexual Activity    Alcohol use: Yes     Comment: on the weekends/ socially    Drug use: No    Sexual activity: Yes     Partners: Male     Birth control/protection: Female Sterilization   Other Topics Concern    Not on file   Social History Narrative    Uses safety equipment-seatbelts     Social Determinants of Health     Financial Resource Strain: Not on file   Food Insecurity: Not on file   Transportation Needs: Not on file   Physical Activity: Not on file   Stress: Not on file   Social Connections: Not on file   Intimate Partner Violence: Not on file   Housing Stability: Not on file       Family History   Problem Relation Age of Onset    Heart disease Father     Hypertension Father     Heart attack Father 40       No Known Allergies    Current Outpatient Medications   Medication Sig Dispense Refill    hydrochlorothiazide (HYDRODIURIL) 25 mg tablet TAKE 1 TABLET BY MOUTH EVERY DAY 90 tablet 1    hydrOXYzine HCL (ATARAX) 25 mg tablet Take 1 tablet (25 mg total) by mouth every 6 (six) hours as needed for anxiety 30 tablet 0    losartan (COZAAR) 50 mg tablet Take 1 tablet (50 mg total) by mouth daily 90 tablet 1    meclizine (ANTIVERT) 25 mg tablet Take 1 tablet (25 mg total) by mouth every 8 (eight) hours as needed for dizziness 30 tablet 0    Omega-3 Fatty Acids (OMEGA 3 PO) Take by mouth      cetirizine (ZyrTEC) 10 mg tablet Take 1 tablet (10 mg total) by mouth daily (Patient not taking: Reported on 10/6/2021) 30 tablet 2    ergocalciferol (VITAMIN D2) 50,000 units TAKE 1 CAP WEEKLY X 8 WEEKS (Patient not taking: Reported on 1/21/2022) 4 capsule 1     No current facility-administered medications for this visit  /96   Pulse 93   Ht 5' 1" (1 549 m)   Wt 76 7 kg (169 lb)   LMP 10/17/2018   SpO2 100%   BMI 31 93 kg/m²     Review of Systems   All other systems reviewed and are negative  Physical Exam  Vitals reviewed  Constitutional:       General: She is not in acute distress  Appearance: Normal appearance  She is not ill-appearing or toxic-appearing  HENT:      Head: Normocephalic and atraumatic  Eyes:      General: No scleral icterus  Extraocular Movements: Extraocular movements intact  Neck:      Vascular: No carotid bruit  Cardiovascular:      Rate and Rhythm: Normal rate and regular rhythm  Pulses: Normal pulses  Heart sounds: Normal heart sounds  No murmur heard  No gallop  Pulmonary:      Effort: Pulmonary effort is normal  No respiratory distress  Breath sounds: Normal breath sounds  No wheezing or rales  Abdominal:      General: Abdomen is flat  Bowel sounds are normal  There is no distension  Palpations: Abdomen is soft  Tenderness: There is no abdominal tenderness  There is no guarding     Musculoskeletal: Right lower leg: No edema  Left lower leg: No edema  Skin:     General: Skin is warm and dry  Coloration: Skin is not jaundiced or pale  Neurological:      Mental Status: She is alert  Psychiatric:         Mood and Affect: Mood normal          Behavior: Behavior normal             Lab Results   Component Value Date     11/17/2015    K 3 5 09/26/2021     09/26/2021    CO2 32 09/26/2021    BUN 14 09/26/2021    CREATININE 0 68 09/26/2021    GLUCOSE 102 11/17/2015    CALCIUM 9 4 09/26/2021    ALT 22 09/26/2021    AST 16 09/26/2021    INR 1 15 11/07/2018       Lab Results   Component Value Date    CHOL 152 08/28/2013    HDL 39 (L) 09/26/2021    LDLCALC 89 09/26/2021    TRIG 263 (H) 09/26/2021       Lab Results   Component Value Date    WBC 7 86 09/26/2021    HGB 16 6 (H) 09/26/2021    HCT 47 7 (H) 09/26/2021     09/26/2021       Lab Results   Component Value Date     05/14/2017    HGBA1C 5 4 05/14/2017       Cardiac studies:   Results for orders placed or performed in visit on 01/21/22   POCT ECG    Impression    Normal sinus rhythm         ASSESSMENT AND PLAN:  Akhil Araujo was seen today for new patient visit, dizziness, palpitations and slow heart rate  Diagnoses and all orders for this visit:    Essential hypertension: BP elevated but she reports white coat hypertension  Recommend monitoring of bp at home  For now, continue losartan 50 mg and HCTZ 25 mg daily  She may need an additional agent  Recommend continued weight loss  She has not had secondary work up at this time which we will need to consider  Given hypertension, smoking, family history of premature CAD and nondescript symptoms - may need EST  Needs better lipid control - continue exercise and weight loss  Smoking cessation   -     POCT ECG  -     Echo complete w/ contrast if indicated; Future  -     AMB extended holter monitor;  Future    Palpitations   Lightheadedness: TSH normal  Very nondescript symptoms and no obvious triggers identified  Check 7-day monitor to assess for rosi- or tachyarrhythmias  Check echocardiogram to rule out structural disease    -     Ambulatory referral to Cardiology  -     POCT ECG  -     Echo complete w/ contrast if indicated; Future  -     [facility] extended holter monitor;  Future    Leola Howell MD

## 2022-01-21 NOTE — PATIENT INSTRUCTIONS
You were seen today in the Cardiology office for palpitations and lightheadedness  Once your echocardiogram and heart monitor are completed, I will call you with the results  Please continue your current cardiac medications as prescribed  Thank you for choosing [address]  Please call our office or use [employer] with any questions 
2.43

## 2022-01-22 PROCEDURE — 93000 ELECTROCARDIOGRAM COMPLETE: CPT | Performed by: INTERNAL MEDICINE

## 2022-02-14 ENCOUNTER — CLINICAL SUPPORT (OUTPATIENT)
Dept: CARDIOLOGY CLINIC | Facility: CLINIC | Age: 42
End: 2022-02-14
Payer: COMMERCIAL

## 2022-02-14 DIAGNOSIS — I10 ESSENTIAL HYPERTENSION: ICD-10-CM

## 2022-02-14 DIAGNOSIS — R42 LIGHTHEADEDNESS: ICD-10-CM

## 2022-02-14 PROCEDURE — 93244 EXT ECG>48HR<7D REV&INTERPJ: CPT | Performed by: INTERNAL MEDICINE

## 2022-03-17 DIAGNOSIS — I10 ESSENTIAL HYPERTENSION: ICD-10-CM

## 2022-03-17 RX ORDER — LOSARTAN POTASSIUM 50 MG/1
TABLET ORAL
Qty: 90 TABLET | Refills: 1 | Status: SHIPPED | OUTPATIENT
Start: 2022-03-17

## 2022-03-17 RX ORDER — HYDROCHLOROTHIAZIDE 25 MG/1
25 TABLET ORAL DAILY
Qty: 90 TABLET | Refills: 0 | Status: SHIPPED | OUTPATIENT
Start: 2022-03-17 | End: 2022-06-29

## 2022-07-19 ENCOUNTER — OFFICE VISIT (OUTPATIENT)
Dept: FAMILY MEDICINE CLINIC | Facility: CLINIC | Age: 42
End: 2022-07-19

## 2022-07-19 VITALS
SYSTOLIC BLOOD PRESSURE: 138 MMHG | WEIGHT: 170 LBS | OXYGEN SATURATION: 99 % | TEMPERATURE: 98.1 F | HEIGHT: 61 IN | RESPIRATION RATE: 20 BRPM | HEART RATE: 79 BPM | DIASTOLIC BLOOD PRESSURE: 87 MMHG | BODY MASS INDEX: 32.1 KG/M2

## 2022-07-19 DIAGNOSIS — Z12.31 ENCOUNTER FOR SCREENING MAMMOGRAM FOR BREAST CANCER: ICD-10-CM

## 2022-07-19 DIAGNOSIS — Z00.00 ANNUAL PHYSICAL EXAM: Primary | ICD-10-CM

## 2022-07-19 PROBLEM — D50.9 IRON DEFICIENCY ANEMIA: Status: RESOLVED | Noted: 2018-11-07 | Resolved: 2022-07-19

## 2022-07-19 PROBLEM — R42 DIZZINESS: Status: RESOLVED | Noted: 2021-10-06 | Resolved: 2022-07-19

## 2022-07-19 PROCEDURE — 99396 PREV VISIT EST AGE 40-64: CPT | Performed by: PHYSICIAN ASSISTANT

## 2022-07-19 PROCEDURE — 3008F BODY MASS INDEX DOCD: CPT | Performed by: PHYSICIAN ASSISTANT

## 2022-07-19 NOTE — PATIENT INSTRUCTIONS

## 2022-07-19 NOTE — ASSESSMENT & PLAN NOTE
No abnormalities noted on exam  Order given for screening mammogram  Decline pneumococcal vaccine today  Labs are UTD

## 2022-07-21 DIAGNOSIS — I10 ESSENTIAL HYPERTENSION: ICD-10-CM

## 2022-07-21 RX ORDER — HYDROCHLOROTHIAZIDE 25 MG/1
25 TABLET ORAL DAILY
Qty: 90 TABLET | Refills: 1 | Status: SHIPPED | OUTPATIENT
Start: 2022-07-21

## 2022-08-16 ENCOUNTER — OFFICE VISIT (OUTPATIENT)
Dept: FAMILY MEDICINE CLINIC | Facility: CLINIC | Age: 42
End: 2022-08-16

## 2022-08-16 VITALS
HEART RATE: 75 BPM | BODY MASS INDEX: 32.74 KG/M2 | WEIGHT: 173.4 LBS | HEIGHT: 61 IN | DIASTOLIC BLOOD PRESSURE: 110 MMHG | TEMPERATURE: 97.5 F | SYSTOLIC BLOOD PRESSURE: 180 MMHG | RESPIRATION RATE: 18 BRPM | OXYGEN SATURATION: 98 %

## 2022-08-16 DIAGNOSIS — R05.9 COUGH: Primary | ICD-10-CM

## 2022-08-16 PROCEDURE — 99213 OFFICE O/P EST LOW 20 MIN: CPT | Performed by: FAMILY MEDICINE

## 2022-08-16 RX ORDER — AZELASTINE 1 MG/ML
1 SPRAY, METERED NASAL 2 TIMES DAILY
Qty: 30 ML | Refills: 0 | Status: SHIPPED | OUTPATIENT
Start: 2022-08-16 | End: 2022-09-14

## 2022-08-16 RX ORDER — BENZONATATE 200 MG/1
200 CAPSULE ORAL 3 TIMES DAILY PRN
Qty: 30 CAPSULE | Refills: 0 | Status: SHIPPED | OUTPATIENT
Start: 2022-08-16

## 2022-08-16 RX ORDER — CETIRIZINE HYDROCHLORIDE 10 MG/1
10 TABLET ORAL DAILY
Qty: 30 TABLET | Refills: 2 | Status: SHIPPED | OUTPATIENT
Start: 2022-08-16 | End: 2022-09-08

## 2022-08-16 NOTE — ASSESSMENT & PLAN NOTE
Patient reports nonproductive cough, congestion, and rhinorrhea started 3 weeks ago while vacationing in [place]  - Denies any fever, chill, chest pain, shortness of breath, or other respiratory symptoms  - Patient is COVID vaccinated and boosted  Denies any sick contacts or exposure  - Exam showed enlarged turbinates with nasal congestion  Bilateral force and expiratory wheeze  Otherwise normal respiratory effort with good air movement  - presentation exam consistent with viral versus allergic bronchitis  - recommend conservative management as discussed  - follow-up in 1 week  - consider chest x-ray and trial of beta agonist if symptoms persist/worsen

## 2022-08-16 NOTE — PROGRESS NOTES
CLINIC VISIT NOTE - [address]era 42 y o  female MRN: [id number]  Encounter: [id number],  Primary Care Provider: Peyton Martinez PA-C      Date: 08/16/22    Assessments & Plans:     Problem List Items Addressed This Visit        Other    Cough - Primary     Patient reports nonproductive cough, congestion, and rhinorrhea started 3 weeks ago while vacationing in [place]  - Denies any fever, chill, chest pain, shortness of breath, or other respiratory symptoms  - Patient is COVID vaccinated and boosted  Denies any sick contacts or exposure  - Exam showed enlarged turbinates with nasal congestion  Bilateral force and expiratory wheeze  Otherwise normal respiratory effort with good air movement  - presentation exam consistent with viral versus allergic bronchitis  - recommend conservative management as discussed  - follow-up in 1 week  - consider chest x-ray and trial of beta agonist if symptoms persist/worsen  Relevant Medications    azelastine (ASTELIN) 0 1 % nasal spray    dextromethorphan-guaifenesin (MUCINEX DM)  MG per 12 hr tablet    cetirizine (ZyrTEC) 10 mg tablet    benzonatate (TESSALON) 200 MG capsule          Follow-up: Return in about 1 week (around 8/23/2022) for Cough  Health Maintenance Due   Topic Date Due    Breast Cancer Screening: Mammogram  Never done    Influenza Vaccine (1) 09/01/2022         Patient Active Problem List   Diagnosis    Thyroid nodule    Depression    Depression with anxiety    Elevated liver enzymes    High triglycerides    Obesity    Panic disorder    Vitamin D deficiency    Weight gain    Hyperpigmentation    Essential hypertension    Tobacco use    Cough    Chest discomfort    Anxiety    Annual physical exam         Recent Visits:     Recent Visits  No visits were found meeting these conditions    Showing recent visits within past 7 days and meeting all other requirements  Today's Visits  Date Type Provider Dept   08/16/22 Office Visit MD Pebbles Rahman   Showing today's visits and meeting all other requirements  Future Appointments  No visits were found meeting these conditions  Showing future appointments within next 150 days and meeting all other requirements      Subjective     Subjective:     Chief Complaint   Patient presents with    Dry Cough       HPI:  42 y o  female presents for concern of:    Cough  This is a new problem  The current episode started 1 to 4 weeks ago  The problem has been unchanged  The problem occurs constantly (Worse at night)  The cough is non-productive  Associated symptoms include nasal congestion, postnasal drip and rhinorrhea  Pertinent negatives include no chest pain, chills, ear congestion, ear pain, fever, headaches, heartburn, hemoptysis, myalgias, rash, sore throat, shortness of breath, sweats, weight loss or wheezing  The symptoms are aggravated by lying down  Risk factors for lung disease include travel  She has tried OTC cough suppressant for the symptoms  The treatment provided no relief  There is no history of asthma, bronchiectasis, bronchitis, COPD, emphysema, environmental allergies or pneumonia  Review of System:     Review of Systems   Constitutional: Negative for chills, fever and weight loss  HENT: Positive for postnasal drip and rhinorrhea  Negative for ear pain, sinus pain, sore throat and trouble swallowing  Eyes: Negative for pain and discharge  Respiratory: Positive for cough  Negative for hemoptysis, shortness of breath and wheezing  Cardiovascular: Negative for chest pain, palpitations and leg swelling  Gastrointestinal: Negative for abdominal pain, heartburn, nausea and vomiting  Endocrine: Negative for polydipsia and polyuria  Genitourinary: Negative for dysuria and hematuria  Musculoskeletal: Negative for arthralgias and myalgias  Skin: Negative for rash and wound     Allergic/Immunologic: Negative for environmental allergies  Neurological: Negative for seizures, syncope and headaches  Psychiatric/Behavioral: Negative for behavioral problems  Historical Information     History:     Past Medical History:   Diagnosis Date    Anemia     Chest tightness or pressure     8/19/2013    Hypertension     Vitamin D deficiency      Past Surgical History:   Procedure Laterality Date    CYSTOSCOPY N/A 12/30/2018    Procedure: CYSTOSCOPY;  Surgeon: Cari Pearce DO;  [facility];  Service: Gynecology    HYSTERECTOMY N/A 12/30/2018    Procedure: HYSTERECTOMY LAPAROSCOPIC TOTAL ([address]); Surgeon: Cari Pearce DO;  [facility];  Service: Gynecology    CA LAP,RMV  ADNEXAL STRUCTURE Bilateral 12/30/2018    Procedure: SALPINGECTOMY, LAPAROSCOPIC;  Surgeon: Cari Pearce DO;  [facility];  Service: Gynecology    TONSILLECTOMY      TUBAL LIGATION      2006     Social History   Social History     Substance and Sexual Activity   Alcohol Use Yes    Comment: on the weekends/ socially     Social History     Substance and Sexual Activity   Drug Use No     Social History     Tobacco Use   Smoking Status Light Tobacco Smoker    Packs/day: 0 50    Years: 35 00    Pack years: 17 50    Types: Cigarettes   Smokeless Tobacco Never Used   Tobacco Comment    social smoker, 1 pack per weekend        Meds/Allergies     Medications & Allergies:   No Known Allergies    PTA Medications:  Current Outpatient Medications on File Prior to Visit   Medication Sig Dispense Refill    hydrochlorothiazide (HYDRODIURIL) 25 mg tablet TAKE 1 TABLET (25 MG TOTAL) BY MOUTH DAILY   90 tablet 1    hydrOXYzine HCL (ATARAX) 25 mg tablet Take 1 tablet (25 mg total) by mouth every 6 (six) hours as needed for anxiety 30 tablet 0    losartan (COZAAR) 50 mg tablet TAKE 1 TABLET BY MOUTH EVERY DAY 90 tablet 1    Omega-3 Fatty Acids (OMEGA 3 PO) Take by mouth      [DISCONTINUED] cetirizine (ZyrTEC) 10 mg tablet Take 1 tablet (10 mg total) by mouth daily 30 tablet 2     No current facility-administered medications on file prior to visit  Objective     Objective & Vitals:   Vitals: BP (!) 180/110   Pulse 75   Temp 97 5 °F (36 4 °C) (Temporal)   Resp 18   Ht 5' 1 3" (1 557 m)   Wt 78 7 kg (173 lb 6 4 oz)   LMP 10/17/2018   SpO2 98%   BMI 32 44 kg/m²       Labs, Imagings, and other studies:   I have personally reviewed pertinent reports  No results found for this or any previous visit (from the past 24 hour(s))  No results found  Physical Exam   Physical Exam  Vitals and nursing note reviewed  Constitutional:       General: She is not in acute distress  Appearance: Normal appearance  She is well-developed and normal weight  She is not ill-appearing, toxic-appearing or diaphoretic  HENT:      Head: Normocephalic and atraumatic  Right Ear: External ear normal       Left Ear: External ear normal       Nose: Congestion and rhinorrhea present  Mouth/Throat:      Mouth: Mucous membranes are moist    Eyes:      General:         Right eye: No discharge  Left eye: No discharge  Extraocular Movements: Extraocular movements intact  Cardiovascular:      Rate and Rhythm: Normal rate  Pulses: Normal pulses  Pulmonary:      Effort: Pulmonary effort is normal  No respiratory distress  Breath sounds: Wheezing (Bilateral force end expiratory wheeze) present  Abdominal:      General: There is no distension  Musculoskeletal:         General: Normal range of motion  Cervical back: Normal range of motion and neck supple  Skin:     General: Skin is warm  Neurological:      General: No focal deficit present  Mental Status: She is alert  Mental status is at baseline     Psychiatric:         Mood and Affect: Mood normal          Behavior: Behavior normal                 Health Maintenance:     Health Maintenance Due   Topic Date Due    Breast Cancer Screening: Mammogram Never done    Influenza Vaccine (1) 09/01/2022         Future Labs & Appointments:     FUTURE LABS:  Lab Frequency Next Occurrence   XR abdomen 1 view kub Once 11/13/2018   US thyroid Once 08/28/2020   Echo complete w/ contrast if indicated Once 01/21/2022   Mammo screening bilateral w 3d & cad Once 12/30/2022       FUTURE CONFIRMED APPOINTMENTS:  No future appointments      Ann-Marie Naranjo MD  PGY-2, [facility]  08/16/22, 5:30 PM

## 2022-09-02 ENCOUNTER — OFFICE VISIT (OUTPATIENT)
Dept: FAMILY MEDICINE CLINIC | Facility: CLINIC | Age: 42
End: 2022-09-02

## 2022-09-02 VITALS
OXYGEN SATURATION: 98 % | HEART RATE: 90 BPM | BODY MASS INDEX: 31.96 KG/M2 | SYSTOLIC BLOOD PRESSURE: 137 MMHG | WEIGHT: 170.8 LBS | TEMPERATURE: 97.8 F | DIASTOLIC BLOOD PRESSURE: 92 MMHG

## 2022-09-02 DIAGNOSIS — Z71.6 ENCOUNTER FOR SMOKING CESSATION COUNSELING: ICD-10-CM

## 2022-09-02 DIAGNOSIS — I10 ESSENTIAL HYPERTENSION: ICD-10-CM

## 2022-09-02 DIAGNOSIS — J21.9 ACUTE BRONCHIOLITIS DUE TO UNSPECIFIED ORGANISM: ICD-10-CM

## 2022-09-02 DIAGNOSIS — J01.00 ACUTE NON-RECURRENT MAXILLARY SINUSITIS: Primary | ICD-10-CM

## 2022-09-02 PROBLEM — R05.8 COUGH WITH CONGESTION OF PARANASAL SINUS: Status: ACTIVE | Noted: 2021-04-23

## 2022-09-02 PROBLEM — R09.81 COUGH WITH CONGESTION OF PARANASAL SINUS: Status: ACTIVE | Noted: 2021-04-23

## 2022-09-02 PROCEDURE — 99213 OFFICE O/P EST LOW 20 MIN: CPT | Performed by: FAMILY MEDICINE

## 2022-09-02 RX ORDER — ALBUTEROL SULFATE 90 UG/1
2 AEROSOL, METERED RESPIRATORY (INHALATION) EVERY 6 HOURS PRN
Qty: 8 G | Refills: 0 | Status: SHIPPED | OUTPATIENT
Start: 2022-09-02 | End: 2022-09-26

## 2022-09-02 RX ORDER — AMOXICILLIN AND CLAVULANATE POTASSIUM 875; 125 MG/1; MG/1
1 TABLET, FILM COATED ORAL EVERY 12 HOURS SCHEDULED
Qty: 14 TABLET | Refills: 0 | Status: SHIPPED | OUTPATIENT
Start: 2022-09-02 | End: 2022-09-09

## 2022-09-02 RX ORDER — LOSARTAN POTASSIUM 50 MG/1
TABLET ORAL
Qty: 90 TABLET | Refills: 1 | Status: SHIPPED | OUTPATIENT
Start: 2022-09-02

## 2022-09-02 NOTE — ASSESSMENT & PLAN NOTE
Patient reports improving but not resolved nonproductive cough, worsening congestion, and rhinorrhea which started >4 weeks ago while vacationing in [place]  - pt tried Astelin, tessalon pearls, zyrtec, Mucinex  - Denies any fever, chill, chest pain, shortness of breath, or other respiratory symptoms  Denies use of anti-pyretics that could be masking a fever    - Patient is COVID vaccinated and boosted  Denies any sick contacts or exposure  Multiple negative home COVID tests, most recent last week  - Exam shows tender maxillary sinuses, left middle ear effusion, non erythematous, enlarged and erythematous turbinates with nasal congestion  B/l expiratory wheezing  Otherwise normal respiratory effort with good air movement  - history and exam consistent acute bacterial sinusitis with allergic bronchitis  - treat with Augmentin for 7 days and albuterol PRN  - follow-up in 1 week if symptoms do not improve  - consider Flonase if symptoms persist/worsen

## 2022-09-02 NOTE — PATIENT INSTRUCTIONS
If symptoms do not improve in 1 week please follow up in office and can try over the counter Flonase

## 2022-09-02 NOTE — PROGRESS NOTES
Assessment/Plan:    Acute non-recurrent maxillary sinusitis  Patient reports improving but not resolved nonproductive cough, worsening congestion, and rhinorrhea which started >4 weeks ago while vacationing in [place]  - pt tried Astelin, tessalon pearls, zyrtec, Mucinex  - Denies any fever, chill, chest pain, shortness of breath, or other respiratory symptoms  Denies use of anti-pyretics that could be masking a fever    - Patient is COVID vaccinated and boosted  Denies any sick contacts or exposure  Multiple negative home COVID tests, most recent last week  - Exam shows tender maxillary sinuses, left middle ear effusion, non erythematous, enlarged and erythematous turbinates with nasal congestion  B/l expiratory wheezing  Otherwise normal respiratory effort with good air movement  - history and exam consistent acute bacterial sinusitis with allergic bronchitis  - treat with Augmentin for 7 days and albuterol PRN  - follow-up in 1 week if symptoms do not improve  - consider Flonase if symptoms persist/worsen  Diagnoses and all orders for this visit:    Acute non-recurrent maxillary sinusitis  -     amoxicillin-clavulanate (Augmentin) 875-125 mg per tablet; Take 1 tablet by mouth every 12 (twelve) hours for 7 days    Acute bronchiolitis due to unspecified organism  -     albuterol (ProAir HFA) 90 mcg/act inhaler; Inhale 2 puffs every 6 (six) hours as needed for wheezing    Encounter for smoking cessation counseling          Subjective:      Patient ID: Uzair Espinosa is a 42 y o  female  Patient with PMH of HTN presents to follow up on congestion and cough  History of present illness was reviewed and patient reports that symptoms started as congestion over 4 weeks ago then she developed a dry cough a/w wheezing causing difficulty sleeping at night  Pt was seen last week by Dr Denise Yan and was compliant with prescribed medications including Astelin nasal spray, mucinex, zyrtec, and tessalon pearls   Pt reports that her cough improved but did not resolve  She endorses unchanged congestion/rhinorrhea and now has new complaint of left ear discomfort since 4 days  Pt describes left ear discomfort as feeling as though it is clogged and causing intermittent pain  Patient denies hearing loss, fever, or antipyretic use  Pt's symptoms are consistent all day and worse at night  The following portions of the patient's history were reviewed and updated as appropriate: current medications and past medical history  Review of Systems   Constitutional: Positive for fatigue  Negative for fever  HENT: Positive for congestion, ear pain, postnasal drip, rhinorrhea and sinus pressure  Negative for sneezing and sore throat  Respiratory: Positive for cough and wheezing  Negative for shortness of breath  Cardiovascular: Negative for chest pain  Gastrointestinal: Negative for constipation, diarrhea, nausea and vomiting  Objective:      /92 (BP Location: Left arm, Patient Position: Sitting, Cuff Size: Large)   Pulse 90   Temp 97 8 °F (36 6 °C) (Temporal)   Wt 77 5 kg (170 lb 12 8 oz)   LMP 10/17/2018   SpO2 98%   BMI 31 96 kg/m²          Physical Exam  Constitutional:       General: She is not in acute distress  Appearance: Normal appearance  She is not ill-appearing, toxic-appearing or diaphoretic  HENT:      Head: Normocephalic and atraumatic  Right Ear: Tympanic membrane, ear canal and external ear normal       Left Ear: Ear canal and external ear normal  No tenderness  A middle ear effusion is present  Nose:      Right Turbinates: Enlarged  Left Turbinates: Enlarged  Right Sinus: Maxillary sinus tenderness present  Left Sinus: Maxillary sinus tenderness present  Mouth/Throat:      Mouth: Mucous membranes are moist       Pharynx: Posterior oropharyngeal erythema present  Eyes:      General:         Right eye: No discharge  Left eye: No discharge  Extraocular Movements: Extraocular movements intact  Conjunctiva/sclera: Conjunctivae normal    Cardiovascular:      Rate and Rhythm: Normal rate and regular rhythm  Pulmonary:      Effort: Pulmonary effort is normal  No respiratory distress  Breath sounds: Wheezing (Right middle and lower lobes, left middle lobe) present  No rhonchi or rales  Abdominal:      General: Abdomen is flat  Bowel sounds are normal       Palpations: Abdomen is soft  Tenderness: There is no abdominal tenderness  Neurological:      Mental Status: She is alert

## 2022-09-07 DIAGNOSIS — R05.9 COUGH: ICD-10-CM

## 2022-09-08 RX ORDER — CETIRIZINE HYDROCHLORIDE 10 MG/1
TABLET ORAL
Qty: 90 TABLET | Refills: 1 | Status: SHIPPED | OUTPATIENT
Start: 2022-09-08

## 2022-09-12 DIAGNOSIS — R05.9 COUGH: ICD-10-CM

## 2022-09-14 RX ORDER — AZELASTINE HYDROCHLORIDE 137 UG/1
SPRAY, METERED NASAL
Qty: 30 ML | Refills: 1 | Status: SHIPPED | OUTPATIENT
Start: 2022-09-14

## 2022-09-24 DIAGNOSIS — J21.9 ACUTE BRONCHIOLITIS DUE TO UNSPECIFIED ORGANISM: ICD-10-CM

## 2022-09-26 RX ORDER — ALBUTEROL SULFATE 90 UG/1
AEROSOL, METERED RESPIRATORY (INHALATION)
Qty: 8.5 G | Refills: 3 | Status: SHIPPED | OUTPATIENT
Start: 2022-09-26

## 2022-11-01 PROBLEM — J01.00 ACUTE NON-RECURRENT MAXILLARY SINUSITIS: Status: RESOLVED | Noted: 2021-04-23 | Resolved: 2022-11-01

## 2022-11-21 ENCOUNTER — OFFICE VISIT (OUTPATIENT)
Dept: FAMILY MEDICINE CLINIC | Facility: CLINIC | Age: 42
End: 2022-11-21

## 2022-11-21 VITALS
OXYGEN SATURATION: 100 % | RESPIRATION RATE: 20 BRPM | WEIGHT: 173 LBS | SYSTOLIC BLOOD PRESSURE: 143 MMHG | TEMPERATURE: 98.3 F | BODY MASS INDEX: 32.66 KG/M2 | DIASTOLIC BLOOD PRESSURE: 88 MMHG | HEIGHT: 61 IN | HEART RATE: 98 BPM

## 2022-11-21 DIAGNOSIS — D75.1 ERYTHROCYTOSIS: ICD-10-CM

## 2022-11-21 DIAGNOSIS — E55.9 VITAMIN D DEFICIENCY: ICD-10-CM

## 2022-11-21 DIAGNOSIS — F41.8 DEPRESSION WITH ANXIETY: ICD-10-CM

## 2022-11-21 DIAGNOSIS — I10 ESSENTIAL HYPERTENSION: Primary | ICD-10-CM

## 2022-11-21 DIAGNOSIS — Z13.6 ENCOUNTER FOR LIPID SCREENING FOR CARDIOVASCULAR DISEASE: ICD-10-CM

## 2022-11-21 DIAGNOSIS — Z13.220 ENCOUNTER FOR LIPID SCREENING FOR CARDIOVASCULAR DISEASE: ICD-10-CM

## 2022-11-21 NOTE — PATIENT INSTRUCTIONS
Increase losartan dose to 75 mg per day (1 5 tabs)  BP >160/100 for extended period of time call on call doctor or go to ED

## 2022-11-21 NOTE — PROGRESS NOTES
Name: Mika Perdomo      : 1980      MRN: [id number]  Encounter Provider: Junior Ott DO  Encounter Date: 2022   Encounter department: [address] [place] [address]  Essential hypertension  Assessment & Plan:  37 yo patient reports elevated BP for the last 2 weeks on ambulatory daily monitoring with upper arm cuff  Pt reports range is from 130-160/80-90  Pt is compliant with medications  Today BP is 143/88 my recheck is 150/102  Plan  - Increase losartan to 75 mg daily (1 5 pills)  - Log ambulatory BP daily at least 2 hours after taking Anti HTN meds and after at least 30 min of resting   - counseled on lifestyle modifications including low sodium diet, exercise, and weight loss  - f/u in 1 week  - ordered routine blood work, see below    Orders:  -     Basic metabolic panel; Future  -     Microalbumin,Urine    2  Vitamin D deficiency  -     Vitamin D 25 hydroxy; Future    3  Encounter for lipid screening for cardiovascular disease  -     Lipid panel; Future    4  Depression with anxiety  -     TSH, 3rd generation with Free T4 reflex; Future    5  Erythrocytosis  -     CBC and differential; Future           Subjective     HPI   Pt presents for recent increased BP  Pt reports that for 2 weeks she has felt "off" a/w frontal headache and needing to lie down sometimes  She is checking her BP more frequently and reports 130s/80s to 160s/90s, highest notes was 160/101  Pt denies vision changes, chest pain, SOB, palpitations  Pt uses upper arm BP machine  On my recheck BP is 150/102  She takes anti HTN meds in the morning 8 am      Denies changes in habits including increased salt intake, poor sleep, increased weight, recent illness, or added stress  Review of Systems  Please see HPI for ROS  All other systems negative       Past Medical History:   Diagnosis Date   • Anemia    • Chest tightness or pressure     2013   • Hypertension    • Vitamin D deficiency      Past Surgical History:   Procedure Laterality Date   • CYSTOSCOPY N/A 12/30/2018    Procedure: CYSTOSCOPY;  Surgeon: Domingo Villa DO;  [facility];  Service: Gynecology   • HYSTERECTOMY N/A 12/30/2018    Procedure: HYSTERECTOMY LAPAROSCOPIC TOTAL ([address]); Surgeon: Domingo Villa DO;  [facility];  Service: Gynecology   • MS LAP,RMV  ADNEXAL STRUCTURE Bilateral 12/30/2018    Procedure: SALPINGECTOMY, LAPAROSCOPIC;  Surgeon: Domingo Villa DO;  [facility];  Service: Gynecology   • TONSILLECTOMY     • TUBAL LIGATION      2006     Family History   Problem Relation Age of Onset   • Heart disease Father    • Hypertension Father    • Heart attack Father 40     Social History     Socioeconomic History   • Marital status: /Civil Union     Spouse name: None   • Number of children: None   • Years of education: None   • Highest education level: None   Occupational History   • None   Tobacco Use   • Smoking status: Light Smoker     Packs/day: 0 50     Years: 35 00     Pack years: 17 50     Types: Cigarettes   • Smokeless tobacco: Never   • Tobacco comments:     social smoker, 1 pack per weekend    Vaping Use   • Vaping Use: Never used   Substance and Sexual Activity   • Alcohol use: Yes     Comment: on the weekends/ socially   • Drug use: No   • Sexual activity: Yes     Partners: Male     Birth control/protection: Female Sterilization   Other Topics Concern   • None   Social History Narrative    Uses safety equipment-seatbelts     Social Determinants of Health     Financial Resource Strain: Low Risk    • Difficulty of Paying Living Expenses: Not hard at all   Food Insecurity: No Food Insecurity   • Worried About Running Out of Food in the Last Year: Never true   • Ran Out of Food in the Last Year: Never true   Transportation Needs: No Transportation Needs   • Lack of Transportation (Medical): No   • Lack of Transportation (Non-Medical):  No   Physical Activity: Inactive   • Days of Exercise per Week: 0 days   • Minutes of Exercise per Session: 0 min   Stress: No Stress Concern Present   • Feeling of Stress : Not at all   Social Connections: Moderately Isolated   • Frequency of Communication with Friends and Family: More than three times a week   • Frequency of Social Gatherings with Friends and Family: More than three times a week   • Attends Sikhism Services: Never   • Active Member of Clubs or Organizations: No   • Attends Club or Organization Meetings: Never   • Marital Status:    Intimate Partner Violence: Not At Risk   • Fear of Current or Ex-Partner: No   • Emotionally Abused: No   • Physically Abused: No   • Sexually Abused: No   Housing Stability: Low Risk    • Unable to Pay for Housing in the Last Year: No   • Number of [place] in the Last Year: 1   • Unstable Housing in the Last Year: No     Current Outpatient Medications on File Prior to Visit   Medication Sig   • Azelastine HCl 137 MCG/SPRAY SOLN 1 SPRAY INTO EACH NOSTRIL 2 (TWO) TIMES A DAY USE IN EACH NOSTRIL AS DIRECTED   • hydrochlorothiazide (HYDRODIURIL) 25 mg tablet TAKE 1 TABLET (25 MG TOTAL) BY MOUTH DAILY     • hydrOXYzine HCL (ATARAX) 25 mg tablet Take 1 tablet (25 mg total) by mouth every 6 (six) hours as needed for anxiety   • losartan (COZAAR) 50 mg tablet TAKE 1 TABLET BY MOUTH EVERY DAY   • Omega-3 Fatty Acids (OMEGA 3 PO) Take by mouth     No Known Allergies  Immunization History   Administered Date(s) Administered   • COVID-19 PFIZER VACCINE 0 3 ML IM 03/24/2021, 04/14/2021, 12/02/2021   • Influenza, injectable, quadrivalent, preservative free 0 5 mL 01/08/2020, 11/30/2020   • Tdap 01/08/2020       Objective     /88 (BP Location: Left arm, Patient Position: Sitting, Cuff Size: Standard)   Pulse 98   Temp 98 3 °F (36 8 °C) (Temporal)   Resp 20   Ht 5' 1" (1 549 m)   Wt 78 5 kg (173 lb)   LMP 10/17/2018   SpO2 100%   BMI 32 69 kg/m²     Physical Exam  Constitutional: General: She is not in acute distress  Appearance: Normal appearance  She is not ill-appearing or toxic-appearing  HENT:      Head: Normocephalic and atraumatic  Right Ear: External ear normal       Left Ear: External ear normal    Eyes:      General: No scleral icterus  Right eye: No discharge  Left eye: No discharge  Extraocular Movements: Extraocular movements intact  Conjunctiva/sclera: Conjunctivae normal    Cardiovascular:      Rate and Rhythm: Normal rate and regular rhythm  Pulses: Normal pulses  Heart sounds: Normal heart sounds  Pulmonary:      Effort: Pulmonary effort is normal  No respiratory distress  Breath sounds: Normal breath sounds  No wheezing, rhonchi or rales  Musculoskeletal:      Cervical back: Normal range of motion and neck supple  No tenderness  Right lower leg: No edema  Left lower leg: No edema  Lymphadenopathy:      Cervical: No cervical adenopathy  Skin:     General: Skin is warm and dry  Neurological:      General: No focal deficit present  Mental Status: She is alert and oriented to person, place, and time  Cranial Nerves: No cranial nerve deficit  Motor: No weakness        Gait: Gait normal    Psychiatric:         Mood and Affect: Mood normal          Behavior: Behavior normal        Giacomo Nail, DO

## 2022-11-23 NOTE — ASSESSMENT & PLAN NOTE
37 yo patient reports elevated BP for the last 2 weeks on ambulatory daily monitoring with upper arm cuff  Pt reports range is from 130-160/80-90  Pt is compliant with medications  Today BP is 143/88 my recheck is 150/102  Plan  - Increase losartan to 75 mg daily (1 5 pills)  - Log ambulatory BP daily at least 2 hours after taking Anti HTN meds and after at least 30 min of resting   - counseled on lifestyle modifications including low sodium diet, exercise, and weight loss  - f/u in 1 week     - ordered routine blood work, see below

## 2022-11-26 ENCOUNTER — APPOINTMENT (OUTPATIENT)
Dept: LAB | Age: 42
End: 2022-11-26

## 2022-11-26 DIAGNOSIS — E55.9 VITAMIN D DEFICIENCY: ICD-10-CM

## 2022-11-26 DIAGNOSIS — Z13.220 ENCOUNTER FOR LIPID SCREENING FOR CARDIOVASCULAR DISEASE: ICD-10-CM

## 2022-11-26 DIAGNOSIS — Z13.6 ENCOUNTER FOR LIPID SCREENING FOR CARDIOVASCULAR DISEASE: ICD-10-CM

## 2022-11-26 DIAGNOSIS — I10 ESSENTIAL HYPERTENSION: ICD-10-CM

## 2022-11-26 DIAGNOSIS — D75.1 ERYTHROCYTOSIS: ICD-10-CM

## 2022-11-26 DIAGNOSIS — F41.8 DEPRESSION WITH ANXIETY: ICD-10-CM

## 2022-11-26 LAB
25(OH)D3 SERPL-MCNC: 20.1 NG/ML (ref 30–100)
ANION GAP SERPL CALCULATED.3IONS-SCNC: 6 MMOL/L (ref 4–13)
BASOPHILS # BLD AUTO: 0.03 THOUSANDS/ÂΜL (ref 0–0.1)
BASOPHILS NFR BLD AUTO: 1 % (ref 0–1)
BUN SERPL-MCNC: 14 MG/DL (ref 5–25)
CALCIUM SERPL-MCNC: 8.9 MG/DL (ref 8.3–10.1)
CHLORIDE SERPL-SCNC: 108 MMOL/L (ref 96–108)
CHOLEST SERPL-MCNC: 208 MG/DL
CO2 SERPL-SCNC: 27 MMOL/L (ref 21–32)
CREAT SERPL-MCNC: 0.7 MG/DL (ref 0.6–1.3)
CREAT UR-MCNC: 289 MG/DL
EOSINOPHIL # BLD AUTO: 0.18 THOUSAND/ÂΜL (ref 0–0.61)
EOSINOPHIL NFR BLD AUTO: 3 % (ref 0–6)
ERYTHROCYTE [DISTWIDTH] IN BLOOD BY AUTOMATED COUNT: 12.3 % (ref 11.6–15.1)
GFR SERPL CREATININE-BSD FRML MDRD: 106 ML/MIN/1.73SQ M
GLUCOSE P FAST SERPL-MCNC: 106 MG/DL (ref 65–99)
HCT VFR BLD AUTO: 42.6 % (ref 34.8–46.1)
HDLC SERPL-MCNC: 44 MG/DL
HGB BLD-MCNC: 14.3 G/DL (ref 11.5–15.4)
IMM GRANULOCYTES # BLD AUTO: 0.01 THOUSAND/UL (ref 0–0.2)
IMM GRANULOCYTES NFR BLD AUTO: 0 % (ref 0–2)
LDLC SERPL CALC-MCNC: 103 MG/DL (ref 0–100)
LYMPHOCYTES # BLD AUTO: 2.21 THOUSANDS/ÂΜL (ref 0.6–4.47)
LYMPHOCYTES NFR BLD AUTO: 35 % (ref 14–44)
MCH RBC QN AUTO: 31.2 PG (ref 26.8–34.3)
MCHC RBC AUTO-ENTMCNC: 33.6 G/DL (ref 31.4–37.4)
MCV RBC AUTO: 93 FL (ref 82–98)
MICROALBUMIN UR-MCNC: 10.2 MG/L (ref 0–20)
MICROALBUMIN/CREAT 24H UR: 4 MG/G CREATININE (ref 0–30)
MONOCYTES # BLD AUTO: 0.47 THOUSAND/ÂΜL (ref 0.17–1.22)
MONOCYTES NFR BLD AUTO: 7 % (ref 4–12)
NEUTROPHILS # BLD AUTO: 3.46 THOUSANDS/ÂΜL (ref 1.85–7.62)
NEUTS SEG NFR BLD AUTO: 54 % (ref 43–75)
NONHDLC SERPL-MCNC: 164 MG/DL
NRBC BLD AUTO-RTO: 0 /100 WBCS
PLATELET # BLD AUTO: 226 THOUSANDS/UL (ref 149–390)
PMV BLD AUTO: 11.5 FL (ref 8.9–12.7)
POTASSIUM SERPL-SCNC: 3.4 MMOL/L (ref 3.5–5.3)
RBC # BLD AUTO: 4.59 MILLION/UL (ref 3.81–5.12)
SODIUM SERPL-SCNC: 141 MMOL/L (ref 135–147)
TRIGL SERPL-MCNC: 305 MG/DL
TSH SERPL DL<=0.05 MIU/L-ACNC: 2.13 UIU/ML (ref 0.45–4.5)
WBC # BLD AUTO: 6.36 THOUSAND/UL (ref 4.31–10.16)

## 2022-11-28 ENCOUNTER — OFFICE VISIT (OUTPATIENT)
Dept: FAMILY MEDICINE CLINIC | Facility: CLINIC | Age: 42
End: 2022-11-28

## 2022-11-28 VITALS
WEIGHT: 177.2 LBS | OXYGEN SATURATION: 98 % | HEIGHT: 61 IN | DIASTOLIC BLOOD PRESSURE: 94 MMHG | HEART RATE: 84 BPM | TEMPERATURE: 98.1 F | SYSTOLIC BLOOD PRESSURE: 152 MMHG | RESPIRATION RATE: 18 BRPM | BODY MASS INDEX: 33.46 KG/M2

## 2022-11-28 DIAGNOSIS — E55.9 VITAMIN D DEFICIENCY: ICD-10-CM

## 2022-11-28 DIAGNOSIS — I10 ESSENTIAL HYPERTENSION: Primary | ICD-10-CM

## 2022-11-28 DIAGNOSIS — E78.5 DYSLIPIDEMIA: ICD-10-CM

## 2022-11-28 DIAGNOSIS — E87.6 HYPOKALEMIA: ICD-10-CM

## 2022-11-28 RX ORDER — ERGOCALCIFEROL 1.25 MG/1
50000 CAPSULE ORAL WEEKLY
Qty: 12 CAPSULE | Refills: 0 | Status: SHIPPED | OUTPATIENT
Start: 2022-11-28

## 2022-11-28 RX ORDER — ATORVASTATIN CALCIUM 40 MG/1
40 TABLET, FILM COATED ORAL DAILY
Qty: 30 TABLET | Refills: 2 | Status: SHIPPED | OUTPATIENT
Start: 2022-11-28

## 2022-11-28 RX ORDER — LOSARTAN POTASSIUM 100 MG/1
100 TABLET ORAL DAILY
Qty: 30 TABLET | Refills: 2 | Status: SHIPPED | OUTPATIENT
Start: 2022-11-28

## 2022-11-28 NOTE — PATIENT INSTRUCTIONS
Follow up appointment in 4-6 weeks  Get fasting blood work prior to this  Increased Losartan to 100 mg daily and added Lipitor 40 mg daily

## 2022-11-29 PROBLEM — E78.5 DYSLIPIDEMIA: Status: ACTIVE | Noted: 2022-11-29

## 2022-11-29 NOTE — ASSESSMENT & PLAN NOTE
37 yo patient presents for BP follow up after one week on increased Losartan 75 mg daily  Pt reports ambulatory BP range is from 120-150s/80-90s  Pt is compliant with medications  Today BP is 152/94  Reviewed blood work  K is low, 3 4 2/2 diuretic  Glucose mildly elevated  Plan  - Increase losartan to 100 mg daily  - Log ambulatory BP daily at least 2 hours after taking Anti HTN meds and after at least 30 min of resting   - counseled on lifestyle modifications including low sodium, DASH diet, low sugar, exercise, and weight loss    - gave information on potassium containing foods     - f/u in 4-6 weeks, and bring in BP log  - ordered routine blood work, see below

## 2022-11-29 NOTE — PROGRESS NOTES
Name: Kuldip Hernandez      : 1980      MRN: [id number]  Encounter Provider: Trino Molina DO  Encounter Date: 2022   Encounter department: [address] [place] [address]  Essential hypertension  Assessment & Plan:  35 yo patient presents for BP follow up after one week on increased Losartan 75 mg daily  Pt reports ambulatory BP range is from 120-150s/80-90s  Pt is compliant with medications  Today BP is 152/94  Reviewed blood work  K is low, 3 4 2/2 diuretic  Glucose mildly elevated  Plan  - Increase losartan to 100 mg daily  - Log ambulatory BP daily at least 2 hours after taking Anti HTN meds and after at least 30 min of resting   - counseled on lifestyle modifications including low sodium, DASH diet, low sugar, exercise, and weight loss    - gave information on potassium containing foods  - f/u in 4-6 weeks, and bring in BP log  - ordered routine blood work, see below    Orders:  -     losartan (COZAAR) 100 MG tablet; Take 1 tablet (100 mg total) by mouth daily    2  Dyslipidemia  Assessment & Plan:  FLP is elevated, A1C risk is 7 6%  Plan  - Started on Lipitor 40 mg daily  Reviewed possible side effects  Orders:  -     atorvastatin (LIPITOR) 40 mg tablet; Take 1 tablet (40 mg total) by mouth daily    3  Vitamin D deficiency  Assessment & Plan:  Reviewed labs form   Vit D is 20 1  According to multiple resources including AAFP, [employer], UpToDate >20 ng per ml is considered Vit D Sufficiency, however, [place] has been deficient in the past and is currently borderline low  Will treat with Vit D supplementation  Plan  - Vit D 84062 weekly x 12 weeks, then OTC VIT D 2000 daily  - Repeat Vit D in 3 months  Orders:  -     ergocalciferol (VITAMIN D2) 50,000 units; Take 1 capsule (50,000 Units total) by mouth once a week    4  Hypokalemia  Assessment & Plan:  Reviewed BMP  K is low at 3 4       Plan  - encouraged eating high potassium diet  Provided patient instructions on high potassium diet  - Repeat fasting BMP prior to next visit in 4-6 weeks  Orders:  -     Basic metabolic panel; Future           Subjective     HPI   Patient presents for 1 week BP follow up and to review labs from 11/26  Pt arrived directly from work and did not bring BP log  Review of Systems   Constitutional: Negative for activity change, fatigue, fever and unexpected weight change  HENT: Negative for sore throat  Eyes: Negative for redness and visual disturbance  Respiratory: Negative for cough and shortness of breath  Cardiovascular: Negative for chest pain and palpitations  Neurological: Negative for syncope  Past Medical History:   Diagnosis Date   • Anemia    • Chest tightness or pressure     8/19/2013   • Hypertension    • Vitamin D deficiency      Past Surgical History:   Procedure Laterality Date   • CYSTOSCOPY N/A 12/30/2018    Procedure: CYSTOSCOPY;  Surgeon: Elvira Gee DO;  [facility];  Service: Gynecology   • HYSTERECTOMY N/A 12/30/2018    Procedure: HYSTERECTOMY LAPAROSCOPIC TOTAL ([address]);   Surgeon: Elvira Gee DO;  [facility];  Service: Gynecology   • GA LAP,RMV  ADNEXAL STRUCTURE Bilateral 12/30/2018    Procedure: SALPINGECTOMY, LAPAROSCOPIC;  Surgeon: Elvira Gee DO;  [facility];  Service: Gynecology   • TONSILLECTOMY     • TUBAL LIGATION      2006     Family History   Problem Relation Age of Onset   • Heart disease Father    • Hypertension Father    • Heart attack Father 40     Social History     Socioeconomic History   • Marital status: /Civil Union     Spouse name: None   • Number of children: None   • Years of education: None   • Highest education level: None   Occupational History   • None   Tobacco Use   • Smoking status: Light Smoker     Packs/day: 0 50     Years: 35 00     Pack years: 17 50     Types: Cigarettes   • Smokeless tobacco: Never   • Tobacco comments:     social smoker, 1 pack per weekend    Vaping Use   • Vaping Use: Never used   Substance and Sexual Activity   • Alcohol use: Yes     Comment: on the weekends/ socially   • Drug use: No   • Sexual activity: Yes     Partners: Male     Birth control/protection: Female Sterilization   Other Topics Concern   • None   Social History Narrative    Uses safety equipment-seatbelts     Social Determinants of Health     Financial Resource Strain: Low Risk    • Difficulty of Paying Living Expenses: Not hard at all   Food Insecurity: No Food Insecurity   • Worried About Running Out of Food in the Last Year: Never true   • Ran Out of Food in the Last Year: Never true   Transportation Needs: No Transportation Needs   • Lack of Transportation (Medical): No   • Lack of Transportation (Non-Medical): No   Physical Activity: Inactive   • Days of Exercise per Week: 0 days   • Minutes of Exercise per Session: 0 min   Stress: No Stress Concern Present   • Feeling of Stress : Not at all   Social Connections:  Moderately Isolated   • Frequency of Communication with Friends and Family: More than three times a week   • Frequency of Social Gatherings with Friends and Family: More than three times a week   • Attends Tenriism Services: Never   • Active Member of Clubs or Organizations: No   • Attends Club or Organization Meetings: Never   • Marital Status:    Intimate Partner Violence: Not At Risk   • Fear of Current or Ex-Partner: No   • Emotionally Abused: No   • Physically Abused: No   • Sexually Abused: No   Housing Stability: Low Risk    • Unable to Pay for Housing in the Last Year: No   • Number of [place] in the Last Year: 1   • Unstable Housing in the Last Year: No     Current Outpatient Medications on File Prior to Visit   Medication Sig   • Azelastine HCl 137 MCG/SPRAY SOLN 1 SPRAY INTO EACH NOSTRIL 2 (TWO) TIMES A DAY USE IN EACH NOSTRIL AS DIRECTED   • hydrochlorothiazide (HYDRODIURIL) 25 mg tablet TAKE 1 TABLET (25 MG TOTAL) BY MOUTH DAILY  • hydrOXYzine HCL (ATARAX) 25 mg tablet Take 1 tablet (25 mg total) by mouth every 6 (six) hours as needed for anxiety   • Omega-3 Fatty Acids (OMEGA 3 PO) Take by mouth     No Known Allergies  Immunization History   Administered Date(s) Administered   • COVID-19 PFIZER VACCINE 0 3 ML IM 03/24/2021, 04/14/2021, 12/02/2021   • Influenza, injectable, quadrivalent, preservative free 0 5 mL 01/08/2020, 11/30/2020   • Tdap 01/08/2020       Objective     /94 (BP Location: Left arm, Patient Position: Sitting, Cuff Size: Large)   Pulse 84   Temp 98 1 °F (36 7 °C) (Temporal)   Resp 18   Ht 5' 1" (1 549 m)   Wt 80 4 kg (177 lb 3 2 oz)   LMP 10/17/2018   SpO2 98%   BMI 33 48 kg/m²     Physical Exam  Constitutional:       General: She is not in acute distress  Appearance: She is not ill-appearing or toxic-appearing  HENT:      Head: Normocephalic and atraumatic  Right Ear: External ear normal       Left Ear: External ear normal    Eyes:      General: No scleral icterus  Right eye: No discharge  Left eye: No discharge  Extraocular Movements: Extraocular movements intact  Conjunctiva/sclera: Conjunctivae normal    Cardiovascular:      Rate and Rhythm: Normal rate and regular rhythm  Heart sounds: Normal heart sounds  Pulmonary:      Effort: Pulmonary effort is normal  No respiratory distress  Breath sounds: Normal breath sounds  Musculoskeletal:      Right lower leg: No edema  Left lower leg: No edema  Skin:     General: Skin is warm and dry  Neurological:      General: No focal deficit present  Mental Status: She is alert  Motor: No weakness  Gait: Gait normal    Psychiatric:         Mood and Affect: Mood normal          Behavior: Behavior normal          Thought Content:  Thought content normal          Judgment: Judgment normal        Lisy Diggs,

## 2022-11-29 NOTE — ASSESSMENT & PLAN NOTE
Reviewed labs form 11/26  Vit D is 20 1  According to multiple resources including AAFP, [employer], UpToDate >20 ng per ml is considered Vit D Sufficiency, however, [place] has been deficient in the past and is currently borderline low  Will treat with Vit D supplementation  Plan  - Vit D 20688 weekly x 12 weeks, then OTC VIT D 2000 daily  - Repeat Vit D in 3 months

## 2022-11-29 NOTE — ASSESSMENT & PLAN NOTE
FLP is elevated, A1C risk is 7 6%  Plan  - Started on Lipitor 40 mg daily  Reviewed possible side effects

## 2022-11-29 NOTE — ASSESSMENT & PLAN NOTE
Reviewed BMP  K is low at 3 4  Plan  - encouraged eating high potassium diet  Provided patient instructions on high potassium diet  - Repeat fasting BMP prior to next visit in 4-6 weeks

## 2022-12-19 ENCOUNTER — OFFICE VISIT (OUTPATIENT)
Dept: FAMILY MEDICINE CLINIC | Facility: CLINIC | Age: 42
End: 2022-12-19

## 2022-12-19 DIAGNOSIS — R39.15 URINARY URGENCY: Primary | ICD-10-CM

## 2022-12-19 LAB
SL AMB  POCT GLUCOSE, UA: ABNORMAL
SL AMB LEUKOCYTE ESTERASE,UA: ABNORMAL
SL AMB POCT BILIRUBIN,UA: ABNORMAL
SL AMB POCT BLOOD,UA: ABNORMAL
SL AMB POCT CLARITY,UA: ABNORMAL
SL AMB POCT COLOR,UA: YELLOW
SL AMB POCT KETONES,UA: ABNORMAL
SL AMB POCT NITRITE,UA: ABNORMAL
SL AMB POCT PH,UA: 6
SL AMB POCT SPECIFIC GRAVITY,UA: 1.01
SL AMB POCT URINE PROTEIN: 15
SL AMB POCT UROBILINOGEN: 1

## 2022-12-19 RX ORDER — SULFAMETHOXAZOLE AND TRIMETHOPRIM 800; 160 MG/1; MG/1
1 TABLET ORAL EVERY 12 HOURS SCHEDULED
Qty: 6 TABLET | Refills: 0 | Status: SHIPPED | OUTPATIENT
Start: 2022-12-19 | End: 2022-12-22

## 2022-12-19 NOTE — ASSESSMENT & PLAN NOTE
43yo postmenopausal female presenting c/o urinary frequency and urgency that began this morning  Pt denies dysuria, fever, chills  VSS today, physical exam negative for abdominal tenderness and CVA tenderness  - Urine dip + for blood, leukocytes   Negative nitrites    Plan:  - Will treat pt with Bactrim BID x3 days, pharmacy confirmed  - F/u PRN

## 2022-12-19 NOTE — PROGRESS NOTES
Name: Candelaria Alfaro      : 1980      MRN: [id number]  Encounter Provider: Edel Powell DO  Encounter Date: 2022   Encounter department: [address] [place] [address]  Urinary urgency  Assessment & Plan:  45yo postmenopausal female presenting c/o urinary frequency and urgency that began this morning  Pt denies dysuria, fever, chills  VSS today, physical exam negative for abdominal tenderness and CVA tenderness  - Urine dip + for blood, leukocytes  Negative nitrites    Plan:  - Will treat pt with Bactrim BID x3 days, pharmacy confirmed  - F/u PRN    Orders:  -     POCT urine dip  -     sulfamethoxazole-trimethoprim (BACTRIM DS) 800-160 mg per tablet; Take 1 tablet by mouth every 12 (twelve) hours for 3 days         Subjective      41yo female presenting c/o 1 day frequency and urgency that began this morning  She reports that she will go to the bathroom and will sometimes not produce much urine  Pt denies dysuria, abdominal pain or back pain, fever or chills  Pt reports that this has happened before and her symptoms are similar  Pt reports using a new soap for her bath, which she believes is the cause for her discomfort  Pt denies vaginal discharge  Pt is s/p hysterectomy in 2019  Pt reports that she is currently sexually active with one partner, her   She denies any new sexual partners recently  Review of Systems   Constitutional: Negative for chills and fever  HENT: Negative for congestion  Eyes: Negative for visual disturbance  Respiratory: Negative for shortness of breath  Cardiovascular: Negative for chest pain and palpitations  Gastrointestinal: Negative for abdominal pain  Genitourinary: Positive for decreased urine volume, frequency and urgency  Negative for difficulty urinating, dyspareunia, dysuria, flank pain, hematuria and pelvic pain     Allergic/Immunologic: Negative for environmental allergies and food allergies  Neurological: Negative for dizziness, weakness and headaches  Psychiatric/Behavioral: Negative for sleep disturbance  Current Outpatient Medications on File Prior to Visit   Medication Sig   • atorvastatin (LIPITOR) 40 mg tablet Take 1 tablet (40 mg total) by mouth daily   • Azelastine HCl 137 MCG/SPRAY SOLN 1 SPRAY INTO EACH NOSTRIL 2 (TWO) TIMES A DAY USE IN EACH NOSTRIL AS DIRECTED   • ergocalciferol (VITAMIN D2) 50,000 units Take 1 capsule (50,000 Units total) by mouth once a week   • hydrochlorothiazide (HYDRODIURIL) 25 mg tablet TAKE 1 TABLET (25 MG TOTAL) BY MOUTH DAILY  • hydrOXYzine HCL (ATARAX) 25 mg tablet Take 1 tablet (25 mg total) by mouth every 6 (six) hours as needed for anxiety   • losartan (COZAAR) 100 MG tablet Take 1 tablet (100 mg total) by mouth daily   • Omega-3 Fatty Acids (OMEGA 3 PO) Take by mouth       Objective     BP (P) 123/84 (BP Location: Left arm, Patient Position: Sitting, Cuff Size: Standard)   Pulse (P) 92   Temp (P) 97 6 °F (36 4 °C) (Temporal)   Resp (P) 16   Ht (P) 5' 1" (1 549 m)   Wt (P) 79 kg (174 lb 3 2 oz)   LMP 10/17/2018   SpO2 (P) 98%   BMI (P) 32 91 kg/m²     Physical Exam  Constitutional:       Appearance: Normal appearance  HENT:      Head: Normocephalic and atraumatic  Cardiovascular:      Rate and Rhythm: Normal rate and regular rhythm  Heart sounds: Normal heart sounds  Pulmonary:      Effort: Pulmonary effort is normal       Breath sounds: Normal breath sounds  Abdominal:      Tenderness: There is no abdominal tenderness  There is no right CVA tenderness or left CVA tenderness  Musculoskeletal:         General: No swelling or deformity  Skin:     General: Skin is warm and dry  Neurological:      Mental Status: She is alert     Psychiatric:         Mood and Affect: Mood normal          Behavior: Behavior normal           Larissa Patel DO

## 2022-12-20 DIAGNOSIS — E78.5 DYSLIPIDEMIA: ICD-10-CM

## 2022-12-20 DIAGNOSIS — I10 ESSENTIAL HYPERTENSION: ICD-10-CM

## 2022-12-20 RX ORDER — ATORVASTATIN CALCIUM 40 MG/1
TABLET, FILM COATED ORAL
Qty: 90 TABLET | Refills: 1 | Status: SHIPPED | OUTPATIENT
Start: 2022-12-20

## 2022-12-20 RX ORDER — LOSARTAN POTASSIUM 100 MG/1
TABLET ORAL
Qty: 90 TABLET | Refills: 1 | Status: SHIPPED | OUTPATIENT
Start: 2022-12-20

## 2022-12-28 ENCOUNTER — RA CDI HCC (OUTPATIENT)
Dept: OTHER | Facility: HOSPITAL | Age: 42
End: 2022-12-28

## 2022-12-28 NOTE — PROGRESS NOTES
[facility]  coding opportunities       Chart reviewed, no opportunity found: CHART REVIEWED, NO OPPORTUNITY FOUND        Patients Insurance        Commercial Insurance: [address] [place] [address]

## 2023-01-16 ENCOUNTER — APPOINTMENT (OUTPATIENT)
Dept: LAB | Age: 43
End: 2023-01-16

## 2023-01-16 DIAGNOSIS — E87.6 HYPOKALEMIA: ICD-10-CM

## 2023-01-16 LAB
ANION GAP SERPL CALCULATED.3IONS-SCNC: 9 MMOL/L (ref 4–13)
BUN SERPL-MCNC: 13 MG/DL (ref 5–25)
CALCIUM SERPL-MCNC: 9.3 MG/DL (ref 8.3–10.1)
CHLORIDE SERPL-SCNC: 104 MMOL/L (ref 96–108)
CO2 SERPL-SCNC: 26 MMOL/L (ref 21–32)
CREAT SERPL-MCNC: 0.77 MG/DL (ref 0.6–1.3)
GFR SERPL CREATININE-BSD FRML MDRD: 94 ML/MIN/1.73SQ M
GLUCOSE P FAST SERPL-MCNC: 92 MG/DL (ref 65–99)
POTASSIUM SERPL-SCNC: 3.6 MMOL/L (ref 3.5–5.3)
SODIUM SERPL-SCNC: 139 MMOL/L (ref 135–147)

## 2023-01-20 ENCOUNTER — OFFICE VISIT (OUTPATIENT)
Dept: FAMILY MEDICINE CLINIC | Facility: CLINIC | Age: 43
End: 2023-01-20

## 2023-01-20 VITALS
SYSTOLIC BLOOD PRESSURE: 129 MMHG | WEIGHT: 177 LBS | DIASTOLIC BLOOD PRESSURE: 88 MMHG | HEIGHT: 61 IN | BODY MASS INDEX: 33.42 KG/M2 | HEART RATE: 87 BPM | RESPIRATION RATE: 18 BRPM | TEMPERATURE: 97.2 F

## 2023-01-20 DIAGNOSIS — E66.09 CLASS 1 OBESITY DUE TO EXCESS CALORIES WITH SERIOUS COMORBIDITY AND BODY MASS INDEX (BMI) OF 33.0 TO 33.9 IN ADULT: ICD-10-CM

## 2023-01-20 DIAGNOSIS — E87.6 HYPOKALEMIA: ICD-10-CM

## 2023-01-20 DIAGNOSIS — I10 ESSENTIAL HYPERTENSION: Primary | ICD-10-CM

## 2023-01-20 RX ORDER — HYDROCHLOROTHIAZIDE 25 MG/1
25 TABLET ORAL DAILY
Qty: 90 TABLET | Refills: 1 | Status: SHIPPED | OUTPATIENT
Start: 2023-01-20

## 2023-01-20 NOTE — PROGRESS NOTES
Name: Brie Peralta      : 1980      MRN: [id number]  Encounter Provider: Bienvenido Laguna DO  Encounter Date: 2023   Encounter department: [address] [place] [address]  Essential hypertension  Assessment & Plan:  37 yo patient presents for BP follow up  Pt reports ambulatory BP is consistently <130/90  Pt is compliant with medications  Today BP is at goal 129/88  Reviewed blood work  K improved to wnl  Plan  - continue losartan to 100 mg daily and HCTZ 25mg daily  - Log ambulatory BP daily at least 2 hours after taking Anti HTN meds and after at least 30 min of resting   - counseled on lifestyle modifications including low sodium, DASH diet, low sugar, exercise, and weight loss  - f/u in 1 mo for weight check    Orders:  -     hydrochlorothiazide (HYDRODIURIL) 25 mg tablet; Take 1 tablet (25 mg total) by mouth daily    2  Hypokalemia  Assessment & Plan:  Reviewed latest blood work  Hypokalemia resolved  3  Class 1 obesity due to excess calories with serious comorbidity and body mass index (BMI) of 33 0 to 33 9 in adult  Assessment & Plan:  Patient eating calorie deficit and has good exercise regimen  Continues to have difficulty losing weight  Weigh loss medication is indicated  Plan  -  Started pt on ozempic 0 25mg weekly  - f/u in 1 week, consider dose increase if tolerating well  Orders:  -     semaglutide, 0 25 or 0 5 mg/dose, (Ozempic) 2 mg/1 5 mL injection pen; Inject 0 19 mL (0 25 mg total) under the skin every 7 days for 4 weeks, then follow up with PCP for possible dose increase  Subjective     HPI   Brie Peralta is a 36 y o  female with PMH of HTN, HLD, obesity who presents for HTN follow up  Pt says she forgot her ambulatory BP log at home  She reports that it is consistently <130/90  Pt reports weight gain of 3 lbs  However, she is adhereing to a well balanced, low sodium diet   Pt is eating smaller portions and choosing healthier options  She is going to the gym daily  Her son and she are having a friendly competition to help each other lose weight  Overall she is doing well without stress in her life  Review of Systems   Constitutional: Negative for activity change, fatigue, fever and unexpected weight change  Eyes: Negative for visual disturbance  Cardiovascular: Negative for chest pain and palpitations  Gastrointestinal: Negative for abdominal pain, constipation, diarrhea, nausea and vomiting  Neurological: Negative for dizziness, syncope and headaches  Past Medical History:   Diagnosis Date   • Anemia    • Chest tightness or pressure     8/19/2013   • Hypertension    • Vitamin D deficiency      Past Surgical History:   Procedure Laterality Date   • CYSTOSCOPY N/A 12/30/2018    Procedure: CYSTOSCOPY;  Surgeon: Laurel Mike DO;  [facility];  Service: Gynecology   • HYSTERECTOMY N/A 12/30/2018    Procedure: HYSTERECTOMY LAPAROSCOPIC TOTAL ([address] [place] [address]);   Surgeon: Laurel Mike DO;  [facility];  Service: Gynecology   • OK LAPAROSCOPY W/RMVL ADNEXAL STRUCTURES Bilateral 12/30/2018    Procedure: SALPINGECTOMY, LAPAROSCOPIC;  Surgeon: Laurel Mike DO;  [facility];  Service: Gynecology   • TONSILLECTOMY     • TUBAL LIGATION      2006     Family History   Problem Relation Age of Onset   • Heart disease Father    • Hypertension Father    • Heart attack Father 40     Social History     Socioeconomic History   • Marital status: /Civil Union     Spouse name: None   • Number of children: None   • Years of education: None   • Highest education level: None   Occupational History   • None   Tobacco Use   • Smoking status: Light Smoker     Packs/day: 0 50     Years: 35 00     Pack years: 17 50     Types: Cigarettes   • Smokeless tobacco: Never   • Tobacco comments:     social smoker, 1 pack per weekend    Vaping Use   • Vaping Use: Never used   Substance and Sexual Activity   • Alcohol use: Yes     Comment: on the weekends/ socially   • Drug use: No   • Sexual activity: Yes     Partners: Male     Birth control/protection: Female Sterilization   Other Topics Concern   • None   Social History Narrative    Uses safety equipment-seatbelts     Social Determinants of Health     Financial Resource Strain: Low Risk    • Difficulty of Paying Living Expenses: Not hard at all   Food Insecurity: No Food Insecurity   • Worried About [address] in the Last Year: Never true   • Ran Out of Food in the Last Year: Never true   Transportation Needs: No Transportation Needs   • Lack of Transportation (Medical): No   • Lack of Transportation (Non-Medical): No   Physical Activity: Inactive   • Days of Exercise per Week: 0 days   • Minutes of Exercise per Session: 0 min   Stress: No Stress Concern Present   • Feeling of Stress : Not at all   Social Connections:  Moderately Isolated   • Frequency of Communication with Friends and Family: More than three times a week   • Frequency of Social Gatherings with Friends and Family: More than three times a week   • Attends Voodoo Services: Never   • Active Member of Clubs or Organizations: No   • Attends Club or Organization Meetings: Never   • Marital Status:    Intimate Partner Violence: Not At Risk   • Fear of Current or Ex-Partner: No   • Emotionally Abused: No   • Physically Abused: No   • Sexually Abused: No   Housing Stability: Low Risk    • Unable to Pay for Housing in the Last Year: No   • Number of [place] in the Last Year: 1   • Unstable Housing in the Last Year: No     Current Outpatient Medications on File Prior to Visit   Medication Sig   • atorvastatin (LIPITOR) 40 mg tablet TAKE 1 TABLET BY MOUTH EVERY DAY   • Azelastine HCl 137 MCG/SPRAY SOLN 1 SPRAY INTO EACH NOSTRIL 2 (TWO) TIMES A DAY USE IN EACH NOSTRIL AS DIRECTED   • ergocalciferol (VITAMIN D2) 50,000 units Take 1 capsule (50,000 Units total) by mouth once a week   • hydrOXYzine HCL (ATARAX) 25 mg tablet Take 1 tablet (25 mg total) by mouth every 6 (six) hours as needed for anxiety   • losartan (COZAAR) 100 MG tablet TAKE 1 TABLET BY MOUTH EVERY DAY   • Omega-3 Fatty Acids (OMEGA 3 PO) Take by mouth     No Known Allergies  Immunization History   Administered Date(s) Administered   • COVID-19 PFIZER VACCINE 0 3 ML IM 03/24/2021, 04/14/2021, 12/02/2021   • Influenza, injectable, quadrivalent, preservative free 0 5 mL 01/08/2020, 11/30/2020   • Tdap 01/08/2020       Objective     /88   Pulse 87   Temp (!) 97 2 °F (36 2 °C)   Resp 18   Ht 5' 1" (1 549 m)   Wt 80 3 kg (177 lb)   LMP 10/17/2018   BMI 33 44 kg/m²     Physical Exam  Constitutional:       General: She is not in acute distress  Appearance: She is not ill-appearing or toxic-appearing  HENT:      Head: Normocephalic and atraumatic  Right Ear: External ear normal       Left Ear: External ear normal    Eyes:      General: No scleral icterus  Right eye: No discharge  Left eye: No discharge  Extraocular Movements: Extraocular movements intact  Conjunctiva/sclera: Conjunctivae normal    Cardiovascular:      Rate and Rhythm: Normal rate and regular rhythm  Pulmonary:      Effort: Pulmonary effort is normal  No respiratory distress  Breath sounds: Normal breath sounds  Musculoskeletal:      Right lower leg: No edema  Left lower leg: No edema  Skin:     General: Skin is warm and dry  Neurological:      General: No focal deficit present  Mental Status: She is alert  Mental status is at baseline  Motor: No weakness        Gait: Gait normal    Psychiatric:         Mood and Affect: Mood normal          Behavior: Behavior normal        Chava Plasencia DO

## 2023-01-23 NOTE — ASSESSMENT & PLAN NOTE
Patient eating calorie deficit and has good exercise regimen  Continues to have difficulty losing weight  Weigh loss medication is indicated  Plan  -  Started pt on ozempic 0 25mg weekly  - f/u in 1 week, consider dose increase if tolerating well

## 2023-01-23 NOTE — ASSESSMENT & PLAN NOTE
35 yo patient presents for BP follow up  Pt reports ambulatory BP is consistently <130/90  Pt is compliant with medications  Today BP is at goal 129/88  Reviewed blood work  K improved to wnl  Plan  - continue losartan to 100 mg daily and HCTZ 25mg daily  - Log ambulatory BP daily at least 2 hours after taking Anti HTN meds and after at least 30 min of resting   - counseled on lifestyle modifications including low sodium, DASH diet, low sugar, exercise, and weight loss     - f/u in 1 mo for weight check

## 2023-02-17 ENCOUNTER — RA CDI HCC (OUTPATIENT)
Dept: OTHER | Facility: HOSPITAL | Age: 43
End: 2023-02-17

## 2023-02-22 DIAGNOSIS — E55.9 VITAMIN D DEFICIENCY: Primary | ICD-10-CM

## 2023-02-24 ENCOUNTER — OFFICE VISIT (OUTPATIENT)
Dept: FAMILY MEDICINE CLINIC | Facility: CLINIC | Age: 43
End: 2023-02-24

## 2023-02-24 VITALS
BODY MASS INDEX: 32.97 KG/M2 | DIASTOLIC BLOOD PRESSURE: 84 MMHG | WEIGHT: 174.6 LBS | TEMPERATURE: 96.2 F | HEIGHT: 61 IN | RESPIRATION RATE: 18 BRPM | HEART RATE: 86 BPM | SYSTOLIC BLOOD PRESSURE: 132 MMHG | OXYGEN SATURATION: 98 %

## 2023-02-24 DIAGNOSIS — Z12.31 BREAST CANCER SCREENING BY MAMMOGRAM: ICD-10-CM

## 2023-02-24 DIAGNOSIS — E66.09 CLASS 1 OBESITY DUE TO EXCESS CALORIES WITH SERIOUS COMORBIDITY AND BODY MASS INDEX (BMI) OF 33.0 TO 33.9 IN ADULT: Primary | ICD-10-CM

## 2023-02-24 DIAGNOSIS — E55.9 VITAMIN D DEFICIENCY: ICD-10-CM

## 2023-02-24 PROBLEM — F32.A DEPRESSION: Chronic | Status: RESOLVED | Noted: 2017-05-14 | Resolved: 2023-02-24

## 2023-02-24 NOTE — ASSESSMENT & PLAN NOTE
Pt plans on scheduling mammogram after she returns from [facility]  Pt is [address] in [facility] on 3/17/23 for 1 week

## 2023-02-24 NOTE — ASSESSMENT & PLAN NOTE
Pt completed 12 weeks of Vit D 50,000 supplementation  Plan  - Repeat Vit D lab test to ensure adequate response

## 2023-02-24 NOTE — PROGRESS NOTES
Name: Remi Barr      : 1980      MRN: [id number]  Encounter Provider: Chiquita Parekh DO  Encounter Date: 2023   Encounter department: [address] [place] [address]  Class 1 obesity due to excess calories with serious comorbidity and body mass index (BMI) of 33 0 to 33 9 in adult  Assessment & Plan:  BMI Counseling: Body mass index is 32 99 kg/m²  The BMI is above normal  Nutrition recommendations include reducing portion sizes, decreasing overall calorie intake, 3-5 servings of fruits/vegetables daily, decreasing soda and/or juice intake and increasing intake of lean protein  Exercise recommendations include vigorous aerobic physical activity for 75 minutes/week, exercising 3-5 times per week and joining a gym  Pharmacotherapy was ordered for patient to aid in weight loss  Patient is tolerating Ozempic for weight loss  Pt had 3 lb weight loss in 4 weeks  BP (132/84 prior to taking antihypertensives) and HR (86) are stable  Plan  - Increased Ozempic dose to 0 5 mg weekly for 4 weeks    - RTO in 4 weeks to assess's if pt is tolerating dose and potentially increase it to 1 mg weekly  Orders:  -     semaglutide, 0 25 or 0 5 mg/dose, (Ozempic) 2 mg/1 5 mL injection pen; Inject 0 38 mL (0 5 mg total) under the skin every 7 days for 4 weeks, then follow up with PCP for possible dose increase  2  Vitamin D deficiency  Assessment & Plan:  Pt completed 12 weeks of Vit D 50,000 supplementation  Plan  - Repeat Vit D lab test to ensure adequate response  3  Breast cancer screening by mammogram  Assessment & Plan:  Pt plans on scheduling mammogram after she returns from [facility]  Pt is [address] in [facility] on 3/17/23 for 1 week  Subjective     HPI   Remi Barr is a 36 y o  female with PMH of HTN, HLD, obesity who presents for obesity follow up  Pt reports tolerating medicine well   She reports taking the medication  nights and experiences mild headache on Monday mornings which is resolved with Motrin  Pt finds this side effect tolerable  Pt says she was nauseous the first week of tyring the medication  This has since improved  Pt continues to adhere to a well balanced, low calorie diet and is going to the gym consistently with her son  Pt focuses on cardio and stair climber  Pt is going to [facility] on 3/17/23 for 1 week  Review of Systems   Constitutional: Negative for activity change, fatigue, fever and unexpected weight change  Cardiovascular: Negative for chest pain and palpitations  Gastrointestinal: Negative for abdominal pain, nausea and vomiting  Psychiatric/Behavioral: Negative for dysphoric mood  Past Medical History:   Diagnosis Date   • Anemia    • Chest tightness or pressure     8/19/2013   • Depression 5/14/2017   • Hypertension    • Vitamin D deficiency      Past Surgical History:   Procedure Laterality Date   • CYSTOSCOPY N/A 12/30/2018    Procedure: CYSTOSCOPY;  Surgeon: Suresh Brooks DO;  [facility];  Service: Gynecology   • HYSTERECTOMY N/A 12/30/2018    Procedure: HYSTERECTOMY LAPAROSCOPIC TOTAL ([address]);   Surgeon: Suresh Brooks DO;  [facility];  Service: Gynecology   • [place] LAPAROSCOPY W/RMVL ADNEXAL STRUCTURES Bilateral 12/30/2018    Procedure: SALPINGECTOMY, LAPAROSCOPIC;  Surgeon: Suresh Brooks DO;  [facility];  Service: Gynecology   • TONSILLECTOMY     • TUBAL LIGATION      2006     Family History   Problem Relation Age of Onset   • Heart disease Father    • Hypertension Father    • Heart attack Father 40     Social History     Socioeconomic History   • Marital status: /Civil Union     Spouse name: None   • Number of children: None   • Years of education: None   • Highest education level: None   Occupational History   • None   Tobacco Use   • Smoking status: Light Smoker     Packs/day: 0 50     Years: 35 00     Pack years: 17 50     Types: Cigarettes   • Smokeless tobacco: Never   • Tobacco comments:     social smoker, 1 pack per weekend    Vaping Use   • Vaping Use: Never used   Substance and Sexual Activity   • Alcohol use: Yes     Comment: on the weekends/ socially   • Drug use: No   • Sexual activity: Yes     Partners: Male     Birth control/protection: Female Sterilization   Other Topics Concern   • None   Social History Narrative    Uses safety equipment-seatbelts     Social Determinants of Health     Financial Resource Strain: Low Risk    • Difficulty of Paying Living Expenses: Not hard at all   Food Insecurity: No Food Insecurity   • Worried About Running Out of Food in the Last Year: Never true   • Ran Out of Food in the Last Year: Never true   Transportation Needs: No Transportation Needs   • Lack of Transportation (Medical): No   • Lack of Transportation (Non-Medical): No   Physical Activity: Inactive   • Days of Exercise per Week: 0 days   • Minutes of Exercise per Session: 0 min   Stress: No Stress Concern Present   • Feeling of Stress : Not at all   Social Connections:  Moderately Isolated   • Frequency of Communication with Friends and Family: More than three times a week   • Frequency of Social Gatherings with Friends and Family: More than three times a week   • Attends Latter day Services: Never   • Active Member of Clubs or Organizations: No   • Attends Club or Organization Meetings: Never   • Marital Status:    Intimate Partner Violence: Not At Risk   • Fear of Current or Ex-Partner: No   • Emotionally Abused: No   • Physically Abused: No   • Sexually Abused: No   Housing Stability: Low Risk    • Unable to Pay for Housing in the Last Year: No   • Number of [place] in the Last Year: 1   • Unstable Housing in the Last Year: No     Current Outpatient Medications on File Prior to Visit   Medication Sig   • atorvastatin (LIPITOR) 40 mg tablet TAKE 1 TABLET BY MOUTH EVERY DAY   • Azelastine HCl 137 MCG/SPRAY SOLN 1 SPRAY INTO EACH NOSTRIL 2 (TWO) TIMES A DAY USE IN EACH NOSTRIL AS DIRECTED   • hydrochlorothiazide (HYDRODIURIL) 25 mg tablet Take 1 tablet (25 mg total) by mouth daily   • hydrOXYzine HCL (ATARAX) 25 mg tablet Take 1 tablet (25 mg total) by mouth every 6 (six) hours as needed for anxiety   • losartan (COZAAR) 100 MG tablet TAKE 1 TABLET BY MOUTH EVERY DAY   • Omega-3 Fatty Acids (OMEGA 3 PO) Take by mouth   • [DISCONTINUED] ergocalciferol (VITAMIN D2) 50,000 units Take 1 capsule (50,000 Units total) by mouth once a week   • [DISCONTINUED] semaglutide, 0 25 or 0 5 mg/dose, (Ozempic) 2 mg/1 5 mL injection pen Inject 0 19 mL (0 25 mg total) under the skin every 7 days for 4 weeks, then follow up with PCP for possible dose increase  No Known Allergies  Immunization History   Administered Date(s) Administered   • COVID-19 PFIZER VACCINE 0 3 ML IM 03/24/2021, 04/14/2021, 12/02/2021   • Influenza, injectable, quadrivalent, preservative free 0 5 mL 01/08/2020, 11/30/2020   • Tdap 01/08/2020       Objective     /84 (BP Location: Left arm, Patient Position: Sitting, Cuff Size: Standard)   Pulse 86   Temp (!) 96 2 °F (35 7 °C) (Temporal)   Resp 18   Ht 5' 1" (1 549 m)   Wt 79 2 kg (174 lb 9 6 oz)   LMP 10/17/2018   SpO2 98%   BMI 32 99 kg/m²     Physical Exam  Constitutional:       General: She is not in acute distress  Appearance: She is not ill-appearing or toxic-appearing  HENT:      Head: Normocephalic and atraumatic  Right Ear: External ear normal       Left Ear: External ear normal    Eyes:      General: No scleral icterus  Right eye: No discharge  Left eye: No discharge  Extraocular Movements: Extraocular movements intact  Conjunctiva/sclera: Conjunctivae normal    Cardiovascular:      Rate and Rhythm: Normal rate and regular rhythm  Pulses: Normal pulses  Heart sounds: Normal heart sounds  No murmur heard  No friction rub  No gallop     Pulmonary:      Effort: Pulmonary effort is normal  No respiratory distress  Breath sounds: Normal breath sounds  No wheezing, rhonchi or rales  Skin:     General: Skin is warm and dry  Neurological:      General: No focal deficit present  Mental Status: She is alert  Motor: No weakness  Gait: Gait normal    Psychiatric:         Mood and Affect: Mood normal          Behavior: Behavior normal          Thought Content:  Thought content normal          Judgment: Judgment normal        Irma Jacques DO

## 2023-02-24 NOTE — ASSESSMENT & PLAN NOTE
BMI Counseling: Body mass index is 32 99 kg/m²  The BMI is above normal  Nutrition recommendations include reducing portion sizes, decreasing overall calorie intake, 3-5 servings of fruits/vegetables daily, decreasing soda and/or juice intake and increasing intake of lean protein  Exercise recommendations include vigorous aerobic physical activity for 75 minutes/week, exercising 3-5 times per week and joining a gym  Pharmacotherapy was ordered for patient to aid in weight loss  Patient is tolerating Ozempic for weight loss  Pt had 3 lb weight loss in 4 weeks  BP (132/84 prior to taking antihypertensives) and HR (86) are stable  Plan  - Increased Ozempic dose to 0 5 mg weekly for 4 weeks    - RTO in 4 weeks to assess's if pt is tolerating dose and potentially increase it to 1 mg weekly

## 2023-03-24 ENCOUNTER — OFFICE VISIT (OUTPATIENT)
Dept: FAMILY MEDICINE CLINIC | Facility: CLINIC | Age: 43
End: 2023-03-24

## 2023-03-24 VITALS
SYSTOLIC BLOOD PRESSURE: 113 MMHG | DIASTOLIC BLOOD PRESSURE: 80 MMHG | BODY MASS INDEX: 32.1 KG/M2 | RESPIRATION RATE: 20 BRPM | HEART RATE: 80 BPM | WEIGHT: 170 LBS | OXYGEN SATURATION: 98 % | HEIGHT: 61 IN | TEMPERATURE: 97.8 F

## 2023-03-24 DIAGNOSIS — R30.0 DYSURIA: Primary | ICD-10-CM

## 2023-03-24 RX ORDER — NITROFURANTOIN 25; 75 MG/1; MG/1
100 CAPSULE ORAL 2 TIMES DAILY
Qty: 10 CAPSULE | Refills: 0 | Status: SHIPPED | OUTPATIENT
Start: 2023-03-24 | End: 2023-03-29

## 2023-03-24 NOTE — PROGRESS NOTES
Name: Dixon Hernandez      : 1980      MRN: [id number]  Encounter Provider: Azeb Mcdonald DO  Encounter Date: 3/24/2023   Encounter department: [address] [place] [address]  Dysuria  Assessment & Plan:  43yo female presenting c/o dysuria since Monday  Denies fever, back pain, n/v  Pt has been managing symptoms with phenazopyridine  VSS, physical exam unremarkable  Plan:  - Urine color will be altered  - Will empirically treat pt for UTI with Macrobid 100mg BIDx5 days  - F/u PRN    Orders:  -     nitrofurantoin (MACROBID) 100 mg capsule; Take 1 capsule (100 mg total) by mouth 2 (two) times a day for 5 days         Subjective      43yo female presenting c/o dysuria since Monday  Pt was recently on vacation, wore her wet bathing suit for an extended period of time  Pt denies fever, abdominal pain, back pain, n/v     Review of Systems   Constitutional: Negative for chills and fever  HENT: Negative for congestion  Eyes: Negative for visual disturbance  Respiratory: Negative for shortness of breath  Cardiovascular: Negative for chest pain and palpitations  Gastrointestinal: Negative for abdominal pain  Genitourinary: Positive for dysuria  Negative for flank pain and hematuria  Allergic/Immunologic: Negative for environmental allergies and food allergies  Neurological: Negative for dizziness, weakness and headaches  Psychiatric/Behavioral: Negative for sleep disturbance         Current Outpatient Medications on File Prior to Visit   Medication Sig   • atorvastatin (LIPITOR) 40 mg tablet TAKE 1 TABLET BY MOUTH EVERY DAY   • Azelastine HCl 137 MCG/SPRAY SOLN 1 SPRAY INTO EACH NOSTRIL 2 (TWO) TIMES A DAY USE IN EACH NOSTRIL AS DIRECTED   • hydrochlorothiazide (HYDRODIURIL) 25 mg tablet Take 1 tablet (25 mg total) by mouth daily   • hydrOXYzine HCL (ATARAX) 25 mg tablet Take 1 tablet (25 mg total) by mouth every 6 (six) hours as needed for anxiety   • losartan (COZAAR) 100 MG tablet TAKE 1 TABLET BY MOUTH EVERY DAY   • Omega-3 Fatty Acids (OMEGA 3 PO) Take by mouth   • semaglutide, 0 25 or 0 5 mg/dose, (Ozempic) 2 mg/1 5 mL injection pen Inject 0 38 mL (0 5 mg total) under the skin every 7 days for 4 weeks, then follow up with PCP for possible dose increase  Objective     /80 (BP Location: Right arm, Patient Position: Sitting, Cuff Size: Standard)   Pulse 80   Temp 97 8 °F (36 6 °C) (Temporal)   Resp 20   Ht 5' 1" (1 549 m)   Wt 77 1 kg (170 lb)   LMP 10/17/2018   SpO2 98%   BMI 32 12 kg/m²     Physical Exam  Constitutional:       Appearance: Normal appearance  HENT:      Head: Normocephalic and atraumatic  Cardiovascular:      Rate and Rhythm: Normal rate and regular rhythm  Heart sounds: Normal heart sounds  Pulmonary:      Effort: Pulmonary effort is normal       Breath sounds: Normal breath sounds  Abdominal:      General: Bowel sounds are normal       Tenderness: There is no abdominal tenderness  There is no right CVA tenderness, left CVA tenderness, guarding or rebound  Musculoskeletal:         General: No swelling or deformity  Skin:     General: Skin is warm and dry  Neurological:      Mental Status: She is alert     Psychiatric:         Mood and Affect: Mood normal          Behavior: Behavior normal           Larissa Patel DO

## 2023-03-24 NOTE — ASSESSMENT & PLAN NOTE
43yo female presenting c/o dysuria since Monday  Denies fever, back pain, n/v  Pt has been managing symptoms with phenazopyridine  VSS, physical exam unremarkable      Plan:  - Urine color will be altered  - Will empirically treat pt for UTI with Macrobid 100mg BIDx5 days  - F/u PRN

## 2023-03-25 ENCOUNTER — NURSE TRIAGE (OUTPATIENT)
Dept: FAMILY MEDICINE CLINIC | Facility: CLINIC | Age: 43
End: 2023-03-25

## 2023-03-25 NOTE — TELEPHONE ENCOUNTER
Regarding: UTI - pain, frequency  ----- Message from Amelie Patient sent at 3/25/2023 12:40 PM EDT -----  " I was diagnosed with a UTI and given an antibiotic and am taking the max dosage of Azo,  but I am still having severe pain during urination and urgency when I am trying to sleep during the night "

## 2023-03-25 NOTE — TELEPHONE ENCOUNTER
Reason for Disposition  • [1] Taking antibiotic < 72 hours (3 days) for UTI AND [2] painful urination or frequency not improved    Answer Assessment - Initial Assessment Questions  1  ANTIBIOTIC: "What antibiotic are you taking?" "How many times per day?"      macrobid 2 doses    2  DURATION: "When was the antibiotic started?"      Yesterday    3  MAIN SYMPTOM: "What is the main symptom you are concerned about?"      Pain with urination, lower mid abd pain constant 6/10    4  FEVER: "Do you have a fever?" If Yes, ask: "What is it, how was it measured, and when did it start?"      Denies    5   OTHER SYMPTOMS: "Do you have any other symptoms?" (e g , flank pain, vaginal discharge, blood in urine)      frequency    Protocols used: URINARY TRACT INFECTION ON ANTIBIOTIC FOLLOW-UP CALL [facility]

## 2023-03-25 NOTE — TELEPHONE ENCOUNTER
Monday s/s started  Seen in office Friday  Started on abx, two doses in  Ongoing s/s  Little relief from OTC pain meds and Azo  On call provider contacted  Recommends pt continue with abx, and otc treatment and drink plenty of fluids  Monitor for fever and flank pain, proceed to ER if occurs

## 2023-03-31 ENCOUNTER — TELEPHONE (OUTPATIENT)
Dept: FAMILY MEDICINE CLINIC | Facility: CLINIC | Age: 43
End: 2023-03-31

## 2023-03-31 ENCOUNTER — OFFICE VISIT (OUTPATIENT)
Dept: FAMILY MEDICINE CLINIC | Facility: CLINIC | Age: 43
End: 2023-03-31

## 2023-03-31 VITALS
BODY MASS INDEX: 31.93 KG/M2 | DIASTOLIC BLOOD PRESSURE: 88 MMHG | OXYGEN SATURATION: 98 % | WEIGHT: 169 LBS | HEART RATE: 97 BPM | SYSTOLIC BLOOD PRESSURE: 132 MMHG

## 2023-03-31 DIAGNOSIS — E66.09 CLASS 1 OBESITY DUE TO EXCESS CALORIES WITH SERIOUS COMORBIDITY AND BODY MASS INDEX (BMI) OF 33.0 TO 33.9 IN ADULT: Primary | ICD-10-CM

## 2023-03-31 DIAGNOSIS — I10 ESSENTIAL HYPERTENSION: ICD-10-CM

## 2023-03-31 NOTE — ASSESSMENT & PLAN NOTE
Patient is tolerating Ozempic for weight loss  Pt had 5 lb weight loss since last visit on 2/24  BP (132/88 prior to taking antihypertensives) and HR (97) are stable  Plan  - Increased Ozempic dose to 1 mg weekly for 4 weeks    - RTO in 4 weeks to assess's if pt is tolerating dose and potentially increase dose

## 2023-03-31 NOTE — ASSESSMENT & PLAN NOTE
"Last ultrasound in 2016: \"Hypoechoic right mid lobe nodule measuring 5 x 3 mm  Otherwise unremarkable exam\"  Repeat US ordered by Marium Carmona previously  Discussed repeating ultrasound to see if there are any changes, pt is agreeable  She denies any family history of thyroid cancer  Denies symptoms of dysthyroidism     "

## 2023-03-31 NOTE — PATIENT INSTRUCTIONS
Central [phone]  - Call to schedule Mammogram and US thyroid follow up  Increased Ozempic dose to 1 mg weekly  Follow up in 4 weeks

## 2023-03-31 NOTE — ASSESSMENT & PLAN NOTE
37 yo patient presents for weight check (on ozempic) and BP follow up  Pt is compliant with medications  Today BP is near goal 138/84, on manual repeat 132/88  Goal <130/90  Plan  - continue losartan to 100 mg daily and HCTZ 25mg daily  - counseled on lifestyle modifications including low sodium, DASH diet, low sugar, exercise, and weight loss     - f/u in 1 mo for weight check

## 2023-03-31 NOTE — PROGRESS NOTES
Name: Sabra Car      : 1980      MRN: [id number]  Encounter Provider: Lisy Alvares DO  Encounter Date: 3/31/2023   Encounter department: [address] [place] [address]  Class 1 obesity due to excess calories with serious comorbidity and body mass index (BMI) of 33 0 to 33 9 in adult  Assessment & Plan:  Patient is tolerating Ozempic for weight loss  Pt had 5 lb weight loss since last visit on   BP (132/88 prior to taking antihypertensives) and HR (97) are stable  Plan  - Increased Ozempic dose to 1 mg weekly for 4 weeks    - RTO in 4 weeks to assess's if pt is tolerating dose and potentially increase dose  Orders:  -     semaglutide, 1 mg/dose, (Ozempic) 4 mg/3 mL injection pen; Inject 0 75 mL (1 mg total) under the skin once a week    2  Essential hypertension  Assessment & Plan:  35 yo patient presents for weight check (on ozempic) and BP follow up  Pt is compliant with medications  Today BP is near goal 138/84, on manual repeat 132/88  Goal <130/90  Plan  - continue losartan to 100 mg daily and HCTZ 25mg daily  - counseled on lifestyle modifications including low sodium, DASH diet, low sugar, exercise, and weight loss  - f/u in 1 mo for weight check           Subjective     HPI   Patient is here for weight recheck she is feeling overall really good  She returned from [place] vacation last week  Patient is tolerating Ozempic well  She takes it for day-to-day and tends to feel a little nauseous and have a little headache the next day which is controlled with over-the-counter antinausea medicine  Patient noticed that she feels full more often  She has no other side effects  Patient continues to maintain her exercise regimen  Review of Systems   Constitutional: Positive for appetite change  Negative for activity change, fatigue and fever  HENT: Negative for congestion, rhinorrhea and sore throat      Eyes: Negative for visual disturbance  Respiratory: Negative for cough and shortness of breath  Cardiovascular: Negative for chest pain and palpitations  Gastrointestinal: Positive for constipation and nausea  Negative for abdominal pain, diarrhea and vomiting  Genitourinary: Negative for dysuria, flank pain and urgency  Past Medical History:   Diagnosis Date   • Anemia    • Chest tightness or pressure     8/19/2013   • Depression 5/14/2017   • Hypertension    • Vitamin D deficiency      Past Surgical History:   Procedure Laterality Date   • CYSTOSCOPY N/A 12/30/2018    Procedure: CYSTOSCOPY;  Surgeon: Reina Marshall DO;  [facility];  Service: Gynecology   • HYSTERECTOMY N/A 12/30/2018    Procedure: HYSTERECTOMY LAPAROSCOPIC TOTAL ([address]);   Surgeon: Reina Marshall DO;  [facility];  Service: Gynecology   • NM LAPAROSCOPY W/RMVL ADNEXAL STRUCTURES Bilateral 12/30/2018    Procedure: SALPINGECTOMY, LAPAROSCOPIC;  Surgeon: Reina Marshall DO;  [facility];  Service: Gynecology   • TONSILLECTOMY     • TUBAL LIGATION      2006     Family History   Problem Relation Age of Onset   • Heart disease Father    • Hypertension Father    • Heart attack Father 40     Social History     Socioeconomic History   • Marital status: /Civil Union     Spouse name: None   • Number of children: None   • Years of education: None   • Highest education level: None   Occupational History   • None   Tobacco Use   • Smoking status: Light Smoker     Packs/day: 0 25     Years: 35 00     Pack years: 8 75     Types: Cigarettes   • Smokeless tobacco: Never   • Tobacco comments:     social smoker, less than 1 pack per weekend   Vaping Use   • Vaping Use: Never used   Substance and Sexual Activity   • Alcohol use: Yes     Comment: on the weekends/ socially   • Drug use: No   • Sexual activity: Yes     Partners: Male     Birth control/protection: Female Sterilization   Other Topics Concern   • None   Social History Narrative Uses safety equipment-seatbelts     Social Determinants of Health     Financial Resource Strain: Low Risk    • Difficulty of Paying Living Expenses: Not hard at all   Food Insecurity: No Food Insecurity   • Worried About Running Out of Food in the Last Year: Never true   • Ran Out of Food in the Last Year: Never true   Transportation Needs: No Transportation Needs   • Lack of Transportation (Medical): No   • Lack of Transportation (Non-Medical): No   Physical Activity: Inactive   • Days of Exercise per Week: 0 days   • Minutes of Exercise per Session: 0 min   Stress: No Stress Concern Present   • Feeling of Stress : Not at all   Social Connections:  Moderately Isolated   • Frequency of Communication with Friends and Family: More than three times a week   • Frequency of Social Gatherings with Friends and Family: More than three times a week   • Attends Mosque Services: Never   • Active Member of Clubs or Organizations: No   • Attends Club or Organization Meetings: Never   • Marital Status:    Intimate Partner Violence: Not At Risk   • Fear of Current or Ex-Partner: No   • Emotionally Abused: No   • Physically Abused: No   • Sexually Abused: No   Housing Stability: Low Risk    • Unable to Pay for Housing in the Last Year: No   • Number of [place] in the Last Year: 1   • Unstable Housing in the Last Year: No     Current Outpatient Medications on File Prior to Visit   Medication Sig   • atorvastatin (LIPITOR) 40 mg tablet TAKE 1 TABLET BY MOUTH EVERY DAY   • Azelastine HCl 137 MCG/SPRAY SOLN 1 SPRAY INTO EACH NOSTRIL 2 (TWO) TIMES A DAY USE IN EACH NOSTRIL AS DIRECTED   • hydrochlorothiazide (HYDRODIURIL) 25 mg tablet Take 1 tablet (25 mg total) by mouth daily   • hydrOXYzine HCL (ATARAX) 25 mg tablet Take 1 tablet (25 mg total) by mouth every 6 (six) hours as needed for anxiety   • losartan (COZAAR) 100 MG tablet TAKE 1 TABLET BY MOUTH EVERY DAY   • Omega-3 Fatty Acids (OMEGA 3 PO) Take by mouth   • [DISCONTINUED] semaglutide, 0 25 or 0 5 mg/dose, (Ozempic) 2 mg/1 5 mL injection pen Inject 0 38 mL (0 5 mg total) under the skin every 7 days for 4 weeks, then follow up with PCP for possible dose increase  No Known Allergies  Immunization History   Administered Date(s) Administered   • COVID-19 PFIZER VACCINE 0 3 ML IM 03/24/2021, 04/14/2021, 12/02/2021   • Influenza, injectable, quadrivalent, preservative free 0 5 mL 01/08/2020, 11/30/2020   • Tdap 01/08/2020       Objective     /88 (BP Location: Left arm, Patient Position: Sitting)   Pulse 97   Wt 76 7 kg (169 lb)   LMP 10/17/2018   SpO2 98%   BMI 31 93 kg/m²     Physical Exam  Constitutional:       General: She is not in acute distress  Appearance: She is not ill-appearing or toxic-appearing  HENT:      Head: Normocephalic and atraumatic  Right Ear: External ear normal       Left Ear: External ear normal    Eyes:      General: No scleral icterus  Right eye: No discharge  Left eye: No discharge  Extraocular Movements: Extraocular movements intact  Conjunctiva/sclera: Conjunctivae normal    Cardiovascular:      Rate and Rhythm: Normal rate  Pulmonary:      Effort: Pulmonary effort is normal  No respiratory distress  Skin:     General: Skin is warm and dry  Neurological:      General: No focal deficit present  Mental Status: She is alert  Motor: No weakness        Gait: Gait normal    Psychiatric:         Mood and Affect: Mood normal          Behavior: Behavior normal        Terrilee Evener, DO

## 2023-03-31 NOTE — TELEPHONE ENCOUNTER
Patient is request that the medication Ozempic be resent to [facility] on [address]  Patient states that the Rashard Ferrara is on back order for [facility]

## 2023-04-26 ENCOUNTER — TELEPHONE (OUTPATIENT)
Dept: FAMILY MEDICINE CLINIC | Facility: CLINIC | Age: 43
End: 2023-04-26

## 2023-04-26 DIAGNOSIS — E66.09 CLASS 1 OBESITY DUE TO EXCESS CALORIES WITH SERIOUS COMORBIDITY AND BODY MASS INDEX (BMI) OF 33.0 TO 33.9 IN ADULT: ICD-10-CM

## 2023-04-26 NOTE — TELEPHONE ENCOUNTER
Patient called states that she took her last shot of Ozempic and needs a refill until appointment with Dr Sharath Pratt on 5/4/2023

## 2023-04-27 ENCOUNTER — RA CDI HCC (OUTPATIENT)
Dept: OTHER | Facility: HOSPITAL | Age: 43
End: 2023-04-27

## 2023-05-04 ENCOUNTER — OFFICE VISIT (OUTPATIENT)
Dept: FAMILY MEDICINE CLINIC | Facility: CLINIC | Age: 43
End: 2023-05-04

## 2023-05-04 VITALS
HEIGHT: 61 IN | WEIGHT: 163 LBS | RESPIRATION RATE: 19 BRPM | OXYGEN SATURATION: 97 % | SYSTOLIC BLOOD PRESSURE: 130 MMHG | DIASTOLIC BLOOD PRESSURE: 87 MMHG | HEART RATE: 74 BPM | BODY MASS INDEX: 30.78 KG/M2 | TEMPERATURE: 98.2 F

## 2023-05-04 DIAGNOSIS — I10 ESSENTIAL HYPERTENSION: ICD-10-CM

## 2023-05-04 DIAGNOSIS — E66.09 CLASS 1 OBESITY DUE TO EXCESS CALORIES WITH SERIOUS COMORBIDITY AND BODY MASS INDEX (BMI) OF 33.0 TO 33.9 IN ADULT: Primary | ICD-10-CM

## 2023-05-05 ENCOUNTER — TELEPHONE (OUTPATIENT)
Dept: FAMILY MEDICINE CLINIC | Facility: CLINIC | Age: 43
End: 2023-05-05

## 2023-05-05 NOTE — PROGRESS NOTES
Name: Margot Vick      : 1980      MRN: [id number]  Encounter Provider: Catherine Vu DO  Encounter Date: 2023   Encounter department: [address] [place] [address]  Class 1 obesity due to excess calories with serious comorbidity and body mass index (BMI) of 33 0 to 33 9 in adult  Assessment & Plan:  Switch ozempic to wegovy at increased dose 1 7 mg per week  Pt verbalizes understanding and agreeable  Orders:  -     Semaglutide-Weight Management (WEGOVY) 1 7 MG/0 75ML; Inject 0 75 mL (1 7 mg total) under the skin once a week    2  Essential hypertension  Assessment & Plan:  Stable and improving with weight loss  C/w anti HTN medications           Subjective     HPI   Pt here for weight check  She lost 9 lbs since last visit 5 weeks ago  Pt tolerating semaglutide well  Review of Systems   Constitutional: Negative for chills and fever  Eyes: Negative for visual disturbance  Respiratory: Negative for cough and shortness of breath  Cardiovascular: Negative for chest pain and palpitations  Gastrointestinal: Negative for abdominal pain, constipation, diarrhea, nausea and vomiting  Genitourinary: Negative for dysuria and hematuria  Skin: Negative for rash  Neurological: Negative for seizures and syncope  All other systems reviewed and are negative  Past Medical History:   Diagnosis Date    Anemia     Chest tightness or pressure     2013    Depression 2017    Hypertension     Vitamin D deficiency      Past Surgical History:   Procedure Laterality Date    CYSTOSCOPY N/A 2018    Procedure: CYSTOSCOPY;  Surgeon: Melanie Mayen DO;  [facility];  Service: Gynecology    HYSTERECTOMY N/A 2018    Procedure: HYSTERECTOMY LAPAROSCOPIC TOTAL ([address] [place] [address]);   Surgeon: Melanie Mayen DO;  [facility];  Service: Gynecology    AK LAPAROSCOPY W/RMVL ADNEXAL STRUCTURES Bilateral 2018    Procedure: SALPINGECTOMY, LAPAROSCOPIC;  Surgeon: Tressa De Luna DO;  [facility];  Service: Gynecology    TONSILLECTOMY      TUBAL LIGATION      2006     Family History   Problem Relation Age of Onset    Heart disease Father     Hypertension Father     Heart attack Father 40     Social History     Socioeconomic History    Marital status: /Civil Union     Spouse name: None    Number of children: None    Years of education: None    Highest education level: None   Occupational History    None   Tobacco Use    Smoking status: Light Smoker     Packs/day: 0 25     Years: 35 00     Pack years: 8 75     Types: Cigarettes    Smokeless tobacco: Never    Tobacco comments:     social smoker, less than 1 pack per weekend   Vaping Use    Vaping Use: Never used   Substance and Sexual Activity    Alcohol use: Yes     Comment: on the weekends/ socially    Drug use: No    Sexual activity: Yes     Partners: Male     Birth control/protection: Female Sterilization   Other Topics Concern    None   Social History Narrative    Uses safety equipment-seatbelts     Social Determinants of Health     Financial Resource Strain: Low Risk     Difficulty of Paying Living Expenses: Not hard at all   Food Insecurity: No Food Insecurity    Worried About Running Out of Food in the Last Year: Never true    Cassandra of Food in the Last Year: Never true   Transportation Needs: No Transportation Needs    Lack of Transportation (Medical): No    Lack of Transportation (Non-Medical): No   Physical Activity: Inactive    Days of Exercise per Week: 0 days    Minutes of Exercise per Session: 0 min   Stress: No Stress Concern Present    Feeling of Stress : Not at all   Social Connections:  Moderately Isolated    Frequency of Communication with Friends and Family: More than three times a week    Frequency of Social Gatherings with Friends and Family: More than three times a week    Attends Anabaptist Services: Never    Active "Member of Clubs or Organizations: No    Attends Club or Organization Meetings: Never    Marital Status:    Intimate Partner Violence: Not At Risk    Fear of Current or Ex-Partner: No    Emotionally Abused: No    Physically Abused: No    Sexually Abused: No   Housing Stability: Low Risk     Unable to Pay for Housing in the Last Year: No    Number of [place] in the Last Year: 1    Unstable Housing in the Last Year: No     Current Outpatient Medications on File Prior to Visit   Medication Sig    atorvastatin (LIPITOR) 40 mg tablet TAKE 1 TABLET BY MOUTH EVERY DAY    Azelastine HCl 137 MCG/SPRAY SOLN 1 SPRAY INTO EACH NOSTRIL 2 (TWO) TIMES A DAY USE IN EACH NOSTRIL AS DIRECTED    hydrochlorothiazide (HYDRODIURIL) 25 mg tablet Take 1 tablet (25 mg total) by mouth daily    hydrOXYzine HCL (ATARAX) 25 mg tablet Take 1 tablet (25 mg total) by mouth every 6 (six) hours as needed for anxiety    losartan (COZAAR) 100 MG tablet TAKE 1 TABLET BY MOUTH EVERY DAY    Omega-3 Fatty Acids (OMEGA 3 PO) Take by mouth    [DISCONTINUED] semaglutide, 1 mg/dose, (Ozempic) 4 mg/3 mL injection pen Inject 0 75 mL (1 mg total) under the skin once a week     No Known Allergies  Immunization History   Administered Date(s) Administered    COVID-19 PFIZER VACCINE 0 3 ML IM 03/24/2021, 04/14/2021, 12/02/2021    Influenza, injectable, quadrivalent, preservative free 0 5 mL 01/08/2020, 11/30/2020    Tdap 01/08/2020       Objective     /87 (BP Location: Left arm, Patient Position: Sitting, Cuff Size: Standard)   Pulse 74   Temp 98 2 °F (36 8 °C) (Temporal)   Resp 19   Ht 5' 1\" (1 549 m)   Wt 73 9 kg (163 lb)   LMP 10/17/2018   SpO2 97%   BMI 30 80 kg/m²     Physical Exam  Constitutional:       General: She is not in acute distress  Appearance: She is not ill-appearing or toxic-appearing  HENT:      Head: Normocephalic and atraumatic        Right Ear: External ear normal       Left Ear: External ear " normal    Eyes:      General: No scleral icterus  Right eye: No discharge  Left eye: No discharge  Extraocular Movements: Extraocular movements intact  Conjunctiva/sclera: Conjunctivae normal    Cardiovascular:      Rate and Rhythm: Normal rate  Pulmonary:      Effort: Pulmonary effort is normal  No respiratory distress  Abdominal:      Palpations: Abdomen is soft  Skin:     General: Skin is warm and dry  Neurological:      General: No focal deficit present  Mental Status: She is alert  Motor: No weakness        Gait: Gait normal    Psychiatric:         Mood and Affect: Mood normal          Behavior: Behavior normal        Melvin Rinne, DO

## 2023-05-05 NOTE — TELEPHONE ENCOUNTER
Please start prior auth for wegovy      Patient stated that Dr Olive Cooper wanted to up her dose and put her on wegovy  It needs a prior auth  In the mean time patient still has injections left from her ozempic  She wants to know if she should continue taking the ozempic until the wegovy gets auth? Please call patient back before the weekend so she knows what she should do      Cheyenne Molina [phone]

## 2023-05-05 NOTE — ASSESSMENT & PLAN NOTE
Switch ozempic to wegovy at increased dose 1 7 mg per week  Pt verbalizes understanding and agreeable

## 2023-05-08 ENCOUNTER — TELEPHONE (OUTPATIENT)
Dept: FAMILY MEDICINE CLINIC | Facility: CLINIC | Age: 43
End: 2023-05-08

## 2023-05-08 NOTE — TELEPHONE ENCOUNTER
Called patient and LVM to explain that she can continue taking her remaining 3 weeks of Ozempic  If Julienne Merritts is filled by by that time she can switch to Julienne Merritts the following Sunday  If not, pt should call the clinic and I will refill Ozempic at the increased dose of 1 7 mg per week until Julienne Merclintons is approved

## 2023-05-12 DIAGNOSIS — E66.09 CLASS 1 OBESITY DUE TO EXCESS CALORIES WITH SERIOUS COMORBIDITY AND BODY MASS INDEX (BMI) OF 33.0 TO 33.9 IN ADULT: Primary | ICD-10-CM

## 2023-05-16 ENCOUNTER — TELEPHONE (OUTPATIENT)
Dept: FAMILY MEDICINE CLINIC | Facility: CLINIC | Age: 43
End: 2023-05-16

## 2023-05-16 DIAGNOSIS — E66.09 CLASS 1 OBESITY DUE TO EXCESS CALORIES WITH SERIOUS COMORBIDITY AND BODY MASS INDEX (BMI) OF 33.0 TO 33.9 IN ADULT: ICD-10-CM

## 2023-05-16 NOTE — TELEPHONE ENCOUNTER
Sorry it is from Encounter 5/12 Refill  Because I put it under Refill Medicine it created a new encounter

## 2023-05-16 NOTE — TELEPHONE ENCOUNTER
[facility] original [facility]  And was told by pharmacist they are waiting from provider office to get info so it may be transferred to [facility]      Patient can be reached at [phone]

## 2023-05-18 ENCOUNTER — OFFICE VISIT (OUTPATIENT)
Dept: FAMILY MEDICINE CLINIC | Facility: CLINIC | Age: 43
End: 2023-05-18

## 2023-05-18 VITALS
SYSTOLIC BLOOD PRESSURE: 120 MMHG | TEMPERATURE: 97.6 F | BODY MASS INDEX: 30.65 KG/M2 | HEART RATE: 70 BPM | DIASTOLIC BLOOD PRESSURE: 86 MMHG | WEIGHT: 162.2 LBS

## 2023-05-18 DIAGNOSIS — H66.003 NON-RECURRENT ACUTE SUPPURATIVE OTITIS MEDIA OF BOTH EARS WITHOUT SPONTANEOUS RUPTURE OF TYMPANIC MEMBRANES: Primary | ICD-10-CM

## 2023-05-18 DIAGNOSIS — H60.502 ACUTE OTITIS EXTERNA OF LEFT EAR, UNSPECIFIED TYPE: ICD-10-CM

## 2023-05-18 RX ORDER — AMOXICILLIN 500 MG/1
500 CAPSULE ORAL EVERY 8 HOURS SCHEDULED
Qty: 30 CAPSULE | Refills: 0 | Status: SHIPPED | OUTPATIENT
Start: 2023-05-18 | End: 2023-05-19

## 2023-05-18 RX ORDER — CIPROFLOXACIN AND DEXAMETHASONE 3; 1 MG/ML; MG/ML
4 SUSPENSION/ DROPS AURICULAR (OTIC) 2 TIMES DAILY
Qty: 7.5 ML | Refills: 0 | Status: SHIPPED | OUTPATIENT
Start: 2023-05-18

## 2023-05-18 NOTE — PROGRESS NOTES
Name: Suzy Boxer      : 1980      MRN: [id number]  Encounter Provider: Steven Pina DO  Encounter Date: 2023   Encounter department: [address] [place] [address]     1  Non-recurrent acute suppurative otitis media of both ears without spontaneous rupture of tympanic membranes  Assessment & Plan:  7 days of amoxicillin 500 mg TID  F/u in 1 week, consider extending course as needed  Orders:  -     amoxicillin (AMOXIL) 500 mg capsule; Take 1 capsule (500 mg total) by mouth every 8 (eight) hours for 7 days    2  Acute otitis externa of left ear, unspecified type  -     ciprofloxacin-dexamethasone (CIPRODEX) otic suspension; Administer 4 drops into the left ear 2 (two) times a day         Subjective     Earache   There is pain in the left ear  This is a new problem  Episode onset: Saturday  The problem occurs constantly  The problem has been gradually worsening  There has been no fever  The pain is at a severity of 7/10  Associated symptoms include headaches ( HA however pt forgot glasses at home)  Pertinent negatives include no abdominal pain, coughing, diarrhea, ear discharge, hearing loss, neck pain, rash, rhinorrhea, sore throat or vomiting  Associated symptoms comments: Clogged  Burping hurts    She has tried NSAIDs for the symptoms  The treatment provided significant (Motrin q4hrs) relief  There is no history of a chronic ear infection, hearing loss or a tympanostomy tube  Review of Systems   HENT: Positive for ear pain  Negative for ear discharge, hearing loss, rhinorrhea and sore throat  Respiratory: Negative for cough  Gastrointestinal: Negative for abdominal pain, diarrhea and vomiting  Musculoskeletal: Negative for neck pain  Skin: Negative for rash  Neurological: Positive for headaches ( HA however pt forgot glasses at home)         Past Medical History:   Diagnosis Date   • Anemia    • Chest tightness or pressure 8/19/2013   • Depression 5/14/2017   • Hypertension    • Vitamin D deficiency      Past Surgical History:   Procedure Laterality Date   • CYSTOSCOPY N/A 12/30/2018    Procedure: CYSTOSCOPY;  Surgeon: Danish Angulo DO;  [facility];  Service: Gynecology   • HYSTERECTOMY N/A 12/30/2018    Procedure: HYSTERECTOMY LAPAROSCOPIC TOTAL ([address]);   Surgeon: Danish Angulo DO;  [facility];  Service: Gynecology   • [place] LAPAROSCOPY W/RMVL ADNEXAL STRUCTURES Bilateral 12/30/2018    Procedure: SALPINGECTOMY, LAPAROSCOPIC;  Surgeon: Danish Angulo DO;  [facility];  Service: Gynecology   • TONSILLECTOMY     • TUBAL LIGATION      2006     Family History   Problem Relation Age of Onset   • Heart disease Father    • Hypertension Father    • Heart attack Father 40     Social History     Socioeconomic History   • Marital status: /Civil Union     Spouse name: Not on file   • Number of children: Not on file   • Years of education: Not on file   • Highest education level: Not on file   Occupational History   • Not on file   Tobacco Use   • Smoking status: Light Smoker     Packs/day: 0 25     Years: 35 00     Pack years: 8 75     Types: Cigarettes   • Smokeless tobacco: Never   • Tobacco comments:     social smoker, less than 1 pack per weekend   Vaping Use   • Vaping Use: Never used   Substance and Sexual Activity   • Alcohol use: Yes     Comment: on the weekends/ socially   • Drug use: No   • Sexual activity: Yes     Partners: Male     Birth control/protection: Female Sterilization   Other Topics Concern   • Not on file   Social History Narrative    Uses safety equipment-seatbelts     Social Determinants of Health     Financial Resource Strain: Low Risk    • Difficulty of Paying Living Expenses: Not hard at all   Food Insecurity: No Food Insecurity   • Worried About Running Out of Food in the Last Year: Never true   • Ran Out of Food in the Last Year: Never true   Transportation Needs: No Transportation Needs   • Lack of Transportation (Medical): No   • Lack of Transportation (Non-Medical): No   Physical Activity: Inactive   • Days of Exercise per Week: 0 days   • Minutes of Exercise per Session: 0 min   Stress: No Stress Concern Present   • Feeling of Stress : Not at all   Social Connections:  Moderately Isolated   • Frequency of Communication with Friends and Family: More than three times a week   • Frequency of Social Gatherings with Friends and Family: More than three times a week   • Attends [employer] Services: Never   • Active Member of Clubs or Organizations: No   • Attends Club or Organization Meetings: Never   • Marital Status:    Intimate Partner Violence: Not At Risk   • Fear of Current or Ex-Partner: No   • Emotionally Abused: No   • Physically Abused: No   • Sexually Abused: No   Housing Stability: Low Risk    • Unable to Pay for Housing in the Last Year: No   • Number of [place] in the Last Year: 1   • Unstable Housing in the Last Year: No     Current Outpatient Medications on File Prior to Visit   Medication Sig   • atorvastatin (LIPITOR) 40 mg tablet TAKE 1 TABLET BY MOUTH EVERY DAY   • Azelastine HCl 137 MCG/SPRAY SOLN 1 SPRAY INTO EACH NOSTRIL 2 (TWO) TIMES A DAY USE IN EACH NOSTRIL AS DIRECTED   • hydrochlorothiazide (HYDRODIURIL) 25 mg tablet Take 1 tablet (25 mg total) by mouth daily   • hydrOXYzine HCL (ATARAX) 25 mg tablet Take 1 tablet (25 mg total) by mouth every 6 (six) hours as needed for anxiety   • losartan (COZAAR) 100 MG tablet TAKE 1 TABLET BY MOUTH EVERY DAY   • Omega-3 Fatty Acids (OMEGA 3 PO) Take by mouth   • semaglutide, 2 mg/dose, (Ozempic) 8 mg/ mL injection pen Inject 0 64 mL (1 7 mg total) under the skin every 7 days for 4 doses     No Known Allergies  Immunization History   Administered Date(s) Administered   • COVID-19 PFIZER VACCINE 0 3 ML IM 03/24/2021, 04/14/2021, 12/02/2021   • Influenza, injectable, quadrivalent, preservative free 0 5 mL 01/08/2020, 11/30/2020   • Tdap 01/08/2020       Objective     /86 (BP Location: Left arm, Patient Position: Sitting, Cuff Size: Adult)   Pulse 70   Temp 97 6 °F (36 4 °C)   Wt 73 6 kg (162 lb 3 2 oz)   LMP 10/17/2018   BMI 30 65 kg/m²     Physical Exam  Constitutional:       General: She is not in acute distress  Appearance: She is not ill-appearing or toxic-appearing  HENT:      Head: Normocephalic and atraumatic  Right Ear: External ear normal  A middle ear effusion is present  Tympanic membrane is bulging  Left Ear: External ear normal  A middle ear effusion is present  Tympanic membrane is bulging  Ears:      Comments: Left ear: Erythematous canal adjacent to eardrum  Eyes:      General: No scleral icterus  Right eye: No discharge  Left eye: No discharge  Extraocular Movements: Extraocular movements intact  Conjunctiva/sclera: Conjunctivae normal    Cardiovascular:      Rate and Rhythm: Normal rate  Pulmonary:      Effort: Pulmonary effort is normal  No respiratory distress  Skin:     General: Skin is warm and dry  Neurological:      General: No focal deficit present  Mental Status: She is alert  Motor: No weakness  Gait: Gait normal    Psychiatric:         Mood and Affect: Mood normal          Behavior: Behavior normal          Thought Content:  Thought content normal          Judgment: Judgment normal        Akash Matthews DO

## 2023-05-19 ENCOUNTER — TELEPHONE (OUTPATIENT)
Dept: FAMILY MEDICINE CLINIC | Facility: CLINIC | Age: 43
End: 2023-05-19

## 2023-05-19 PROBLEM — H66.003 NON-RECURRENT ACUTE SUPPURATIVE OTITIS MEDIA OF BOTH EARS WITHOUT SPONTANEOUS RUPTURE OF TYMPANIC MEMBRANES: Status: ACTIVE | Noted: 2023-05-19

## 2023-05-19 PROBLEM — H60.502 ACUTE OTITIS EXTERNA OF LEFT EAR: Status: ACTIVE | Noted: 2023-05-19

## 2023-05-19 RX ORDER — AMOXICILLIN 500 MG/1
500 CAPSULE ORAL EVERY 8 HOURS SCHEDULED
Qty: 21 CAPSULE | Refills: 0 | Status: SHIPPED | OUTPATIENT
Start: 2023-05-19 | End: 2023-05-26

## 2023-05-19 NOTE — TELEPHONE ENCOUNTER
Appointment is already scheduled for next Friday 5/26    The message is about weight management and patient just wants the PCP not know she could not schedule when PCP had wanted her to schedule

## 2023-05-19 NOTE — TELEPHONE ENCOUNTER
Patient needed to make appointment for the end of June per PCP directions  Only available appointments are for 6/16/23  and 7/6 at 1 PM    Patient made appointment for 7/6 at 1 PM  Patient is wondering if that is too late       Patient can be reached at [phone]

## 2023-05-19 NOTE — TELEPHONE ENCOUNTER
Reviewed office note from yesterday, patient to return in one week for f/u  Dr Meghan Lima has openings next Thurs   Please schedule

## 2023-05-30 ENCOUNTER — OFFICE VISIT (OUTPATIENT)
Dept: FAMILY MEDICINE CLINIC | Facility: CLINIC | Age: 43
End: 2023-05-30

## 2023-05-30 VITALS
SYSTOLIC BLOOD PRESSURE: 113 MMHG | DIASTOLIC BLOOD PRESSURE: 80 MMHG | RESPIRATION RATE: 18 BRPM | WEIGHT: 156.4 LBS | HEART RATE: 70 BPM | BODY MASS INDEX: 29.55 KG/M2 | TEMPERATURE: 97.4 F

## 2023-05-30 DIAGNOSIS — H60.502 ACUTE OTITIS EXTERNA OF LEFT EAR, UNSPECIFIED TYPE: Primary | ICD-10-CM

## 2023-05-30 PROBLEM — H65.92 LEFT NON-SUPPURATIVE OTITIS MEDIA: Status: ACTIVE | Noted: 2023-05-30

## 2023-05-30 RX ORDER — CIPROFLOXACIN AND DEXAMETHASONE 3; 1 MG/ML; MG/ML
4 SUSPENSION/ DROPS AURICULAR (OTIC) 2 TIMES DAILY
Qty: 7.5 ML | Refills: 0 | Status: SHIPPED | OUTPATIENT
Start: 2023-05-30

## 2023-05-30 NOTE — ASSESSMENT & PLAN NOTE
Pt presented 05/18 for bilateral otitis media, given amoxacillin 500 mg and ciprofloxacin-dexamethasone otic suspension  Pt noticed initial improvement, but left ear pain persisted  Relief with pulling on pinna  Exam showed erythematous TM, as well as inflamed, erythematous ear canal on exam   - Pt counseled to continue cipro-dexa otic drops twice daily   If no improvement by Thursday, pt to call office to be prescribed Augmentin   - Pt counseled on proper humidity control and exacerbation of sinus symptoms, recommended flonase for allergy exacerbation

## 2023-05-30 NOTE — PROGRESS NOTES
"Name: Lindsay Ponce      : 1980      MRN: [id number]  Encounter Provider: Lily Negrete MD  Encounter Date: 2023   Encounter department: [address] [place] [address]  Acute otitis externa of left ear, unspecified type  Assessment & Plan:  Pt presented  for bilateral otitis media, given amoxacillin 500 mg and ciprofloxacin-dexamethasone otic suspension  Pt noticed initial improvement, but left ear pain persisted  Relief with pulling on pinna  Exam showed erythematous TM, as well as inflamed, erythematous ear canal on exam   - Pt counseled to continue cipro-dexa otic drops twice daily  If no improvement by Thursday, pt to call office to be prescribed Augmentin   - Pt counseled on proper humidity control and exacerbation of sinus symptoms, recommended flonase for allergy exacerbation             Subjective      Pt presents for follow-up of bilateral ear infection  Pt states that the right ear has been progressing and getting better, but the left ear has been getting progressively worse  Pt states left ear was improving, but pt noticed that ear was feeling as though it was \"sharp\"  Pt described muffled sound in the left  Pt has completed the course of amoxacillin last week, and pt is just about completed with the otic solution  Pt has been taking the otic drops, but they have not been helping  Pt has noticed improvement with ibuprofen  Pt noticed relief with pulling on pinna  Pt does notice that the room has been very dry  Pt denies discharge  Review of Systems   Constitutional: Negative for fatigue and fever  HENT: Positive for ear pain and sneezing  Negative for congestion, ear discharge, rhinorrhea and sinus pain  Eyes: Negative for pain and visual disturbance  Respiratory: Negative for chest tightness and shortness of breath  Cardiovascular: Negative for chest pain and palpitations  Gastrointestinal: Negative for nausea   " Musculoskeletal: Negative  Skin: Negative  Allergic/Immunologic: Negative  Neurological: Positive for headaches  Negative for dizziness and light-headedness  Hematological: Negative  Psychiatric/Behavioral: Negative  Current Outpatient Medications on File Prior to Visit   Medication Sig   • atorvastatin (LIPITOR) 40 mg tablet TAKE 1 TABLET BY MOUTH EVERY DAY   • Azelastine HCl 137 MCG/SPRAY SOLN 1 SPRAY INTO EACH NOSTRIL 2 (TWO) TIMES A DAY USE IN EACH NOSTRIL AS DIRECTED   • ciprofloxacin-dexamethasone (CIPRODEX) otic suspension Administer 4 drops into the left ear 2 (two) times a day   • hydrochlorothiazide (HYDRODIURIL) 25 mg tablet Take 1 tablet (25 mg total) by mouth daily   • hydrOXYzine HCL (ATARAX) 25 mg tablet Take 1 tablet (25 mg total) by mouth every 6 (six) hours as needed for anxiety   • losartan (COZAAR) 100 MG tablet TAKE 1 TABLET BY MOUTH EVERY DAY   • Omega-3 Fatty Acids (OMEGA 3 PO) Take by mouth   • semaglutide, 2 mg/dose, (Ozempic) 8 mg/ mL injection pen Inject 0 64 mL (1 7 mg total) under the skin every 7 days for 4 doses       Objective     /80   Pulse 70   Temp (!) 97 4 °F (36 3 °C)   Resp 18   Wt 70 9 kg (156 lb 6 4 oz)   LMP 10/17/2018   BMI 29 55 kg/m²     Physical Exam  Constitutional:       General: She is not in acute distress  Appearance: Normal appearance  She is normal weight  She is not ill-appearing, toxic-appearing or diaphoretic  HENT:      Head: Normocephalic  Right Ear: Hearing, tympanic membrane, ear canal and external ear normal       Left Ear: External ear normal  Decreased hearing noted  Swelling (Ear canal) and tenderness present  Tympanic membrane is erythematous and bulging  Nose: Nose normal  No congestion or rhinorrhea  Mouth/Throat:      Lips: Pink  Mouth: Mucous membranes are dry  Pharynx: Oropharynx is clear  Tonsils: 0 on the right  0 on the left     Eyes:      Extraocular Movements: Extraocular movements intact  Conjunctiva/sclera: Conjunctivae normal       Pupils: Pupils are equal, round, and reactive to light  Cardiovascular:      Rate and Rhythm: Normal rate and regular rhythm  Pulses: Normal pulses  Heart sounds: Normal heart sounds  Pulmonary:      Effort: Pulmonary effort is normal       Breath sounds: Normal breath sounds  Musculoskeletal:         General: Normal range of motion  Cervical back: Normal range of motion  Skin:     General: Skin is warm and dry  Neurological:      Mental Status: She is alert  Mental status is at baseline  Psychiatric:         Mood and Affect: Mood normal          Behavior: Behavior normal          Thought Content:  Thought content normal          Judgment: Judgment normal        Hollis Ann MD

## 2023-06-20 ENCOUNTER — OFFICE VISIT (OUTPATIENT)
Dept: FAMILY MEDICINE CLINIC | Facility: CLINIC | Age: 43
End: 2023-06-20

## 2023-06-20 VITALS
HEART RATE: 86 BPM | SYSTOLIC BLOOD PRESSURE: 132 MMHG | RESPIRATION RATE: 16 BRPM | WEIGHT: 155 LBS | TEMPERATURE: 98 F | BODY MASS INDEX: 29.29 KG/M2 | DIASTOLIC BLOOD PRESSURE: 87 MMHG

## 2023-06-20 DIAGNOSIS — Z76.89 ENCOUNTER FOR WEIGHT MANAGEMENT: Primary | ICD-10-CM

## 2023-06-20 PROBLEM — R63.5 WEIGHT GAIN: Status: RESOLVED | Noted: 2017-05-09 | Resolved: 2023-06-20

## 2023-06-20 PROCEDURE — 99213 OFFICE O/P EST LOW 20 MIN: CPT | Performed by: FAMILY MEDICINE

## 2023-06-20 NOTE — ASSESSMENT & PLAN NOTE
Pt does not meet criteria for bariatric surgery  Patient pursuing conservative treatment   Initial goal to lose 5-10% weight   Struggling to lose weight, weight changes over the last 12  months  Denies personal history of pancreatitis, denies personal and family history of thyroid cancer and MEN 2  Anti-obesity pharmacotherapy discussed to assist with lifestyle modification  Adverse side effects discussed  Discussed Ozempic is not the medication used for weight management, Sergio Nixon would be indicated  Max dose 2 mg for ozempic vs wegovy max dose of 2 4  Recent labs reviewed  Initial weight: 174 lbs, BMI 32 99  TWL: 19 lbs, 10 %  Wt Readings from Last 3 Encounters:   06/20/23 70 3 kg (155 lb)   05/30/23 70 9 kg (156 lb 6 4 oz)   05/18/23 73 6 kg (162 lb 3 2 oz)       Plan  • We will decrease dosage to 1 mg per dose weekly injection with plan to increase to 2 mg in 4 to 8 weeks  • Continue to improve nutrition and complete exercise program as discussed, goal:  o Food logging, weighing, and measuring food  o Drink at least 64oz of water daily   o Exercise: 200-300min/week with atleast 30 min sessions of aerobic exercise with 2 days of resistance training   • Return in 4-8 weeks to continue to monitor weight management and adjust as needed

## 2023-06-20 NOTE — PROGRESS NOTES
Name: Mamta Pierre      : 1980      MRN: [id number]  Encounter Provider: Danae Martin MD  Encounter Date: 2023   Encounter department: [address] [place] [address]     1  Encounter for weight management  Assessment & Plan:  Pt does not meet criteria for bariatric surgery  Patient pursuing conservative treatment   Initial goal to lose 5-10% weight   Struggling to lose weight, weight changes over the last 12  months  Denies personal history of pancreatitis, denies personal and family history of thyroid cancer and MEN 2  Anti-obesity pharmacotherapy discussed to assist with lifestyle modification  Adverse side effects discussed  Discussed Ozempic is not the medication used for weight management, Michelle Body would be indicated  Max dose 2 mg for ozempic vs wegovy max dose of 2 4  Recent labs reviewed  Initial weight: 174 lbs, BMI 32 99  TWL: 19 lbs, 10 %  Wt Readings from Last 3 Encounters:   23 70 3 kg (155 lb)   23 70 9 kg (156 lb 6 4 oz)   23 73 6 kg (162 lb 3 2 oz)       Plan  • We will decrease dosage to 1 mg per dose weekly injection with plan to increase to 2 mg in 4 to 8 weeks  • Continue to improve nutrition and complete exercise program as discussed, goal:  o Food logging, weighing, and measuring food  o Drink at least 64oz of water daily   o Exercise: 200-300min/week with atleast 30 min sessions of aerobic exercise with 2 days of resistance training   • Return in 4-8 weeks to continue to monitor weight management and adjust as needed  Orders:  -     semaglutide, 1 mg/dose, (Ozempic) 4 mg/3 mL injection pen; Inject 0 75 mL (1 mg total) under the skin once a week          Return in about 4 weeks (around 2023) for weight management   Subjective      Mamta Pierre is a 79-year-old female here for weight management follow-up  Patient was started on Ozempic for weight loss    Patient has lost weight since the start in February  Patient has noticed intermittent anxiety coming on since the increased dosage of 1 7 mg  Patient also reports having issues with the dosing  Would like to continue with the Ozempic better at a lower dose  Review of Systems   Constitutional: Negative for chills and fever  HENT: Negative for ear pain and sore throat  Eyes: Negative for pain and visual disturbance  Respiratory: Negative for cough and shortness of breath  Cardiovascular: Negative for chest pain and palpitations  Gastrointestinal: Negative for abdominal pain and vomiting  Genitourinary: Negative for dysuria and hematuria  Musculoskeletal: Negative for arthralgias and back pain  Skin: Negative for color change and rash  Neurological: Negative for seizures and syncope  Psychiatric/Behavioral: The patient is nervous/anxious  All other systems reviewed and are negative  Current Outpatient Medications on File Prior to Visit   Medication Sig   • atorvastatin (LIPITOR) 40 mg tablet TAKE 1 TABLET BY MOUTH EVERY DAY   • Azelastine HCl 137 MCG/SPRAY SOLN 1 SPRAY INTO EACH NOSTRIL 2 (TWO) TIMES A DAY USE IN EACH NOSTRIL AS DIRECTED   • hydrochlorothiazide (HYDRODIURIL) 25 mg tablet Take 1 tablet (25 mg total) by mouth daily   • losartan (COZAAR) 100 MG tablet TAKE 1 TABLET BY MOUTH EVERY DAY   • Omega-3 Fatty Acids (OMEGA 3 PO) Take by mouth   • [DISCONTINUED] ciprofloxacin-dexamethasone (CIPRODEX) otic suspension Administer 4 drops into the left ear 2 (two) times a day (Patient not taking: Reported on 6/20/2023)   • [DISCONTINUED] hydrOXYzine HCL (ATARAX) 25 mg tablet Take 1 tablet (25 mg total) by mouth every 6 (six) hours as needed for anxiety (Patient not taking: Reported on 6/20/2023)       Objective     /87   Pulse 86   Temp 98 °F (36 7 °C)   Resp 16   Wt 70 3 kg (155 lb)   LMP 10/17/2018   BMI 29 29 kg/m²     Physical Exam  Vitals and nursing note reviewed     Constitutional:       General: She is not in acute distress  Appearance: Normal appearance  She is not ill-appearing, toxic-appearing or diaphoretic  HENT:      Head: Normocephalic and atraumatic  Right Ear: External ear normal       Left Ear: External ear normal       Nose: Nose normal       Mouth/Throat:      Mouth: Mucous membranes are moist    Eyes:      General: No scleral icterus  Right eye: No discharge  Left eye: No discharge  Extraocular Movements: Extraocular movements intact  Conjunctiva/sclera: Conjunctivae normal    Cardiovascular:      Rate and Rhythm: Normal rate and regular rhythm  Pulses: Normal pulses  Heart sounds: Normal heart sounds  Pulmonary:      Effort: Pulmonary effort is normal       Breath sounds: Normal breath sounds  Abdominal:      Palpations: Abdomen is soft  Tenderness: There is no abdominal tenderness  Musculoskeletal:         General: No swelling  Cervical back: Normal range of motion  Right lower leg: No edema  Left lower leg: No edema  Skin:     Capillary Refill: Capillary refill takes less than 2 seconds  Neurological:      General: No focal deficit present  Mental Status: She is alert and oriented to person, place, and time  Psychiatric:         Mood and Affect: Mood normal          Behavior: Behavior normal          Thought Content:  Thought content normal        Rylie Cm MD

## 2023-06-23 DIAGNOSIS — E78.5 DYSLIPIDEMIA: ICD-10-CM

## 2023-06-23 DIAGNOSIS — I10 ESSENTIAL HYPERTENSION: ICD-10-CM

## 2023-06-23 RX ORDER — LOSARTAN POTASSIUM 100 MG/1
TABLET ORAL
Qty: 90 TABLET | Refills: 1 | Status: SHIPPED | OUTPATIENT
Start: 2023-06-23

## 2023-06-23 RX ORDER — ATORVASTATIN CALCIUM 40 MG/1
TABLET, FILM COATED ORAL
Qty: 90 TABLET | Refills: 1 | Status: SHIPPED | OUTPATIENT
Start: 2023-06-23

## 2023-07-18 PROBLEM — H60.502 ACUTE OTITIS EXTERNA OF LEFT EAR: Status: RESOLVED | Noted: 2023-05-19 | Resolved: 2023-07-18

## 2023-07-18 PROBLEM — H66.003 NON-RECURRENT ACUTE SUPPURATIVE OTITIS MEDIA OF BOTH EARS WITHOUT SPONTANEOUS RUPTURE OF TYMPANIC MEMBRANES: Status: RESOLVED | Noted: 2023-05-19 | Resolved: 2023-07-18

## 2023-07-19 ENCOUNTER — HOSPITAL ENCOUNTER (OUTPATIENT)
Dept: RADIOLOGY | Age: 43
Discharge: HOME | End: 2023-07-19
Payer: COMMERCIAL

## 2023-07-19 VITALS — BODY MASS INDEX: 29.27 KG/M2 | WEIGHT: 155 LBS | HEIGHT: 61 IN

## 2023-07-19 DIAGNOSIS — Z12.31 ENCOUNTER FOR SCREENING MAMMOGRAM FOR BREAST CANCER: ICD-10-CM

## 2023-07-19 PROCEDURE — 77067 SCR MAMMO BI INCL CAD: CPT

## 2023-07-19 PROCEDURE — 77063 BREAST TOMOSYNTHESIS BI: CPT

## 2023-07-20 ENCOUNTER — OFFICE VISIT (OUTPATIENT)
Dept: FAMILY MEDICINE CLINIC | Facility: CLINIC | Age: 43
End: 2023-07-20

## 2023-07-20 VITALS
OXYGEN SATURATION: 98 % | WEIGHT: 151.6 LBS | SYSTOLIC BLOOD PRESSURE: 124 MMHG | HEART RATE: 96 BPM | BODY MASS INDEX: 28.64 KG/M2 | DIASTOLIC BLOOD PRESSURE: 84 MMHG | TEMPERATURE: 98.3 F

## 2023-07-20 DIAGNOSIS — E66.09 CLASS 1 OBESITY DUE TO EXCESS CALORIES WITH SERIOUS COMORBIDITY AND BODY MASS INDEX (BMI) OF 33.0 TO 33.9 IN ADULT: Primary | ICD-10-CM

## 2023-07-20 DIAGNOSIS — E66.3 OVERWEIGHT (BMI 25.0-29.9): ICD-10-CM

## 2023-07-20 PROCEDURE — 99213 OFFICE O/P EST LOW 20 MIN: CPT | Performed by: FAMILY MEDICINE

## 2023-07-20 NOTE — PROGRESS NOTES
Name: Briana Mujica      : 1980      MRN: [id number]  Encounter Provider: Kacy Herman DO  Encounter Date: 2023   Encounter department: [address]     1. Class 1 obesity due to excess calories with serious comorbidity and body mass index (BMI) of 33.0 to 33.9 in adult  Assessment & Plan:  Pt tolerating Ozempic 1mg weekly for weight loss well (/84, HR 96). She is losing adeqate weight (on avg 4 lbs per month) with ozemipic and lifestyle modifications. Semaglutide is recommended to be increased to 1.7 mg weekly for four weeks then finally to 2.4 weekly for maintenance, however, only Wegovy comes in 1.7 and 2.4 mg dosages. [facility] is not covered by pt's insurance. Ozempic doses come in 1 mg and 2 mg. Plan  - Increase to Ozempic 2 mg weekly for maintenance, if intolerable side effects, encouraged pt to call office and I will reorder Ozempic 1 mg weekly and will continue this as maintenance dose    Orders:  -     semaglutide, 2 mg/dose, (Ozempic) 8 mg/ mL injection pen; Inject 0.75 mL (2 mg total) under the skin every 7 days for 8 doses    2. Overweight (BMI 25.0-29. 9)  Assessment & Plan:  Pt tolerating Ozempic 1mg weekly for weight loss well (/84, HR 96). She is losing adeqate weight (on avg 4 lbs per month) with ozemipic and lifestyle modifications. Semaglutide is recommended to be increased to 1.7 mg weekly for four weeks then finally to 2.4 weekly for maintenance, however, only Wegovy comes in 1.7 and 2.4 mg dosages. [facility] is not covered by pt's insurance. Ozempic doses come in 1 mg and 2 mg. Plan  - Increase to Ozempic 2 mg weekly for maintenance, if intolerable side effects, encouraged pt to call office and I will reorder Ozempic 1 mg weekly and will continue this as maintenance dose           Subjective      HPI   Pt is here for management of weight loss on Ozempic.  She is currently on 1 mg daily and is asking how to take 1.7 mg weekly as prescribed. She went back to 1 mg in the meantime. Pt is tolerating ozempic 1 mg well. She feels a headache the day of or after using the injection. She also has increased anxiety that may be related to her father in law being recently diganosed with cancer     Lost 4 pounds since 6/20/23       Review of Systems   Constitutional: Negative for fatigue. Respiratory: Negative for chest tightness and shortness of breath. Cardiovascular: Negative for chest pain and palpitations. Gastrointestinal: Negative for constipation, diarrhea, nausea and vomiting. Neurological: Positive for headaches. Headaches the day after, usually subsides within the day        Current Outpatient Medications on File Prior to Visit   Medication Sig   • atorvastatin (LIPITOR) 40 mg tablet TAKE 1 TABLET BY MOUTH EVERY DAY   • hydrochlorothiazide (HYDRODIURIL) 25 mg tablet Take 1 tablet (25 mg total) by mouth daily   • losartan (COZAAR) 100 MG tablet TAKE 1 TABLET BY MOUTH EVERY DAY   • Omega-3 Fatty Acids (OMEGA 3 PO) Take by mouth   • Azelastine HCl 137 MCG/SPRAY SOLN 1 SPRAY INTO EACH NOSTRIL 2 (TWO) TIMES A DAY USE IN EACH NOSTRIL AS DIRECTED (Patient not taking: Reported on 7/20/2023)       Objective     /84 (BP Location: Right arm, Patient Position: Sitting, Cuff Size: Standard)   Pulse 96   Temp 98.3 °F (36.8 °C) (Temporal)   Wt 68.8 kg (151 lb 9.6 oz)   LMP 10/17/2018   SpO2 98%   BMI 28.64 kg/m²     Physical Exam  Constitutional:       General: She is not in acute distress. Appearance: She is not ill-appearing or toxic-appearing. HENT:      Head: Normocephalic and atraumatic. Right Ear: External ear normal.      Left Ear: External ear normal.   Eyes:      General: No scleral icterus. Right eye: No discharge. Left eye: No discharge. Extraocular Movements: Extraocular movements intact.       Conjunctiva/sclera: Conjunctivae normal.   Cardiovascular:      Rate and Rhythm: Normal rate. Pulmonary:      Effort: Pulmonary effort is normal. No respiratory distress. Skin:     General: Skin is warm and dry. Neurological:      General: No focal deficit present. Mental Status: She is alert. Motor: No weakness. Gait: Gait normal.   Psychiatric:         Mood and Affect: Mood normal.         Behavior: Behavior normal.         Thought Content:  Thought content normal.         Judgment: Judgment normal.       Joey Garcia,

## 2023-07-21 ENCOUNTER — TELEPHONE (OUTPATIENT)
Dept: FAMILY MEDICINE CLINIC | Facility: CLINIC | Age: 43
End: 2023-07-21

## 2023-07-21 DIAGNOSIS — I10 ESSENTIAL HYPERTENSION: ICD-10-CM

## 2023-07-21 RX ORDER — HYDROCHLOROTHIAZIDE 25 MG/1
25 TABLET ORAL DAILY
Qty: 90 TABLET | Refills: 1 | Status: SHIPPED | OUTPATIENT
Start: 2023-07-21

## 2023-07-21 NOTE — TELEPHONE ENCOUNTER
[employer] called the pt is in need of an authorization for the medication     Semaflutide     Phone number for Josue Dixonkatherine is     [phone]

## 2023-07-25 ENCOUNTER — TELEPHONE (OUTPATIENT)
Dept: FAMILY MEDICINE CLINIC | Facility: CLINIC | Age: 43
End: 2023-07-25

## 2023-07-25 NOTE — TELEPHONE ENCOUNTER
Patient called to schedule appointment for physical. Patient wanted the latest hour. Appointment scheduled 9/28 at 3:40 pm     Patient mentioned PCP wanted to see her for a physical and weight check. Patient is wondering if September is too long to wait for the weight check.     Patient can be reached at [phone]

## 2023-07-27 NOTE — TELEPHONE ENCOUNTER
PA for Ozempic (2 MG/DOSE) 8MG/3ML pen-injectors submitted through cover my meds. Will follow up in 2- 3 days.     Key: [id number]

## 2023-08-01 ENCOUNTER — TELEPHONE (OUTPATIENT)
Dept: FAMILY MEDICINE CLINIC | Facility: CLINIC | Age: 43
End: 2023-08-01

## 2023-08-01 NOTE — TELEPHONE ENCOUNTER
Called pt in regards of PA for medication, pt answered and informed that she already picked up medication from pharmacy.

## 2023-08-07 ENCOUNTER — OFFICE VISIT (OUTPATIENT)
Dept: FAMILY MEDICINE CLINIC | Facility: CLINIC | Age: 43
End: 2023-08-07

## 2023-08-07 VITALS
DIASTOLIC BLOOD PRESSURE: 81 MMHG | TEMPERATURE: 97.8 F | WEIGHT: 150.8 LBS | BODY MASS INDEX: 28.49 KG/M2 | HEART RATE: 73 BPM | SYSTOLIC BLOOD PRESSURE: 113 MMHG | RESPIRATION RATE: 16 BRPM

## 2023-08-07 DIAGNOSIS — R30.0 DYSURIA: Primary | ICD-10-CM

## 2023-08-07 LAB
SL AMB  POCT GLUCOSE, UA: ABNORMAL
SL AMB LEUKOCYTE ESTERASE,UA: ABNORMAL
SL AMB POCT BILIRUBIN,UA: NEGATIVE
SL AMB POCT BLOOD,UA: ABNORMAL
SL AMB POCT CLARITY,UA: ABNORMAL
SL AMB POCT COLOR,UA: YELLOW
SL AMB POCT KETONES,UA: NEGATIVE
SL AMB POCT NITRITE,UA: NEGATIVE
SL AMB POCT PH,UA: 7
SL AMB POCT SPECIFIC GRAVITY,UA: 1.02
SL AMB POCT URINE PROTEIN: NEGATIVE
SL AMB POCT UROBILINOGEN: 0.2

## 2023-08-07 PROCEDURE — 99213 OFFICE O/P EST LOW 20 MIN: CPT | Performed by: FAMILY MEDICINE

## 2023-08-07 PROCEDURE — 81002 URINALYSIS NONAUTO W/O SCOPE: CPT | Performed by: FAMILY MEDICINE

## 2023-08-07 RX ORDER — SULFAMETHOXAZOLE AND TRIMETHOPRIM 800; 160 MG/1; MG/1
1 TABLET ORAL 2 TIMES DAILY
Qty: 6 TABLET | Refills: 0 | Status: SHIPPED | OUTPATIENT
Start: 2023-08-07 | End: 2023-08-10

## 2023-08-07 NOTE — PROGRESS NOTES
Name: Lulu Villalpando      : 1980      MRN: [id number]  Encounter Provider: Brandon Shoemaker MD  Encounter Date: 2023   Encounter department: [address]     1. Dysuria  Assessment & Plan:  3 days. H/o multiple UTIs. Has been taking Azo which has improved sx slightly, but pt still experiencing discomfort and urinary frequency. Denies fever and back pain. Urine dip done today was unremarkable. Due to pt's history and ongoing sx, will prescribe Bactrim 800-160mg BID for 3 days. Orders:  -     POCT urine dip  -     sulfamethoxazole-trimethoprim (BACTRIM DS) 800-160 mg per tablet; Take 1 tablet by mouth 2 (two) times a day for 3 days         Subjective      37 yo female presents today with complaints of dysuria. Pt reports dysuria and polyuria that started 3 days ago. She notes she has had multiple UTIs in the past, and says her current sx feel similar to those past episodes. Pt reports burning and pressure with urinating. She also notes frequent urge to urinate, but only urinates a couple drops. Denies fever or back pain. Has been taking Azo which has improved symptoms. She has no other concerns today. Review of Systems   Constitutional: Negative for chills and fever. HENT: Negative for ear pain and sore throat. Eyes: Negative for pain and visual disturbance. Respiratory: Negative for cough and shortness of breath. Cardiovascular: Negative for chest pain and palpitations. Gastrointestinal: Negative for abdominal pain and vomiting. Genitourinary: Positive for dysuria, frequency and urgency. Negative for hematuria. Musculoskeletal: Negative for arthralgias and back pain. Skin: Negative for color change and rash. Neurological: Negative for seizures and syncope. All other systems reviewed and are negative.       Current Outpatient Medications on File Prior to Visit   Medication Sig   • atorvastatin (LIPITOR) 40 mg tablet TAKE 1 TABLET BY MOUTH EVERY DAY   • hydrochlorothiazide (HYDRODIURIL) 25 mg tablet TAKE 1 TABLET (25 MG TOTAL) BY MOUTH DAILY. • losartan (COZAAR) 100 MG tablet TAKE 1 TABLET BY MOUTH EVERY DAY   • Omega-3 Fatty Acids (OMEGA 3 PO) Take by mouth   • semaglutide, 2 mg/dose, (Ozempic) 8 mg/ mL injection pen Inject 0.75 mL (2 mg total) under the skin every 7 days for 8 doses   • Azelastine HCl 137 MCG/SPRAY SOLN 1 SPRAY INTO EACH NOSTRIL 2 (TWO) TIMES A DAY USE IN EACH NOSTRIL AS DIRECTED (Patient not taking: Reported on 7/20/2023)       Objective     /81   Pulse 73   Temp 97.8 °F (36.6 °C)   Resp 16   Wt 68.4 kg (150 lb 12.8 oz)   LMP 10/17/2018   BMI 28.49 kg/m²     Physical Exam  Vitals reviewed. Constitutional:       Appearance: Normal appearance. HENT:      Head: Normocephalic and atraumatic. Eyes:      Extraocular Movements: Extraocular movements intact. Conjunctiva/sclera: Conjunctivae normal.      Pupils: Pupils are equal, round, and reactive to light. Cardiovascular:      Rate and Rhythm: Normal rate and regular rhythm. Pulses: Normal pulses. Heart sounds: Normal heart sounds. Pulmonary:      Effort: Pulmonary effort is normal.      Breath sounds: Normal breath sounds. Abdominal:      General: Abdomen is flat. Bowel sounds are normal.      Palpations: Abdomen is soft. Skin:     General: Skin is warm and dry. Capillary Refill: Capillary refill takes less than 2 seconds. Neurological:      General: No focal deficit present. Mental Status: She is alert and oriented to person, place, and time.    Psychiatric:         Mood and Affect: Mood normal.         Behavior: Behavior normal.       Ronny Gonzalez MD

## 2023-08-14 ENCOUNTER — TELEPHONE (OUTPATIENT)
Dept: FAMILY MEDICINE CLINIC | Facility: CLINIC | Age: 43
End: 2023-08-14

## 2023-08-14 NOTE — TELEPHONE ENCOUNTER
Patient was seen in the office by Dr Rhina Diehl, who treated her for UTI. After a three day it has improved but not completely resolved. Patient states, she was told to call back if not better and more med's would be called to pharmacy. Please advise.

## 2023-08-28 ENCOUNTER — HOSPITAL ENCOUNTER (OUTPATIENT)
Dept: MAMMOGRAPHY | Facility: CLINIC | Age: 43
Discharge: HOME | End: 2023-08-28
Payer: COMMERCIAL

## 2023-08-28 ENCOUNTER — HOSPITAL ENCOUNTER (OUTPATIENT)
Dept: ULTRASOUND IMAGING | Facility: CLINIC | Age: 43
Discharge: HOME | End: 2023-08-28
Payer: COMMERCIAL

## 2023-08-28 VITALS — WEIGHT: 150 LBS | HEIGHT: 61 IN | BODY MASS INDEX: 28.32 KG/M2

## 2023-08-28 DIAGNOSIS — R92.8 ABNORMAL MAMMOGRAM: ICD-10-CM

## 2023-08-28 PROCEDURE — G0279 TOMOSYNTHESIS, MAMMO: HCPCS

## 2023-08-28 PROCEDURE — 77065 DX MAMMO INCL CAD UNI: CPT

## 2023-08-28 PROCEDURE — 76642 ULTRASOUND BREAST LIMITED: CPT

## 2023-09-14 DIAGNOSIS — E66.09 CLASS 1 OBESITY DUE TO EXCESS CALORIES WITH SERIOUS COMORBIDITY AND BODY MASS INDEX (BMI) OF 33.0 TO 33.9 IN ADULT: ICD-10-CM

## 2023-09-15 RX ORDER — SEMAGLUTIDE 2.68 MG/ML
INJECTION, SOLUTION SUBCUTANEOUS
Qty: 3 ML | Refills: 1 | Status: SHIPPED | OUTPATIENT
Start: 2023-09-15

## 2023-09-25 ENCOUNTER — TELEPHONE (OUTPATIENT)
Dept: FAMILY MEDICINE CLINIC | Facility: CLINIC | Age: 43
End: 2023-09-25

## 2023-09-28 ENCOUNTER — OFFICE VISIT (OUTPATIENT)
Dept: FAMILY MEDICINE CLINIC | Facility: CLINIC | Age: 43
End: 2023-09-28

## 2023-09-28 VITALS
RESPIRATION RATE: 18 BRPM | BODY MASS INDEX: 26.35 KG/M2 | TEMPERATURE: 98 F | HEART RATE: 76 BPM | SYSTOLIC BLOOD PRESSURE: 111 MMHG | HEIGHT: 62 IN | OXYGEN SATURATION: 98 % | DIASTOLIC BLOOD PRESSURE: 79 MMHG | WEIGHT: 143.2 LBS

## 2023-09-28 DIAGNOSIS — I10 ESSENTIAL HYPERTENSION: ICD-10-CM

## 2023-09-28 DIAGNOSIS — Z00.00 ANNUAL PHYSICAL EXAM: Primary | ICD-10-CM

## 2023-09-28 DIAGNOSIS — Z13.6 SCREENING FOR CARDIOVASCULAR CONDITION: ICD-10-CM

## 2023-09-28 DIAGNOSIS — Z13.1 SCREENING FOR DIABETES MELLITUS: ICD-10-CM

## 2023-09-28 PROCEDURE — 99396 PREV VISIT EST AGE 40-64: CPT | Performed by: FAMILY MEDICINE

## 2023-09-28 NOTE — PROGRESS NOTES
[address] [facility]    NAME: Laura Park  AGE: 36 y.o. SEX: female  : 1980     DATE: 2023     Assessment and Plan:     Problem List Items Addressed This Visit        Cardiovascular and Mediastinum    Essential hypertension    Relevant Orders    Lipid panel    Albumin / creatinine urine ratio    Basic metabolic panel       Other    Annual physical exam - Primary   Other Visit Diagnoses     Screening for diabetes mellitus        Relevant Orders    Hemoglobin A1C    Screening for cardiovascular condition        Relevant Orders    Lipid panel    BMI 26.0-26.9,adult              Immunizations and preventive care screenings were discussed with patient today. Appropriate education was printed on patient's after visit summary. Counseling:  Dental Health: discussed importance of regular tooth brushing, flossing, and dental visits. Injury prevention: discussed safety/seat belts, safety helmets, smoke detectors, carbon dioxide detectors, and smoking near bedding or upholstery. · Exercise: the importance of regular exercise/physical activity was discussed. Recommend exercise 3-5 times per week for at least 30 minutes. Return in 6 months (on 3/28/2024) for Weight and BP follow up. Chief Complaint:     Chief Complaint   Patient presents with   • Physical Exam      History of Present Illness:     Adult Annual Physical   Patient here for a comprehensive physical exam. The patient reports problems - anxiety. Diet and Physical Activity  · Diet/Nutrition: well balanced diet and heart healthy (low sodium) diet. · Exercise: 3-4 times a week on average.       Depression Screening  PHQ-2/9 Depression Screening    Little interest or pleasure in doing things: 0 - not at all  Feeling down, depressed, or hopeless: 1 - several days  Trouble falling or staying asleep, or sleeping too much: 1 - several days  Feeling tired or having little energy: 1 - several days  Poor appetite or overeatin - not at all  Feeling bad about yourself - or that you are a failure or have let yourself or your family down: 0 - not at all  Trouble concentrating on things, such as reading the newspaper or watching television: 0 - not at all  Moving or speaking so slowly that other people could have noticed. Or the opposite - being so fidgety or restless that you have been moving around a lot more than usual: 0 - not at all  Thoughts that you would be better off dead, or of hurting yourself in some way: 0 - not at all  PHQ-9 Score: 3   PHQ-9 Interpretation: No or Minimal depression        General Health  · Sleep: 4am wake up (alarm) daily; 9:30/10p (1 hour to sleep). · Hearing: normal - bilateral.  · Vision: goes for regular eye exams and wears glasses. · Dental: regular dental visits and brushes teeth twice daily. /GYN Health   · Patient is: Total hysterectomy 2018. Review of Systems:     Review of Systems   Constitutional: Negative for activity change, fatigue, fever and unexpected weight change. HENT: Negative for sore throat. Eyes: Negative for redness and visual disturbance. Cardiovascular: Negative for chest pain and palpitations. Gastrointestinal: Negative for abdominal pain, constipation, diarrhea, nausea and vomiting. Genitourinary: Negative for dysuria, hematuria and menstrual problem. Skin: Negative for rash. Neurological: Negative for dizziness, syncope and headaches.       Past Medical History:     Past Medical History:   Diagnosis Date   • Anemia    • Chest tightness or pressure     2013   • Depression 2017   • Hypertension    • Vitamin D deficiency       Past Surgical History:     Past Surgical History:   Procedure Laterality Date   • CYSTOSCOPY N/A 2018    Procedure: CYSTOSCOPY;  Surgeon: Deshawn Carroll DO;  Location: [facility];  Service: Gynecology   • HYSTERECTOMY N/A 2018 Procedure: HYSTERECTOMY LAPAROSCOPIC TOTAL ([address]); Surgeon: Irma Mendez DO;  Location: [facility];  Service: Gynecology   • NV LAPAROSCOPY W/RMVL ADNEXAL STRUCTURES Bilateral 12/30/2018    Procedure: SALPINGECTOMY, LAPAROSCOPIC;  Surgeon: Irma Mendez DO;  Location: [facility];  Service: Gynecology   • TONSILLECTOMY     • TUBAL LIGATION      2006      Social History:     Social History     Socioeconomic History   • Marital status: /Civil Union     Spouse name: None   • Number of children: None   • Years of education: None   • Highest education level: None   Occupational History   • None   Tobacco Use   • Smoking status: Some Days     Types: Cigarettes   • Smokeless tobacco: Never   • Tobacco comments:     social smoker, less than 1 pack per weekend   Vaping Use   • Vaping Use: Never used   Substance and Sexual Activity   • Alcohol use: Yes     Comment: on the weekends/socially   • Drug use: No   • Sexual activity: Yes     Partners: Male     Birth control/protection: Female Sterilization, Other   Other Topics Concern   • None   Social History Narrative    Uses safety equipment-seatbelts     Social Determinants of Health     Financial Resource Strain: Low Risk  (3/24/2023)    Overall Financial Resource Strain (CARDIA)    • Difficulty of Paying Living Expenses: Not hard at all   Food Insecurity: No Food Insecurity (3/24/2023)    Hunger Vital Sign    • Worried About Running Out of Food in the Last Year: Never true    • Ran Out of Food in the Last Year: Never true   Transportation Needs: No Transportation Needs (3/24/2023)    PRAPARE - Transportation    • Lack of Transportation (Medical): No    • Lack of Transportation (Non-Medical):  No   Physical Activity: Insufficiently Active (9/28/2023)    Exercise Vital Sign    • Days of Exercise per Week: 2 days    • Minutes of Exercise per Session: 50 min   Stress: Stress Concern Present (9/28/2023)    [address] Questionnaire    • Feeling of Stress : Rather much   Social Connections:  Moderately Isolated (9/28/2023)    Social Connection and Isolation Panel [NHANES]    • Frequency of Communication with Friends and Family: More than three times a week    • Frequency of Social Gatherings with Friends and Family: More than three times a week    • Attends Latter-day Services: Never    • Active Member of Clubs or Organizations: No    • Attends Club or Organization Meetings: Never    • Marital Status:    Intimate Partner Violence: Not At Risk (9/28/2023)    Humiliation, Afraid, Rape, and Kick questionnaire    • Fear of Current or Ex-Partner: No    • Emotionally Abused: No    • Physically Abused: No    • Sexually Abused: No   Housing Stability: Low Risk  (3/24/2023)    Housing Stability Vital Sign    • Unable to Pay for Housing in the Last Year: No    • Number of Places Lived in the Last Year: 1    • Unstable Housing in the Last Year: No      Family History:     Family History   Problem Relation Age of Onset   • No Known Problems Mother    • Heart disease Father    • Hypertension Father    • Heart attack Father 40   • No Known Problems Sister    • No Known Problems Daughter    • No Known Problems Daughter    • No Known Problems Maternal Grandmother    • No Known Problems Maternal Grandfather    • No Known Problems Paternal Grandmother    • No Known Problems Paternal Grandfather    • No Known Problems Son    • No Known Problems Maternal Aunt    • No Known Problems Maternal Aunt    • No Known Problems Maternal Aunt    • No Known Problems Maternal Aunt    • No Known Problems Maternal Aunt    • No Known Problems Paternal Aunt    • No Known Problems Paternal Aunt    • No Known Problems Half-Sister    • Breast cancer Neg Hx    • Colon cancer Neg Hx    • Ovarian cancer Neg Hx    • Endometrial cancer Neg Hx       Current Medications:     Current Outpatient Medications   Medication Sig Dispense Refill   • atorvastatin (LIPITOR) 40 mg tablet TAKE 1 TABLET BY MOUTH EVERY DAY 90 tablet 1   • hydrochlorothiazide (HYDRODIURIL) 25 mg tablet TAKE 1 TABLET (25 MG TOTAL) BY MOUTH DAILY. 90 tablet 1   • losartan (COZAAR) 100 MG tablet TAKE 1 TABLET BY MOUTH EVERY DAY 90 tablet 1   • Omega-3 Fatty Acids (OMEGA 3 PO) Take by mouth     • Ozempic, 2 MG/DOSE, 8 MG/3ML injection pen INJECT 0.75 ML (2 MG TOTAL) UNDER THE SKIN EVERY 7 DAYS FOR 8 DOSES 3 mL 1   • Azelastine HCl 137 MCG/SPRAY SOLN 1 SPRAY INTO EACH NOSTRIL 2 (TWO) TIMES A DAY USE IN EACH NOSTRIL AS DIRECTED (Patient not taking: Reported on 7/20/2023) 30 mL 1     No current facility-administered medications for this visit. Allergies:     No Known Allergies   Physical Exam:     /79 (BP Location: Left arm, Patient Position: Sitting, Cuff Size: Standard)   Pulse 76   Temp 98 °F (36.7 °C) (Temporal)   Resp 18   Ht 5' 1.5" (1.562 m)   Wt 65 kg (143 lb 3.2 oz)   LMP 10/17/2018   SpO2 98%   BMI 26.62 kg/m²     Physical Exam  Vitals and nursing note reviewed. Constitutional:       General: She is not in acute distress. Appearance: Normal appearance. She is well-developed. She is obese. She is not toxic-appearing. HENT:      Head: Normocephalic and atraumatic. Right Ear: Tympanic membrane, ear canal and external ear normal.      Left Ear: Tympanic membrane, ear canal and external ear normal.      Nose: Nose normal.      Mouth/Throat:      Mouth: Mucous membranes are moist.      Pharynx: Oropharynx is clear. Eyes:      Extraocular Movements: Extraocular movements intact. Conjunctiva/sclera: Conjunctivae normal.      Pupils: Pupils are equal, round, and reactive to light. Cardiovascular:      Rate and Rhythm: Normal rate and regular rhythm. Heart sounds: Normal heart sounds. No murmur heard. No friction rub. No gallop. Pulmonary:      Effort: Pulmonary effort is normal. No respiratory distress. Breath sounds: Normal breath sounds.  No wheezing, rhonchi or rales.   Abdominal:      General: Bowel sounds are normal.      Palpations: Abdomen is soft. Tenderness: There is no abdominal tenderness. Musculoskeletal:         General: Normal range of motion. Cervical back: Neck supple. Right lower leg: No edema. Left lower leg: No edema. Lymphadenopathy:      Cervical: No cervical adenopathy. Skin:     General: Skin is warm and dry. Neurological:      General: No focal deficit present. Mental Status: She is alert.       Gait: Gait normal.   Psychiatric:         Mood and Affect: Mood normal.         Behavior: Behavior normal.          DO Junior Ramirez

## 2023-10-30 ENCOUNTER — OFFICE VISIT (OUTPATIENT)
Dept: FAMILY MEDICINE CLINIC | Facility: CLINIC | Age: 43
End: 2023-10-30

## 2023-10-30 VITALS
HEIGHT: 61 IN | OXYGEN SATURATION: 97 % | TEMPERATURE: 98.2 F | DIASTOLIC BLOOD PRESSURE: 77 MMHG | BODY MASS INDEX: 27.11 KG/M2 | RESPIRATION RATE: 18 BRPM | HEART RATE: 76 BPM | SYSTOLIC BLOOD PRESSURE: 112 MMHG | WEIGHT: 143.6 LBS

## 2023-10-30 DIAGNOSIS — J30.89 SEASONAL ALLERGIC RHINITIS DUE TO OTHER ALLERGIC TRIGGER: ICD-10-CM

## 2023-10-30 DIAGNOSIS — H92.02 LEFT EAR PAIN: Primary | ICD-10-CM

## 2023-10-30 DIAGNOSIS — Z23 ENCOUNTER FOR IMMUNIZATION: ICD-10-CM

## 2023-10-30 PROBLEM — J30.9 ALLERGIC RHINITIS DUE TO ALLERGEN: Status: ACTIVE | Noted: 2023-10-30

## 2023-10-30 PROCEDURE — 3078F DIAST BP <80 MM HG: CPT | Performed by: FAMILY MEDICINE

## 2023-10-30 PROCEDURE — 99213 OFFICE O/P EST LOW 20 MIN: CPT | Performed by: FAMILY MEDICINE

## 2023-10-30 PROCEDURE — 3074F SYST BP LT 130 MM HG: CPT | Performed by: FAMILY MEDICINE

## 2023-10-30 PROCEDURE — 90471 IMMUNIZATION ADMIN: CPT | Performed by: FAMILY MEDICINE

## 2023-10-30 PROCEDURE — 90686 IIV4 VACC NO PRSV 0.5 ML IM: CPT | Performed by: FAMILY MEDICINE

## 2023-10-30 RX ORDER — FLUTICASONE PROPIONATE 50 MCG
1 SPRAY, SUSPENSION (ML) NASAL DAILY
Qty: 9.9 ML | Refills: 0 | Status: SHIPPED | OUTPATIENT
Start: 2023-10-30

## 2023-10-30 NOTE — ASSESSMENT & PLAN NOTE
States she has post-nasal drip, some worsening throat discomfort and dryness. No erythema or exudate on exam.    Previously taking azelastine hcl nasal spray, not filled, stated it made her feel drowsy. Suspected cause of recent throat discomfort.   Plan:  -Stop azelastine, started fluticasone 50mcg/act nasal spray  -Monitor for symptom improvement

## 2023-10-30 NOTE — PROGRESS NOTES
Name: Daiana Pino      : 1980      MRN: [id number]  Encounter Provider: Alexey Hernandez MD  Encounter Date: 10/30/2023   Encounter department: [address]     1. Left ear pain  Assessment & Plan:  Continued since previous otitis externa over the summer, never entirely resolved. No visual erythema or discharge, cerumen noted in the canal.  Cerumen is not impacted so able to visualize TM well. Plan:  -DEBROX otic solution 5 drops BID  -Monitor for symptom improvement  -Return if symptoms continue    Orders:  -     carbamide peroxide (DEBROX) 6.5 % otic solution; Administer 5 drops into the left ear 2 (two) times a day    2. Seasonal allergic rhinitis due to other allergic trigger  Assessment & Plan:  States she has post-nasal drip, some worsening throat discomfort and dryness. No erythema or exudate on exam.    Previously taking azelastine hcl nasal spray, not filled, stated it made her feel drowsy. Suspected cause of recent throat discomfort. Plan:  -Stop azelastine, started fluticasone 50mcg/act nasal spray  -Monitor for symptom improvement    Orders:  -     fluticasone (FLONASE) 50 mcg/act nasal spray; 1 spray into each nostril daily    3. Encounter for immunization  -     influenza vaccine, quadrivalent, 0.5 mL, preservative-free, for adult and pediatric patients 6 mos+ (AFLURIA, FLUARIX, FLULAVAL, FLUZONE)           Subjective      Ear pain and throat discomfort since Friday. Ear pain long term sequelae from previous otitis external following bilateral otitis media. Some imbalance, no falls, no room spinning, no n/v. Took alkaseltzer cold and flu which helped    Throat discomfort:  -Started Friday, una discomfort dry  -Some sneezing, no rhinorrhea  -No cough  -Feels like she sounds nasally compared to baseline  -Some chills      Review of Systems   Constitutional:  Positive for chills. Negative for diaphoresis and fever. HENT:  Positive for ear pain, hearing loss (muffled hearing in left ear), postnasal drip and voice change (nasally voice). Negative for congestion, ear discharge, sinus pressure, sinus pain and trouble swallowing. Respiratory:  Positive for cough (mild, intermittent, dry). Negative for chest tightness and shortness of breath. Gastrointestinal:  Negative for abdominal pain, constipation, diarrhea, nausea and vomiting. Skin:  Negative for rash. Neurological:  Negative for dizziness. Current Outpatient Medications on File Prior to Visit   Medication Sig    atorvastatin (LIPITOR) 40 mg tablet TAKE 1 TABLET BY MOUTH EVERY DAY    hydrochlorothiazide (HYDRODIURIL) 25 mg tablet TAKE 1 TABLET (25 MG TOTAL) BY MOUTH DAILY. losartan (COZAAR) 100 MG tablet TAKE 1 TABLET BY MOUTH EVERY DAY    Omega-3 Fatty Acids (OMEGA 3 PO) Take by mouth    Ozempic, 2 MG/DOSE, 8 MG/3ML injection pen INJECT 0.75 ML (2 MG TOTAL) UNDER THE SKIN EVERY 7 DAYS FOR 8 DOSES    [DISCONTINUED] Azelastine HCl 137 MCG/SPRAY SOLN 1 SPRAY INTO EACH NOSTRIL 2 (TWO) TIMES A DAY USE IN EACH NOSTRIL AS DIRECTED (Patient not taking: Reported on 7/20/2023)       Objective     /77 (BP Location: Left arm, Patient Position: Sitting, Cuff Size: Standard)   Pulse 76   Temp 98.2 °F (36.8 °C) (Temporal)   Resp 18   Ht 5' 1" (1.549 m)   Wt 65.1 kg (143 lb 9.6 oz)   LMP 10/17/2018   SpO2 97%   BMI 27.13 kg/m²     Physical Exam  Vitals reviewed. Constitutional:       General: She is not in acute distress. Appearance: Normal appearance. She is not ill-appearing or diaphoretic. HENT:      Head: Normocephalic and atraumatic. Right Ear: Tympanic membrane, ear canal and external ear normal. There is no impacted cerumen. Left Ear: Tympanic membrane and external ear normal.      Ears:      Comments: Significant cerumen in left ear, non-impacted. Visualized TM well. No erythema, no effusion, no drainage.       Mouth/Throat: Mouth: Mucous membranes are moist.      Pharynx: Oropharynx is clear. No oropharyngeal exudate or posterior oropharyngeal erythema. Eyes:      General: No scleral icterus. Cardiovascular:      Rate and Rhythm: Normal rate and regular rhythm. Pulses: Normal pulses. Heart sounds: Normal heart sounds. No murmur heard. Pulmonary:      Effort: Pulmonary effort is normal. No respiratory distress. Breath sounds: Normal breath sounds. Abdominal:      General: There is no distension. Musculoskeletal:      Cervical back: Normal range of motion and neck supple. No tenderness. Skin:     General: Skin is warm and dry. Neurological:      General: No focal deficit present. Mental Status: She is alert.    Psychiatric:         Mood and Affect: Mood normal.       Marla Colvin MS3  Kapil Haywood MD

## 2023-10-30 NOTE — ASSESSMENT & PLAN NOTE
Continued since previous otitis externa over the summer, never entirely resolved. No visual erythema or discharge, cerumen noted in the canal.  Cerumen is not impacted so able to visualize TM well.     Plan:  -DEBROX otic solution 5 drops BID  -Monitor for symptom improvement  -Return if symptoms continue

## 2023-11-02 DIAGNOSIS — E66.09 CLASS 1 OBESITY DUE TO EXCESS CALORIES WITH SERIOUS COMORBIDITY AND BODY MASS INDEX (BMI) OF 33.0 TO 33.9 IN ADULT: ICD-10-CM

## 2023-11-08 ENCOUNTER — TELEPHONE (OUTPATIENT)
Dept: FAMILY MEDICINE CLINIC | Facility: CLINIC | Age: 43
End: 2023-11-08

## 2023-11-08 NOTE — TELEPHONE ENCOUNTER
Pre authorization for Ozempic 2mg dose started on 11/8/23  Faxed to [phone]  Will keep in black clinical folder to follow up in 2-3 days

## 2023-11-09 NOTE — TELEPHONE ENCOUNTER
Pre authorization was redone with a new form.    Faxed to [phone]  Will continue to follow up

## 2023-11-09 NOTE — TELEPHONE ENCOUNTER
HI Dr Toni Aguirre, patient called for a prior Auth on Ozempic just yesterday. She stated, it was on back order at the original pharmacy but did find a pharmacy that had it on stock. The Insurance will over ride a small amount due to the fact that she has not take med's for 5 days only until the prior authorization has been full approved. She wanted to know if you would forward the request to [address] [place] [address]. However they will only hold it for a short period of time. Please advise.

## 2023-11-12 ENCOUNTER — NURSE TRIAGE (OUTPATIENT)
Dept: OTHER | Facility: OTHER | Age: 43
End: 2023-11-12

## 2023-11-12 NOTE — TELEPHONE ENCOUNTER
Regarding: UTI  ----- Message from Izzy Mendoza sent at 11/12/2023  1:43 PM EST -----  Pt called in to follow up    ----- Message from Bree Dias sent at 11/12/2023 10:49 AM EST -----  Pt called in again.    ----- Message from Liz Leung sent at 11/12/2023  8:50 AM EST -----  " I have a UTI, My symptoms are Urinary Frequency, Burning."

## 2023-11-12 NOTE — TELEPHONE ENCOUNTER
Reason for Disposition  • All other patients with painful urination (Exception: [1] EITHER frequency or urgency AND [2] has on-call doctor)    Answer Assessment - Initial Assessment Questions  1. SEVERITY: "How bad is the pain?"  (e.g., Scale 1-10; mild, moderate, or severe)    - MILD (1-3): complains slightly about urination hurting    - MODERATE (4-7): interferes with normal activities      - SEVERE (8-10): excruciating, unwilling or unable to urinate because of the pain       Burning with urination. 2. FREQUENCY: "How many times have you had painful urination today?"       Yes    3. PATTERN: "Is pain present every time you urinate or just sometimes?"       Only with urination    4. ONSET: "When did the painful urination start?"       Started on Friday    5. FEVER: "Do you have a fever?" If Yes, ask: "What is your temperature, how was it measured, and when did it start?"      Denies fever. 6. PAST UTI: "Have you had a urine infection before?" If Yes, ask: "When was the last time?" and "What happened that time?"       Denies blood in urine. Urine seemed cloudy- is drinking lots of fluids. 7. CAUSE: "What do you think is causing the painful urination?"  (e.g., UTI, scratch, Herpes sore)      UTI    8. OTHER SYMPTOMS: "Do you have any other symptoms?" (e.g., flank pain, vaginal discharge, genital sores, urgency, blood in urine)      No other symptoms. 9.  PREGNANCY: "Is there any chance you are pregnant?" "When was your last menstrual period?"      Denies- hysterectomy    Protocols used: [facility]

## 2023-11-13 ENCOUNTER — TELEPHONE (OUTPATIENT)
Dept: FAMILY MEDICINE CLINIC | Facility: CLINIC | Age: 43
End: 2023-11-13

## 2023-11-13 NOTE — TELEPHONE ENCOUNTER
Patient's Rx for Ozempic is denied by insurance. If patient is to continue, please order Ozempic to [facility].

## 2023-11-13 NOTE — TELEPHONE ENCOUNTER
Pre Authorization for Ozempic 2mg dose was denied on 11/13/23. Reason given was the requested medication is not FDA approved for the diagnosis provided. Please advise, will call the patient and make them aware.

## 2023-12-16 DIAGNOSIS — E78.5 DYSLIPIDEMIA: ICD-10-CM

## 2023-12-16 DIAGNOSIS — I10 ESSENTIAL HYPERTENSION: ICD-10-CM

## 2023-12-19 RX ORDER — ATORVASTATIN CALCIUM 40 MG/1
TABLET, FILM COATED ORAL
Qty: 90 TABLET | Refills: 2 | Status: SHIPPED | OUTPATIENT
Start: 2023-12-19

## 2023-12-19 RX ORDER — LOSARTAN POTASSIUM 100 MG/1
TABLET ORAL
Qty: 90 TABLET | Refills: 2 | Status: SHIPPED | OUTPATIENT
Start: 2023-12-19

## 2023-12-22 DIAGNOSIS — J30.89 SEASONAL ALLERGIC RHINITIS DUE TO OTHER ALLERGIC TRIGGER: ICD-10-CM

## 2023-12-22 RX ORDER — FLUTICASONE PROPIONATE 50 MCG
SPRAY, SUSPENSION (ML) NASAL
Qty: 24 ML | Refills: 1 | Status: SHIPPED | OUTPATIENT
Start: 2023-12-22 | End: 2023-12-28

## 2023-12-27 DIAGNOSIS — J30.89 SEASONAL ALLERGIC RHINITIS DUE TO OTHER ALLERGIC TRIGGER: ICD-10-CM

## 2023-12-28 RX ORDER — FLUTICASONE PROPIONATE 50 MCG
SPRAY, SUSPENSION (ML) NASAL
Qty: 24 ML | Refills: 2 | Status: SHIPPED | OUTPATIENT
Start: 2023-12-28

## 2024-01-02 ENCOUNTER — TELEPHONE (OUTPATIENT)
Dept: FAMILY MEDICINE CLINIC | Facility: CLINIC | Age: 44
End: 2024-01-02

## 2024-01-02 NOTE — TELEPHONE ENCOUNTER
LVM to relay message from PCP to reschedule a sooner appointment    Patient can be reached at     [phone]

## 2024-01-02 NOTE — TELEPHONE ENCOUNTER
Patient is calling to speak to PCP regarding the   Insurance denial of Ozempic    Patient has notice some changes put  on 14 pounds since last dose (mid October) my BP reading having increased the latest one 133 over 90  Patient is wondering if she should make appointment to have alternative medication    Patient can be reached at  [phone]

## 2024-01-11 ENCOUNTER — OFFICE VISIT (OUTPATIENT)
Dept: FAMILY MEDICINE CLINIC | Facility: CLINIC | Age: 44
End: 2024-01-11

## 2024-01-11 VITALS
WEIGHT: 146.6 LBS | RESPIRATION RATE: 18 BRPM | SYSTOLIC BLOOD PRESSURE: 142 MMHG | OXYGEN SATURATION: 98 % | HEART RATE: 89 BPM | TEMPERATURE: 98.4 F | DIASTOLIC BLOOD PRESSURE: 95 MMHG | HEIGHT: 61 IN | BODY MASS INDEX: 27.68 KG/M2

## 2024-01-11 DIAGNOSIS — R53.82 CHRONIC FATIGUE: Primary | ICD-10-CM

## 2024-01-11 DIAGNOSIS — E66.3 OVERWEIGHT (BMI 25.0-29.9): ICD-10-CM

## 2024-01-11 PROCEDURE — 99213 OFFICE O/P EST LOW 20 MIN: CPT | Performed by: FAMILY MEDICINE

## 2024-01-11 NOTE — PROGRESS NOTES
"Name: Shireen Rueda      : 1980      MRN: [id number]  Encounter Provider: Selam Zaragoza DO  Encounter Date: 2024   Encounter department: [facility]    Assessment & Plan     1. Chronic fatigue  -     TSH, 3rd generation with Free T4 reflex; Future  -     CBC and differential; Future    2. Overweight (BMI 25.0-29.9)  Assessment & Plan:  Patient was on Ozempic for weight loss for Obesity with comorbities and lost significant weight. Stopped in October due to no longer being covered by insurance. Pt maintains healthy diet and weight     Plan  - f/u in 1 month for weight check with BP log and food diary while adhering to healthy lifestyle.   - blood work             Subjective     HPI  Pt here to discuss weight gain after stopping Ozempic. She has been feeling off since October when she stopped Ozempic due to insurance not covering the medication any longer. Pt also reports feeling HA or \"off\" and checks her BP during this time when she notes it being around, 130/90 which is higher than usual (baseline -120)    Pt counts calories, eats healthy, limited junk food and salt, exercises 3 times a week.         Past Medical History:   Diagnosis Date    Anemia     Chest tightness or pressure     2013    Depression 2017    Hypertension     Vitamin D deficiency      Past Surgical History:   Procedure Laterality Date    CYSTOSCOPY N/A 2018    Procedure: CYSTOSCOPY;  Surgeon: Laura Bal DO;  [facility];  Service: Gynecology    HYSTERECTOMY N/A 2018    Procedure: HYSTERECTOMY LAPAROSCOPIC TOTAL (LTH);  Surgeon: Laura Bal DO;  [facility];  Service: Gynecology    [place] LAPAROSCOPY W/RMVL ADNEXAL STRUCTURES Bilateral 2018    Procedure: SALPINGECTOMY, LAPAROSCOPIC;  Surgeon: Laura Bal DO;  [facility];  Service: Gynecology    TONSILLECTOMY      TUBAL LIGATION      2006     Family History   Problem " Relation Age of Onset    No Known Problems Mother     Heart disease Father     Hypertension Father     Heart attack Father 37    No Known Problems Sister     No Known Problems Daughter     No Known Problems Daughter     No Known Problems Maternal Grandmother     No Known Problems Maternal Grandfather     No Known Problems Paternal Grandmother     No Known Problems Paternal Grandfather     No Known Problems Son     No Known Problems Maternal Aunt     No Known Problems Maternal Aunt     No Known Problems Maternal Aunt     No Known Problems Maternal Aunt     No Known Problems Maternal Aunt     No Known Problems Paternal Aunt     No Known Problems Paternal Aunt     No Known Problems Half-Sister     Breast cancer Neg Hx     Colon cancer Neg Hx     Ovarian cancer Neg Hx     Endometrial cancer Neg Hx      Social History     Socioeconomic History    Marital status: /Civil Union     Spouse name: None    Number of children: None    Years of education: None    Highest education level: None   Occupational History    None   Tobacco Use    Smoking status: Some Days     Types: Cigarettes    Smokeless tobacco: Never    Tobacco comments:     social smoker, less than 1 pack per weekend   Vaping Use    Vaping status: Never Used   Substance and Sexual Activity    Alcohol use: Yes     Comment: on the weekends/socially    Drug use: No    Sexual activity: Yes     Partners: Male     Birth control/protection: Female Sterilization, Other   Other Topics Concern    None   Social History Narrative    Uses safety equipment-seatbelts     Social Determinants of Health     Financial Resource Strain: Low Risk  (3/24/2023)    Overall Financial Resource Strain (CARDIA)     Difficulty of Paying Living Expenses: Not hard at all   Food Insecurity: No Food Insecurity (3/24/2023)    Hunger Vital Sign     Worried About Running Out of Food in the Last Year: Never true     Ran Out of Food in the Last Year: Never true   Transportation Needs: No  Transportation Needs (3/24/2023)    PRAPARE - Transportation     Lack of Transportation (Medical): No     Lack of Transportation (Non-Medical): No   Physical Activity: Insufficiently Active (9/28/2023)    Exercise Vital Sign     Days of Exercise per Week: 2 days     Minutes of Exercise per Session: 50 min   Stress: Stress Concern Present (9/28/2023)    [place] [employer] - Occupational Stress Questionnaire     Feeling of Stress : Rather much   Social Connections: Moderately Isolated (9/28/2023)    Social Connection and Isolation Panel [NHANES]     Frequency of Communication with Friends and Family: More than three times a week     Frequency of Social Gatherings with Friends and Family: More than three times a week     Attends Shinto Services: Never     Active Member of Clubs or Organizations: No     Attends Club or Organization Meetings: Never     Marital Status:    Intimate Partner Violence: Not At Risk (9/28/2023)    Humiliation, Afraid, Rape, and Kick questionnaire     Fear of Current or Ex-Partner: No     Emotionally Abused: No     Physically Abused: No     Sexually Abused: No   Housing Stability: Low Risk  (3/24/2023)    Housing Stability Vital Sign     Unable to Pay for Housing in the Last Year: No     Number of Places Lived in the Last Year: 1     Unstable Housing in the Last Year: No     Current Outpatient Medications on File Prior to Visit   Medication Sig    atorvastatin (LIPITOR) 40 mg tablet TAKE 1 TABLET BY MOUTH EVERY DAY    fluticasone (FLONASE) 50 mcg/act nasal spray SPRAY 1 SPRAY INTO EACH NOSTRIL EVERY DAY (Patient taking differently: PRN)    hydrochlorothiazide (HYDRODIURIL) 25 mg tablet TAKE 1 TABLET (25 MG TOTAL) BY MOUTH DAILY.    losartan (COZAAR) 100 MG tablet TAKE 1 TABLET BY MOUTH EVERY DAY    Omega-3 Fatty Acids (OMEGA 3 PO) Take by mouth    carbamide peroxide (DEBROX) 6.5 % otic solution Administer 5 drops into the left ear 2 (two) times a day (Patient not  "taking: Reported on 1/11/2024)    semaglutide, 2 mg/dose, (Ozempic, 2 MG/DOSE,) 8 mg/ mL injection pen Inject 0.75 mL (2 mg total) under the skin every 7 days (Patient not taking: Reported on 1/11/2024)     No Known Allergies  Immunization History   Administered Date(s) Administered    COVID-19 PFIZER VACCINE 0.3 ML IM 03/24/2021, 04/14/2021, 12/02/2021    Influenza, injectable, quadrivalent, preservative free 0.5 mL 01/08/2020, 11/30/2020, 10/30/2023    Tdap 01/08/2020       Objective     /95 (BP Location: Left arm, Patient Position: Sitting, Cuff Size: Standard)   Pulse 89   Temp 98.4 °F (36.9 °C) (Temporal)   Resp 18   Ht 5' 1\" (1.549 m)   Wt 66.5 kg (146 lb 9.6 oz)   LMP 10/17/2018   SpO2 98%   BMI 27.70 kg/m²     Physical Exam  Constitutional:       General: She is not in acute distress.     Appearance: She is not ill-appearing or toxic-appearing.   HENT:      Head: Normocephalic and atraumatic.      Right Ear: External ear normal.      Left Ear: External ear normal.   Eyes:      General: No scleral icterus.        Right eye: No discharge.         Left eye: No discharge.      Extraocular Movements: Extraocular movements intact.      Conjunctiva/sclera: Conjunctivae normal.   Cardiovascular:      Rate and Rhythm: Normal rate.   Pulmonary:      Effort: Pulmonary effort is normal. No respiratory distress.   Skin:     General: Skin is warm and dry.   Neurological:      General: No focal deficit present.      Mental Status: She is alert.      Motor: No weakness.      Gait: Gait normal.   Psychiatric:         Mood and Affect: Mood normal.         Behavior: Behavior normal.         Thought Content: Thought content normal.         Judgment: Judgment normal.       Selam Zaragoza,     "

## 2024-01-12 NOTE — ASSESSMENT & PLAN NOTE
Patient was on Ozempic for weight loss for Obesity with comorbities and lost significant weight. Stopped in October due to no longer being covered by insurance. Pt maintains healthy diet and weight     Plan  - f/u in 1 month for weight check with BP log and food diary while adhering to healthy lifestyle.   - blood work

## 2024-01-25 DIAGNOSIS — I10 ESSENTIAL HYPERTENSION: ICD-10-CM

## 2024-01-26 RX ORDER — HYDROCHLOROTHIAZIDE 25 MG/1
25 TABLET ORAL DAILY
Qty: 90 TABLET | Refills: 1 | Status: SHIPPED | OUTPATIENT
Start: 2024-01-26

## 2024-01-27 ENCOUNTER — APPOINTMENT (OUTPATIENT)
Dept: LAB | Age: 44
End: 2024-01-27
Payer: COMMERCIAL

## 2024-01-27 DIAGNOSIS — I10 ESSENTIAL HYPERTENSION: ICD-10-CM

## 2024-01-27 DIAGNOSIS — Z13.1 SCREENING FOR DIABETES MELLITUS: ICD-10-CM

## 2024-01-27 DIAGNOSIS — E55.9 VITAMIN D DEFICIENCY: ICD-10-CM

## 2024-01-27 DIAGNOSIS — R53.82 CHRONIC FATIGUE: ICD-10-CM

## 2024-01-27 DIAGNOSIS — Z13.6 SCREENING FOR CARDIOVASCULAR CONDITION: ICD-10-CM

## 2024-01-27 LAB
25(OH)D3 SERPL-MCNC: 29.7 NG/ML (ref 30–100)
ANION GAP SERPL CALCULATED.3IONS-SCNC: 6 MMOL/L
BASOPHILS # BLD AUTO: 0.03 THOUSANDS/ÂΜL (ref 0–0.1)
BASOPHILS NFR BLD AUTO: 1 % (ref 0–1)
BUN SERPL-MCNC: 11 MG/DL (ref 5–25)
CALCIUM SERPL-MCNC: 9.5 MG/DL (ref 8.4–10.2)
CHLORIDE SERPL-SCNC: 101 MMOL/L (ref 96–108)
CHOLEST SERPL-MCNC: 143 MG/DL
CO2 SERPL-SCNC: 31 MMOL/L (ref 21–32)
CREAT SERPL-MCNC: 0.64 MG/DL (ref 0.6–1.3)
CREAT UR-MCNC: 154.4 MG/DL
EOSINOPHIL # BLD AUTO: 0.24 THOUSAND/ÂΜL (ref 0–0.61)
EOSINOPHIL NFR BLD AUTO: 5 % (ref 0–6)
ERYTHROCYTE [DISTWIDTH] IN BLOOD BY AUTOMATED COUNT: 12.1 % (ref 11.6–15.1)
EST. AVERAGE GLUCOSE BLD GHB EST-MCNC: 108 MG/DL
GFR SERPL CREATININE-BSD FRML MDRD: 108 ML/MIN/1.73SQ M
GLUCOSE P FAST SERPL-MCNC: 81 MG/DL (ref 65–99)
HBA1C MFR BLD: 5.4 %
HCT VFR BLD AUTO: 40.8 % (ref 34.8–46.1)
HDLC SERPL-MCNC: 58 MG/DL
HGB BLD-MCNC: 14.2 G/DL (ref 11.5–15.4)
IMM GRANULOCYTES # BLD AUTO: 0.01 THOUSAND/UL (ref 0–0.2)
IMM GRANULOCYTES NFR BLD AUTO: 0 % (ref 0–2)
LDLC SERPL CALC-MCNC: 38 MG/DL (ref 0–100)
LYMPHOCYTES # BLD AUTO: 2.06 THOUSANDS/ÂΜL (ref 0.6–4.47)
LYMPHOCYTES NFR BLD AUTO: 40 % (ref 14–44)
MCH RBC QN AUTO: 31.9 PG (ref 26.8–34.3)
MCHC RBC AUTO-ENTMCNC: 34.8 G/DL (ref 31.4–37.4)
MCV RBC AUTO: 92 FL (ref 82–98)
MICROALBUMIN UR-MCNC: <7 MG/L
MICROALBUMIN/CREAT 24H UR: <5 MG/G CREATININE (ref 0–30)
MONOCYTES # BLD AUTO: 0.36 THOUSAND/ÂΜL (ref 0.17–1.22)
MONOCYTES NFR BLD AUTO: 7 % (ref 4–12)
NEUTROPHILS # BLD AUTO: 2.44 THOUSANDS/ÂΜL (ref 1.85–7.62)
NEUTS SEG NFR BLD AUTO: 47 % (ref 43–75)
NONHDLC SERPL-MCNC: 85 MG/DL
NRBC BLD AUTO-RTO: 0 /100 WBCS
PLATELET # BLD AUTO: 234 THOUSANDS/UL (ref 149–390)
PMV BLD AUTO: 11.2 FL (ref 8.9–12.7)
POTASSIUM SERPL-SCNC: 3.5 MMOL/L (ref 3.5–5.3)
RBC # BLD AUTO: 4.45 MILLION/UL (ref 3.81–5.12)
SODIUM SERPL-SCNC: 138 MMOL/L (ref 135–147)
TRIGL SERPL-MCNC: 234 MG/DL
TSH SERPL DL<=0.05 MIU/L-ACNC: 2.4 UIU/ML (ref 0.45–4.5)
WBC # BLD AUTO: 5.14 THOUSAND/UL (ref 4.31–10.16)

## 2024-01-27 PROCEDURE — 80048 BASIC METABOLIC PNL TOTAL CA: CPT

## 2024-01-27 PROCEDURE — 83036 HEMOGLOBIN GLYCOSYLATED A1C: CPT

## 2024-01-27 PROCEDURE — 82570 ASSAY OF URINE CREATININE: CPT

## 2024-01-27 PROCEDURE — 82043 UR ALBUMIN QUANTITATIVE: CPT

## 2024-01-27 PROCEDURE — 82306 VITAMIN D 25 HYDROXY: CPT

## 2024-01-27 PROCEDURE — 85025 COMPLETE CBC W/AUTO DIFF WBC: CPT

## 2024-01-27 PROCEDURE — 84443 ASSAY THYROID STIM HORMONE: CPT

## 2024-01-27 PROCEDURE — 80061 LIPID PANEL: CPT

## 2024-01-27 PROCEDURE — 36415 COLL VENOUS BLD VENIPUNCTURE: CPT

## 2024-02-06 ENCOUNTER — OFFICE VISIT (OUTPATIENT)
Dept: FAMILY MEDICINE CLINIC | Facility: CLINIC | Age: 44
End: 2024-02-06

## 2024-02-06 VITALS
RESPIRATION RATE: 20 BRPM | SYSTOLIC BLOOD PRESSURE: 148 MMHG | BODY MASS INDEX: 27.85 KG/M2 | HEART RATE: 65 BPM | TEMPERATURE: 97.5 F | WEIGHT: 147.4 LBS | DIASTOLIC BLOOD PRESSURE: 100 MMHG

## 2024-02-06 DIAGNOSIS — E66.3 OVERWEIGHT (BMI 25.0-29.9): ICD-10-CM

## 2024-02-06 DIAGNOSIS — I10 ESSENTIAL HYPERTENSION: Primary | ICD-10-CM

## 2024-02-06 PROCEDURE — 99213 OFFICE O/P EST LOW 20 MIN: CPT | Performed by: FAMILY MEDICINE

## 2024-02-06 RX ORDER — AMLODIPINE BESYLATE 2.5 MG/1
2.5 TABLET ORAL DAILY
Qty: 30 TABLET | Refills: 5 | Status: SHIPPED | OUTPATIENT
Start: 2024-02-06

## 2024-02-06 NOTE — PATIENT INSTRUCTIONS
Create a calorie deficit: cut 500 - 1000kcal/day (3500kcal = 1lb)  Drink Water   Aerobic physical activity >/ 150min/week  No diet has been shown to be better than others for weight loss.  Mediterranean diet is the healthiest.  See ChooseMyPlate.gov

## 2024-02-07 NOTE — ASSESSMENT & PLAN NOTE
Pt presents for 1 mo weight check, gained 1 pound.  Pt brings in food log and maintains well-balanced and calorie controlled diet. She exercises 3 times a week. Pt's blood pressure is not well controlled and it previously was when she was on Ozempic (no longer taking due to insurance).    Plan  - Start Kong. Discussed side effects, pt agreeable.  - Pt agrees to contact insurance to inquire which, if any, weight loss medications are covered by her insurance.

## 2024-02-07 NOTE — PROGRESS NOTES
Name: Shireen Rueda      : 1980      MRN: [id number]  Encounter Provider: Selam Zaragoza DO  Encounter Date: 2024   Encounter department: [facility]    Assessment & Plan     1. Essential hypertension  Assessment & Plan:  Not at goal. Goal <140/90.  BP log brought in and ambulatory pressures are 130-155/, almost 50% >140/90.     Plan  - C/w losartan 100 mg and HCTZ 25 mg  - add amlodipine 2.5 mg daily  - continue weight loss efforts and start Brandie    Orders:  -     orlistat (BRANDIE) 60 MG capsule; Take 1 capsule (60 mg total) by mouth 3 (three) times a day with meals  -     amLODIPine (NORVASC) 2.5 mg tablet; Take 1 tablet (2.5 mg total) by mouth daily    2. Overweight (BMI 25.0-29.9)  Assessment & Plan:  Pt presents for 1 mo weight check, gained 1 pound.  Pt brings in food log and maintains well-balanced and calorie controlled diet. She exercises 3 times a week. Pt's blood pressure is not well controlled and it previously was when she was on Ozempic (no longer taking due to insurance).    Plan  - Start Brandie. Discussed side effects, pt agreeable.  - Pt agrees to contact insurance to inquire which, if any, weight loss medications are covered by her insurance.              Subjective       Shireen Rueda is a 43 y.o. female who presents for weight check and HTN. Pt maintains healthy diet, food log brought in, and exercises at least 3 times a week. Drinks water throughout the day and focuses avoids high calorie, carb, fatty foods. Adheres to low sodium diet.   BP log brought in and ambulatory pressures are 130-155/, almost 50% >140/90.       Past Medical History:   Diagnosis Date    Anemia     Chest tightness or pressure     2013    Depression 2017    Hypertension     Vitamin D deficiency      Past Surgical History:   Procedure Laterality Date    CYSTOSCOPY N/A 2018    Procedure: CYSTOSCOPY;  Surgeon: Laura Bal DO;  [facility];   Service: Gynecology    HYSTERECTOMY N/A 12/30/2018    Procedure: HYSTERECTOMY LAPAROSCOPIC TOTAL (LTH);  Surgeon: Laura Bal DO;  [facility];  Service: Gynecology    [place] LAPAROSCOPY W/RMVL ADNEXAL STRUCTURES Bilateral 12/30/2018    Procedure: SALPINGECTOMY, LAPAROSCOPIC;  Surgeon: Laura Bal DO;  [facility];  Service: Gynecology    TONSILLECTOMY      TUBAL LIGATION      2006     Family History   Problem Relation Age of Onset    No Known Problems Mother     Heart disease Father     Hypertension Father     Heart attack Father 37    No Known Problems Sister     No Known Problems Daughter     No Known Problems Daughter     No Known Problems Maternal Grandmother     No Known Problems Maternal Grandfather     No Known Problems Paternal Grandmother     No Known Problems Paternal Grandfather     No Known Problems Son     No Known Problems Maternal Aunt     No Known Problems Maternal Aunt     No Known Problems Maternal Aunt     No Known Problems Maternal Aunt     No Known Problems Maternal Aunt     No Known Problems Paternal Aunt     No Known Problems Paternal Aunt     No Known Problems Half-Sister     Breast cancer Neg Hx     Colon cancer Neg Hx     Ovarian cancer Neg Hx     Endometrial cancer Neg Hx      Social History     Socioeconomic History    Marital status: /Civil Union     Spouse name: None    Number of children: None    Years of education: None    Highest education level: None   Occupational History    None   Tobacco Use    Smoking status: Some Days     Types: Cigarettes    Smokeless tobacco: Never    Tobacco comments:     social smoker, less than 1 pack per weekend   Vaping Use    Vaping status: Never Used   Substance and Sexual Activity    Alcohol use: Yes     Comment: on the weekends/socially    Drug use: No    Sexual activity: Yes     Partners: Male     Birth control/protection: Female Sterilization, Other   Other Topics Concern    None   Social History Narrative    Uses  safety equipment-seatbelts     Social Determinants of Health     Financial Resource Strain: Low Risk  (3/24/2023)    Overall Financial Resource Strain (CARDIA)     Difficulty of Paying Living Expenses: Not hard at all   Food Insecurity: No Food Insecurity (3/24/2023)    Hunger Vital Sign     Worried About Running Out of Food in the Last Year: Never true     Ran Out of Food in the Last Year: Never true   Transportation Needs: No Transportation Needs (3/24/2023)    PRAPARE - Transportation     Lack of Transportation (Medical): No     Lack of Transportation (Non-Medical): No   Physical Activity: Insufficiently Active (9/28/2023)    Exercise Vital Sign     Days of Exercise per Week: 2 days     Minutes of Exercise per Session: 50 min   Stress: Stress Concern Present (9/28/2023)    [place] [employer]ational [employer] - Occupational Stress Questionnaire     Feeling of Stress : Rather much   Social Connections: Moderately Isolated (9/28/2023)    Social Connection and Isolation Panel [NHANES]     Frequency of Communication with Friends and Family: More than three times a week     Frequency of Social Gatherings with Friends and Family: More than three times a week     Attends Mandaeism Services: Never     Active Member of Clubs or Organizations: No     Attends Club or Organization Meetings: Never     Marital Status:    Intimate Partner Violence: Not At Risk (9/28/2023)    Humiliation, Afraid, Rape, and Kick questionnaire     Fear of Current or Ex-Partner: No     Emotionally Abused: No     Physically Abused: No     Sexually Abused: No   Housing Stability: Low Risk  (3/24/2023)    Housing Stability Vital Sign     Unable to Pay for Housing in the Last Year: No     Number of Places Lived in the Last Year: 1     Unstable Housing in the Last Year: No     Current Outpatient Medications on File Prior to Visit   Medication Sig    atorvastatin (LIPITOR) 40 mg tablet TAKE 1 TABLET BY MOUTH EVERY DAY    hydroCHLOROthiazide 25  mg tablet TAKE 1 TABLET (25 MG TOTAL) BY MOUTH DAILY.    losartan (COZAAR) 100 MG tablet TAKE 1 TABLET BY MOUTH EVERY DAY    Omega-3 Fatty Acids (OMEGA 3 PO) Take by mouth    carbamide peroxide (DEBROX) 6.5 % otic solution Administer 5 drops into the left ear 2 (two) times a day (Patient not taking: Reported on 1/11/2024)    fluticasone (FLONASE) 50 mcg/act nasal spray SPRAY 1 SPRAY INTO EACH NOSTRIL EVERY DAY (Patient taking differently: PRN)    semaglutide, 2 mg/dose, (Ozempic, 2 MG/DOSE,) 8 mg/ mL injection pen Inject 0.75 mL (2 mg total) under the skin every 7 days (Patient not taking: Reported on 1/11/2024)     No Known Allergies  Immunization History   Administered Date(s) Administered    COVID-19 PFIZER VACCINE 0.3 ML IM 03/24/2021, 04/14/2021, 12/02/2021    Influenza, injectable, quadrivalent, preservative free 0.5 mL 01/08/2020, 11/30/2020, 10/30/2023    Tdap 01/08/2020       Objective     /100 (BP Location: Left arm, Patient Position: Sitting, Cuff Size: Standard)   Pulse 65   Temp 97.5 °F (36.4 °C)   Resp 20   Wt 66.9 kg (147 lb 6.4 oz)   LMP 10/17/2018   BMI 27.85 kg/m²     Physical Exam  Constitutional:       General: She is not in acute distress.     Appearance: She is not ill-appearing or toxic-appearing.   HENT:      Head: Normocephalic and atraumatic.      Right Ear: External ear normal.      Left Ear: External ear normal.   Eyes:      General: No scleral icterus.        Right eye: No discharge.         Left eye: No discharge.      Extraocular Movements: Extraocular movements intact.      Conjunctiva/sclera: Conjunctivae normal.   Cardiovascular:      Rate and Rhythm: Normal rate.   Pulmonary:      Effort: Pulmonary effort is normal. No respiratory distress.   Skin:     General: Skin is warm and dry.   Neurological:      General: No focal deficit present.      Mental Status: She is alert.      Motor: No weakness.      Gait: Gait normal.   Psychiatric:         Mood and Affect: Mood  normal.         Behavior: Behavior normal.         Thought Content: Thought content normal.         Judgment: Judgment normal.       Selam Zaragoza, DO

## 2024-02-07 NOTE — ASSESSMENT & PLAN NOTE
Not at goal. Goal <140/90.  BP log brought in and ambulatory pressures are 130-155/, almost 50% >140/90.     Plan  - C/w losartan 100 mg and HCTZ 25 mg  - add amlodipine 2.5 mg daily  - continue weight loss efforts and start Kong

## 2024-02-12 ENCOUNTER — OFFICE VISIT (OUTPATIENT)
Dept: FAMILY MEDICINE CLINIC | Facility: CLINIC | Age: 44
End: 2024-02-12

## 2024-02-12 VITALS
WEIGHT: 147 LBS | DIASTOLIC BLOOD PRESSURE: 82 MMHG | SYSTOLIC BLOOD PRESSURE: 122 MMHG | HEIGHT: 61 IN | BODY MASS INDEX: 27.75 KG/M2 | OXYGEN SATURATION: 99 % | TEMPERATURE: 98 F | HEART RATE: 78 BPM | RESPIRATION RATE: 20 BRPM

## 2024-02-12 DIAGNOSIS — I10 ESSENTIAL HYPERTENSION: Primary | ICD-10-CM

## 2024-02-12 PROCEDURE — 99213 OFFICE O/P EST LOW 20 MIN: CPT | Performed by: FAMILY MEDICINE

## 2024-02-12 NOTE — ASSESSMENT & PLAN NOTE
Not at goal. Goal <140/90.  Pt states ambulatory BP unchanged from previous visit. Per last note, ambulatory pressures 130-155/, almost 50% >140/90. In clinic today, 122/82.     Headaches likely in the setting of HTN.    Plan:   - C/w losartan 100mg, HCTZ 25 mg, and amlodipine 2.5 mg  - Up to 3000mg daily Tylenol to manage headaches and provide better prophylactic pain control.   - ER precautions given. Advised to go to ER if systolic higher than 180, or severe headache accompanied by blurry vision or weakness.   - Continue ambulatory BP monitoring. Follow-up next Friday to reassess if headaches are secondary to HTN or adverse effect of amlodipine.

## 2024-02-12 NOTE — PROGRESS NOTES
"Name: Shireen Rueda      : 1980      MRN: [id number]  Encounter Provider: Leonard Elena MD  Encounter Date: 2024   Encounter department: [facility]    Assessment & Plan     1. Essential hypertension  Assessment & Plan:  Not at goal. Goal <140/90.  Pt states ambulatory BP unchanged from previous visit. Per last note, ambulatory pressures 130-155/, almost 50% >140/90. In clinic today, 122/82.     Headaches likely in the setting of HTN.    Plan:   - C/w losartan 100mg, HCTZ 25 mg, and amlodipine 2.5 mg  - Up to 3000mg daily Tylenol to manage headaches and provide better prophylactic pain control.   - ER precautions given. Advised to go to ER if systolic higher than 180, or severe headache accompanied by blurry vision or weakness.   - Continue ambulatory BP monitoring. Follow-up this Friday to reassess if headaches are secondary to HTN or adverse effect of amlodipine.              Subjective      Pt is a 42 y/o F who presents for 2 day onset of headaches and HTN. Patient states headaches started 2 days ago, initially intermittent in nature. Endorses constant headache the following day, accompanied with dizziness. She admits having to leave work yesterday due to severity of headache and feeling \"off.\" When checked, BP was 153/98. Pt was started on amlodipine last week and associates headaches with medication. She also states that she typically gets headaches when her blood pressures are elevated. Admits current headaches resemble headaches associated with elevated BP.      Did not bring BP log today, but reports unchanged ambulatory BP log. Denies photophobia, nausea, vomiting, palpation, chest pain, visual disturbances. Also denies any recent work stress or anxiety. Denies history of migraines. Admits good hydration. States that she has been managing headaches with 1000mg of Tylenol. Admits taking amlodipine (2.5mg), Hydrochlorothiazide (25mg) and " "losartan 100mg as prescribed. Denies starting Brandie just yet.         Review of Systems   Constitutional:  Negative for chills, fatigue and fever.   HENT:  Negative for ear pain and sore throat.    Eyes:  Negative for photophobia, pain and visual disturbance.   Respiratory:  Negative for cough, chest tightness and shortness of breath.    Cardiovascular:  Negative for chest pain, palpitations and leg swelling.   Gastrointestinal:  Negative for abdominal pain, constipation, diarrhea, nausea and vomiting.   Genitourinary:  Negative for dysuria and hematuria.   Musculoskeletal:  Negative for arthralgias and back pain.   Skin:  Negative for color change and rash.   Neurological:  Positive for dizziness and headaches. Negative for seizures, syncope and facial asymmetry.   All other systems reviewed and are negative.      Current Outpatient Medications on File Prior to Visit   Medication Sig    amLODIPine (NORVASC) 2.5 mg tablet Take 1 tablet (2.5 mg total) by mouth daily    atorvastatin (LIPITOR) 40 mg tablet TAKE 1 TABLET BY MOUTH EVERY DAY    hydroCHLOROthiazide 25 mg tablet TAKE 1 TABLET (25 MG TOTAL) BY MOUTH DAILY.    losartan (COZAAR) 100 MG tablet TAKE 1 TABLET BY MOUTH EVERY DAY    Omega-3 Fatty Acids (OMEGA 3 PO) Take by mouth    carbamide peroxide (DEBROX) 6.5 % otic solution Administer 5 drops into the left ear 2 (two) times a day (Patient not taking: Reported on 1/11/2024)    fluticasone (FLONASE) 50 mcg/act nasal spray SPRAY 1 SPRAY INTO EACH NOSTRIL EVERY DAY (Patient taking differently: PRN)    orlistat (BRANDIE) 60 MG capsule Take 1 capsule (60 mg total) by mouth 3 (three) times a day with meals    semaglutide, 2 mg/dose, (Ozempic, 2 MG/DOSE,) 8 mg/ mL injection pen Inject 0.75 mL (2 mg total) under the skin every 7 days (Patient not taking: Reported on 1/11/2024)       Objective     /82   Pulse 78   Temp 98 °F (36.7 °C) (Temporal)   Resp 20   Ht 5' 1\" (1.549 m)   Wt 66.7 kg (147 lb)   LMP " 10/17/2018   SpO2 99%   BMI 27.78 kg/m²     Physical Exam  Constitutional:       General: She is not in acute distress.     Appearance: Normal appearance. She is not ill-appearing or toxic-appearing.   HENT:      Head: Normocephalic and atraumatic.   Eyes:      Pupils: Pupils are equal, round, and reactive to light.   Cardiovascular:      Rate and Rhythm: Normal rate and regular rhythm.      Pulses: Normal pulses.      Heart sounds: Normal heart sounds. No murmur heard.  Pulmonary:      Effort: Pulmonary effort is normal. No respiratory distress.      Breath sounds: Normal breath sounds. No stridor. No wheezing or rhonchi.   Musculoskeletal:         General: No swelling.      Cervical back: Normal range of motion.   Skin:     General: Skin is warm and dry.   Neurological:      General: No focal deficit present.      Mental Status: She is alert and oriented to person, place, and time. Mental status is at baseline.   Psychiatric:         Mood and Affect: Mood is anxious. Affect is tearful.         Behavior: Behavior normal.       Leonard Elena MD

## 2024-02-12 NOTE — LETTER
February 12, 2024     Patient: Shireen Rueda  YOB: 1980  Date of Visit: 2/12/2024      To Whom it May Concern:    Shireen Rueda is under my professional care. Shireen was seen in my office on 2/12/2024. Shireen may return to work on 02/13/2024 .    If you have any questions or concerns, please don't hesitate to call.         Sincerely,          Loenard Elena MD        CC: No Recipients

## 2024-02-13 ENCOUNTER — TELEPHONE (OUTPATIENT)
Dept: FAMILY MEDICINE CLINIC | Facility: CLINIC | Age: 44
End: 2024-02-13

## 2024-02-13 NOTE — LETTER
February 14, 2024     Patient: Shireen Rueda  YOB: 1980  Date of Visit: 2/12/2024      To Whom it May Concern:    Shireen Rueda is under my professional care. Shireen was seen in my office on 2/13/2024. Shireen may return to work on 12/14/2024 .    If you have any questions or concerns, please don't hesitate to call.         Sincerely,      Leonard Elena MD        CC: No Recipients

## 2024-02-13 NOTE — TELEPHONE ENCOUNTER
Patient call stating she misplaced the letter given yesterday 2/12/24 at her appt with Dr. Elena. Patient is unable to see it through My Chart. Patient requesting a new letter. Also patient state if the letter can state she can go back to work tomorrow 2/14/24 instead of today.

## 2024-02-22 ENCOUNTER — RA CDI HCC (OUTPATIENT)
Dept: OTHER | Facility: HOSPITAL | Age: 44
End: 2024-02-22

## 2024-02-29 DIAGNOSIS — I10 ESSENTIAL HYPERTENSION: ICD-10-CM

## 2024-02-29 RX ORDER — AMLODIPINE BESYLATE 2.5 MG/1
2.5 TABLET ORAL DAILY
Qty: 90 TABLET | Refills: 2 | Status: SHIPPED | OUTPATIENT
Start: 2024-02-29

## 2024-03-08 NOTE — PROGRESS NOTES
[address] [facility]    NAME: Sherrell Izquierdo  AGE: 42 y o  SEX: female  : 1980     DATE: 2022     Assessment and Plan:     Problem List Items Addressed This Visit        Other    Annual physical exam - Primary     No abnormalities noted on exam  Order given for screening mammogram  Decline pneumococcal vaccine today  Labs are UTD           Other Visit Diagnoses     Encounter for screening mammogram for breast cancer        Relevant Orders    Mammo screening bilateral w 3d & cad          Immunizations and preventive care screenings were discussed with patient today  Appropriate education was printed on patient's after visit summary  Counseling:  Exercise: the importance of regular exercise/physical activity was discussed  Recommend exercise 3-5 times per week for at least 30 minutes  BMI Counseling: Body mass index is 31 81 kg/m²  The BMI is above normal  Nutrition recommendations include decreasing portion sizes and moderation in carbohydrate intake  Exercise recommendations include exercising 3-5 times per week  No pharmacotherapy was ordered  Rationale for BMI follow-up plan is due to patient being overweight or obese  Return in about 6 months (around 2023)  Chief Complaint:     Chief Complaint   Patient presents with    Annual Exam      History of Present Illness:     Adult Annual Physical   Patient here for a comprehensive physical exam  The patient reports no problems  Diet and Physical Activity  Diet/Nutrition: well balanced diet and Follows a weight watche diet  Exercise: no formal exercise  Depression Screening  PHQ-2/9 Depression Screening         General Health  Sleep: gets 7-8 hours of sleep on average  Hearing: normal - bilateral   Vision: goes for regular eye exams, most recent eye exam <1 year ago and wears glasses     Dental: regular dental visits, brushes teeth twice daily and uses a waterpike  /GYN Health  Patient - hysterectomy  Last menstrual period: hysterectomy       Review of Systems:     Review of Systems   Constitutional: Negative for chills, fatigue and fever  Respiratory: Negative for cough, chest tightness, shortness of breath and wheezing  Cardiovascular: Negative for chest pain and palpitations  Gastrointestinal: Negative for abdominal pain, constipation, diarrhea, nausea and vomiting  Genitourinary: Negative for difficulty urinating  Musculoskeletal: Negative for arthralgias, myalgias, neck pain and neck stiffness  Skin: Negative for rash and wound  Neurological: Negative for dizziness, weakness, light-headedness, numbness and headaches  Psychiatric/Behavioral: Negative for agitation and behavioral problems  The patient is not nervous/anxious  Past Medical History:     Past Medical History:   Diagnosis Date    Anemia     Chest tightness or pressure     8/19/2013    Hypertension     Vitamin D deficiency       Past Surgical History:     Past Surgical History:   Procedure Laterality Date    CYSTOSCOPY N/A 12/30/2018    Procedure: CYSTOSCOPY;  Surgeon: Cecilio Watson DO;  [facility];  Service: Gynecology    HYSTERECTOMY N/A 12/30/2018    Procedure: HYSTERECTOMY LAPAROSCOPIC TOTAL ([address] [place] [address]);   Surgeon: Cecilio Watson DO;  [facility];  Service: Gynecology    SC LAP,RMV  ADNEXAL STRUCTURE Bilateral 12/30/2018    Procedure: SALPINGECTOMY, LAPAROSCOPIC;  Surgeon: Cecilio Watson DO;  [facility];  Service: Gynecology    TONSILLECTOMY      TUBAL LIGATION      2006      Social History:     Social History     Socioeconomic History    Marital status: /Civil Union     Spouse name: None    Number of children: None    Years of education: None    Highest education level: None   Occupational History    None   Tobacco Use    Smoking status: Light Tobacco Smoker     Packs/day: 0 50     Years: 35 00 Pack years: 17 50     Types: Cigarettes    Smokeless tobacco: Never Used    Tobacco comment: social smoker, 1 pack per weekend    Vaping Use    Vaping Use: Never used   Substance and Sexual Activity    Alcohol use: Yes     Comment: on the weekends/ socially    Drug use: No    Sexual activity: Yes     Partners: Male     Birth control/protection: Female Sterilization   Other Topics Concern    None   Social History Narrative    Uses safety equipment-seatbelts     Social Determinants of Health     Financial Resource Strain: Low Risk     Difficulty of Paying Living Expenses: Not hard at all   Food Insecurity: No Food Insecurity    Worried About Running Out of Food in the Last Year: Never true    Cassandra of Food in the Last Year: Never true   Transportation Needs: No Transportation Needs    Lack of Transportation (Medical): No    Lack of Transportation (Non-Medical): No   Physical Activity: Inactive    Days of Exercise per Week: 0 days    Minutes of Exercise per Session: 0 min   Stress: No Stress Concern Present    Feeling of Stress : Not at all   Social Connections:  Moderately Isolated    Frequency of Communication with Friends and Family: More than three times a week    Frequency of Social Gatherings with Friends and Family: More than three times a week    Attends Mormonism Services: Never    Active Member of Clubs or Organizations: No    Attends Club or Organization Meetings: Never    Marital Status:    Intimate Partner Violence: Not At Risk    Fear of Current or Ex-Partner: No    Emotionally Abused: No    Physically Abused: No    Sexually Abused: No   Housing Stability: Low Risk     Unable to Pay for Housing in the Last Year: No    Number of [place] in the Last Year: 1    Unstable Housing in the Last Year: No      Family History:     Family History   Problem Relation Age of Onset    Heart disease Father     Hypertension Father     Heart attack Father 40      Current Medications:     Current Outpatient Medications   Medication Sig Dispense Refill    cetirizine (ZyrTEC) 10 mg tablet Take 1 tablet (10 mg total) by mouth daily 30 tablet 2    hydrochlorothiazide (HYDRODIURIL) 25 mg tablet TAKE 1 TABLET (25 MG TOTAL) BY MOUTH DAILY  30 tablet 0    hydrOXYzine HCL (ATARAX) 25 mg tablet Take 1 tablet (25 mg total) by mouth every 6 (six) hours as needed for anxiety 30 tablet 0    losartan (COZAAR) 50 mg tablet TAKE 1 TABLET BY MOUTH EVERY DAY 90 tablet 1    Omega-3 Fatty Acids (OMEGA 3 PO) Take by mouth       No current facility-administered medications for this visit  Allergies:     No Known Allergies   Physical Exam:     /87 (BP Location: Left arm, Patient Position: Sitting, Cuff Size: Standard)   Pulse 79   Temp 98 1 °F (36 7 °C) (Temporal)   Resp 20   Ht 5' 1 3" (1 557 m)   Wt 77 1 kg (170 lb)   LMP 10/17/2018   SpO2 99%   BMI 31 81 kg/m²     Physical Exam  Constitutional:       General: She is not in acute distress  Appearance: Normal appearance  She is not ill-appearing  HENT:      Head: Normocephalic and atraumatic  Right Ear: Tympanic membrane, ear canal and external ear normal  There is no impacted cerumen  Left Ear: Tympanic membrane, ear canal and external ear normal  There is no impacted cerumen  Nose: Nose normal       Mouth/Throat:      Mouth: Mucous membranes are moist       Pharynx: No oropharyngeal exudate or posterior oropharyngeal erythema  Eyes:      General:         Right eye: No discharge  Left eye: No discharge  Conjunctiva/sclera: Conjunctivae normal       Pupils: Pupils are equal, round, and reactive to light  Cardiovascular:      Rate and Rhythm: Normal rate and regular rhythm  Pulses: Normal pulses  Heart sounds: Normal heart sounds  No murmur heard  Pulmonary:      Effort: Pulmonary effort is normal       Breath sounds: Normal breath sounds  No wheezing or rhonchi     Abdominal: General: Bowel sounds are normal  There is no distension  Palpations: Abdomen is soft  There is no mass  Tenderness: There is no abdominal tenderness  There is no guarding  Musculoskeletal:         General: No swelling or deformity  Cervical back: Normal range of motion and neck supple  No muscular tenderness  Lymphadenopathy:      Cervical: No cervical adenopathy  Skin:     General: Skin is warm and dry  Findings: No erythema  Neurological:      Mental Status: She is alert and oriented to person, place, and time  Psychiatric:         Mood and Affect: Mood normal          Behavior: Behavior normal          Thought Content:  Thought content normal          Judgment: Judgment normal           Noemy Landry PA-C  [address] 1[address]82

## 2024-07-08 ENCOUNTER — OFFICE VISIT (OUTPATIENT)
Dept: FAMILY MEDICINE CLINIC | Facility: CLINIC | Age: 44
End: 2024-07-08

## 2024-07-08 VITALS
RESPIRATION RATE: 18 BRPM | DIASTOLIC BLOOD PRESSURE: 78 MMHG | BODY MASS INDEX: 25.83 KG/M2 | HEART RATE: 73 BPM | WEIGHT: 136.8 LBS | TEMPERATURE: 97.6 F | SYSTOLIC BLOOD PRESSURE: 114 MMHG | OXYGEN SATURATION: 98 % | HEIGHT: 61 IN

## 2024-07-08 DIAGNOSIS — F41.9 ANXIETY: ICD-10-CM

## 2024-07-08 DIAGNOSIS — R00.2 PALPITATIONS: Primary | ICD-10-CM

## 2024-07-08 DIAGNOSIS — E55.9 VITAMIN D DEFICIENCY: ICD-10-CM

## 2024-07-08 DIAGNOSIS — Z13.6 SCREENING FOR CARDIOVASCULAR CONDITION: ICD-10-CM

## 2024-07-08 PROBLEM — I15.8 OTHER SECONDARY HYPERTENSION: Status: ACTIVE | Noted: 2024-07-08

## 2024-07-08 PROCEDURE — 99213 OFFICE O/P EST LOW 20 MIN: CPT | Performed by: FAMILY MEDICINE

## 2024-07-08 RX ORDER — ESCITALOPRAM OXALATE 5 MG/1
5 TABLET ORAL DAILY
Qty: 30 TABLET | Refills: 0 | Status: SHIPPED | OUTPATIENT
Start: 2024-07-08

## 2024-07-08 NOTE — PROGRESS NOTES
Ambulatory Visit  Name: Shireen Rueda      : 1980      MRN: [id number]  Encounter Provider: Nenita Velásquez MD  Encounter Date: 2024   Encounter department: [facility]    Assessment & Plan   1. Palpitations  Assessment & Plan:  - History of palpitation.  Per patient had Holter monitor done a few years ago which was normal  - States that she has had a lot more stressors lately, and that her anxiety has been overwhelming  - Workup for organic causes of palpitations ordered  - Likely secondary to anxiety given current stressors, and that symptoms decrease as she practices BT  - EKG ordered to rule out arrhythmia  - ER return precautions also discussed with patient  - Symptoms worsen or do not improve will send for Holter monitor    Orders:  -     CBC and differential; Future  -     Basic metabolic panel; Future  -     TSH, 3rd generation with Free T4 reflex; Future  -     Vitamin D 25 hydroxy; Future  -     ECG 12 lead; Future  2. Anxiety  Assessment & Plan:  YADIRA-7 Flowsheet Screening      Flowsheet Row Most Recent Value   Over the last 2 weeks, how often have you been bothered by any of the following problems?    Feeling nervous, anxious, or on edge 2   Not being able to stop or control worrying 2   Worrying too much about different things 3   Trouble relaxing 3   Being so restless that it is hard to sit still 0   Becoming easily annoyed or irritable 1   Feeling afraid as if something awful might happen 3   YADIRA-7 Total Score 14              Patient with a history of anxiety, currently not on any medication  Currently she has a lot of life stressors including her son now graduating, and she is stressed about her health  In the past patient has tried Atarax but has not been on any anxiety medication  Given this we will try Lexapro 5 mg to be titrated up to 10 mg daily; going slow as patient is worried about side effects.  Risk and benefit of starting medication  discussed with patient.  Side effects of medication not so discussed with patient.  Will follow patient in 3 weeks  Referral for therapy also placed    Orders:  -     escitalopram (LEXAPRO) 5 mg tablet; Take 1 tablet (5 mg total) by mouth daily  -     Ambulatory referral to Psych Services; Future  3. Vitamin D deficiency  Assessment & Plan:  Noted to have vitamin D deficiency back in January.  Currently not on any supplementation.    Plan  - Repeat Vit D lab test  Orders:  -     Vitamin D 25 hydroxy; Future  4. Screening for cardiovascular condition  -     Lipid panel; Future  -     Hemoglobin A1C; Future  -     Basic metabolic panel; Future       History of Present Illness     Shireen is a 43 year old female who presents with 10 days of feeling generally unwell. Her symptoms have gradually worsened over this period of time. Symptoms began suddenly upon waking. Symptoms include feeling as though her heart is skipping a beat, occasional fatigue. She denies chest pain or shortness of breath.  Stress from son's graduation party this weekend. Aleve helped a little bit. No aggravating factors.  Denies blurry vision, tinnitus, headache (woke with one today, took Tylenol, didn't help), endorses fatigue. Denies numbness tingling, N/V/D, fever, chills.         Review of Systems   Constitutional:  Positive for fatigue. Negative for chills and fever.   HENT:  Negative for ear pain and sore throat.    Eyes:  Negative for pain and visual disturbance.   Respiratory:  Negative for cough and shortness of breath.    Cardiovascular:  Positive for palpitations. Negative for chest pain.   Gastrointestinal:  Negative for abdominal pain and vomiting.   Genitourinary:  Negative for dysuria and hematuria.   Musculoskeletal:  Negative for arthralgias and back pain.   Skin:  Negative for color change and rash.   Neurological:  Negative for seizures and syncope.   All other systems reviewed and are negative.    Current Outpatient Medications on  "File Prior to Visit   Medication Sig Dispense Refill    amLODIPine (NORVASC) 2.5 mg tablet TAKE 1 TABLET BY MOUTH EVERY DAY 90 tablet 2    atorvastatin (LIPITOR) 40 mg tablet TAKE 1 TABLET BY MOUTH EVERY DAY 90 tablet 2    hydroCHLOROthiazide 25 mg tablet TAKE 1 TABLET (25 MG TOTAL) BY MOUTH DAILY. 90 tablet 1    losartan (COZAAR) 100 MG tablet TAKE 1 TABLET BY MOUTH EVERY DAY 90 tablet 2    Omega-3 Fatty Acids (OMEGA 3 PO) Take by mouth      carbamide peroxide (DEBROX) 6.5 % otic solution Administer 5 drops into the left ear 2 (two) times a day (Patient not taking: Reported on 1/11/2024) 15 mL 0    fluticasone (FLONASE) 50 mcg/act nasal spray SPRAY 1 SPRAY INTO EACH NOSTRIL EVERY DAY (Patient not taking: Reported on 7/8/2024) 24 mL 2    orlistat (BRANDIE) 60 MG capsule Take 1 capsule (60 mg total) by mouth 3 (three) times a day with meals (Patient not taking: Reported on 7/8/2024) 90 capsule 2    semaglutide, 2 mg/dose, (Ozempic, 2 MG/DOSE,) 8 mg/ mL injection pen Inject 0.75 mL (2 mg total) under the skin every 7 days (Patient not taking: Reported on 1/11/2024) 9 mL 1     No current facility-administered medications on file prior to visit.      Social History     Tobacco Use    Smoking status: Some Days     Types: Cigarettes    Smokeless tobacco: Never    Tobacco comments:     social smoker, less than 1 pack per weekend   Vaping Use    Vaping status: Never Used   Substance and Sexual Activity    Alcohol use: Yes     Comment: on the weekends/socially    Drug use: No    Sexual activity: Yes     Partners: Male     Birth control/protection: Female Sterilization, Other     Objective     /78 (BP Location: Right arm, Patient Position: Sitting, Cuff Size: Standard)   Pulse 73   Temp 97.6 °F (36.4 °C) (Temporal)   Resp 18   Ht 5' 1\" (1.549 m)   Wt 62.1 kg (136 lb 12.8 oz)   LMP 10/17/2018   SpO2 98%   BMI 25.85 kg/m²     Physical Exam  Vitals and nursing note reviewed.   Constitutional:       General: She is not " in acute distress.     Appearance: She is well-developed.   HENT:      Head: Normocephalic and atraumatic.   Eyes:      Conjunctiva/sclera: Conjunctivae normal.   Cardiovascular:      Rate and Rhythm: Normal rate and regular rhythm.      Heart sounds: No murmur heard.  Pulmonary:      Effort: Pulmonary effort is normal. No respiratory distress.      Breath sounds: Normal breath sounds.   Abdominal:      Palpations: Abdomen is soft.      Tenderness: There is no abdominal tenderness.   Musculoskeletal:         General: No swelling.      Cervical back: Neck supple.   Skin:     General: Skin is warm and dry.      Capillary Refill: Capillary refill takes less than 2 seconds.   Neurological:      Mental Status: She is alert.   Psychiatric:         Mood and Affect: Mood normal.

## 2024-07-08 NOTE — ASSESSMENT & PLAN NOTE
- History of palpitation.  Per patient had Holter monitor done a few years ago which was normal  - States that she has had a lot more stressors lately, and that her anxiety has been overwhelming  - Workup for organic causes of palpitations ordered  - Likely secondary to anxiety given current stressors, and that symptoms decrease as she practices BT  - EKG ordered to rule out arrhythmia  - ER return precautions also discussed with patient  - Symptoms worsen or do not improve will send for Holter monitor

## 2024-07-08 NOTE — ASSESSMENT & PLAN NOTE
Noted to have vitamin D deficiency back in January.  Currently not on any supplementation.    Plan  - Repeat Vit D lab test

## 2024-07-08 NOTE — ASSESSMENT & PLAN NOTE
YADIRA-7 Flowsheet Screening      Flowsheet Row Most Recent Value   Over the last 2 weeks, how often have you been bothered by any of the following problems?    Feeling nervous, anxious, or on edge 2   Not being able to stop or control worrying 2   Worrying too much about different things 3   Trouble relaxing 3   Being so restless that it is hard to sit still 0   Becoming easily annoyed or irritable 1   Feeling afraid as if something awful might happen 3   YADIRA-7 Total Score 14              Patient with a history of anxiety, currently not on any medication  Currently she has a lot of life stressors including her son now graduating, and she is stressed about her health  In the past patient has tried Atarax but has not been on any anxiety medication  Given this we will try Lexapro 5 mg to be titrated up to 10 mg daily; going slow as patient is worried about side effects.  Risk and benefit of starting medication discussed with patient.  Side effects of medication not so discussed with patient.  Will follow patient in 3 weeks  Referral for therapy also placed

## 2024-07-23 ENCOUNTER — TELEPHONE (OUTPATIENT)
Age: 44
End: 2024-07-23

## 2024-07-30 DIAGNOSIS — I10 ESSENTIAL HYPERTENSION: ICD-10-CM

## 2024-07-30 RX ORDER — HYDROCHLOROTHIAZIDE 25 MG/1
25 TABLET ORAL DAILY
Qty: 90 TABLET | Refills: 0 | Status: SHIPPED | OUTPATIENT
Start: 2024-07-30

## 2024-07-31 DIAGNOSIS — F41.9 ANXIETY: ICD-10-CM

## 2024-07-31 RX ORDER — ESCITALOPRAM OXALATE 5 MG/1
5 TABLET ORAL DAILY
Qty: 90 TABLET | Refills: 0 | Status: SHIPPED | OUTPATIENT
Start: 2024-07-31

## 2024-08-07 PROBLEM — Z13.6 SCREENING FOR CARDIOVASCULAR CONDITION: Status: RESOLVED | Noted: 2024-07-08 | Resolved: 2024-08-07

## 2024-09-04 DIAGNOSIS — I10 ESSENTIAL HYPERTENSION: ICD-10-CM

## 2024-09-04 RX ORDER — AMLODIPINE BESYLATE 2.5 MG/1
2.5 TABLET ORAL DAILY
Qty: 30 TABLET | Refills: 0 | Status: SHIPPED | OUTPATIENT
Start: 2024-09-04

## 2024-09-22 DIAGNOSIS — I10 ESSENTIAL HYPERTENSION: ICD-10-CM

## 2024-09-22 DIAGNOSIS — E78.5 DYSLIPIDEMIA: ICD-10-CM

## 2024-09-23 RX ORDER — LOSARTAN POTASSIUM 100 MG/1
TABLET ORAL
Qty: 90 TABLET | Refills: 2 | Status: SHIPPED | OUTPATIENT
Start: 2024-09-23

## 2024-09-23 RX ORDER — ATORVASTATIN CALCIUM 40 MG/1
TABLET, FILM COATED ORAL
Qty: 90 TABLET | Refills: 2 | Status: SHIPPED | OUTPATIENT
Start: 2024-09-23

## 2024-10-30 DIAGNOSIS — I10 ESSENTIAL HYPERTENSION: ICD-10-CM

## 2024-10-30 RX ORDER — HYDROCHLOROTHIAZIDE 25 MG/1
25 TABLET ORAL DAILY
Qty: 90 TABLET | Refills: 0 | Status: SHIPPED | OUTPATIENT
Start: 2024-10-30

## 2024-10-31 DIAGNOSIS — F41.9 ANXIETY: ICD-10-CM

## 2024-10-31 RX ORDER — ESCITALOPRAM OXALATE 5 MG/1
5 TABLET ORAL DAILY
Qty: 30 TABLET | Refills: 0 | Status: SHIPPED | OUTPATIENT
Start: 2024-10-31

## 2024-11-07 NOTE — ASSESSMENT & PLAN NOTE
Health Maintenance       Hepatitis C Screening (Once)  Never done    Depression Screening (Yearly)  Due soon on 4/22/2025           Following review of the above:  Patient is not proceeding with: Hepatitis C Screening    Note: Refer to final orders and clinician documentation.    Recent PHQ 2/9 Score    PHQ 2:  PHQ 2 Score Adult PHQ 2 Score Adult PHQ 2 Interpretation Little interest or pleasure in activity?   11/7/2024   2:27 PM 0 No further screening needed 0       PHQ 9:            3 days. H/o multiple UTIs. Has been taking Azo which has improved sx slightly, but pt still experiencing discomfort and urinary frequency. Denies fever and back pain. Urine dip done today was unremarkable. Due to pt's history and ongoing sx, will prescribe Bactrim 800-160mg BID for 3 days.

## 2024-12-02 DIAGNOSIS — I10 ESSENTIAL HYPERTENSION: ICD-10-CM

## 2024-12-02 RX ORDER — AMLODIPINE BESYLATE 2.5 MG/1
2.5 TABLET ORAL DAILY
Qty: 30 TABLET | Refills: 0 | Status: SHIPPED | OUTPATIENT
Start: 2024-12-02

## 2024-12-02 NOTE — TELEPHONE ENCOUNTER
Med refill    Dr Velásquez    amLODIPine (NORVASC) 2.5 mg tablet     [facility] [phone]    Patient has an appointment scheduled 12/19/2024

## 2024-12-23 ENCOUNTER — OFFICE VISIT (OUTPATIENT)
Dept: FAMILY MEDICINE CLINIC | Facility: CLINIC | Age: 44
End: 2024-12-23

## 2024-12-23 VITALS
OXYGEN SATURATION: 99 % | HEART RATE: 99 BPM | TEMPERATURE: 98.7 F | HEIGHT: 61 IN | DIASTOLIC BLOOD PRESSURE: 85 MMHG | WEIGHT: 136 LBS | BODY MASS INDEX: 25.68 KG/M2 | RESPIRATION RATE: 20 BRPM | SYSTOLIC BLOOD PRESSURE: 137 MMHG

## 2024-12-23 DIAGNOSIS — F41.9 ANXIETY: Primary | ICD-10-CM

## 2024-12-23 DIAGNOSIS — Z13.9 SCREENING DUE: ICD-10-CM

## 2024-12-23 DIAGNOSIS — F43.21 GRIEF: ICD-10-CM

## 2024-12-23 DIAGNOSIS — Z12.31 BREAST CANCER SCREENING BY MAMMOGRAM: ICD-10-CM

## 2024-12-23 PROCEDURE — 99213 OFFICE O/P EST LOW 20 MIN: CPT | Performed by: FAMILY MEDICINE

## 2024-12-23 RX ORDER — HYDROXYZINE HYDROCHLORIDE 10 MG/1
10 TABLET, FILM COATED ORAL EVERY 8 HOURS PRN
Qty: 14 TABLET | Refills: 0 | Status: SHIPPED | OUTPATIENT
Start: 2024-12-23

## 2024-12-23 RX ORDER — SERTRALINE HYDROCHLORIDE 25 MG/1
25 TABLET, FILM COATED ORAL DAILY
Qty: 30 TABLET | Refills: 0 | Status: SHIPPED | OUTPATIENT
Start: 2024-12-23 | End: 2025-01-22

## 2024-12-23 NOTE — ASSESSMENT & PLAN NOTE
YADIRA-7 Flowsheet Screening      Flowsheet Row Most Recent Value   Over the last two weeks, how often have you been bothered by the following problems?     Feeling nervous, anxious, or on edge 3   Not being able to stop or control worrying 3   Worrying too much about different things 3   Trouble relaxing  3   Being so restless that it's hard to sit still 3   Becoming easily annoyed or irritable  3   Feeling afraid as if something awful might happen 3   How difficult have these problems made it for you to do your work, take care of things at home, or get along with other people?  Extremely difficult   YADIRA Score  21           -Patient with high YADIRA.  She states that her anxiety which she was previously treated for has worsened after losing her brother in October.  Per patient she only goes to work and otherwise does not leave the house.  -Previously patient has tried Lexapro which she states she did not like.  Patient requesting a different medication for her anxiety.  Went over different medication options as along with risk, SE and benefits of each option.  Ultimately patient chose to start Zoloft and Atarax as needed for severe anxiety attacks  -Patient denies any personal or family history of bipolar.  Denied any bipolar/jony symptoms as they were reviewed with patient.   -Patient to follow-up in 2 to 3 weeks  -Patient would benefit from talk therapy.  Patient in agreement with this.  Referral for behavioral health placed.  -Patient also given suicide/crisis hotline number.  On exam today patient denied any HI/SI.  ED and return precaution also discussed with patient.  -Depression screening was attempted but patient very tearful on exam thinking about brother.  Will screen at next visit    Orders:    hydrOXYzine HCL (ATARAX) 10 mg tablet; Take 1 tablet (10 mg total) by mouth every 8 (eight) hours as needed for anxiety    sertraline (ZOLOFT) 25 mg tablet; Take 1 tablet (25 mg total) by mouth daily

## 2024-12-23 NOTE — ASSESSMENT & PLAN NOTE
-Patient due for screening mammogram last done 08/2023  Orders:    Mammo screening bilateral w 3d and cad; Future

## 2025-01-02 ENCOUNTER — TELEPHONE (OUTPATIENT)
Dept: PSYCHOLOGY | Facility: CLINIC | Age: 45
End: 2025-01-02

## 2025-01-02 ENCOUNTER — HOSPITAL ENCOUNTER (INPATIENT)
Facility: HOSPITAL | Age: 45
LOS: 3 days | Discharge: HOME | DRG: 885 | End: 2025-01-06
Attending: EMERGENCY MEDICINE | Admitting: PSYCHIATRY & NEUROLOGY
Payer: COMMERCIAL

## 2025-01-02 ENCOUNTER — OFFICE VISIT (OUTPATIENT)
Dept: FAMILY MEDICINE CLINIC | Facility: CLINIC | Age: 45
End: 2025-01-02

## 2025-01-02 VITALS
DIASTOLIC BLOOD PRESSURE: 83 MMHG | RESPIRATION RATE: 20 BRPM | WEIGHT: 133 LBS | HEIGHT: 61 IN | TEMPERATURE: 98 F | HEART RATE: 88 BPM | SYSTOLIC BLOOD PRESSURE: 128 MMHG | BODY MASS INDEX: 25.11 KG/M2 | OXYGEN SATURATION: 98 %

## 2025-01-02 DIAGNOSIS — F32.A DEPRESSION, UNSPECIFIED DEPRESSION TYPE: ICD-10-CM

## 2025-01-02 DIAGNOSIS — F43.21 GRIEF: ICD-10-CM

## 2025-01-02 DIAGNOSIS — E53.8 VITAMIN B12 DEFICIENCY: ICD-10-CM

## 2025-01-02 DIAGNOSIS — E83.42 HYPOMAGNESEMIA: ICD-10-CM

## 2025-01-02 DIAGNOSIS — E53.8 FOLATE DEFICIENCY: ICD-10-CM

## 2025-01-02 DIAGNOSIS — F41.9 ANXIETY: ICD-10-CM

## 2025-01-02 DIAGNOSIS — G47.00 INSOMNIA: ICD-10-CM

## 2025-01-02 DIAGNOSIS — F32.A DEPRESSION: ICD-10-CM

## 2025-01-02 DIAGNOSIS — F33.1 MODERATE EPISODE OF RECURRENT MAJOR DEPRESSIVE DISORDER (HCC): Primary | ICD-10-CM

## 2025-01-02 DIAGNOSIS — R62.7 FTT (FAILURE TO THRIVE) IN ADULT: ICD-10-CM

## 2025-01-02 DIAGNOSIS — E55.9 VITAMIN D DEFICIENCY: ICD-10-CM

## 2025-01-02 DIAGNOSIS — R62.7 FTT (FAILURE TO THRIVE) IN ADULT: Primary | ICD-10-CM

## 2025-01-02 DIAGNOSIS — Z00.8 MEDICAL CLEARANCE FOR PSYCHIATRIC ADMISSION: ICD-10-CM

## 2025-01-02 LAB
AMPHETAMINES SERPL QL SCN: NEGATIVE
BACTERIA UR QL AUTO: ABNORMAL /HPF
BARBITURATES UR QL: NEGATIVE
BENZODIAZ UR QL: NEGATIVE
BILIRUB UR QL STRIP: NEGATIVE
CLARITY UR: CLEAR
COCAINE UR QL: NEGATIVE
COLOR UR: YELLOW
ETHANOL EXG-MCNC: 0 MG/DL
FENTANYL UR QL SCN: NEGATIVE
GLUCOSE UR STRIP-MCNC: NEGATIVE MG/DL
HGB UR QL STRIP.AUTO: 150
HYDROCODONE UR QL SCN: NEGATIVE
KETONES UR STRIP-MCNC: NEGATIVE MG/DL
LEUKOCYTE ESTERASE UR QL STRIP: NEGATIVE
METHADONE UR QL: NEGATIVE
NITRITE UR QL STRIP: NEGATIVE
NON-SQ EPI CELLS URNS QL MICRO: ABNORMAL /HPF
OPIATES UR QL SCN: NEGATIVE
OXYCODONE+OXYMORPHONE UR QL SCN: NEGATIVE
PCP UR QL: NEGATIVE
PH UR STRIP.AUTO: 6 [PH]
PROT UR STRIP-MCNC: NEGATIVE MG/DL
RBC #/AREA URNS AUTO: ABNORMAL /HPF
SP GR UR STRIP.AUTO: 1.01 (ref 1–1.04)
THC UR QL: NEGATIVE
UROBILINOGEN UA: NEGATIVE MG/DL
WBC #/AREA URNS AUTO: ABNORMAL /HPF

## 2025-01-02 PROCEDURE — 82075 ASSAY OF BREATH ETHANOL: CPT | Performed by: EMERGENCY MEDICINE

## 2025-01-02 PROCEDURE — 81001 URINALYSIS AUTO W/SCOPE: CPT | Performed by: EMERGENCY MEDICINE

## 2025-01-02 PROCEDURE — 99214 OFFICE O/P EST MOD 30 MIN: CPT | Performed by: FAMILY MEDICINE

## 2025-01-02 PROCEDURE — 81003 URINALYSIS AUTO W/O SCOPE: CPT | Performed by: EMERGENCY MEDICINE

## 2025-01-02 PROCEDURE — 99283 EMERGENCY DEPT VISIT LOW MDM: CPT

## 2025-01-02 PROCEDURE — 80307 DRUG TEST PRSMV CHEM ANLYZR: CPT | Performed by: EMERGENCY MEDICINE

## 2025-01-02 PROCEDURE — 99285 EMERGENCY DEPT VISIT HI MDM: CPT | Performed by: EMERGENCY MEDICINE

## 2025-01-02 RX ORDER — HYDROXYZINE HYDROCHLORIDE 25 MG/1
25 TABLET, FILM COATED ORAL ONCE
Status: COMPLETED | OUTPATIENT
Start: 2025-01-02 | End: 2025-01-02

## 2025-01-02 RX ORDER — HYDROXYZINE HYDROCHLORIDE 25 MG/1
25 TABLET, FILM COATED ORAL DAILY PRN
Qty: 7 TABLET | Refills: 0 | Status: ON HOLD | OUTPATIENT
Start: 2025-01-02 | End: 2025-01-06

## 2025-01-02 RX ORDER — LORAZEPAM 1 MG/1
1 TABLET ORAL ONCE
Status: COMPLETED | OUTPATIENT
Start: 2025-01-02 | End: 2025-01-02

## 2025-01-02 RX ADMIN — HYDROXYZINE HYDROCHLORIDE 25 MG: 25 TABLET ORAL at 22:58

## 2025-01-02 RX ADMIN — LORAZEPAM 1 MG: 1 TABLET ORAL at 15:38

## 2025-01-02 NOTE — ED NOTES
Crisis met with patient and patient's daughter in ED 11 to complete intake and safety risk assessment. Patient was calm and cooperative during assessment. Patient presented to the ED at the urging of her PCP for evaluation. Patient has been experiencing extreme depression and anxiety since October following the passing of her younger brother. Patient reports that she was very close with her brother and his death was sudden and unexpected. Patient reports a decline in functioning since her brother's death. Reports that she sits in the corner of the couch and is barely able to accomplish basic tasks such as emptying the  or basic ADL's and only with support from her family. Patient states that she has anxiety and fear of leaving the home. Was supposed to complete blood work for her PCP and was unable to do so. Had been on [place] break from work and was scheduled to return today but was unable to report to work. Patient is also experiencing insomnia with less than one hour of sleep a night and loss of appetite with a weight loss of about 17-20 lbs over the last two months and 3 lbs in the last week. Patient denies SI, HI, AVH, SIB or Paranoia. Patient denies and Substance Abuse. Patient is currently not connected with outpatient treatment. Also discussed options like partial program, however, patient feels that with her depression she won't be able leave the house to attend a program or outpatient with any consistency. Discussed inpatient treatment and signing voluntary consent for treatment which patient is in agreement with. Explained inpatient treatment and rights under 201 to patient.

## 2025-01-02 NOTE — ASSESSMENT & PLAN NOTE
YADIRA-7 Flowsheet Screening      Flowsheet Row Most Recent Value   Over the last two weeks, how often have you been bothered by the following problems?     Feeling nervous, anxious, or on edge 3   Not being able to stop or control worrying 3   Worrying too much about different things 3   Trouble relaxing  3   Being so restless that it's hard to sit still 3   Becoming easily annoyed or irritable  3   Feeling afraid as if something awful might happen 3   How difficult have these problems made it for you to do your work, take care of things at home, or get along with other people?  Very difficult   YADIRA Score  21           Orders:    Transfer to other facility    Ambulatory Referral to [facility] or Partial Psych Program; Future    hydrOXYzine HCL (ATARAX) 25 mg tablet; Take 1 tablet (25 mg total) by mouth daily as needed for anxiety    -Patient with YADIRA and severe grief reaction.  She states her anxiety has been so bad she is unable to go to work  -Previously patient has tried Lexapro.  During last visit went over different medication options as along with risk, SE and benefits of each option.  Ultimately patient chose to start Zoloft and Atarax as needed for severe anxiety attacks.  Patient did not start Zoloft until this morning.  Reports that she has been taking Atarax 20 mg.  For severe anxiety attacks which has helped.  Will give her 25 mg as needed for attacks.  Stressed importance of patient to take Zoloft as prescribed.  Consider adding Remeron.   -Patient to follow-up in 1 week. Patient also given suicide/crisis hotline number as visit  -Dr. Poe came in to talk to patient; she was very tearful on exam.  Discussed different options including partial program, hospitalization and continue follow-up with PCP.  Patient in order to fill patient has really not been doing well as such would like to go to the emergency room to be evaluated.  Did put in stat referral for partial program.

## 2025-01-02 NOTE — PROGRESS NOTES
Name: Shireen Rueda      : 1980      MRN: [id number]  Encounter Provider: Nenita Velásquez MD  Encounter Date: 2025   Encounter department: [facility] [place]  :  Assessment & Plan  FTT (failure to thrive) in adult  -Patient with a 17 pound weight loss over the last 4 to 5 months  -3 pound weight loss over the last week  -Patient reports she has not been eating, drinking, sleeping or recently leaving the house to her degree  -Lab work previously ordered but unable to be done as patient has had a hard time leaving the home.  The patient to get lab work drawn if discharged from the hospital  -Long discussion with patient.  Ultimately patient daughter decided for patient to be seen in the emergency room.  Will send for partial program.  Consider Remeron in the future if needed.  - Can consider addition of nutrition supplements if needed  Orders:    Transfer to other facility    Ambulatory Referral to [facility] or [facility] Program; Future    hydrOXYzine HCL (ATARAX) 25 mg tablet; Take 1 tablet (25 mg total) by mouth daily as needed for anxiety    Magnesium; Future    Phosphorus; Future    Anxiety  YADIRA-7 Flowsheet Screening      Flowsheet Row Most Recent Value   Over the last two weeks, how often have you been bothered by the following problems?     Feeling nervous, anxious, or on edge 3   Not being able to stop or control worrying 3   Worrying too much about different things 3   Trouble relaxing  3   Being so restless that it's hard to sit still 3   Becoming easily annoyed or irritable  3   Feeling afraid as if something awful might happen 3   How difficult have these problems made it for you to do your work, take care of things at home, or get along with other people?  Very difficult   YADIRA Score  21           Orders:    Transfer to other facility    Ambulatory Referral to [facility] or [facility] Program; Future    hydrOXYzine HCL (ATARAX) 25 mg tablet; Take  1 tablet (25 mg total) by mouth daily as needed for anxiety    -Patient with YADIRA and severe grief reaction.  She states her anxiety has been so bad she is unable to go to work  -Previously patient has tried Lexapro.  During last visit went over different medication options as along with risk, SE and benefits of each option.  Ultimately patient chose to start Zoloft and Atarax as needed for severe anxiety attacks.  Patient did not start Zoloft until this morning.  Reports that she has been taking Atarax 20 mg.  For severe anxiety attacks which has helped.  Will give her 25 mg as needed for attacks.  Stressed importance of patient to take Zoloft as prescribed.  Consider adding Remeron.   -Patient to follow-up in 1 week. Patient also given suicide/crisis hotline number as visit  -Dr. Poe came in to talk to patient; she was very tearful on exam.  Discussed different options including partial program, hospitalization and continue follow-up with PCP.  Patient in order to fill patient has really not been doing well as such would like to go to the emergency room to be evaluated.  Did put in stat referral for partial program.   Grief    Orders:    Transfer to other facility    Ambulatory Referral to [facility] or [facility] Program; Future    hydrOXYzine HCL (ATARAX) 25 mg tablet; Take 1 tablet (25 mg total) by mouth daily as needed for anxiety    Depression, unspecified depression type  Depression Screening Follow-up Plan: Patient's depression screening was positive with a PHQ-9 score of 22. Patient with underlying depression in the setting of grief and was advised to continue current medications as prescribed. Patient assessed for underlying major depression. They have no active suicidal ideations. Brief counseling provided and recommend additional follow-up/re-evaluation next office visit. Patient advised to follow-up with PCP for further management.    Orders:    Transfer to other facility    Ambulatory Referral  "to [facility] or [facility] Psych Program; Future    hydrOXYzine HCL (ATARAX) 25 mg tablet; Take 1 tablet (25 mg total) by mouth daily as needed for anxiety          Depression Screening and Follow-up Plan: Patient with underlying depression and was advised to continue current medications as prescribed.       History of Present Illness     Anxiety  Presents for follow-up visit. Symptoms include depressed mood, excessive worry, insomnia, muscle tension, nausea, nervous/anxious behavior, palpitations and panic. Patient reports no shortness of breath or suicidal ideas. The severity of symptoms is severe, causing significant distress and incapacitating. The quality of sleep is non-restorative. Nighttime awakenings: several.     Compliance with medications is 0-25%.     Review of Systems   Respiratory:  Negative for shortness of breath.    Cardiovascular:  Positive for palpitations.   Gastrointestinal:  Positive for nausea.   Psychiatric/Behavioral:  Negative for suicidal ideas. The patient is nervous/anxious and has insomnia.        Objective   /83   Pulse 88   Temp 98 °F (36.7 °C) (Temporal)   Resp 20   Ht 5' 1\" (1.549 m)   Wt 60.3 kg (133 lb)   LMP 10/17/2018   SpO2 98%   BMI 25.13 kg/m²      Physical Exam  HENT:      Right Ear: Tympanic membrane normal.      Left Ear: Tympanic membrane normal.      Nose: No congestion or rhinorrhea.   Cardiovascular:      Rate and Rhythm: Normal rate.   Pulmonary:      Effort: Pulmonary effort is normal. No respiratory distress.   Skin:     General: Skin is warm.      Findings: No rash.   Neurological:      Mental Status: She is alert.   Psychiatric:         Mood and Affect: Mood is depressed. Affect is tearful.         Speech: Speech normal. She is communicative. Speech is not delayed.         Behavior: Behavior is withdrawn. Behavior is not aggressive, hyperactive or combative. Behavior is cooperative.         Thought Content: Thought content does not include homicidal " or suicidal ideation. Thought content does not include homicidal or suicidal plan.

## 2025-01-02 NOTE — ED PROVIDER NOTES
Time reflects when diagnosis was documented in both MDM as applicable and the Disposition within this note       Time User Action Codes Description Comment    1/2/2025  3:25 PM León Mccartney Add [F32.A] Depression     1/2/2025  3:25 PM León Mccartney [F41.9] Anxiety           ED Disposition       ED Disposition   Transfer to Behavioral Health Condition   --    Date/Time   Thu Jan 2, 2025  3:25 PM    Comment   Shireen Rueda should be transferred out to [facility] and has been medically cleared.               Assessment & Plan       Medical Decision Making  Acute onset of depression and anxiety.  Sent in by PCP.  Medically cleared, 201 signed.  Pending placement.     Amount and/or Complexity of Data Reviewed  Labs: ordered. Decision-making details documented in ED Course.    Risk  Prescription drug management.  Decision regarding hospitalization.        ED Course as of 01/02/25 2152   Thu Jan 02, 2025   1550 EXTBreath Alcohol: 0.000   1637 Patient is medically cleared for inpt  admission.        Medications   LORazepam (ATIVAN) tablet 1 mg (1 mg Oral Given 1/2/25 1538)       ED Risk Strat Scores                          SBIRT 22yo+      Flowsheet Row Most Recent Value   Initial Alcohol Screen: US AUDIT-C     1. How often do you have a drink containing alcohol? 0 Filed at: 01/02/2025 1843   2. How many drinks containing alcohol do you have on a typical day you are drinking?  0 Filed at: 01/02/2025 1843   3a. Male UNDER 65: How often do you have five or more drinks on one occasion? 0 Filed at: 01/02/2025 1843   3b. FEMALE Any Age, or MALE 65+: How often do you have 4 or more drinks on one occassion? 0 Filed at: 01/02/2025 1843   Audit-C Score 0 Filed at: 01/02/2025 1843   TOÑITO: How many times in the past year have you...    Used an illegal drug or used a prescription medication for non-medical reasons? Never Filed at: 01/02/2025 1843                            History of Present Illness       Chief Complaint   Patient  "presents with    Psychiatric Evaluation     Pt was sent from PCP with increasing depression and anxiety. Pt had a death in the family recently and \"its just been 1 thing after another and I had a panic attack last night\"       Past Medical History:   Diagnosis Date    Anemia     Chest tightness or pressure     8/19/2013    Depression 5/14/2017    Hypertension     Vitamin D deficiency       Past Surgical History:   Procedure Laterality Date    CYSTOSCOPY N/A 12/30/2018    Procedure: CYSTOSCOPY;  Surgeon: Laura Bal DO;  Location: [facility];  Service: Gynecology    HYSTERECTOMY N/A 12/30/2018    Procedure: HYSTERECTOMY LAPAROSCOPIC TOTAL (LTH);  Surgeon: Laura Bal DO;  Location: [facility];  Service: Gynecology    PA LAPAROSCOPY W/RMVL ADNEXAL STRUCTURES Bilateral 12/30/2018    Procedure: SALPINGECTOMY, LAPAROSCOPIC;  Surgeon: Laura Bal DO;  Location: [facility];  Service: Gynecology    TONSILLECTOMY      TUBAL LIGATION      2006      Family History   Problem Relation Age of Onset    No Known Problems Mother     Heart disease Father     Hypertension Father     Heart attack Father 37    No Known Problems Sister     No Known Problems Daughter     No Known Problems Daughter     No Known Problems Maternal Grandmother     No Known Problems Maternal Grandfather     No Known Problems Paternal Grandmother     No Known Problems Paternal Grandfather     No Known Problems Son     No Known Problems Maternal Aunt     No Known Problems Maternal Aunt     No Known Problems Maternal Aunt     No Known Problems Maternal Aunt     No Known Problems Maternal Aunt     No Known Problems Paternal Aunt     No Known Problems Paternal Aunt     No Known Problems Half-Sister     Breast cancer Neg Hx     Colon cancer Neg Hx     Ovarian cancer Neg Hx     Endometrial cancer Neg Hx       Social History     Tobacco Use    Smoking status: Some Days     Types: Cigarettes    Smokeless tobacco: Never    Tobacco comments:     social " smoker, less than 1 pack per weekend   Vaping Use    Vaping status: Never Used   Substance Use Topics    Alcohol use: Yes     Comment: on the weekends/socially    Drug use: No      E-Cigarette/Vaping    E-Cigarette Use Never User       E-Cigarette/Vaping Substances    Nicotine No     THC No     CBD No     Flavoring No     Other No     Unknown No       I have reviewed and agree with the history as documented.     Patient is a 44-year-old female who presents from the office for inpt  admission.  States worsening depression and anxiety since the death of her brother on 10/6/24.  Having difficulty moving on.  No eating, sleeping, caring for self.  Denies any Si/Hi/hallucinations.  Has been seeing her PCP for this.  Put on attarax previously without any improvement.  Took first dose of zoloft today.  Denies any illicit drugs or etoh abuse.         Review of Systems   Constitutional:  Positive for activity change and appetite change.   HENT: Negative.     Eyes: Negative.    Respiratory: Negative.     Cardiovascular: Negative.    Gastrointestinal: Negative.    Endocrine: Negative.    Genitourinary: Negative.    Musculoskeletal: Negative.    Skin: Negative.    Allergic/Immunologic: Negative.    Neurological: Negative.    Hematological: Negative.    Psychiatric/Behavioral:  Positive for agitation, dysphoric mood and sleep disturbance. Negative for hallucinations, self-injury and suicidal ideas. The patient is nervous/anxious.    All other systems reviewed and are negative.          Objective       ED Triage Vitals [01/02/25 1539]   Temperature Pulse Blood Pressure Respirations SpO2 Patient Position - Orthostatic VS   98 °F (36.7 °C) 84 142/83 16 96 % Lying      Temp Source Heart Rate Source BP Location FiO2 (%) Pain Score    Oral Monitor Left arm -- No Pain      Vitals      Date and Time Temp Pulse SpO2 Resp BP Pain Score FACES Pain Rating User   01/02/25 2004 -- 86 100 % 18 127/83 No Pain -- KS   01/02/25 1539 98 °F (36.7  °C) 84 96 % 16 142/83 No Pain -- TW            Physical Exam  Vitals and nursing note reviewed.   Constitutional:       Appearance: Normal appearance. She is normal weight.   HENT:      Head: Normocephalic and atraumatic.   Cardiovascular:      Rate and Rhythm: Normal rate and regular rhythm.      Pulses: Normal pulses.      Heart sounds: Normal heart sounds.   Pulmonary:      Effort: Pulmonary effort is normal.      Breath sounds: Normal breath sounds.   Musculoskeletal:      Cervical back: Normal range of motion.   Skin:     General: Skin is warm and dry.      Capillary Refill: Capillary refill takes less than 2 seconds.   Neurological:      General: No focal deficit present.      Mental Status: She is alert and oriented to person, place, and time.   Psychiatric:         Attention and Perception: Attention normal.         Mood and Affect: Mood is anxious and depressed. Affect is tearful.         Speech: Speech normal.         Behavior: Behavior is withdrawn.         Thought Content: Thought content is not paranoid. Thought content does not include homicidal or suicidal ideation.         Cognition and Memory: Cognition normal.         Judgment: Judgment normal.         Results Reviewed       Procedure Component Value Units Date/Time    Rapid drug screen, urine [[id number]]  (Normal) Collected: 01/02/25 1536    Lab Status: Final result Specimen: Urine, Clean Catch Updated: 01/02/25 1603     Amph/Meth UR Negative     Barbiturate Ur Negative     Benzodiazepine Urine Negative     Cocaine Urine Negative     Methadone Urine Negative     Opiate Urine Negative     PCP Ur Negative     THC Urine Negative     Oxycodone Urine Negative     Fentanyl Urine Negative     HYDROCODONE URINE Negative    Narrative:      FOR MEDICAL PURPOSES ONLY.   IF CONFIRMATION NEEDED PLEASE CONTACT THE LAB WITHIN 5 DAYS.    Drug Screen Cutoff Levels:  AMPHETAMINE/METHAMPHETAMINES  1000 ng/mL  BARBITURATES     200 ng/mL  BENZODIAZEPINES     200  ng/mL  COCAINE      300 ng/mL  METHADONE      300 ng/mL  OPIATES      300 ng/mL  PHENCYCLIDINE     25 ng/mL  THC       50 ng/mL  OXYCODONE      100 ng/mL  FENTANYL      5 ng/mL  HYDROCODONE     300 ng/mL    Urine Microscopic [[id number]]  (Abnormal) Collected: 01/02/25 1536    Lab Status: Final result Specimen: Urine, Clean Catch Updated: 01/02/25 1556     RBC, UA 4-10 /hpf      WBC, UA None Seen /hpf      Epithelial Cells Occasional /hpf      Bacteria, UA Occasional /hpf     UA w Reflex to Microscopic w Reflex to Culture [[id number]]  (Abnormal) Collected: 01/02/25 1536    Lab Status: Final result Specimen: Urine, Clean Catch Updated: 01/02/25 1547     Color, UA Yellow     Clarity, UA Clear     Specific Gravity, UA 1.015     pH, UA 6.0     Leukocytes, UA Negative     Nitrite, UA Negative     Protein, UA Negative mg/dl      Glucose, UA Negative mg/dl      Ketones, UA Negative mg/dl      Bilirubin, UA Negative     Occult Blood, .0     UROBILINOGEN UA Negative mg/dL     POCT alcohol breath test [[id number]]  (Normal) Resulted: 01/02/25 1537    Lab Status: Final result Updated: 01/02/25 1537     EXTBreath Alcohol 0.000            No orders to display       Procedures    ED Medication and Procedure Management   Prior to Admission Medications   Prescriptions Last Dose Informant Patient Reported? Taking?   Omega-3 Fatty Acids (OMEGA 3 PO)  Self Yes No   Sig: Take by mouth   amLODIPine (NORVASC) 2.5 mg tablet   No No   Sig: Take 1 tablet (2.5 mg total) by mouth daily   atorvastatin (LIPITOR) 40 mg tablet   No No   Sig: TAKE 1 TABLET BY MOUTH EVERY DAY   carbamide peroxide (DEBROX) 6.5 % otic solution   No No   Sig: Administer 5 drops into the left ear 2 (two) times a day   Patient not taking: Reported on 1/11/2024   fluticasone (FLONASE) 50 mcg/act nasal spray   No No   Sig: SPRAY 1 SPRAY INTO EACH NOSTRIL EVERY DAY   Patient not taking: Reported on 7/8/2024   hydrOXYzine HCL (ATARAX) 25 mg tablet   No No   Sig: Take 1  tablet (25 mg total) by mouth daily as needed for anxiety   hydroCHLOROthiazide 25 mg tablet   No No   Sig: TAKE 1 TABLET (25 MG TOTAL) BY MOUTH DAILY.   losartan (COZAAR) 100 MG tablet   No No   Sig: TAKE 1 TABLET BY MOUTH EVERY DAY   orlistat (BRANDIE) 60 MG capsule   No No   Sig: Take 1 capsule (60 mg total) by mouth 3 (three) times a day with meals   Patient not taking: Reported on 7/8/2024   semaglutide, 2 mg/dose, (Ozempic, 2 MG/DOSE,) 8 mg/ mL injection pen   No No   Sig: Inject 0.75 mL (2 mg total) under the skin every 7 days   Patient not taking: Reported on 1/11/2024   sertraline (ZOLOFT) 25 mg tablet   No No   Sig: Take 1 tablet (25 mg total) by mouth daily      Facility-Administered Medications: None     Patient's Medications   Discharge Prescriptions    No medications on file     No discharge procedures on file.  ED SEPSIS DOCUMENTATION   Time reflects when diagnosis was documented in both MDM as applicable and the Disposition within this note       Time User Action Codes Description Comment    1/2/2025  3:25 PM León Mccartney [F32.A] Depression     1/2/2025  3:25 PM León Mccartney [F41.9] Anxiety                  León Mccartney MD  01/02/25 9324

## 2025-01-03 PROBLEM — Z00.8 MEDICAL CLEARANCE FOR PSYCHIATRIC ADMISSION: Status: ACTIVE | Noted: 2023-10-30

## 2025-01-03 PROCEDURE — 99222 1ST HOSP IP/OBS MODERATE 55: CPT | Performed by: NURSE PRACTITIONER

## 2025-01-03 RX ORDER — ATORVASTATIN CALCIUM 40 MG/1
40 TABLET, FILM COATED ORAL DAILY
Status: DISCONTINUED | OUTPATIENT
Start: 2025-01-03 | End: 2025-01-06 | Stop reason: HOSPADM

## 2025-01-03 RX ORDER — BISACODYL 10 MG
10 SUPPOSITORY, RECTAL RECTAL DAILY PRN
Status: DISCONTINUED | OUTPATIENT
Start: 2025-01-03 | End: 2025-01-04

## 2025-01-03 RX ORDER — OLANZAPINE 2.5 MG/1
2.5 TABLET, FILM COATED ORAL
Status: DISCONTINUED | OUTPATIENT
Start: 2025-01-03 | End: 2025-01-06 | Stop reason: HOSPADM

## 2025-01-03 RX ORDER — OLANZAPINE 10 MG/2ML
5 INJECTION, POWDER, FOR SOLUTION INTRAMUSCULAR
Status: DISCONTINUED | OUTPATIENT
Start: 2025-01-03 | End: 2025-01-06 | Stop reason: HOSPADM

## 2025-01-03 RX ORDER — ACETAMINOPHEN 325 MG/1
650 TABLET ORAL EVERY 6 HOURS PRN
Status: DISCONTINUED | OUTPATIENT
Start: 2025-01-03 | End: 2025-01-06 | Stop reason: HOSPADM

## 2025-01-03 RX ORDER — OLANZAPINE 10 MG/2ML
2.5 INJECTION, POWDER, FOR SOLUTION INTRAMUSCULAR
Status: DISCONTINUED | OUTPATIENT
Start: 2025-01-03 | End: 2025-01-06 | Stop reason: HOSPADM

## 2025-01-03 RX ORDER — HYDROXYZINE HYDROCHLORIDE 25 MG/1
25 TABLET, FILM COATED ORAL
Status: DISCONTINUED | OUTPATIENT
Start: 2025-01-03 | End: 2025-01-06 | Stop reason: HOSPADM

## 2025-01-03 RX ORDER — NICOTINE 21 MG/24HR
1 PATCH, TRANSDERMAL 24 HOURS TRANSDERMAL DAILY
Status: DISCONTINUED | OUTPATIENT
Start: 2025-01-03 | End: 2025-01-06 | Stop reason: HOSPADM

## 2025-01-03 RX ORDER — HYDROXYZINE HYDROCHLORIDE 25 MG/1
25 TABLET, FILM COATED ORAL DAILY PRN
Status: DISCONTINUED | OUTPATIENT
Start: 2025-01-03 | End: 2025-01-06 | Stop reason: HOSPADM

## 2025-01-03 RX ORDER — AMOXICILLIN 250 MG
1 CAPSULE ORAL DAILY PRN
Status: DISCONTINUED | OUTPATIENT
Start: 2025-01-03 | End: 2025-01-06 | Stop reason: HOSPADM

## 2025-01-03 RX ORDER — DIPHENHYDRAMINE HYDROCHLORIDE 50 MG/ML
50 INJECTION INTRAMUSCULAR; INTRAVENOUS EVERY 6 HOURS PRN
Status: DISCONTINUED | OUTPATIENT
Start: 2025-01-03 | End: 2025-01-06 | Stop reason: HOSPADM

## 2025-01-03 RX ORDER — AMLODIPINE BESYLATE 2.5 MG/1
2.5 TABLET ORAL DAILY
Status: DISCONTINUED | OUTPATIENT
Start: 2025-01-03 | End: 2025-01-06 | Stop reason: HOSPADM

## 2025-01-03 RX ORDER — LOSARTAN POTASSIUM 25 MG/1
100 TABLET ORAL DAILY
Status: DISCONTINUED | OUTPATIENT
Start: 2025-01-03 | End: 2025-01-06 | Stop reason: HOSPADM

## 2025-01-03 RX ORDER — MAGNESIUM HYDROXIDE/ALUMINUM HYDROXICE/SIMETHICONE 120; 1200; 1200 MG/30ML; MG/30ML; MG/30ML
30 SUSPENSION ORAL EVERY 4 HOURS PRN
Status: DISCONTINUED | OUTPATIENT
Start: 2025-01-03 | End: 2025-01-06 | Stop reason: HOSPADM

## 2025-01-03 RX ORDER — HYDROXYZINE HYDROCHLORIDE 25 MG/1
25 TABLET, FILM COATED ORAL ONCE
Status: COMPLETED | OUTPATIENT
Start: 2025-01-03 | End: 2025-01-03

## 2025-01-03 RX ORDER — ACETAMINOPHEN 325 MG/1
650 TABLET ORAL EVERY 4 HOURS PRN
Status: DISCONTINUED | OUTPATIENT
Start: 2025-01-03 | End: 2025-01-06 | Stop reason: HOSPADM

## 2025-01-03 RX ORDER — SERTRALINE HYDROCHLORIDE 25 MG/1
25 TABLET, FILM COATED ORAL DAILY
Status: DISCONTINUED | OUTPATIENT
Start: 2025-01-03 | End: 2025-01-05

## 2025-01-03 RX ORDER — BENZTROPINE MESYLATE 1 MG/ML
1 INJECTION, SOLUTION INTRAMUSCULAR; INTRAVENOUS
Status: DISCONTINUED | OUTPATIENT
Start: 2025-01-03 | End: 2025-01-06 | Stop reason: HOSPADM

## 2025-01-03 RX ORDER — OLANZAPINE 5 MG/1
5 TABLET ORAL
Status: DISCONTINUED | OUTPATIENT
Start: 2025-01-03 | End: 2025-01-06 | Stop reason: HOSPADM

## 2025-01-03 RX ORDER — TRAZODONE HYDROCHLORIDE 50 MG/1
50 TABLET, FILM COATED ORAL
Status: DISCONTINUED | OUTPATIENT
Start: 2025-01-03 | End: 2025-01-06 | Stop reason: HOSPADM

## 2025-01-03 RX ORDER — ACETAMINOPHEN 325 MG/1
975 TABLET ORAL EVERY 6 HOURS PRN
Status: DISCONTINUED | OUTPATIENT
Start: 2025-01-03 | End: 2025-01-06 | Stop reason: HOSPADM

## 2025-01-03 RX ORDER — HYDROXYZINE HYDROCHLORIDE 50 MG/1
100 TABLET, FILM COATED ORAL
Status: DISCONTINUED | OUTPATIENT
Start: 2025-01-03 | End: 2025-01-06 | Stop reason: HOSPADM

## 2025-01-03 RX ORDER — PROPRANOLOL HYDROCHLORIDE 10 MG/1
10 TABLET ORAL EVERY 8 HOURS PRN
Status: DISCONTINUED | OUTPATIENT
Start: 2025-01-03 | End: 2025-01-06 | Stop reason: HOSPADM

## 2025-01-03 RX ORDER — LORAZEPAM 2 MG/ML
2 INJECTION INTRAMUSCULAR EVERY 6 HOURS PRN
Status: DISCONTINUED | OUTPATIENT
Start: 2025-01-03 | End: 2025-01-06 | Stop reason: HOSPADM

## 2025-01-03 RX ORDER — HYDROXYZINE HYDROCHLORIDE 50 MG/1
50 TABLET, FILM COATED ORAL
Status: DISCONTINUED | OUTPATIENT
Start: 2025-01-03 | End: 2025-01-06 | Stop reason: HOSPADM

## 2025-01-03 RX ORDER — BENZTROPINE MESYLATE 1 MG/1
1 TABLET ORAL
Status: DISCONTINUED | OUTPATIENT
Start: 2025-01-03 | End: 2025-01-06 | Stop reason: HOSPADM

## 2025-01-03 RX ORDER — HYDROCHLOROTHIAZIDE 25 MG/1
25 TABLET ORAL DAILY
Status: DISCONTINUED | OUTPATIENT
Start: 2025-01-03 | End: 2025-01-06 | Stop reason: HOSPADM

## 2025-01-03 RX ORDER — POLYETHYLENE GLYCOL 3350 17 G/17G
17 POWDER, FOR SOLUTION ORAL DAILY PRN
Status: DISCONTINUED | OUTPATIENT
Start: 2025-01-03 | End: 2025-01-04

## 2025-01-03 RX ADMIN — HYDROXYZINE HYDROCHLORIDE 25 MG: 25 TABLET ORAL at 06:05

## 2025-01-03 RX ADMIN — SERTRALINE HYDROCHLORIDE 25 MG: 50 TABLET ORAL at 11:48

## 2025-01-03 RX ADMIN — AMLODIPINE BESYLATE 2.5 MG: 2.5 TABLET ORAL at 11:47

## 2025-01-03 RX ADMIN — HYDROCHLOROTHIAZIDE 25 MG: 25 TABLET ORAL at 11:47

## 2025-01-03 RX ADMIN — MELATONIN TAB 3 MG 3 MG: 3 TAB at 22:28

## 2025-01-03 RX ADMIN — LOSARTAN POTASSIUM 100 MG: 25 TABLET, FILM COATED ORAL at 11:47

## 2025-01-03 RX ADMIN — ATORVASTATIN CALCIUM 40 MG: 40 TABLET, FILM COATED ORAL at 11:46

## 2025-01-03 NOTE — ED NOTES
Insurance Authorization for admission:   Phone call placed to [facility].  Phone number: [phone].     Attempted call x2, automated message indicates offices are closed. Pre-cert to be completed when offices are open.       Eligibility Verification System checked - ([phone]).  Online system / automated system indicates: MA Inactive.    Ml Dejesus, CHRISTINA  01/03/25    [id number]

## 2025-01-03 NOTE — ED NOTES
Patient resting in bed with eyes closed and equal and even respirations. No needs from the patient expressed at this time.      Princess White RN  01/03/25 043

## 2025-01-03 NOTE — ED NOTES
Patient told this RN that she was feeling anxious and was requesting medications to help with such. She was tearful, provider made aware and medications given as prescribed.      Princess White RN  01/03/25 0657

## 2025-01-03 NOTE — ED NOTES
Patient states that the anxiety has decreased and does not express any other needs at this time.      Princess White RN  01/02/25 2425

## 2025-01-03 NOTE — ED NOTES
Pt AAOX4 and asking what she is waiting for. I advised that the intake team is looking for an inpatient unit for her at this time. She verbalized understanding.      Ese Wisdom RN  01/03/25 0754

## 2025-01-03 NOTE — ASSESSMENT & PLAN NOTE
Vital signs stable afebrile patient seen and examined by myself today labs on 1/4/2025 as follows sodium 139 potassium 3.3 creatinine 0.72  Patient medically stable for admit  Please reach out to [facility] with any medical questions or concerns

## 2025-01-03 NOTE — CONSULTS
Consultation - Hospitalist   Name: Shireen Rueda 44 y.o. female I MRN: [id number]  Unit/Bed#: U 341-01 I Date of Admission: 1/2/2025   Date of Service: 1/3/2025 I Hospital Day: 0   Inpatient consult for Medical Clearance for  patient  Consult performed by: EDER Welch  Consult ordered by: Kenneth Nicole DO        Physician Requesting Evaluation: Te Kwong MD   Reason for Evaluation / Principal Problem: Medical clearance for psychiatric admission    Assessment & Plan  Medical clearance for psychiatric admission  Vital signs stable afebrile patient seen and examined by myself today labs on 1/4/2025 as follows sodium 139 potassium 3.3 creatinine 0.72  Patient medically stable for admit  Please reach out to [facility] with any medical questions or concerns  Essential hypertension  Patient will continue to take her outpatient amlodipine 2.5 mg p.o. daily  We will continue to monitor her blood pressure on ongoing basis and additively agents as needed  Dyslipidemia  Patient will continue to take her outpatient Lipitor 40 mg p.o. daily  Left ear pain  Patient will continue to use Debrox drops 5 drops into her left ear twice daily  Patient will continue to use her Flonase 1 spray each nostril daily  Hypokalemia  Potassium today 3.3  Replete potassium 40 mEq p.o. x 2 today  Repeat BMP tomorrow 1/5/2025  Hypomagnesemia  Patient's magnesium today 1.8  Start Mag-Ox 400 milligrams p.o. twice daily  Patient will have a magnesium level drawn tomorrow on 1/5/2025        Collaboration of Care: Were Recommendations Directly Discussed with Primary Treatment Team? - No     History of Present Illness   Shireen Rueda is a 44 y.o. female who is originally admitted to the psychiatry service due to increasing anxiety and depression. We are consulted for medical clearance for admission to Behavioral Health Unit and treatment of underlying psychiatric illness.      Patient presents to [place] ED on 1/2/2024 as she was sent  by her PCP due to a visit at the PCPs office where she was having increased depression and anxiety and is incredibly grief stricken.  She has failure to thrive in an adult.  Per the PCP.  The patient has lost 16 pounds since February 2024 and an epic retrospective chart review.  Her current BMI is 24.83.  She endorses decreased sleep poor appetite as well as panic attacks and able to complete her ADLs.  Her younger brother recently passed away in October very unexpectedly and she is having a very difficult time dealing with it.  Past medical history is significant for hypertension, mixed hyperlipidemia, left ear pain as well as seasonal allergies.  She is now admitted to the [facility] for stabilization of her mental health.    Review of Systems   Constitutional:  Positive for activity change and appetite change. Negative for chills and fever.   HENT:  Negative for ear pain and sore throat.    Eyes:  Negative for pain and visual disturbance.   Respiratory:  Negative for cough and shortness of breath.    Cardiovascular:  Negative for chest pain and palpitations.   Gastrointestinal:  Negative for abdominal pain and vomiting.   Genitourinary:  Negative for dysuria and hematuria.   Musculoskeletal:  Negative for arthralgias and back pain.   Skin:  Negative for color change and rash.   Neurological:  Negative for seizures and syncope.   Psychiatric/Behavioral:  Positive for confusion, decreased concentration and sleep disturbance. The patient is nervous/anxious.    All other systems reviewed and are negative.      Historical Information   Past Medical History:   Diagnosis Date    Anemia     Chest tightness or pressure     8/19/2013    Depression 5/14/2017    Hypertension     Vitamin D deficiency      Past Surgical History:   Procedure Laterality Date    CYSTOSCOPY N/A 12/30/2018    Procedure: CYSTOSCOPY;  Surgeon: Laura Bal DO;  Location: [facility];  Service: Gynecology    HYSTERECTOMY N/A 12/30/2018    Procedure:  HYSTERECTOMY LAPAROSCOPIC TOTAL (LTH);  Surgeon: Laura Bal DO;  Location: [facility];  Service: Gynecology    MN LAPAROSCOPY W/RMVL ADNEXAL STRUCTURES Bilateral 12/30/2018    Procedure: SALPINGECTOMY, LAPAROSCOPIC;  Surgeon: Laura Bal DO;  Location: [facility];  Service: Gynecology    TONSILLECTOMY      TUBAL LIGATION      2006     Social History     Tobacco Use    Smoking status: Some Days     Types: Cigarettes    Smokeless tobacco: Never    Tobacco comments:     social smoker, less than 1 pack per weekend   Vaping Use    Vaping status: Never Used   Substance and Sexual Activity    Alcohol use: Yes     Alcohol/week: 3.0 standard drinks of alcohol     Types: 3 Glasses of wine per week     Comment: on the weekends/socially    Drug use: No    Sexual activity: Yes     Partners: Male     Birth control/protection: Female Sterilization, Other     E-Cigarette/Vaping    E-Cigarette Use Never User      E-Cigarette/Vaping Substances    Nicotine No     THC No     CBD No     Flavoring No     Other No     Unknown No        Marital Status: /Civil Union    Meds/Allergies   I have reviewed home medications with patient personally.  Prior to Admission medications    Medication Sig Start Date End Date Taking? Authorizing Provider   amLODIPine (NORVASC) 2.5 mg tablet Take 1 tablet (2.5 mg total) by mouth daily 12/2/24  Yes Nenita Velásquez MD   atorvastatin (LIPITOR) 40 mg tablet TAKE 1 TABLET BY MOUTH EVERY DAY 9/23/24  Yes Nenita Velásquez MD   hydroCHLOROthiazide 25 mg tablet TAKE 1 TABLET (25 MG TOTAL) BY MOUTH DAILY. 10/30/24  Yes Nenita Velásquez MD   hydrOXYzine HCL (ATARAX) 25 mg tablet Take 1 tablet (25 mg total) by mouth daily as needed for anxiety 1/2/25 2/1/25 Yes Nenita Velásquez MD   losartan (COZAAR) 100 MG tablet TAKE 1 TABLET BY MOUTH EVERY DAY 9/23/24  Yes Nenita Velásquez MD   Omega-3 Fatty Acids (OMEGA 3 PO) Take by mouth   Yes Historical Provider, MD  "  sertraline (ZOLOFT) 25 mg tablet Take 1 tablet (25 mg total) by mouth daily 12/23/24 1/22/25 Yes Nenita Velásquez MD   carbamide peroxide (DEBROX) 6.5 % otic solution Administer 5 drops into the left ear 2 (two) times a day  Patient not taking: Reported on 1/3/2025 10/30/23   Camacho Kirby MD   fluticasone (FLONASE) 50 mcg/act nasal spray SPRAY 1 SPRAY INTO EACH NOSTRIL EVERY DAY  Patient not taking: Reported on 1/3/2025 12/28/23   Selam Zaragoza DO   orlistat (BRANDIE) 60 MG capsule Take 1 capsule (60 mg total) by mouth 3 (three) times a day with meals  Patient not taking: Reported on 1/3/2025 2/6/24   Selam Zaragoza DO   semaglutide, 2 mg/dose, (Ozempic, 2 MG/DOSE,) 8 mg/ mL injection pen Inject 0.75 mL (2 mg total) under the skin every 7 days  Patient not taking: Reported on 1/3/2025 11/7/23   Selam Zaragoza DO     No Known Allergies    Objective :  Temp:  [97.8 °F (36.6 °C)] 97.8 °F (36.6 °C)  HR:  [72-86] 74  BP: (121-130)/(77-86) 125/77  Resp:  [16-18] 16  SpO2:  [96 %-100 %] 96 %  O2 Device: None (Room air)    Height: 5' 1\" (154.9 cm) (01/03/25 1500)  Weight - Scale: 59.6 kg (131 lb 6.4 oz) (01/03/25 1500)  Physical Exam  Constitutional:       Appearance: Normal appearance. She is normal weight.   HENT:      Head: Normocephalic and atraumatic.      Nose: Nose normal.      Mouth/Throat:      Mouth: Mucous membranes are moist.      Pharynx: Oropharynx is clear.   Eyes:      Extraocular Movements: Extraocular movements intact.      Pupils: Pupils are equal, round, and reactive to light.   Cardiovascular:      Rate and Rhythm: Normal rate and regular rhythm.      Pulses: Normal pulses.      Heart sounds: Normal heart sounds.   Pulmonary:      Effort: Pulmonary effort is normal.      Breath sounds: Normal breath sounds.   Abdominal:      General: Abdomen is flat. Bowel sounds are normal.      Palpations: Abdomen is soft.   Musculoskeletal:         General: Normal range of motion.      " Cervical back: Normal range of motion and neck supple.   Skin:     General: Skin is warm and dry.   Neurological:      Mental Status: She is alert and oriented to person, place, and time.   Psychiatric:         Attention and Perception: Attention normal.         Mood and Affect: Mood is depressed. Affect is tearful.         Speech: Speech normal.         Behavior: Behavior normal.         Thought Content: Thought content normal.         Cognition and Memory: Cognition normal.         Judgment: Judgment normal.           Lab Results: I have reviewed the following results:                  Lab Results   Component Value Date/Time    BA1C 5.4 01/27/2024 08:45 AM    HGBA1C 5.4 05/14/2017 10:19 AM           Imaging Results Review: No pertinent imaging studies reviewed.      Administrative Statements   I have spent a total time of 45 minutes in caring for this patient on the day of the visit/encounter including Diagnostic results, Prognosis, Risks and benefits of tx options, Instructions for management, Patient and family education, Importance of tx compliance, Risk factor reductions, Impressions, Counseling / Coordination of care, Documenting in the medical record, Reviewing / ordering tests, medicine, procedures  , Obtaining or reviewing history  , and Communicating with other healthcare professionals .  ** Please Note: This note has been constructed using a voice recognition system. **

## 2025-01-03 NOTE — ED CARE HANDOFF
Emergency Department Sign Out Note        Sign out and transfer of care from Dr. Mccartney. See Separate Emergency Department note.     The patient, Shireen Rueda, was evaluated by the previous provider for Psych.    Workup Completed:  Psych eval due to worsening depression    ED Course / Workup Pending (followup):  201 signed pending placement                                      Procedures  Medical Decision Making  Amount and/or Complexity of Data Reviewed  Labs: ordered.    Risk  Prescription drug management.  Decision regarding hospitalization.            Disposition  Final diagnoses:   Depression   Anxiety     Time reflects when diagnosis was documented in both MDM as applicable and the Disposition within this note       Time User Action Codes Description Comment    1/2/2025  3:25 PM León Mccartney Add [F32.A] Depression     1/2/2025  3:25 PM León Mccartney Add [F41.9] Anxiety           ED Disposition       ED Disposition   Transfer to Behavioral Health Condition   --    Date/Time   Thu Jan 2, 2025  3:25 PM    Comment   Shireen Rueda should be transferred out to D and has been medically cleared.               MD Documentation      Flowsheet Row Most Recent Value   Sending MD Dr. León Mccartney MD          Follow-up Information    None       Patient's Medications   Discharge Prescriptions    No medications on file     No discharge procedures on file.       ED Provider  Electronically Signed by     Ike Chow MD  01/02/25 0122

## 2025-01-03 NOTE — ED NOTES
Patient is accepted at  3B  Patient is accepted by Dr. Kwong     Patient may go to the floor at 1330        Nurse report is to be called to x [phone] prior to patient transfer.

## 2025-01-03 NOTE — ASSESSMENT & PLAN NOTE
Patient will continue to take her outpatient amlodipine 2.5 mg p.o. daily  We will continue to monitor her blood pressure on ongoing basis and additively agents as needed

## 2025-01-03 NOTE — PLAN OF CARE
Problem: Depression  Goal: Treatment Goal: Demonstrate behavioral control of depressive symptoms, verbalize feelings of improved mood/affect, and adopt new coping skills prior to discharge  Outcome: Not Progressing     Problem: Anxiety  Goal: Anxiety is at manageable level  Description: Interventions:  - Assess and monitor patient's anxiety level.   - Monitor for signs and symptoms (heart palpitations, chest pain, shortness of breath, headaches, nausea, feeling jumpy, restlessness, irritable, apprehensive).   - Collaborate with interdisciplinary team and initiate plan and interventions as ordered.  - [place] patient to unit/surroundings  - Explain treatment plan  - Encourage participation in care  - Encourage verbalization of concerns/fears  - Identify coping mechanisms  - Assist in developing anxiety-reducing skills  - Administer/offer alternative therapies  - Limit or eliminate stimulants  Outcome: Not Progressing     Problem: SLEEP DISTURBANCE  Goal: Will exhibit normal sleeping pattern  Description: Interventions:  -  Assess the patients sleep pattern, noting recent changes  - Administer medication as ordered  - Decrease environmental stimuli, including noise, as appropriate during the night  - Encourage the patient to actively participate in unit groups and or exercise during the day to enhance ability to achieve adequate sleep at night  - Assess the patient, in the morning, encouraging a description of sleep experience  Outcome: Not Progressing     Problem: SELF CARE DEFICIT  Goal: Return ADL status to a safe level of function  Description: INTERVENTIONS:  - Administer medication as ordered  - Assess ADL deficits and provide assistive devices as needed  - Obtain PT/OT consults as needed  - Assist and instruct patient to increase activity and self care as tolerated  Outcome: Not Progressing

## 2025-01-03 NOTE — SOCIAL WORK
Admission Status    Status of admission 201   [facility]     Patient Intake   Address to discharge to [address] [place] [id number]   Living Arrangement Own Home   Can patient return home Yes   Patient's Telephone Number [phone]   Patient's e-mail Address [email or web address]   Insurance [employer]    PCP Nenita Velásquez MD   Education High School Graduate   Type of work [employer] - Full time - needs work note    History Denies   Access to Firearms Denies   Marital Status/Children , 3 adult children   Spirituality/Muslim Denies   Transportation Family Drives   Preferred Pharmacy [facility] - [address], [place]     Patient History   Presenting Problem Increased depression and anxiety since passing of younger brother. Unable to complete ADLs, decreased sleep, poor oral intake   Stressor/Trigger Passing of younger brother   Treatment History Denies   Current Psychiatrist/Therapist Follows with PCP for medications   ACT/ICM Denies   Family History of Mental Health Denies   Suicide Attempts Denies   Legal Issues Denies   Trauma/Psychosocial loss History of Trauma, declined to discuss further. Recent increase in flashbacks d/t passing of her brother.      Substance Abuse Assessment   UDS: Negative  Audit Score: 4  Nicotine/Tobacco: Cigarettes - weekend use   Substance First use Last Use and amount Frequency Amount Used How long Longest period of sobriety and when Method of use   THC   Denies use         Heroin   Denies use         Cocaine   Denies use         ETOH   Socia         Meth   Denies use         Benzos   Denies use         Other:   Denies use         History of ANTHONY Denies   Family History of ANTHONY Endorses family history of ANTHONY   Prior Inpatient ANTHONY Treatment Denies   Current Outpatient treatment Denies   Response to Referral N/A     Referrals/ROIs   Referrals Needed PHP, Grief support groups   ROIs Signed Jef Sandhu  Mohit -

## 2025-01-03 NOTE — ED NOTES
Patient resting comfortably.  Respirations even and unlabored.  No signs of distress noted at this time.      Yvonne Chow RN  01/03/25 [id number]

## 2025-01-03 NOTE — ASSESSMENT & PLAN NOTE
Patient will continue to use Debrox drops 5 drops into her left ear twice daily  Patient will continue to use her Flonase 1 spray each nostril daily

## 2025-01-03 NOTE — ED NOTES
Patient called this RN to room. Patient tearful and stating that she was very anxious. Provider made aware and medication administered as prescribed. Alternative comfort measures such as food, water, and blankets offered.       Princess White RN  01/02/25 [id number]

## 2025-01-04 PROBLEM — E83.42 HYPOMAGNESEMIA: Status: ACTIVE | Noted: 2025-01-04

## 2025-01-04 PROBLEM — F33.1 MODERATE EPISODE OF RECURRENT MAJOR DEPRESSIVE DISORDER (HCC): Status: ACTIVE | Noted: 2025-01-04

## 2025-01-04 LAB
25(OH)D3 SERPL-MCNC: 27.2 NG/ML (ref 30–100)
ALBUMIN SERPL BCG-MCNC: 4.2 G/DL (ref 3.5–5)
ALP SERPL-CCNC: 69 U/L (ref 34–104)
ALT SERPL W P-5'-P-CCNC: 8 U/L (ref 7–52)
ANION GAP SERPL CALCULATED.3IONS-SCNC: 9 MMOL/L (ref 4–13)
AST SERPL W P-5'-P-CCNC: 12 U/L (ref 13–39)
BACTERIA UR QL AUTO: ABNORMAL /HPF
BASOPHILS # BLD AUTO: 0.04 THOUSANDS/ΜL (ref 0–0.1)
BASOPHILS NFR BLD AUTO: 0 % (ref 0–1)
BILIRUB SERPL-MCNC: 0.68 MG/DL (ref 0.2–1)
BILIRUB UR QL STRIP: NEGATIVE
BUN SERPL-MCNC: 16 MG/DL (ref 5–25)
CALCIUM SERPL-MCNC: 9.5 MG/DL (ref 8.4–10.2)
CHLORIDE SERPL-SCNC: 100 MMOL/L (ref 96–108)
CHOLEST SERPL-MCNC: 124 MG/DL (ref ?–200)
CLARITY UR: ABNORMAL
CO2 SERPL-SCNC: 30 MMOL/L (ref 21–32)
COLOR UR: ABNORMAL
CREAT SERPL-MCNC: 0.72 MG/DL (ref 0.6–1.3)
EOSINOPHIL # BLD AUTO: 0.22 THOUSAND/ΜL (ref 0–0.61)
EOSINOPHIL NFR BLD AUTO: 2 % (ref 0–6)
ERYTHROCYTE [DISTWIDTH] IN BLOOD BY AUTOMATED COUNT: 12.1 % (ref 11.6–15.1)
EST. AVERAGE GLUCOSE BLD GHB EST-MCNC: 108 MG/DL
FOLATE SERPL-MCNC: 5.4 NG/ML
GFR SERPL CREATININE-BSD FRML MDRD: 101 ML/MIN/1.73SQ M
GLUCOSE P FAST SERPL-MCNC: 94 MG/DL (ref 65–99)
GLUCOSE SERPL-MCNC: 94 MG/DL (ref 65–140)
GLUCOSE UR STRIP-MCNC: NEGATIVE MG/DL
HBA1C MFR BLD: 5.4 %
HCG SERPL QL: NEGATIVE
HCT VFR BLD AUTO: 40.8 % (ref 34.8–46.1)
HDLC SERPL-MCNC: 59 MG/DL
HGB BLD-MCNC: 14 G/DL (ref 11.5–15.4)
HGB UR QL STRIP.AUTO: 50
IMM GRANULOCYTES # BLD AUTO: 0.03 THOUSAND/UL (ref 0–0.2)
IMM GRANULOCYTES NFR BLD AUTO: 0 % (ref 0–2)
KETONES UR STRIP-MCNC: ABNORMAL MG/DL
LDLC SERPL CALC-MCNC: 50 MG/DL (ref 0–100)
LEUKOCYTE ESTERASE UR QL STRIP: 25
LYMPHOCYTES # BLD AUTO: 2.5 THOUSANDS/ΜL (ref 0.6–4.47)
LYMPHOCYTES NFR BLD AUTO: 23 % (ref 14–44)
MAGNESIUM SERPL-MCNC: 1.8 MG/DL (ref 1.9–2.7)
MCH RBC QN AUTO: 32 PG (ref 26.8–34.3)
MCHC RBC AUTO-ENTMCNC: 34.3 G/DL (ref 31.4–37.4)
MCV RBC AUTO: 93 FL (ref 82–98)
MONOCYTES # BLD AUTO: 0.95 THOUSAND/ΜL (ref 0.17–1.22)
MONOCYTES NFR BLD AUTO: 9 % (ref 4–12)
MUCOUS THREADS UR QL AUTO: ABNORMAL
NEUTROPHILS # BLD AUTO: 7.05 THOUSANDS/ΜL (ref 1.85–7.62)
NEUTS SEG NFR BLD AUTO: 66 % (ref 43–75)
NITRITE UR QL STRIP: NEGATIVE
NON-SQ EPI CELLS URNS QL MICRO: ABNORMAL /HPF
NONHDLC SERPL-MCNC: 65 MG/DL
NRBC BLD AUTO-RTO: 0 /100 WBCS
PH UR STRIP.AUTO: 5 [PH]
PHOSPHATE SERPL-MCNC: 3.7 MG/DL (ref 2.7–4.5)
PLATELET # BLD AUTO: 232 THOUSANDS/UL (ref 149–390)
PMV BLD AUTO: 10.5 FL (ref 8.9–12.7)
POTASSIUM SERPL-SCNC: 3.3 MMOL/L (ref 3.5–5.3)
PROT SERPL-MCNC: 6.9 G/DL (ref 6.4–8.4)
PROT UR STRIP-MCNC: ABNORMAL MG/DL
RBC # BLD AUTO: 4.38 MILLION/UL (ref 3.81–5.12)
RBC #/AREA URNS AUTO: ABNORMAL /HPF
SODIUM SERPL-SCNC: 139 MMOL/L (ref 135–147)
SP GR UR STRIP.AUTO: 1.02 (ref 1–1.04)
TRIGL SERPL-MCNC: 73 MG/DL (ref ?–150)
TSH SERPL DL<=0.05 MIU/L-ACNC: 1.4 UIU/ML (ref 0.45–4.5)
UROBILINOGEN UA: 1 MG/DL
VIT B12 SERPL-MCNC: 231 PG/ML (ref 180–914)
WBC # BLD AUTO: 10.79 THOUSAND/UL (ref 4.31–10.16)
WBC #/AREA URNS AUTO: ABNORMAL /HPF

## 2025-01-04 PROCEDURE — 86780 TREPONEMA PALLIDUM: CPT

## 2025-01-04 PROCEDURE — 80061 LIPID PANEL: CPT

## 2025-01-04 PROCEDURE — 80053 COMPREHEN METABOLIC PANEL: CPT

## 2025-01-04 PROCEDURE — 82306 VITAMIN D 25 HYDROXY: CPT

## 2025-01-04 PROCEDURE — 84703 CHORIONIC GONADOTROPIN ASSAY: CPT

## 2025-01-04 PROCEDURE — 99223 1ST HOSP IP/OBS HIGH 75: CPT | Performed by: PSYCHIATRY & NEUROLOGY

## 2025-01-04 PROCEDURE — 83036 HEMOGLOBIN GLYCOSYLATED A1C: CPT

## 2025-01-04 PROCEDURE — 84100 ASSAY OF PHOSPHORUS: CPT

## 2025-01-04 PROCEDURE — 83735 ASSAY OF MAGNESIUM: CPT

## 2025-01-04 PROCEDURE — 82746 ASSAY OF FOLIC ACID SERUM: CPT

## 2025-01-04 PROCEDURE — 84443 ASSAY THYROID STIM HORMONE: CPT

## 2025-01-04 PROCEDURE — 81001 URINALYSIS AUTO W/SCOPE: CPT

## 2025-01-04 PROCEDURE — 85025 COMPLETE CBC W/AUTO DIFF WBC: CPT

## 2025-01-04 PROCEDURE — 82607 VITAMIN B-12: CPT

## 2025-01-04 RX ORDER — LOPERAMIDE HYDROCHLORIDE 2 MG/1
2 CAPSULE ORAL 4 TIMES DAILY PRN
Status: DISCONTINUED | OUTPATIENT
Start: 2025-01-04 | End: 2025-01-06 | Stop reason: HOSPADM

## 2025-01-04 RX ORDER — POLYETHYLENE GLYCOL 3350 17 G/17G
17 POWDER, FOR SOLUTION ORAL DAILY PRN
Status: DISCONTINUED | OUTPATIENT
Start: 2025-01-04 | End: 2025-01-06 | Stop reason: HOSPADM

## 2025-01-04 RX ORDER — LANOLIN ALCOHOL/MO/W.PET/CERES
400 CREAM (GRAM) TOPICAL 2 TIMES DAILY
Status: DISCONTINUED | OUTPATIENT
Start: 2025-01-04 | End: 2025-01-06 | Stop reason: HOSPADM

## 2025-01-04 RX ORDER — FOLIC ACID 1 MG/1
1 TABLET ORAL DAILY
Status: DISCONTINUED | OUTPATIENT
Start: 2025-01-04 | End: 2025-01-06 | Stop reason: HOSPADM

## 2025-01-04 RX ORDER — POTASSIUM CHLORIDE 1500 MG/1
40 TABLET, EXTENDED RELEASE ORAL 2 TIMES DAILY
Status: COMPLETED | OUTPATIENT
Start: 2025-01-04 | End: 2025-01-04

## 2025-01-04 RX ORDER — BISACODYL 10 MG
10 SUPPOSITORY, RECTAL RECTAL DAILY PRN
Status: DISCONTINUED | OUTPATIENT
Start: 2025-01-04 | End: 2025-01-06 | Stop reason: HOSPADM

## 2025-01-04 RX ORDER — TRAZODONE HYDROCHLORIDE 50 MG/1
25 TABLET, FILM COATED ORAL
Status: DISCONTINUED | OUTPATIENT
Start: 2025-01-04 | End: 2025-01-05

## 2025-01-04 RX ADMIN — AMLODIPINE BESYLATE 2.5 MG: 2.5 TABLET ORAL at 09:30

## 2025-01-04 RX ADMIN — Medication 400 MG: at 17:34

## 2025-01-04 RX ADMIN — LOPERAMIDE HYDROCHLORIDE 2 MG: 2 CAPSULE ORAL at 15:00

## 2025-01-04 RX ADMIN — HYDROXYZINE HYDROCHLORIDE 100 MG: 50 TABLET, FILM COATED ORAL at 10:03

## 2025-01-04 RX ADMIN — ATORVASTATIN CALCIUM 40 MG: 40 TABLET, FILM COATED ORAL at 09:30

## 2025-01-04 RX ADMIN — HYDROCHLOROTHIAZIDE 25 MG: 25 TABLET ORAL at 09:30

## 2025-01-04 RX ADMIN — FOLIC ACID 1 MG: 1 TABLET ORAL at 17:34

## 2025-01-04 RX ADMIN — POTASSIUM CHLORIDE 40 MEQ: 1500 TABLET, EXTENDED RELEASE ORAL at 17:34

## 2025-01-04 RX ADMIN — Medication 400 MG: at 09:30

## 2025-01-04 RX ADMIN — MELATONIN TAB 3 MG 3 MG: 3 TAB at 23:49

## 2025-01-04 RX ADMIN — LOSARTAN POTASSIUM 100 MG: 25 TABLET, FILM COATED ORAL at 09:30

## 2025-01-04 RX ADMIN — TRAZODONE HYDROCHLORIDE 25 MG: 50 TABLET ORAL at 23:49

## 2025-01-04 RX ADMIN — POTASSIUM CHLORIDE 40 MEQ: 1500 TABLET, EXTENDED RELEASE ORAL at 09:30

## 2025-01-04 RX ADMIN — SERTRALINE HYDROCHLORIDE 25 MG: 50 TABLET ORAL at 09:30

## 2025-01-04 NOTE — ED NOTES
Insurance Authorization for admission:   Phone call placed to  [facility]  Phone number: [phone]    Attempted to precert.  Office is closed. Call on Monday

## 2025-01-04 NOTE — PLAN OF CARE
Problem: SELF CARE DEFICIT  Goal: Return ADL status to a safe level of function  Description: INTERVENTIONS:  - Administer medication as ordered  - Assess ADL deficits and provide assistive devices as needed  - Obtain PT/OT consults as needed  - Assist and instruct patient to increase activity and self care as tolerated  Outcome: Progressing     Problem: Depression  Goal: Treatment Goal: Demonstrate behavioral control of depressive symptoms, verbalize feelings of improved mood/affect, and adopt new coping skills prior to discharge  Outcome: Not Progressing     Problem: Anxiety  Goal: Anxiety is at manageable level  Description: Interventions:  - Assess and monitor patient's anxiety level.   - Monitor for signs and symptoms (heart palpitations, chest pain, shortness of breath, headaches, nausea, feeling jumpy, restlessness, irritable, apprehensive).   - Collaborate with interdisciplinary team and initiate plan and interventions as ordered.  - [place] patient to unit/surroundings  - Explain treatment plan  - Encourage participation in care  - Encourage verbalization of concerns/fears  - Identify coping mechanisms  - Assist in developing anxiety-reducing skills  - Administer/offer alternative therapies  - Limit or eliminate stimulants  Outcome: Not Progressing     Problem: SLEEP DISTURBANCE  Goal: Will exhibit normal sleeping pattern  Description: Interventions:  -  Assess the patients sleep pattern, noting recent changes  - Administer medication as ordered  - Decrease environmental stimuli, including noise, as appropriate during the night  - Encourage the patient to actively participate in unit groups and or exercise during the day to enhance ability to achieve adequate sleep at night  - Assess the patient, in the morning, encouraging a description of sleep experience  Outcome: Not Progressing

## 2025-01-04 NOTE — ASSESSMENT & PLAN NOTE
Shireen is a 44 y.o. female with past medical history of HTN and pertinent past psychiatric history of unspecified mood disorder who presents voluntarily on a 201 commitment secondary to worsening depression and poor self-care following the death of her brother in October 2024 from cirrhosis.  Patient exhibited worse self-care such as poor appetite resulting in a 20 pound weight loss within 2 months, struggled with domestic tasks, and at times missed work as a result of her worsening mood, anxiety, and guilt surrounding her brother's death. Patient was recently started on Zoloft 25 mg daily by her PCP but only just started taking the medication on 1/2/2025. Recommend continuing the medication with a plan to up-titrate as tolerated.      Recommended Treatment:     Continue with current medications:  Zoloft 25 mg daily for depressive symptoms with plan to increase to 50 mg on 1/6/2025 if tolerated  Continue with group therapy, milieu therapy and occupational therapy.    Continue frequent safety checks and vitals per unit protocol.  Continue with SLIM medical management as indicated  Continue coordinating with case management regarding disposition    Legal Status: 201  Disposition: To be determined, coordinating with case management    Case discussed with treatment team.  Risks, benefits and possible side effects of Medications: Risks, benefits, and possible side effects of medications have been explained to the patient, who verbalizes understanding

## 2025-01-04 NOTE — NURSING NOTE
Patient has been visible and social with select peers.  She is med/meal compliant.  She denies SI/Hi and A/V hallucinations.
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
## 2025-01-04 NOTE — ASSESSMENT & PLAN NOTE
Patient's magnesium today 1.8  Start Mag-Ox 400 milligrams p.o. twice daily  Patient will have a magnesium level drawn tomorrow on 1/5/2025

## 2025-01-04 NOTE — NURSING NOTE
Patient has been about the unit.  Interacting appropriately with peers as well as attending groups. Denies SI/HI, denies hallucinations. Reports depression related to the death of her brother. Compliant with medications.

## 2025-01-04 NOTE — NURSING NOTE
"Patient 201  F 45 YO sent from  PCP  to ED with increasing depression and anxiety. Pt had a death in the family recently and \"its just been 1 thing after another and I had a panic attack last night\".Patient reports a 17 pound weight loss over the last 4 to 5 months and has not been eating, drinking, sleeping or recently leaving the house.    Patient arrived to unit, tearful and and anxious, alert and oriented x4. Patient agreeable to admission due to the increasing depression and inability to cope with the passing of her brother in October.  Patient reports not being able to do simple home task and inability to sleep. Patient reports having a good support system at home with  and stable job. Patient reports not being able to grief her brothers death until now and would like to seek psychiatric help.   Patient denies SI/HI/AH/VH at this time.  CSSRS lifetime/Low Risk Last contact /Low risk. Denies alcohol/ drug/and tobacco use only on social occasion's does she smoke cigarettes and drink alcohol. UDS negative.     Patient reports having poor appetite and not eating or drinking well.    Oriented to unit and room, wearing unit attire. Q 15 minute checks initiated on arrival.     "

## 2025-01-05 LAB
ANION GAP SERPL CALCULATED.3IONS-SCNC: 6 MMOL/L (ref 4–13)
ATRIAL RATE: 67 BPM
BUN SERPL-MCNC: 16 MG/DL (ref 5–25)
CALCIUM SERPL-MCNC: 9.1 MG/DL (ref 8.4–10.2)
CHLORIDE SERPL-SCNC: 103 MMOL/L (ref 96–108)
CO2 SERPL-SCNC: 31 MMOL/L (ref 21–32)
CREAT SERPL-MCNC: 0.75 MG/DL (ref 0.6–1.3)
GFR SERPL CREATININE-BSD FRML MDRD: 96 ML/MIN/1.73SQ M
GLUCOSE P FAST SERPL-MCNC: 89 MG/DL (ref 65–99)
GLUCOSE SERPL-MCNC: 89 MG/DL (ref 65–140)
MAGNESIUM SERPL-MCNC: 2 MG/DL (ref 1.9–2.7)
P AXIS: 65 DEGREES
POTASSIUM SERPL-SCNC: 4.1 MMOL/L (ref 3.5–5.3)
PR INTERVAL: 134 MS
QRS AXIS: 72 DEGREES
QRSD INTERVAL: 68 MS
QT INTERVAL: 392 MS
QTC INTERVAL: 414 MS
SODIUM SERPL-SCNC: 140 MMOL/L (ref 135–147)
T WAVE AXIS: 62 DEGREES
TREPONEMA PALLIDUM IGG+IGM AB [PRESENCE] IN SERUM OR PLASMA BY IMMUNOASSAY: NORMAL
VENTRICULAR RATE: 67 BPM

## 2025-01-05 PROCEDURE — 99232 SBSQ HOSP IP/OBS MODERATE 35: CPT | Performed by: PSYCHIATRY & NEUROLOGY

## 2025-01-05 PROCEDURE — 93005 ELECTROCARDIOGRAM TRACING: CPT

## 2025-01-05 PROCEDURE — 93010 ELECTROCARDIOGRAM REPORT: CPT

## 2025-01-05 PROCEDURE — 83735 ASSAY OF MAGNESIUM: CPT | Performed by: NURSE PRACTITIONER

## 2025-01-05 PROCEDURE — 80048 BASIC METABOLIC PNL TOTAL CA: CPT | Performed by: NURSE PRACTITIONER

## 2025-01-05 RX ORDER — ERGOCALCIFEROL 1.25 MG/1
50000 CAPSULE, LIQUID FILLED ORAL WEEKLY
Status: DISCONTINUED | OUTPATIENT
Start: 2025-01-05 | End: 2025-01-06 | Stop reason: HOSPADM

## 2025-01-05 RX ORDER — CYANOCOBALAMIN 1000 UG/ML
1000 INJECTION, SOLUTION INTRAMUSCULAR; SUBCUTANEOUS ONCE
Status: COMPLETED | OUTPATIENT
Start: 2025-01-05 | End: 2025-01-05

## 2025-01-05 RX ADMIN — MELATONIN TAB 3 MG 3 MG: 3 TAB at 23:02

## 2025-01-05 RX ADMIN — Medication 400 MG: at 10:04

## 2025-01-05 RX ADMIN — LOSARTAN POTASSIUM 100 MG: 25 TABLET, FILM COATED ORAL at 10:06

## 2025-01-05 RX ADMIN — SERTRALINE HYDROCHLORIDE 25 MG: 50 TABLET ORAL at 10:05

## 2025-01-05 RX ADMIN — CYANOCOBALAMIN 1000 MCG: 1000 INJECTION INTRAMUSCULAR; SUBCUTANEOUS at 10:09

## 2025-01-05 RX ADMIN — ERGOCALCIFEROL 50000 UNITS: 1.25 CAPSULE ORAL at 10:09

## 2025-01-05 RX ADMIN — Medication 400 MG: at 17:04

## 2025-01-05 RX ADMIN — AMLODIPINE BESYLATE 2.5 MG: 2.5 TABLET ORAL at 10:06

## 2025-01-05 RX ADMIN — HYDROCHLOROTHIAZIDE 25 MG: 25 TABLET ORAL at 10:05

## 2025-01-05 RX ADMIN — FOLIC ACID 1 MG: 1 TABLET ORAL at 10:04

## 2025-01-05 RX ADMIN — ATORVASTATIN CALCIUM 40 MG: 40 TABLET, FILM COATED ORAL at 10:06

## 2025-01-05 NOTE — NURSING NOTE
Patient denies SI, HI, AHs and VHs. Denies anxiety, reports moderate depression. Patient is medication and meal compliant. Visible on the unit, made multiple phone calls. Limited interaction with peers noted. She denies any needs at this time.

## 2025-01-05 NOTE — NURSING NOTE
Patient has been in the milieu all evening with a small group of peers interacting - chatting, laughing, coloring and generally getting along together very well.  She has been holding off taking he HS melatonin anf Trazadone(there is a later curfew tonight) because she thinks it julian make her too sleepy.  She denies S.I.H.I.A/H V/H   72 hour notice to withdraw from treatment signed today at 1415 is still in effect.

## 2025-01-05 NOTE — ASSESSMENT & PLAN NOTE
Shireen is a 44 y.o. female with past medical history of HTN and pertinent past psychiatric history of unspecified mood disorder who presents voluntarily on a 201 commitment secondary to worsening depression and poor self-care following the death of her brother in October 2024 from cirrhosis.  Patient exhibited worse self-care such as poor appetite resulting in a 20 pound weight loss within 2 months, struggled with domestic tasks, and at times missed work as a result of her worsening mood, anxiety, and guilt surrounding her brother's death. Patient was recently started on Zoloft 25 mg daily by her PCP but only just started taking the medication on 1/2/2025. Recommend continuing the medication with a plan to up-titrate as tolerated. Patient signed a 72 hr notice on 1/4/2025. Currently, she has no safety concerns that would meet criteria for an involuntary 302.      Recommended Treatment:     Continue with current medications:  Increase Zoloft to 50 mg daily on 1/6/2025  Continue with group therapy, milieu therapy and occupational therapy.    Continue frequent safety checks and vitals per unit protocol.  Continue with SLIM medical management as indicated  Continue coordinating with case management regarding disposition  72 hr notice signed 1/4/2025    Legal Status: 201  Disposition: To be determined, coordinating with case management    Case discussed with treatment team.  Risks, benefits and possible side effects of Medications: Risks, benefits, and possible side effects of medications have been explained to the patient, who verbalizes understanding

## 2025-01-05 NOTE — PROGRESS NOTES
Progress Note - Behavioral Health   Name: Shireen Rueda 44 y.o. female I MRN: [id number]  Unit/Bed#: -01 I Date of Admission: 1/2/2025   Date of Service: 1/5/2025 I Hospital Day: 2     Assessment & Plan  Major depressive disorder, moderate, without psychotic features (HCC)    Shireen is a 44 y.o. female with past medical history of HTN and pertinent past psychiatric history of unspecified mood disorder who presents voluntarily on a 201 commitment secondary to worsening depression and poor self-care following the death of her brother in October 2024 from cirrhosis.  Patient exhibited worse self-care such as poor appetite resulting in a 20 pound weight loss within 2 months, struggled with domestic tasks, and at times missed work as a result of her worsening mood, anxiety, and guilt surrounding her brother's death. Patient was recently started on Zoloft 25 mg daily by her PCP but only just started taking the medication on 1/2/2025. Recommend continuing the medication with a plan to up-titrate as tolerated. Patient signed a 72 hr notice on 1/4/2025. Currently, she has no safety concerns that would meet criteria for an involuntary 302.      Recommended Treatment:     Continue with current medications:  Increase Zoloft to 50 mg daily on 1/6/2025  Continue with group therapy, milieu therapy and occupational therapy.    Continue frequent safety checks and vitals per unit protocol.  Continue with SLIM medical management as indicated  Continue coordinating with case management regarding disposition  72 hr notice signed 1/4/2025    Legal Status: 201  Disposition: To be determined, coordinating with case management    Case discussed with treatment team.  Risks, benefits and possible side effects of Medications: Risks, benefits, and possible side effects of medications have been explained to the patient, who verbalizes understanding    Essential hypertension    Hypokalemia    Dyslipidemia    Left ear pain    Medical clearance  "for psychiatric admission    Hypomagnesemia          ------------------------------------------------------------    Subjective: Patient's chart was reviewed, and patient's progress and plan was discussed with treatment team. Per nursing report, Shireen has been cooperative on the unit and compliant with medications.  Yesterday patient became anxious following the initial psychiatric evaluation and received Atarax 100 mg as needed, which was effective.  Yesterday she signed a 72-hour notice.    Shireen was evaluated this morning for continuity of care. On examination, Shireen is calm, cooperative, and pleasant.  She was brighter on approach than previous.  She states her mood is \"better\".  She reports no issues with sleep, appetite, or energy stating that all of these things have improved.  She did sign a 72-hour notice yesterday, the patient states that she feels as low she is equipped to handle her psychiatric concerns in the outpatient setting.  She states that the 100 mg dose of Atarax was effective for treating her acute anxiety yesterday, and would like her home Atarax dose to be higher as she does not find her outpatient 10 mg as needed dose effective.  She denies adverse effects from medications. She denies suicidal ideation, homicidal ideation, auditory hallucinations, and visual hallucinations.     VS: Reviewed, within normal limits    Progress Toward Goals: slow improvement    Psychiatric Review of Systems:  Behavior over the last 24 hours: improved  Sleep: normal and improving  Appetite: adequate, improving  Medication side effects: none verbalized  Medical ROS: Complete review of systems is negative except as noted above.    Vital signs in last 24 hours:  Temp:  [97.8 °F (36.6 °C)-98.7 °F (37.1 °C)] 98.7 °F (37.1 °C)  HR:  [65-68] 65  BP: (115-131)/(55-67) 115/55  Resp:  [16] 16  SpO2:  [96 %-97 %] 96 %  O2 Device: None (Room air)    Mental Status Exam:    Appearance:  appears stated age, casually dressed, and " "appropriate grooming and hygiene   Behavior:  calm, cooperative, and pleasant   Speech:  spontaneous, normal rate, normal volume, and coherent   Mood:  \"Better\"   Affect:  Euthymic, full range and appropriate   Thought Process:  Organized, logical, goal-directed   Associations: intact associations   Thought Content:  no verbalized delusions or overt paranoia   Perceptual Disturbances: no reported hallucinations and does not appear to be responding to internal stimuli at this time   Risk Potential: Suicidal ideation - None at present  Homicidal ideation - None at present  Potential for aggression - No   Sensorium:  oriented to person, place, and time/date   Memory:  recent and remote memory grossly intact   Consciousness:  alert and awake   Attention/Concentration: attention span and concentration are age appropriate   Insight:  improving   Judgment: improving   Gait/Station: normal gait/station   Motor Activity: no abnormal movements     Current Medications:  Current Facility-Administered Medications   Medication Dose Route Frequency Provider Last Rate    acetaminophen  650 mg Oral Q6H PRN Gazal Jabir, DO      acetaminophen  650 mg Oral Q4H PRN Gazal Jabir, DO      acetaminophen  975 mg Oral Q6H PRN Gazal Jabir, DO      aluminum-magnesium hydroxide-simethicone  30 mL Oral Q4H PRN Gazal Jabir, DO      amLODIPine  2.5 mg Oral Daily Heather Thornton MD      atorvastatin  40 mg Oral Daily Heather Thornton MD      benztropine  1 mg Intramuscular Q4H PRN Max 6/day Gazal Jabir, DO      benztropine  1 mg Oral Q4H PRN Max 6/day Gazal Jabir, DO      bisacodyl  10 mg Rectal Daily PRN Gazal Jabir, DO      [START ON 1/6/2025] cyanocobalamin  1,000 mcg Oral Daily HENRY WelchNP      cyanocobalamin  1,000 mcg Intramuscular Once EDER Welch      hydrOXYzine HCL  50 mg Oral Q6H PRN Max 4/day Gazal Jabir, DO      Or    diphenhydrAMINE  50 mg Intramuscular Q6H PRN Gazal Jabir, DO      " ergocalciferol  50,000 Units Oral Weekly EDER Welch      folic acid  1 mg Oral Daily EDER Welch      hydroCHLOROthiazide  25 mg Oral Daily Heather Thornton MD      hydrOXYzine HCL  100 mg Oral Q6H PRN Max 4/day Gazal Jabir, DO      Or    LORazepam  2 mg Intramuscular Q6H PRN Gazal Jabir, DO      hydrOXYzine HCL  25 mg Oral Daily PRN Heather Thornton MD      hydrOXYzine HCL  25 mg Oral Q6H PRN Max 4/day Gazal Jabir, DO      loperamide  2 mg Oral 4x Daily PRN EDER Welch      losartan  100 mg Oral Daily Heather Thornton MD      magnesium Oxide  400 mg Oral BID EDER Welch      melatonin  3 mg Oral HS Gazal Jabir, DO      nicotine  1 patch Transdermal Daily Gazal Jabir, DO      OLANZapine  5 mg Oral Q4H PRN Max 3/day Gazal Jabir, DO      Or    OLANZapine  2.5 mg Intramuscular Q4H PRN Max 3/day Gazal Jabir, DO      OLANZapine  5 mg Oral Q3H PRN Max 3/day Gazal Jabir, DO      Or    OLANZapine  5 mg Intramuscular Q3H PRN Max 3/day Gazal Jabir, DO      OLANZapine  2.5 mg Oral Q4H PRN Max 6/day Gazal Jabir, DO      polyethylene glycol  17 g Oral Daily PRN Gazal Jabir, DO      propranolol  10 mg Oral Q8H PRN Gazal Jabir, DO      senna-docusate sodium  1 tablet Oral Daily PRN Gazal Jabir, DO      [START ON 1/6/2025] sertraline  50 mg Oral Daily Ivan Hinson MD      traZODone  50 mg Oral HS PRN Gazal Jabir, DO         Behavioral Health Medications: all current active meds have been reviewed. Changes as in plan section above.    Laboratory results:  I have personally reviewed all pertinent laboratory/tests results.   Recent Results (from the past 48 hours)   TSH, 3rd generation with Free T4 reflex    Collection Time: 01/04/25  5:42 AM   Result Value Ref Range    TSH 3RD GENERATON 1.401 0.450 - 4.500 uIU/mL   Vitamin B12    Collection Time: 01/04/25  5:42 AM   Result Value Ref Range    Vitamin B-12 231 180 - 914 pg/mL   Lipid panel     Collection Time: 01/04/25  5:42 AM   Result Value Ref Range    Cholesterol 124 See Comment mg/dL    Triglycerides 73 See Comment mg/dL    HDL, Direct 59 >=50 mg/dL    LDL Calculated 50 0 - 100 mg/dL    Non-HDL-Chol (CHOL-HDL) 65 mg/dl   Folate    Collection Time: 01/04/25  5:42 AM   Result Value Ref Range    Folate 5.4 (L) >5.9 ng/mL   Vitamin D 25 hydroxy    Collection Time: 01/04/25  5:42 AM   Result Value Ref Range    Vit D, 25-Hydroxy 27.2 (L) 30.0 - 100.0 ng/mL   Total Syphilis (IgG/IgM) Screening    Collection Time: 01/04/25  5:42 AM   Result Value Ref Range    Syphilis Total Antibody Non-reactive Non-Reactive   hCG, serum, qualitative    Collection Time: 01/04/25  5:42 AM   Result Value Ref Range    Preg, Serum Negative Negative   Comprehensive metabolic panel    Collection Time: 01/04/25  5:42 AM   Result Value Ref Range    Sodium 139 135 - 147 mmol/L    Potassium 3.3 (L) 3.5 - 5.3 mmol/L    Chloride 100 96 - 108 mmol/L    CO2 30 21 - 32 mmol/L    ANION GAP 9 4 - 13 mmol/L    BUN 16 5 - 25 mg/dL    Creatinine 0.72 0.60 - 1.30 mg/dL    Glucose 94 65 - 140 mg/dL    Glucose, Fasting 94 65 - 99 mg/dL    Calcium 9.5 8.4 - 10.2 mg/dL    AST 12 (L) 13 - 39 U/L    ALT 8 7 - 52 U/L    Alkaline Phosphatase 69 34 - 104 U/L    Total Protein 6.9 6.4 - 8.4 g/dL    Albumin 4.2 3.5 - 5.0 g/dL    Total Bilirubin 0.68 0.20 - 1.00 mg/dL    eGFR 101 ml/min/1.73sq m   Magnesium    Collection Time: 01/04/25  5:42 AM   Result Value Ref Range    Magnesium 1.8 (L) 1.9 - 2.7 mg/dL   Phosphorus    Collection Time: 01/04/25  5:42 AM   Result Value Ref Range    Phosphorus 3.7 2.7 - 4.5 mg/dL   CBC and differential    Collection Time: 01/04/25  5:42 AM   Result Value Ref Range    WBC 10.79 (H) 4.31 - 10.16 Thousand/uL    RBC 4.38 3.81 - 5.12 Million/uL    Hemoglobin 14.0 11.5 - 15.4 g/dL    Hematocrit 40.8 34.8 - 46.1 %    MCV 93 82 - 98 fL    MCH 32.0 26.8 - 34.3 pg    MCHC 34.3 31.4 - 37.4 g/dL    RDW 12.1 11.6 - 15.1 %    MPV 10.5 8.9  - 12.7 fL    Platelets 232 149 - 390 Thousands/uL    nRBC 0 /100 WBCs    Segmented % 66 43 - 75 %    Immature Grans % 0 0 - 2 %    Lymphocytes % 23 14 - 44 %    Monocytes % 9 4 - 12 %    Eosinophils Relative 2 0 - 6 %    Basophils Relative 0 0 - 1 %    Absolute Neutrophils 7.05 1.85 - 7.62 Thousands/µL    Absolute Immature Grans 0.03 0.00 - 0.20 Thousand/uL    Absolute Lymphocytes 2.50 0.60 - 4.47 Thousands/µL    Absolute Monocytes 0.95 0.17 - 1.22 Thousand/µL    Eosinophils Absolute 0.22 0.00 - 0.61 Thousand/µL    Basophils Absolute 0.04 0.00 - 0.10 Thousands/µL   Hemoglobin A1C    Collection Time: 01/04/25  5:42 AM   Result Value Ref Range    Hemoglobin A1C 5.4 Normal 4.0-5.6%; PreDiabetic 5.7-6.4%; Diabetic >=6.5%; Glycemic control for adults with diabetes <7.0% %     mg/dl   UA (URINE) with reflex to Scope    Collection Time: 01/04/25 10:19 AM   Result Value Ref Range    Color, UA Liz (A) Straw, Yellow, Pale Yellow    Clarity, UA Slightly Cloudy (A) Clear, Other    Specific Gravity, UA 1.020 1.003 - 1.040    pH, UA 5.0 4.5, 5.0, 5.5, 6.0, 6.5, 7.0, 7.5, 8.0    Leukocytes, UA 25.0 (A) Negative    Nitrite, UA Negative Negative    Protein, UA 30 (1+) (A) Negative mg/dl    Glucose, UA Negative Negative mg/dl    Ketones, UA 5 (Trace) (A) Negative mg/dl    Bilirubin, UA Negative Negative    Occult Blood, UA 50.0 (A) Negative    UROBILINOGEN UA 1.0 1.0, Negative mg/dL   Urine Microscopic    Collection Time: 01/04/25 10:19 AM   Result Value Ref Range    RBC, UA 2-4 None Seen, 0-1, 1-2, 2-4, 0-5 /hpf    WBC, UA 1-2 None Seen, 0-1, 1-2, 0-5, 2-4 /hpf    Epithelial Cells Innumerable (A) None Seen, Occasional /hpf    Bacteria, UA Occasional None Seen, Occasional /hpf    MUCUS THREADS Moderate (A) None Seen   Basic metabolic panel    Collection Time: 01/05/25  5:15 AM   Result Value Ref Range    Sodium 140 135 - 147 mmol/L    Potassium 4.1 3.5 - 5.3 mmol/L    Chloride 103 96 - 108 mmol/L    CO2 31 21 - 32 mmol/L     ANION GAP 6 4 - 13 mmol/L    BUN 16 5 - 25 mg/dL    Creatinine 0.75 0.60 - 1.30 mg/dL    Glucose 89 65 - 140 mg/dL    Glucose, Fasting 89 65 - 99 mg/dL    Calcium 9.1 8.4 - 10.2 mg/dL    eGFR 96 ml/min/1.73sq m   Magnesium    Collection Time: 01/05/25  5:15 AM   Result Value Ref Range    Magnesium 2.0 1.9 - 2.7 mg/dL        Counseling / Coordination of Care:  Patient's progress discussed with staff in treatment team meeting.  Patient's progress reviewed with case management staff.  Patient's diagnosis and treatment indicated reviewed with patient.        Ivan Gross MD 01/05/25  Psychiatry Residency, PGY-II  This note has been constructed using a voice recognition system. There may be translation, syntax, or grammatical errors. If you have any questions, please contact the dictating author.

## 2025-01-05 NOTE — PLAN OF CARE
Problem: Depression  Goal: Treatment Goal: Demonstrate behavioral control of depressive symptoms, verbalize feelings of improved mood/affect, and adopt new coping skills prior to discharge  Outcome: Progressing     Problem: Anxiety  Goal: Anxiety is at manageable level  Description: Interventions:  - Assess and monitor patient's anxiety level.   - Monitor for signs and symptoms (heart palpitations, chest pain, shortness of breath, headaches, nausea, feeling jumpy, restlessness, irritable, apprehensive).   - Collaborate with interdisciplinary team and initiate plan and interventions as ordered.  - [place] patient to unit/surroundings  - Explain treatment plan  - Encourage participation in care  - Encourage verbalization of concerns/fears  - Identify coping mechanisms  - Assist in developing anxiety-reducing skills  - Administer/offer alternative therapies  - Limit or eliminate stimulants  Outcome: Progressing     Problem: SLEEP DISTURBANCE  Goal: Will exhibit normal sleeping pattern  Description: Interventions:  -  Assess the patients sleep pattern, noting recent changes  - Administer medication as ordered  - Decrease environmental stimuli, including noise, as appropriate during the night  - Encourage the patient to actively participate in unit groups and or exercise during the day to enhance ability to achieve adequate sleep at night  - Assess the patient, in the morning, encouraging a description of sleep experience  Outcome: Progressing     Problem: SELF CARE DEFICIT  Goal: Return ADL status to a safe level of function  Description: INTERVENTIONS:  - Administer medication as ordered  - Assess ADL deficits and provide assistive devices as needed  - Obtain PT/OT consults as needed  - Assist and instruct patient to increase activity and self care as tolerated  Outcome: Progressing

## 2025-01-06 VITALS
DIASTOLIC BLOOD PRESSURE: 60 MMHG | RESPIRATION RATE: 16 BRPM | BODY MASS INDEX: 24.81 KG/M2 | OXYGEN SATURATION: 98 % | HEART RATE: 62 BPM | WEIGHT: 131.4 LBS | SYSTOLIC BLOOD PRESSURE: 118 MMHG | TEMPERATURE: 97.9 F | HEIGHT: 61 IN

## 2025-01-06 PROBLEM — E87.6 HYPOKALEMIA: Status: RESOLVED | Noted: 2020-12-15 | Resolved: 2025-01-06

## 2025-01-06 PROBLEM — Z00.8 MEDICAL CLEARANCE FOR PSYCHIATRIC ADMISSION: Status: RESOLVED | Noted: 2023-10-30 | Resolved: 2025-01-06

## 2025-01-06 PROBLEM — E83.42 HYPOMAGNESEMIA: Status: RESOLVED | Noted: 2025-01-04 | Resolved: 2025-01-06

## 2025-01-06 PROCEDURE — 99238 HOSP IP/OBS DSCHRG MGMT 30/<: CPT | Performed by: PSYCHIATRY & NEUROLOGY

## 2025-01-06 RX ORDER — FOLIC ACID 1 MG/1
1 TABLET ORAL DAILY
Qty: 30 TABLET | Refills: 0 | Status: SHIPPED | OUTPATIENT
Start: 2025-01-07

## 2025-01-06 RX ORDER — HYDROXYZINE HYDROCHLORIDE 25 MG/1
50 TABLET, FILM COATED ORAL EVERY 6 HOURS PRN
Qty: 30 TABLET | Refills: 0 | Status: SHIPPED | OUTPATIENT
Start: 2025-01-06 | End: 2025-02-05

## 2025-01-06 RX ORDER — ERGOCALCIFEROL 1.25 MG/1
50000 CAPSULE, LIQUID FILLED ORAL WEEKLY
Qty: 4 CAPSULE | Refills: 0 | Status: SHIPPED | OUTPATIENT
Start: 2025-01-12 | End: 2025-02-24

## 2025-01-06 RX ORDER — LANOLIN ALCOHOL/MO/W.PET/CERES
400 CREAM (GRAM) TOPICAL 2 TIMES DAILY
Qty: 60 TABLET | Refills: 0 | Status: SHIPPED | OUTPATIENT
Start: 2025-01-06

## 2025-01-06 RX ADMIN — AMLODIPINE BESYLATE 2.5 MG: 2.5 TABLET ORAL at 08:19

## 2025-01-06 RX ADMIN — CYANOCOBALAMIN TAB 1000 MCG 1000 MCG: 1000 TAB at 08:19

## 2025-01-06 RX ADMIN — ATORVASTATIN CALCIUM 40 MG: 40 TABLET, FILM COATED ORAL at 08:19

## 2025-01-06 RX ADMIN — FOLIC ACID 1 MG: 1 TABLET ORAL at 08:19

## 2025-01-06 RX ADMIN — Medication 400 MG: at 08:19

## 2025-01-06 RX ADMIN — SERTRALINE HYDROCHLORIDE 50 MG: 50 TABLET ORAL at 08:19

## 2025-01-06 RX ADMIN — LOSARTAN POTASSIUM 100 MG: 25 TABLET, FILM COATED ORAL at 08:19

## 2025-01-06 RX ADMIN — HYDROCHLOROTHIAZIDE 25 MG: 25 TABLET ORAL at 08:20

## 2025-01-06 NOTE — PROGRESS NOTES
01/06/25 1127    Admission Notification   Notification of Admission Provided to: Family   Family Notified via: Phone call  (Jef Johnsonsoila - [phone])     Phone call placed to pt's . SW left voicemail requesting return phone call.

## 2025-01-06 NOTE — NURSING NOTE
Visible in the milieu. Pleasant, calm and cooperative. Denies all symptoms and is looking forward to discharge today.

## 2025-01-06 NOTE — DISCHARGE INSTR - APPOINTMENTS
Behavioral Health Nurse Navigator, Faith or Kelly will be calling you after your discharge, on the phone number that you provided.  They will be available as an additional support, if needed.   If you wish to speak with Faith, you may contact her at [phone].

## 2025-01-06 NOTE — NURSING NOTE
Outpatient appointments and medications reviewed with patient prior to discharge. Prescriptions sent to patients own pharmacy for patient .

## 2025-01-06 NOTE — SOCIAL WORK
[facility] PARTIAL PROGRAM REFERRAL     [facility]    Name and Date of Birth:  Shireen Rueda 44 y.o. 1980    Date of Referral: January 6, 2025    Presenting Symptoms and Stressors:      Symptoms:  depression, anxiety, and obsessive thoughts  Stressors:  death of brother, job stress, and everyday stressors    Access to Weapons:  No    Smoking Status: denies current use    Substance Use:  None    Suicidal Ideation: None    Homicidal Ideation: None    Depressed Mood: Yes, depressed mood, anhedonia, sadness, hopelessness, helplessness, low energy, low motivation, decreased interest, excessive guilt, poor concentration, ruminations, negative thoughts    [place]/Hypomania: None    Psychosis: None    Agitation: No    Appetite Changes: decreased appetite    Sleep Disturbance: decreased sleep    Diagnoses:  Major Depressive Disorder, recurrent, moderate    Current Psychiatrist or Therapist:    Psychiatrist: None  Therapist: None    Do they Require Ambulatory Assistance: No    Communication Assistance: not required     Legal Issues: None        Diamond Fleming

## 2025-01-06 NOTE — BH TRANSITION RECORD
Contact Information: If you have any questions, concerns, pended studies, tests and/or procedures, or emergencies regarding your inpatient behavioral health visit. Please contact [facility] [phone] and ask to speak to a , nurse or physician. A contact is available 24 hours/ 7 days a week at this number.     Summary of Procedures Performed During your Stay:  Below is a list of major procedures performed during your hospital stay and a summary of results:  - Cardiac Procedures/Studies: EKG 1/5/2025 normal sinus rhythm, Qtc 414.    If studies are pending at discharge, follow up with your PCP and/or referring provider.
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
## 2025-01-06 NOTE — PROGRESS NOTES
01/06/25 0903   Team Meeting   Meeting Type Daily Rounds   Team Members Present   Team Members Present Physician;Nurse;   Physician Team Member Varghese   Nursing Team Member Toshiatessy   [facility] Management Team Member Bernadette   Patient/Family Present   Patient Present No   Patient's Family Present No     Readmit score 14. Pt is a new admit here on a 201 for increased depression, difficulty completing ADLs. Pt visible on the unit, social with peers, denying symptoms. Pt med/meal compliant. Discharge possible tomorrow.

## 2025-01-06 NOTE — PLAN OF CARE
Problem: Depression  Goal: Treatment Goal: Demonstrate behavioral control of depressive symptoms, verbalize feelings of improved mood/affect, and adopt new coping skills prior to discharge  Outcome: Progressing     Problem: Anxiety  Goal: Anxiety is at manageable level  Description: Interventions:  - Assess and monitor patient's anxiety level.   - Monitor for signs and symptoms (heart palpitations, chest pain, shortness of breath, headaches, nausea, feeling jumpy, restlessness, irritable, apprehensive).   - Collaborate with interdisciplinary team and initiate plan and interventions as ordered.  - [place] patient to unit/surroundings  - Explain treatment plan  - Encourage participation in care  - Encourage verbalization of concerns/fears  - Identify coping mechanisms  - Assist in developing anxiety-reducing skills  - Administer/offer alternative therapies  - Limit or eliminate stimulants  Outcome: Progressing     Problem: SLEEP DISTURBANCE  Goal: Will exhibit normal sleeping pattern  Description: Interventions:  -  Assess the patients sleep pattern, noting recent changes  - Administer medication as ordered  - Decrease environmental stimuli, including noise, as appropriate during the night  - Encourage the patient to actively participate in unit groups and or exercise during the day to enhance ability to achieve adequate sleep at night  - Assess the patient, in the morning, encouraging a description of sleep experience  Outcome: Progressing     Problem: SELF CARE DEFICIT  Goal: Return ADL status to a safe level of function  Description: INTERVENTIONS:  - Administer medication as ordered  - Assess ADL deficits and provide assistive devices as needed  - Obtain PT/OT consults as needed  - Assist and instruct patient to increase activity and self care as tolerated  Outcome: Progressing

## 2025-01-06 NOTE — PROGRESS NOTES
01/06/25 1132   Team Meeting   Meeting Type Tx Team Meeting   Initial Conference Date 01/06/25   Team Members Present   Team Members Present Physician;Nurse;   Physician Team Member Varghese   Nursing Team Member Nasir   Care Management Team Member Bernadette   Patient/Family Present   Patient Present Yes   Patient's Family Present No     Tx plan was reviewed and discussed with Pt. Pt was encouraged to attend groups. Medication was discussed with Pt. Pt signed tx plan.

## 2025-01-06 NOTE — ED NOTES
Insurance Authorization for admission:   Phone call placed to [facility]  Phone number: [phone]  (Prior number is not correct)    Spoke to Liz Hill states that authorization is waived on this plan and further authorization is not necessary.  Reference# for Call:  [id number]

## 2025-01-06 NOTE — DISCHARGE INSTR - OTHER ORDERS
CRISIS INFORMATION  If you are experiencing a mental health emergency, you may call the [facility] Crisis Intervention Office 24 hours a day, 7 days per week at [phone].    In [facility], call [phone].    Warmline is a confidential 24/7 telephone support service manned by trained mental health consumers.  [facility]armline provides support, a listening ear and can provide information about available services.[facility] specializes in the concerns of mental health consumers, their families and friends.  However, we are also here for anyone who has a mental health concern, is confused about or just doesn't know anything about mental health or where to get information.  To reach [facility], call [phone].    HOW TO GET SUBSTANCE ABUSE HELP:  If you or someone you know has a drug or alcohol problem, there is help:  [facility] Drug & Alcohol Abuse Services: [phone]  [facility] Drug & Alcohol Abuse Services: [phone]  An assessment is the first step.   In addition to those listed there are other programs available in the area but assessment is best to determine an appropriate level of care.  If you DO NOT have Medical Assistance (MA) or Private Insurance, an assessment can be scheduled at one of these providers:  [facility]  [address] [place] [address]  [phone]   [facility]  [address] [place] [address]  [phone]   [address] [place] [address]  [phone]   [facility]  [address] [place] [address]  [phone]   [facility]  [address] [place] [address]  [phone]   [facility]  [address] [place] [address]  [phone]   [facility]  [address], [place] [address]  [phone]     If you HAVE Medical Assistance, an assessment can be scheduled at one of these providers:  [facility]  [address] [place] [address]  [phone]   [facility]  [address] [place] [address]  [phone]   [facility]  [address]st [address], [place] [address]  [phone]  [address] [place] [address], [address], [place] [address] [phone]   [address] [address]  [phone]   [facility]  [address] [place] [address]  [phone]   [facility] ([facility])  [address] [place] [address]  [phone]   [facility]  [address] [place] [address]  [phone]   [facility]  [address] [place] [address]  [phone]   [facility]  [address] [place] [address]  [phone]   [facility]  [address], [place] [address]  [phone]     If you HAVE Private Insurance, an assessment can be scheduled at one of these providers.  Please contact these Providers to determine if they are in your network plan:  [facility]  [address], [place] [address]  [phone]   [address] [address]  [phone]   [facility]  [address] [place] [address]  [phone]   [facility] ([facility])  [address] [place] [address]  [phone]   [facility]  [address] [place] [address]  [phone]   [facility]  [address], [place] [address]  [phone]     From the [facility] website [place]guides[place]opioid-epidemic/#GetNaloxone    How do I get naloxone?  Family members and friends can access naloxone by:    Obtaining a prescription from their family doctor  Using the standing order issued by Acting Physician General Grecia Kennedy. A standing order is a prescription written  for the general public, rather than specifically for an individual.  To use the standing order, print it and take it with you to the pharmacy or have the digital version on your phone. Download the standing order from the Department of [facility] (PDF).    If you are unable to print it or use the digital version, the standing order is kept on file at many pharmacies. If a pharmacy does not have it on file, they may have the ability to look it up.    Naloxone prescriptions can be filled at most pharmacies. Although the medication might not be available for same-day pickup, it often can be ordered and available within a day or two.      Grief Groups  [facility]  [address] [place]  Mondays at 7:00pm ET  Jan 13, 2025 - Apr 7, 2025    [address]  Tuesdays at 6:30pm ET    The [address] [phone]   [facility] [address] [place] [address] [phone]

## 2025-01-06 NOTE — ASSESSMENT & PLAN NOTE
Shireen is a 44 y.o. female with past medical history of HTN and pertinent past psychiatric history of unspecified mood disorder who presents voluntarily on a 201 commitment secondary to worsening depression and poor self-care following the death of her brother in October 2024 from cirrhosis.  Patient exhibited worse self-care such as poor appetite resulting in a 20 pound weight loss within 2 months, struggled with domestic tasks, and at times missed work as a result of her worsening mood, anxiety, and guilt surrounding her brother's death. Patient was recently started on Zoloft 25 mg daily by her PCP but only just started taking the medication on 1/2/2025. Recommend continuing the medication with a plan to up-titrate as tolerated. Patient signed a 72 hr notice on 1/4/2025. Currently, she has no safety concerns that would meet criteria for an involuntary 302.  Patient is not endorsing suicidal ideations or homicidal ideations at the time of discharge. Please refer to the initial H&P for full details.     Recommended Treatment:     Continue with current medications:  Increase Zoloft to 50 mg daily on 1/6/2025  Continue with group therapy, milieu therapy and occupational therapy.    Continue frequent safety checks and vitals per unit protocol.  Continue with SLIM medical management as indicated  Continue coordinating with case management regarding disposition  72 hr notice signed 1/4/2025, patient does not meet criteria for involuntary inpatient psychiatric admission at this time    Legal Status: 201  Disposition: To be determined, coordinating with case management    Case discussed with treatment team.  Risks, benefits and possible side effects of Medications: Risks, benefits, and possible side effects of medications have been explained to the patient, who verbalizes understanding

## 2025-01-06 NOTE — NURSING NOTE
Patient has been visible in the milieu all evening. Social with peers, interacting appropriately. 72 hour notice to withdraw from treatment remains in effect from 1415 1/4. She denies S.I.H.I.A/H V/H

## 2025-01-06 NOTE — PLAN OF CARE
Problem: Depression  Goal: Treatment Goal: Demonstrate behavioral control of depressive symptoms, verbalize feelings of improved mood/affect, and adopt new coping skills prior to discharge  Outcome: Completed     Problem: Anxiety  Goal: Anxiety is at manageable level  Description: Interventions:  - Assess and monitor patient's anxiety level.   - Monitor for signs and symptoms (heart palpitations, chest pain, shortness of breath, headaches, nausea, feeling jumpy, restlessness, irritable, apprehensive).   - Collaborate with interdisciplinary team and initiate plan and interventions as ordered.  - [place] patient to unit/surroundings  - Explain treatment plan  - Encourage participation in care  - Encourage verbalization of concerns/fears  - Identify coping mechanisms  - Assist in developing anxiety-reducing skills  - Administer/offer alternative therapies  - Limit or eliminate stimulants  Outcome: Completed     Problem: SELF CARE DEFICIT  Goal: Return ADL status to a safe level of function  Description: INTERVENTIONS:  - Administer medication as ordered  - Assess ADL deficits and provide assistive devices as needed  - Obtain PT/OT consults as needed  - Assist and instruct patient to increase activity and self care as tolerated  Outcome: Completed     Problem: DISCHARGE PLANNING - CARE MANAGEMENT  Goal: Discharge to post-acute care or home with appropriate resources  Description: INTERVENTIONS:  - Conduct assessment to determine patient/family and health care team treatment goals, and need for post-acute services based on payer coverage, community resources, and patient preferences, and barriers to discharge  - Address psychosocial, clinical, and financial barriers to discharge as identified in assessment in conjunction with the patient/family and health care team  - Arrange appropriate level of post-acute services according to patient’s   needs and preference and payer coverage in collaboration with the physician and  health care team  - Communicate with and update the patient/family, physician, and health care team regarding progress on the discharge plan  - Arrange appropriate transportation to post-acute venues  Outcome: Completed

## 2025-01-07 ENCOUNTER — TELEPHONE (OUTPATIENT)
Dept: FAMILY MEDICINE CLINIC | Facility: CLINIC | Age: 45
End: 2025-01-07

## 2025-01-07 NOTE — TELEPHONE ENCOUNTER
[employer] called about patient asking about some information on her work absence. They were asking if Dr. Velásquez took the patient out of work on 1/2. Advised patient was seen in the office on 1/2 by Dr. Velásquez but I could not tell them any information on why. She was asking for Diagnosis of the patient. Advised if she had forms to be filled out she can fax them and we can give her information then. I was not giving her any information over the phone. Fax number was given and advised once received we have 7-10 business days to complete it. Please review thank you

## 2025-01-08 NOTE — TELEPHONE ENCOUNTER
Called and spoke with patient, she said it is ok for the practice to fill out the FMLA forms for her. Advised of the phone call from amanda. She said she had called her job after the appointment and they advised her to get FMLA to help save her job. Please review thank you

## 2025-01-09 ENCOUNTER — TELEMEDICINE (OUTPATIENT)
Dept: PSYCHOLOGY | Facility: CLINIC | Age: 45
End: 2025-01-09
Payer: COMMERCIAL

## 2025-01-09 ENCOUNTER — TELEMEDICINE (OUTPATIENT)
Dept: PSYCHIATRY | Facility: CLINIC | Age: 45
End: 2025-01-09
Payer: COMMERCIAL

## 2025-01-09 DIAGNOSIS — F43.21 GRIEF: ICD-10-CM

## 2025-01-09 DIAGNOSIS — F33.1 MODERATE EPISODE OF RECURRENT MAJOR DEPRESSIVE DISORDER (HCC): Primary | ICD-10-CM

## 2025-01-09 DIAGNOSIS — F41.0 PANIC DISORDER: ICD-10-CM

## 2025-01-09 PROBLEM — F32.1 CURRENT MODERATE EPISODE OF MAJOR DEPRESSIVE DISORDER (HCC): Status: ACTIVE | Noted: 2025-01-09

## 2025-01-09 PROCEDURE — 90791 PSYCH DIAGNOSTIC EVALUATION: CPT

## 2025-01-09 PROCEDURE — G0176 OPPS/PHP;ACTIVITY THERAPY: HCPCS

## 2025-01-09 PROCEDURE — 90792 PSYCH DIAG EVAL W/MED SRVCS: CPT | Performed by: STUDENT IN AN ORGANIZED HEALTH CARE EDUCATION/TRAINING PROGRAM

## 2025-01-09 PROCEDURE — 90834 PSYTX W PT 45 MINUTES: CPT

## 2025-01-09 PROCEDURE — G0410 GRP PSYCH PARTIAL HOSP 45-50: HCPCS

## 2025-01-09 RX ORDER — TRAZODONE HYDROCHLORIDE 50 MG/1
50 TABLET, FILM COATED ORAL
Qty: 30 TABLET | Refills: 1 | Status: SHIPPED | OUTPATIENT
Start: 2025-01-09

## 2025-01-09 NOTE — PSYCH
Subjective:     Patient ID: Shireen Rueda is a 44 y.o. female.    [facility] Clinical Progress Notes      Specialized Services Documentation  Therapist must complete separate progress note for each specific clinical activity in which the individual participated during the day.     Visit Time    Visit Start Time: 0930  Visit Stop Time: 1030  Total Visit Duration: 0 minutes    Group Psychotherapy Life Skills (3914-2632) Shireen Rueda was not present in group due to intake.      Tx Plan Objective: 1.1,1.2 Therapist: SHALONDA Britton ,AVERY ; co-facilitated with EDUAR Mtz    Visit Time    Visit Start Time: 0830  Visit Stop Time: 0930  Total Visit Duration: 0 minutes    EDUCATION THERAPY    [id number]    Shireen Rueda was not present in group due to intake.      Tx Plan Objective: 1.1,1.2, Therapist: AVERY Britton

## 2025-01-09 NOTE — PSYCH
"Visit Time    Visit Start Time: 1045  Visit Stop Time: 1145  Total Visit Duration:  60 minutes    Subjective:     Patient ID: Shireen Rueda is a 44 y.o. female.    [facility] Clinical Progress Notes      Specialized Services Documentation  Therapist must complete separate progress note for each specific clinical activity in which the individual participated during the day.     Allied Therapy 8440-1045 This group was facilitated virtually in a private office using HIPAA Compliant and Approved [employer] Teams. Shireen Rueda actively participated in music therapy group regarding supports. The group participated in a discussion around supports in their lives and areas where they need support. The group participated in a dereje discussion on the song \"Bridge Over Troubled Water\". The group read and discussed a handout on supports. The group listened to \"You've Got a Friend\" and identified the types of support demonstrated within the song. Shireen identified giving herself permission to use supports as an area of supports that needs attention in their life. Some effort noted towards treatment goal. Continue AT to encourage the development and proactive use of supports.  Tx Plan Objective: n/a, Therapist:  BERTHA Lane MT-BC    Case Management Note    BERTHA Lane MT-BC    Current suicide risk : Low     [id number] Met with Shireen Rueda. Reviewed program and given on call number. she completed releases and OP providers/ PCP notified of admission and health care coordination form completed. Completed initial psycho-social evaluation and initial treatment goals discussed.     Medications changes/added/denied? Yes, see Dr Cardenas Note    Treatment session number: 1    Individual Case Management Visit provided today? Yes     [facility] follow up physician's orders: See Dr Cardenas Note    "

## 2025-01-09 NOTE — PSYCH
Virtual Regular Visit    Verification of patient location:    Patient is located at Home in the following state in which I hold an active license [place]      Assessment/Plan:    Problem List Items Addressed This Visit       Panic disorder    Current moderate episode of major depressive disorder (HCC) - Primary    Grief       Goals addressed in session:      Depression Follow-up Plan Completed:     Reason for visit is PHP VIRTUAL GROUP DUE TO virtual preference of care       Encounter provider [facility] PARTIAL PSYCH PROGRAM      Recent Visits  Date Type Provider Dept   01/02/25 Office Visit Nenita Velásquez MD  [facility]   Showing recent visits within past 7 days and meeting all other requirements  Future Appointments  No visits were found meeting these conditions.  Showing future appointments within next 150 days and meeting all other requirements       The patient was identified by name and date of birth. Shireen Rueda was informed that this is a telemedicine visit and that the visit is being conducted throughthe [employer] Teams platform. She agrees to proceed..  My office door was closed. No one else was in the room.  She acknowledged consent and understanding of privacy and security of the video platform. The patient has agreed to participate and understands they can discontinue the visit at any time.    Patient is aware this is a billable service.     Subjective  Shireen Rueda is a 44 y.o. female  .      HPI     Past Medical History:   Diagnosis Date    Anemia     Chest tightness or pressure     8/19/2013    Depression 5/14/2017    Hypertension     Vitamin D deficiency        Past Surgical History:   Procedure Laterality Date    CYSTOSCOPY N/A 12/30/2018    Procedure: CYSTOSCOPY;  Surgeon: Laura Bal DO;  [facility];  Service: Gynecology    HYSTERECTOMY N/A 12/30/2018    Procedure: HYSTERECTOMY LAPAROSCOPIC TOTAL (LTH);  Surgeon: Laura Bal DO;  [facility];  Service: Gynecology     UT LAPAROSCOPY W/RMVL ADNEXAL STRUCTURES Bilateral 12/30/2018    Procedure: SALPINGECTOMY, LAPAROSCOPIC;  Surgeon: Laura Bal DO;  [facility]: [facility];  Service: Gynecology    TONSILLECTOMY      TUBAL LIGATION      2006       Current Outpatient Medications   Medication Sig Dispense Refill    amLODIPine (NORVASC) 2.5 mg tablet Take 1 tablet (2.5 mg total) by mouth daily 30 tablet 0    atorvastatin (LIPITOR) 40 mg tablet TAKE 1 TABLET BY MOUTH EVERY DAY 90 tablet 2    carbamide peroxide (DEBROX) 6.5 % otic solution Administer 5 drops into the left ear 2 (two) times a day (Patient not taking: Reported on 1/3/2025) 15 mL 0    cyanocobalamin (VITAMIN B-12) 1000 MCG tablet Take 1 tablet (1,000 mcg total) by mouth daily 30 tablet 0    [START ON 1/12/2025] ergocalciferol (VITAMIN D2) 50,000 units Take 1 capsule (50,000 Units total) by mouth once a week for 7 doses 4 capsule 0    fluticasone (FLONASE) 50 mcg/act nasal spray SPRAY 1 SPRAY INTO EACH NOSTRIL EVERY DAY (Patient not taking: Reported on 1/3/2025) 24 mL 2    folic acid (FOLVITE) 1 mg tablet Take 1 tablet (1 mg total) by mouth daily 30 tablet 0    hydroCHLOROthiazide 25 mg tablet TAKE 1 TABLET (25 MG TOTAL) BY MOUTH DAILY. 90 tablet 0    hydrOXYzine HCL (ATARAX) 25 mg tablet Take 2 tablets (50 mg total) by mouth every 6 (six) hours as needed for anxiety 30 tablet 0    losartan (COZAAR) 100 MG tablet TAKE 1 TABLET BY MOUTH EVERY DAY 90 tablet 2    magnesium Oxide (MAG-OX) 400 mg TABS Take 1 tablet (400 mg total) by mouth 2 (two) times a day 60 tablet 0    melatonin 3 mg Take 1 tablet (3 mg total) by mouth daily at bedtime 30 tablet 0    Omega-3 Fatty Acids (OMEGA 3 PO) Take by mouth      sertraline (ZOLOFT) 50 mg tablet Take 1 tablet (50 mg total) by mouth daily 30 tablet 0    traZODone (DESYREL) 50 mg tablet Take 1 tablet (50 mg total) by mouth daily at bedtime as needed for sleep 30 tablet 1     No current facility-administered medications for this visit.         No Known Allergies    Review of Systems    Video Exam    There were no vitals filed for this visit.    Physical Exam     I spent FIVE GROUP HOURS PLUS CASE MANAGEMENT minutes with patient today in which greater than 50% of time was spent in counseling/coordination of care regarding PHP - SEE NOTES

## 2025-01-09 NOTE — TELEPHONE ENCOUNTER
[employer] called inquired about [place] paperwork, informed nothing has been received as of yet, they will re fax the forms, also informed them of the 7-10 business day turnaround time once received as the [employer] representative had asked,
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
## 2025-01-09 NOTE — PSYCH
Visit Time    Visit Start Time: 1215  Visit Stop Time: 1315  Total Visit Duration: 60 minutes    Subjective:     Patient ID: Shireen Rueda is a 44 y.o. female.    [facility] Clinical Progress Notes      Specialized Services Documentation  Therapist must complete separate progress note for each specific clinical activity in which the individual participated during the day.     Group Psychotherapy  This group was facilitated virtually in a private office using HIPAA Compliant and Approved pinion-pins Teams. Shireen Rueda consented to the use of tele-video modality of treatment  Shireen Rueda attended group- Wellness Recovery Action Plan. Today, members focused on completing WRAP's toolbox then following specific sections and encouraged to fill in coping tools from their toolbox for planned responses:   Daily Plan   Identification of triggers and stressors  Early warning signs  When things are breaking down and or getting worse    Group members shared pieces of information from these sections and identified their importance. Writer encouraged the members of group to continue utilizing the packet to develop plans inside and outside of program. Some effort towards progress of goals which were displayed through participation and engagement in topic.  Treatment Plan Objectives: 1.1, 1.2  Therapist: Gwen SHAW RN

## 2025-01-09 NOTE — ASSESSMENT & PLAN NOTE
Continue Zoloft 50 mg daily, and will augment with trazodone 50 mg daily bedtime as needed for insomnia.  Patient tolerated medication on the inpatient unit.  Orders:    traZODone (DESYREL) 50 mg tablet; Take 1 tablet (50 mg total) by mouth daily at bedtime as needed for sleep
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
## 2025-01-09 NOTE — ASSESSMENT & PLAN NOTE
Continue with hydroxyzine 50 mg daily as needed for anxiety.  Patient reports response to medication, has not been taking every day, but does feel it helps when she is feeling overwhelmed.

## 2025-01-09 NOTE — BH TREATMENT PLAN
"Assessment/Plan:      Diagnoses and all orders for this visit:    Moderate episode of recurrent major depressive disorder (HCC)    Grief    Panic disorder          Subjective:     Patient ID: Shireen Rueda is a 44 y.o. female.    [facility] Treatment Plan   AREAS OF NEED: depression and anxiety as evidenced by low mood, poor appetite, weight loss, poor sleep, poor self care, anxious thoughts, social anxiety, poor focus, struggling with work responsibilities, and guilt. Stressors of brother's death in October of 2024, being out in public, and poor relationships with nieces.   Date Initiated: 01/09/25    Strengths: \"I am a good mom, friend and worker\"     LONG TERM GOAL:   Date Initiated: 01/09/25  1.0 I will identify 3 signs that my depression and anxiety have improved since starting program.   Target Date: 2/6/25  Completion Date:       SHORT TERM OBJECTIVES:     Date Initiated: 01/09/25  1.1 I will identify 3 coping skills I can use to address my depression and anxiety and practice at least 1 daily.   Revision Date:   Target Date: 1/20/25  Completion Date:     Date Initiated: 01/09/25  1.2 I will identify 1 support group I can attend following my time in PHP to address the loss of my brother (grief support, AA, etc.)  Revision Date:   Target Date: 1/20/25  Completion Date:     Date Initiated: 01/09/25  1.3 I will take medication as prescribed and share questions and concerns if they arise.  Revision Date:  Target Date: 1/20/25  Completion Date:      Date Initiated: 01/09/25  1.4 I will identify 3 ways my supports can assist in my recovery and agree to staff/support contact as indicated.   Revision Date:  Target Date: 1/20/25  Completion Date:           7 DAY REVISION:    Date Initiated:  Revision Date:   Target Date:   Completion Date:        PSYCHIATRY:  Date Initiated: 01/09/25  Medication Management and Education       Revision Date:   The person(s) responsible for carrying out the plan is Rubén Cardenas DO; " Annabelle Claudio PA-C    NURSING/SYMPTOM EDUCATION:  Date Initiated: 01/09/25  1.1, 1.2. 1.3, 1.4 This RN and/or Therapist will provide wellness/symptoms and skill education groups three to five days weekly to educate Shireen Rueda on signs and symptoms of diagnoses, skills to manage, and medication questions that will be addressed by the treatment team.    Revision date:  The person(s) responsible for carrying out the plan is Gwen Thurman; JAY Francis ED    PSYCHOLOGY:   Date Initiated: 01/09/25       1.1, 1.2, 1.4 Provide psychotherapy group 5 times per week to allow opportunity for Shireen Rueda  to explore stressors and ways of coping.   Revision Date:   The person(s) responsible for carrying out the plan is SHALONDA Britton, JORGEW; Ashley Costenbader, MA LMT    ALLIED THERAPY:   Date Initiated: 01/09/25  1.1,1.2 Engage Shireen Rueda in AT group 3-5 times per week to encourage development and use of wellness tools to decrease symptoms and promote recovery through meaningful activity.  Revision Date:   The person(s) responsible for carrying out the plan is EDUAR Mtz; BERTHA Lane MT-BC    CASE MANAGEMENT:   Date Initiated: 01/09/25      1.0 This  will meet with Shireen Rueda  3-4 times weekly to assess treatment progress, discharge planning, connection to community supports and UR as indicated.  Revision Date:   The person(s) responsible for carrying out the plan is BERTHA Lane MT-BC      TREATMENT REVIEW/COMMENTS:     DISCHARGE CRITERIA:Identify 3 signs of progress and complete relapse prevention plan.   DISCHARGE PLAN: Discharge to OP therapy and psychiatry  Estimated Length of Stay: 10 treatment days           Diagnosis and Treatment Plan explained to Shireen Shireen relates understanding diagnosis and is agreeable to Treatment Plan.         CLIENT COMMENTS / Please share your thoughts, feelings, need and/or experiences regarding your treatment plan with Staff.  Please  see follow up note with comments.      Signatures can be found on [facility] Treatment plan consent form.

## 2025-01-09 NOTE — PSYCH
[facility] Insurance Authorization for Treatment      Call Start Time: 1240  Call Stop Time: 1248  Total Visit Duration:  8 minutes    Subjective:     Patient ID: Shireen Rueda is a 44 y.o. female.    Phone call placed to [facility]/[employer]  Phone number: [phone]  Tax ID and/or NPI used NPI [id number] ([employer]ult [employer])  Location: [address] [place], [place] [id number]  Spoke to Quita RUIZ  Code Used for Authorization: none requested  Level of Care PHP    This writer was informed that no authorization was required for PHP.     Call ref#: [id number]    Therapist: BERTHA Lane, EDUAR

## 2025-01-09 NOTE — PSYCH
Visit Time    Visit Start Time: 1330  Visit Stop Time: 1430  Total Visit Duration:  60 minutes    Subjective:     Patient ID: Shireen Rueda is a 44 y.o. female.    [facility] Clinical Progress Notes      Specialized Services Documentation  Therapist must complete separate progress note for each specific clinical activity in which the individual participated during the day.       GROUP PSYCHOTHERAPY    Shireen Rueda participated quietly in psychotherapy group.  This was observed Consistent throughout the treatment day. They were engaged in learning related to Wellness Tools. Staff utilized Verbal, Written, and A/V teaching methods.  Shireen Rueda shared area of learning and set a goal for outside of program to get off of screen time and get good sleep.  Shireen Rueda was able to share items explored during the day and shared in adding to their WRAP.  Ended session with staff led exercise journal prompt.  Shireen Rueda demonstrated good progress toward goal.  Continue group psychotherapy to actively practice learned skills and encourage transfer of knowledge to unstructured time.    Tx Plan Objective: 1.1,1.2, Therapist: Ashley Costenbader, MA LMT

## 2025-01-10 ENCOUNTER — TELEMEDICINE (OUTPATIENT)
Dept: PSYCHOLOGY | Facility: CLINIC | Age: 45
End: 2025-01-10
Payer: COMMERCIAL

## 2025-01-10 ENCOUNTER — TELEPHONE (OUTPATIENT)
Age: 45
End: 2025-01-10

## 2025-01-10 ENCOUNTER — TELEPHONE (OUTPATIENT)
Dept: FAMILY MEDICINE CLINIC | Facility: CLINIC | Age: 45
End: 2025-01-10

## 2025-01-10 DIAGNOSIS — F33.1 MODERATE EPISODE OF RECURRENT MAJOR DEPRESSIVE DISORDER (HCC): Primary | ICD-10-CM

## 2025-01-10 DIAGNOSIS — F43.21 GRIEF: ICD-10-CM

## 2025-01-10 DIAGNOSIS — F41.0 PANIC DISORDER: ICD-10-CM

## 2025-01-10 PROCEDURE — G0176 OPPS/PHP;ACTIVITY THERAPY: HCPCS

## 2025-01-10 PROCEDURE — G0177 OPPS/PHP; TRAIN & EDUC SERV: HCPCS

## 2025-01-10 PROCEDURE — G0410 GRP PSYCH PARTIAL HOSP 45-50: HCPCS

## 2025-01-10 NOTE — TELEPHONE ENCOUNTER
Paperwork completed based on chart review and discussions with Dr. Velásquez regarding patient on 1/2/25  In Dr. Velásquez's folder ready to be scanned and faxed.

## 2025-01-10 NOTE — PSYCH
Visit Time    Visit Start Time: 0830  Visit Stop Time: 0930  Total Visit Duration:  60 minutes    Subjective:     Patient ID: Shireen Rueda is a 44 y.o. female.    [facility] Clinical Progress Notes      Specialized Services Documentation  Therapist must complete separate progress note for each specific clinical activity in which the individual participated during the day.       EDUCATION THERAPY      Shireen Rueda actively shared in morning assessment and goal review. Presented as Receptive related to readiness to learn. Shireen Rueda  did complete goal from last treatment day identifying gaining hope. Shireen Rueda did present with any barriers to learning. Throughout morning group, Shireen Rueda participated in mindfulness exercise. Shireen Rueda made good progress toward goal. Continue education group to assess willingness to engage, assess transfer of knowledge, and participate in skill development.    Tx Plan Objective: 1.1, Therapist: Ashley Costenbader, MA LMT     This group was facilitated virtually in a private office using HIPAA Compliant and Approved [employer] Teams.  Shireen Rueda consented to the use of tele-video modality of treatment.

## 2025-01-10 NOTE — PSYCH
Visit Time    Visit Start Time: 1045  Visit Stop Time: 1145  Total Visit Duration: 60 minutes   Subjective:     Patient ID: Shireen Rueda is a 44 y.o. female.    [facility] Clinical Progress Notes      Specialized Services Documentation  Therapist must complete separate progress note for each specific clinical activity in which the individual participated during the day.       Group Psychotherapy  This group was facilitated virtually in a private office using HIPAA Compliant and Approved [employer] Teams.  Shireen Rueda consented to the use of tele-video modality of treatment.   Shireen Rueda actively shared in psychoeducational group which focused on Tapping skills- Emotional Freedom Technique (EFT) as a coping skill. How Tapping can help, where Tapping comes from, why Tapping works and how to Tap were discussed with the group. The group discussed benefits of Tapping. The group was then guided through a Tapping scenario. The group wrapped up by making a goal to be more positive. Some progress towards goal noted. Shireen Rueda stated she felt a decrease in anxiety at conclusion of Tapping vs beginning of coping skill. Continue psychoeducation to educate on the use of Tapping as a healthy coping skill to improve mental wellbeing.  Tx. Plan Objectives: 1.1.1.2   Therapist: Gwen SHAW RN

## 2025-01-10 NOTE — TELEPHONE ENCOUNTER
Recently seen 1/3/2025  Folder (To be completed by) -Dr. Velásquez     Name of Form - [employer]        Form to be Faxed [phone]    Patient was made aware of the 7-10 business day form policy.

## 2025-01-10 NOTE — PSYCH
Visit Time    Visit Start Time: 1215  Visit Stop Time: 1315  Total Visit Duration:  60 minutes    Subjective:     Patient ID: Shireen Rueda is a 44 y.o. female.    [facility] Clinical Progress Notes      Specialized Services Documentation  Therapist must complete separate progress note for each specific clinical activity in which the individual participated during the day.     Allied Therapy [id number] This group was facilitated virtually in a private office using HIPAA Compliant and Approved [employer] Teams. Shireen Rueda actively participated in music therapy group regarding mindfulness. The group participated in a name that tune exercise as an example of mindfulness. The group then read and discussed materials on mindfulness as well as areas they could use mindfulness in their current lives. The group then read and discussed a handout on a music mindfulness exercise. When prompted, Shireen participated in the name that tune game and appeared attentive throughout. Some effort noted towards treatment goal. Continue AT to encourage the development and proactive use of mindfulness coping tools. Tx Plan Objective: 1.1,1.2,1.4, Therapist:  BERTHA Lane MT-BC    Case Management Note    BERTHA Lane MT-BC    Current suicide risk : Low     [id number] Met with Shireen for case management. Treatment plan reviewed and signed. She stated she really enjoyed the first day of group and feels she will benefit from program.     I,Shireen Rueda,am physically unable to provide a signature; however, I understand the nature of and am in agreement with the documentation presented to me via TEAMS.  I have received a copy through My Chart and/or the [employer].  I freely give verbal consent.  Name of Document (s): treatment plan  Witness to verbal consent: BERTHA Lane MT-BC  Witness to verbal consent: Fabiana Banks    Medications changes/added/denied? No    Treatment session number: 2    Individual Case Management Visit  provided today? Yes     [facility] follow up physician's orders: None at this time.

## 2025-01-10 NOTE — PSYCH
Virtual Regular Visit    Verification of patient location:    Patient is located at Home in the following state in which I hold an active license [place]      Assessment/Plan:    Problem List Items Addressed This Visit       Panic disorder    Major depressive disorder, moderate, without psychotic features (HCC) - Primary    Grief       Goals addressed in session: PHP VIRTUAL GROUP DUE TO virtual preference of care      Depression Follow-up Plan Completed: Not applicable    Reason for visit is No chief complaint on file.       Encounter provider  PARTIAL PSYCH PROGRAM      Recent Visits  No visits were found meeting these conditions.  Showing recent visits within past 7 days and meeting all other requirements  Today's Visits  Date Type Provider Dept   01/10/25 Telephone MD [facility]   Showing today's visits and meeting all other requirements  Future Appointments  No visits were found meeting these conditions.  Showing future appointments within next 150 days and meeting all other requirements       The patient was identified by name and date of birth. Shireen Rueda was informed that this is a telemedicine visit and that the visit is being conducted throughthe Microsoft Teams platform. She agrees to proceed..  My office door was closed. No one else was in the room.  She acknowledged consent and understanding of privacy and security of the video platform. The patient has agreed to participate and understands they can discontinue the visit at any time.    Patient is aware this is a billable service.     Subjective  Shireen Rueda is a 44 y.o. female I spent FIVE GROUP HOURS PLUS CASE MANAGEMENT minutes with patient today in which greater than 50% of time was spent in counseling/coordination of care regarding PHP - SEE NOTES  .      HPI     Past Medical History:   Diagnosis Date    Anemia     Chest tightness or pressure     8/19/2013    Depression 5/14/2017    Hypertension     Vitamin D deficiency         Past Surgical History:   Procedure Laterality Date    CYSTOSCOPY N/A 12/30/2018    Procedure: CYSTOSCOPY;  Surgeon: Laura Bal DO;  [facility];  Service: Gynecology    HYSTERECTOMY N/A 12/30/2018    Procedure: HYSTERECTOMY LAPAROSCOPIC TOTAL (LTH);  Surgeon: Laura Bal DO;  [facility];  Service: Gynecology    ME LAPAROSCOPY W/RMVL ADNEXAL STRUCTURES Bilateral 12/30/2018    Procedure: SALPINGECTOMY, LAPAROSCOPIC;  Surgeon: Laura Bal DO;  [facility];  Service: Gynecology    TONSILLECTOMY      TUBAL LIGATION      2006       Current Outpatient Medications   Medication Sig Dispense Refill    amLODIPine (NORVASC) 2.5 mg tablet Take 1 tablet (2.5 mg total) by mouth daily 30 tablet 0    atorvastatin (LIPITOR) 40 mg tablet TAKE 1 TABLET BY MOUTH EVERY DAY 90 tablet 2    carbamide peroxide (DEBROX) 6.5 % otic solution Administer 5 drops into the left ear 2 (two) times a day (Patient not taking: Reported on 1/3/2025) 15 mL 0    cyanocobalamin (VITAMIN B-12) 1000 MCG tablet Take 1 tablet (1,000 mcg total) by mouth daily 30 tablet 0    [START ON 1/12/2025] ergocalciferol (VITAMIN D2) 50,000 units Take 1 capsule (50,000 Units total) by mouth once a week for 7 doses 4 capsule 0    fluticasone (FLONASE) 50 mcg/act nasal spray SPRAY 1 SPRAY INTO EACH NOSTRIL EVERY DAY (Patient not taking: Reported on 1/3/2025) 24 mL 2    folic acid (FOLVITE) 1 mg tablet Take 1 tablet (1 mg total) by mouth daily 30 tablet 0    hydroCHLOROthiazide 25 mg tablet TAKE 1 TABLET (25 MG TOTAL) BY MOUTH DAILY. 90 tablet 0    hydrOXYzine HCL (ATARAX) 25 mg tablet Take 2 tablets (50 mg total) by mouth every 6 (six) hours as needed for anxiety 30 tablet 0    losartan (COZAAR) 100 MG tablet TAKE 1 TABLET BY MOUTH EVERY DAY 90 tablet 2    magnesium Oxide (MAG-OX) 400 mg TABS Take 1 tablet (400 mg total) by mouth 2 (two) times a day 60 tablet 0    melatonin 3 mg Take 1 tablet (3 mg total) by mouth daily at  bedtime 30 tablet 0    Omega-3 Fatty Acids (OMEGA 3 PO) Take by mouth      sertraline (ZOLOFT) 50 mg tablet Take 1 tablet (50 mg total) by mouth daily 30 tablet 0    traZODone (DESYREL) 50 mg tablet Take 1 tablet (50 mg total) by mouth daily at bedtime as needed for sleep 30 tablet 1     No current facility-administered medications for this visit.        No Known Allergies    Review of Systems    Video Exam    There were no vitals filed for this visit.    Physical Exam     Visit Time    Visit Start Time: 830  Visit Stop Time: 1430  Total Visit Duration:  300 minutes

## 2025-01-10 NOTE — TELEPHONE ENCOUNTER
FMLA forms were received today, Dr. Drake was your attending during patients last office visit. Will ask Dr. Drake to fill out patients FMLA forms since you are on night float.

## 2025-01-10 NOTE — PSYCH
Subjective:     Patient ID: Shireen Rueda is a 44 y.o. female.    [facility] Clinical Progress Notes      Specialized Services Documentation  Therapist must complete separate progress note for each specific clinical activity in which the individual participated during the day.       Visit Time    Visit Start Time: 0930  Visit Stop Time: 1030  Total Visit Duration: 60 minutes    Group Psychotherapy Life Skills ([id number]) Shireen Rueda minimally engaged in group focused on cognitive distortions and restructuring their cognitive distortions. The group reviewed what cognitive distortions are. The group worked on an exercise together examining a thought, identifying the distortion, challenging the thought and changing the thought. During the next activity participants worked on a cognitive distortions log and identified their irrational thought, one or more of the type of cognitive distortion that they demonstrated, and a rational alternative. Group members were encouraged to challenge one thought per day and identify a rational alternative.Shireen made some progress towards goals through verbal participation in group; to accomplish long term goals continue to utilize skills learned in programming. Continue with psychotherapy to educate and encourage use of wellness tools.     This group was facilitated as a hybrid group and Shireen Rueda was present virtually . The group was facilitated in a private office using HIPAA Compliant and Approved [employer] Teams. Shireen Rueda consented to the use of tele-video modality of treatment.         Tx Plan Objective: 1.1,1.2 Therapist: SHALONDA Britton ,AVERY        Visit Time    Visit Start Time: 1330  Visit Stop Time: 1430  Total Visit Duration: 60 minutes    GROUP PSYCHOTHERAPY    [id number]    Shireen Rueda participated actively in psychotherapy group.  This was observedConsistent throughout the treatment day. They were engaged in learning related to Illness, Medication, Aftercare, and  Wellness Tools. Staff utilized Verbal, Written, A/V, and Demonstration teaching methods.  Shireen Rueda shared area of learning and set a goal for outside of program to take down the [place] tree.  Shireen Rueda was able to share items explored during the day and shared in adding to their WRAP.  Ended session with staff led exercise in mindfulness. Shireen Rueda demonstrated good progress toward goal.  Continue group psychotherapy to actively practice learned skills and encourage transfer of knowledge to unstructured time.      This group was facilitated as a hybrid group and Shireen Rueda was present virtually . The group was facilitated in a private office using HIPAA Compliant and Approved [employer] Teams. Shireen Rueda consented to the use of tele-video modality of treatment.       Tx Plan Objective: 1.1,1.2,1.3,1.4 Therapist: AVERY Britton

## 2025-01-10 NOTE — TELEPHONE ENCOUNTER
Contacted patient in regards to Routine Referral in attempts to verify patient's needs of services and add patient to proper wait list. LVM for patient to contact intake dept  in regards to referral.     Pt is currently in partial program.   2nd attempt, referral closed.

## 2025-01-13 ENCOUNTER — TELEMEDICINE (OUTPATIENT)
Dept: PSYCHOLOGY | Facility: CLINIC | Age: 45
End: 2025-01-13
Payer: COMMERCIAL

## 2025-01-13 DIAGNOSIS — F41.0 PANIC DISORDER: ICD-10-CM

## 2025-01-13 DIAGNOSIS — F43.21 GRIEF: ICD-10-CM

## 2025-01-13 DIAGNOSIS — F33.1 MODERATE EPISODE OF RECURRENT MAJOR DEPRESSIVE DISORDER (HCC): Primary | ICD-10-CM

## 2025-01-13 PROCEDURE — G0176 OPPS/PHP;ACTIVITY THERAPY: HCPCS

## 2025-01-13 PROCEDURE — G0410 GRP PSYCH PARTIAL HOSP 45-50: HCPCS

## 2025-01-13 NOTE — PSYCH
Visit Time    Visit Start Time: 1045  Visit Stop Time: 1145  Total Visit Duration: 60 minutes    Subjective:     Patient ID: Shireen Rueda is a 44 y.o. female.    [facility] Clinical Progress Notes      Specialized Services Documentation  Therapist must complete separate progress note for each specific clinical activity in which the individual participated during the day.       Group Psychotherapy Life Skills ([id number]) Shireen Johnsonsoila minimally engaged in group focused on the drama triangle. The group learned about the drama triangle and the roles of the victim, rescuer, and persecutor. Shireen did not identify having been in any of the roles. During the activity group members learned how to stop the drama triangle from continuing. Shireen Rueda  identified ways that they would stop the drama triangle from occuring. Shireen little progress towards goals through verbal participation in group; to accomplish long term goals continue to utilize skills learned in programming. Continue with psychotherapy to educate and encourage use of wellness tools.     This group was facilitated as a hybrid group and Shireen Rueda was present virtually . The group was facilitated in a private office using HIPAA Compliant and Approved [employer] Teams. Shireen Mohit consented to the use of tele-video modality of treatment.       Tx Plan Objective: 1.1,1.2 Therapist: Fabiana Banks,MSW, LSW

## 2025-01-13 NOTE — PSYCH
"Visit Time    Visit Start Time: 1215  Visit Stop Time: 1315  Total Visit Duration:  60 minutes    Subjective:     Patient ID: Shireen Rueda is a 44 y.o. female.    [facility] Clinical Progress Notes      Specialized Services Documentation  Therapist must complete separate progress note for each specific clinical activity in which the individual participated during the day.     Allied Therapy [id number] This group was facilitated virtually in a private office using HIPAA Compliant and Approved [employer] Teams. Shireen Rueda actively participated in music therapy group on destructive vs. healthy behaviors. The group began with a dereje discussion on the song \"Hurt\" and the concept of self-destructive actions. The group then engaged in a discussion on self-destructive actions in their lives and where they hoped to grow. The group then read and discussed the handout Building New Habits and discussed how they could implement the concepts in to building healthy habits in their lives. The group then participated in a dereje replacement for the song \"Flowers\" identifying healthy habits they could use to support themselves. Shireen did not share but appeared attentive throughout. Some effort noted towards treatment goal. Continue AT to encourage the formation of healthy habits.   Tx Plan Objective: 1.1,1.2,1.4, Therapist:  BERTHA Lane, [facility]      Visit Time    Visit Start Time: 1330  Visit Stop Time: 1430  Total Visit Duration: 60 minutes    GROUP PSYCHOTHERAPY    Shireen Rueda participated actively in psychotherapy group.  This was observed consistently throughout the treatment day. They were engaged in learning related to Illness and Wellness Tools. Staff utilized Verbal, A/V, and Demonstration teaching methods.  Shireen Rueda shared area of learning and set a goal for outside of program to go to the grocery store and practice coping skills if needed.  Shireen Rueda was able to share items explored during the day and encouraged to " add to their WRAP.  Ended session with staff led exercise of guided imagery.  Shireendarrell Johnsoncelestinealiya demonstrated some progress toward goal.  Continue group psychotherapy to actively practice learned skills and encourage transfer of knowledge to unstructured time.    Tx Plan Objective: 1.1, Therapist: River Viera       Case Management Note    River Viera, [facility], MT-BC    Current suicide risk : Low     [id number] Met with Shireen for case management. She stated that she had a mostly positive weekend. She stated she went to dinner with her in-laws and overall had a positive experience, but noted that seeing her brother's dog (who her in-laws adopted) was a challenge. Discussed triggers and how to understand and address them.     Medications changes/added/denied? No    Treatment session number: 3    Individual Case Management Visit provided today? Yes     [facility]ations follow up physician's orders: None at this time

## 2025-01-13 NOTE — PSYCH
Visit Time    Visit Start Time: 0830  Visit Stop Time: 0930  Total Visit Duration: 20 minutes     Subjective:     Patient ID: Shireen Rueda is a 44 y.o. female.    [facility] Clinical Progress Notes      Specialized Services Documentation  Therapist must complete separate progress note for each specific clinical activity in which the individual participated during the day.       EDUCATION THERAPY      This group was facilitated virtually in a private office using HIPAA Compliant and Approved [employer] Teams.  Shireen Rueda consented to the use of tele-video modality of treatment.    Shireen Rueda with prompts shared in morning assessment and goal review. Presented as Receptive related to readiness to learn. Shireen Rueda  did complete goal from last treatment day identifying gaining responsibility. Shireen Rueda did not present with any barriers to learning. Throughout morning group, Shireen Rueda participated in movement experience. Shireen Rueda made some progress toward goal. Continue education group to assess willingness to engage, assess transfer of knowledge, and participate in skill development.    Tx Plan Objective: 1.1, 1.2, 1.3, 1.4 Gwen SHAW RN        Visit Time    Visit Start Time: 0930  Visit Stop Time: 1030  Total Visit Duration: 60 minutes     Group Psychotherapy   This group was facilitated virtually in a private office using HIPAA Compliant and Approved [employer] Teams. Shireen Rueda consented to the use of tele-video modality of treatment.  0930-1030 Shireen Rueda was present in psychoeducational group about Depression & Bipolar. The group followed Depression & Bipolar resource/exercise sheets and the video Dark Glasses & Kaleidoscopes. Group was educated on:  Signs and symptoms of Depression & Bipolar as well as different types of Bipolar.   Diagnosis criteria   Causes and treatments of Depression & Bipolar   Management skills for Depression & Bipolar  Discussed various coping skills    Question  and answer time allowed. Some progress towards goal noted through participation in group. Continue psychoeducational groups on anxiety and Bipolar to teach the value of learning about diagnosis and treatments available.     Tx Plan Objective:  1.1, 1.2, 1.3, 1.4 Therapist:  Gwen SHAW RN

## 2025-01-13 NOTE — PSYCH
Virtual Regular Visit    Verification of patient location:    Patient is located at Home in the following state in which I hold an active license [place]      Assessment/Plan:    Problem List Items Addressed This Visit       Panic disorder    Major depressive disorder, moderate, without psychotic features (HCC) - Primary    Grief       Goals addressed in session: PHP VIRTUAL GROUP DUE TO virtual preference of care      Depression Follow-up Plan Completed: Not applicable    Reason for visit is No chief complaint on file.       Encounter provider  PARTIAL PSYCH PROGRAM      Recent Visits  Date Type Provider Dept   01/10/25 Telephone Crystal MD [facility]   Showing recent visits within past 7 days and meeting all other requirements  Future Appointments  No visits were found meeting these conditions.  Showing future appointments within next 150 days and meeting all other requirements       The patient was identified by name and date of birth. Shireen Rueda was informed that this is a telemedicine visit and that the visit is being conducted throughthe Microsoft Teams platform. She agrees to proceed..  My office door was closed. No one else was in the room.  She acknowledged consent and understanding of privacy and security of the video platform. The patient has agreed to participate and understands they can discontinue the visit at any time.    Patient is aware this is a billable service.     Subjective  Shireen Rueda is a 44 y.o. female I spent FIVE GROUP HOURS PLUS CASE MANAGEMENT minutes with patient today in which greater than 50% of time was spent in counseling/coordination of care regarding PHP - SEE NOTES  .      HPI     Past Medical History:   Diagnosis Date    Anemia     Chest tightness or pressure     8/19/2013    Depression 5/14/2017    Hypertension     Vitamin D deficiency        Past Surgical History:   Procedure Laterality Date    CYSTOSCOPY N/A 12/30/2018    Procedure: CYSTOSCOPY;  Surgeon:  Laura Bal DO;  [facility];  Service: Gynecology    HYSTERECTOMY N/A 12/30/2018    Procedure: HYSTERECTOMY LAPAROSCOPIC TOTAL (LTH);  Surgeon: Laura Bal DO;  [facility];  Service: Gynecology    TX LAPAROSCOPY W/RMVL ADNEXAL STRUCTURES Bilateral 12/30/2018    Procedure: SALPINGECTOMY, LAPAROSCOPIC;  Surgeon: Laura Bal DO;  [facility];  Service: Gynecology    TONSILLECTOMY      TUBAL LIGATION      2006       Current Outpatient Medications   Medication Sig Dispense Refill    amLODIPine (NORVASC) 2.5 mg tablet Take 1 tablet (2.5 mg total) by mouth daily 30 tablet 0    atorvastatin (LIPITOR) 40 mg tablet TAKE 1 TABLET BY MOUTH EVERY DAY 90 tablet 2    carbamide peroxide (DEBROX) 6.5 % otic solution Administer 5 drops into the left ear 2 (two) times a day (Patient not taking: Reported on 1/3/2025) 15 mL 0    cyanocobalamin (VITAMIN B-12) 1000 MCG tablet Take 1 tablet (1,000 mcg total) by mouth daily 30 tablet 0    ergocalciferol (VITAMIN D2) 50,000 units Take 1 capsule (50,000 Units total) by mouth once a week for 7 doses 4 capsule 0    fluticasone (FLONASE) 50 mcg/act nasal spray SPRAY 1 SPRAY INTO EACH NOSTRIL EVERY DAY (Patient not taking: Reported on 1/3/2025) 24 mL 2    folic acid (FOLVITE) 1 mg tablet Take 1 tablet (1 mg total) by mouth daily 30 tablet 0    hydroCHLOROthiazide 25 mg tablet TAKE 1 TABLET (25 MG TOTAL) BY MOUTH DAILY. 90 tablet 0    hydrOXYzine HCL (ATARAX) 25 mg tablet Take 2 tablets (50 mg total) by mouth every 6 (six) hours as needed for anxiety 30 tablet 0    losartan (COZAAR) 100 MG tablet TAKE 1 TABLET BY MOUTH EVERY DAY 90 tablet 2    magnesium Oxide (MAG-OX) 400 mg TABS Take 1 tablet (400 mg total) by mouth 2 (two) times a day 60 tablet 0    melatonin 3 mg Take 1 tablet (3 mg total) by mouth daily at bedtime 30 tablet 0    Omega-3 Fatty Acids (OMEGA 3 PO) Take by mouth      sertraline (ZOLOFT) 50 mg tablet Take 1 tablet (50 mg total) by mouth  daily 30 tablet 0    traZODone (DESYREL) 50 mg tablet Take 1 tablet (50 mg total) by mouth daily at bedtime as needed for sleep 30 tablet 1     No current facility-administered medications for this visit.        No Known Allergies    Review of Systems    Video Exam    There were no vitals filed for this visit.    Physical Exam     Visit Time    Visit Start Time: 830  Visit Stop Time: 1430  Total Visit Duration:  300 minutes

## 2025-01-14 ENCOUNTER — TELEMEDICINE (OUTPATIENT)
Dept: PSYCHOLOGY | Facility: CLINIC | Age: 45
End: 2025-01-14
Payer: COMMERCIAL

## 2025-01-14 DIAGNOSIS — F32.1 CURRENT MODERATE EPISODE OF MAJOR DEPRESSIVE DISORDER, UNSPECIFIED WHETHER RECURRENT (HCC): Primary | ICD-10-CM

## 2025-01-14 DIAGNOSIS — F41.0 PANIC DISORDER: ICD-10-CM

## 2025-01-14 DIAGNOSIS — F43.21 GRIEF: ICD-10-CM

## 2025-01-14 PROCEDURE — G0176 OPPS/PHP;ACTIVITY THERAPY: HCPCS

## 2025-01-14 PROCEDURE — G0410 GRP PSYCH PARTIAL HOSP 45-50: HCPCS

## 2025-01-14 PROCEDURE — G0177 OPPS/PHP; TRAIN & EDUC SERV: HCPCS

## 2025-01-14 NOTE — PSYCH
Virtual Regular Visit    Verification of patient location:    Patient is located at Home in the following state in which I hold an active license [place]      Assessment/Plan:    Problem List Items Addressed This Visit       Panic disorder    Current moderate episode of major depressive disorder (HCC) - Primary    Grief       Goals addressed in session: PHP VIRTUAL GROUP DUE TO virtual preference of care      Depression Follow-up Plan Completed: Not applicable    Reason for visit is No chief complaint on file.       Encounter provider [facility] PARTIAL PSYCH PROGRAM      Recent Visits  Date Type Provider Dept   01/10/25 Telephone Crystal MD [facility]   Showing recent visits within past 7 days and meeting all other requirements  Future Appointments  No visits were found meeting these conditions.  Showing future appointments within next 150 days and meeting all other requirements       The patient was identified by name and date of birth. Shireen Rueda was informed that this is a telemedicine visit and that the visit is being conducted throughthe Microsoft Teams platform. She agrees to proceed..  My office door was closed. No one else was in the room.  She acknowledged consent and understanding of privacy and security of the video platform. The patient has agreed to participate and understands they can discontinue the visit at any time.    Patient is aware this is a billable service.     Subjective  Shireen Rueda is a 44 y.o. female I spent FIVE GROUP HOURS PLUS CASE MANAGEMENT minutes with patient today in which greater than 50% of time was spent in counseling/coordination of care regarding PHP - SEE NOTES  .      HPI     Past Medical History:   Diagnosis Date    Anemia     Chest tightness or pressure     8/19/2013    Depression 5/14/2017    Hypertension     Vitamin D deficiency        Past Surgical History:   Procedure Laterality Date    CYSTOSCOPY N/A 12/30/2018    Procedure: CYSTOSCOPY;  Surgeon: Laura  DO Valeriano;  Location: [facility];  Service: Gynecology    HYSTERECTOMY N/A 12/30/2018    Procedure: HYSTERECTOMY LAPAROSCOPIC TOTAL (LTH);  Surgeon: Laura Bal DO;  Location: [facility];  Service: Gynecology    AK LAPAROSCOPY W/RMVL ADNEXAL STRUCTURES Bilateral 12/30/2018    Procedure: SALPINGECTOMY, LAPAROSCOPIC;  Surgeon: Laura Bal DO;  Location: [facility];  Service: Gynecology    TONSILLECTOMY      TUBAL LIGATION      2006       Current Outpatient Medications   Medication Sig Dispense Refill    amLODIPine (NORVASC) 2.5 mg tablet Take 1 tablet (2.5 mg total) by mouth daily 30 tablet 0    atorvastatin (LIPITOR) 40 mg tablet TAKE 1 TABLET BY MOUTH EVERY DAY 90 tablet 2    carbamide peroxide (DEBROX) 6.5 % otic solution Administer 5 drops into the left ear 2 (two) times a day (Patient not taking: Reported on 1/3/2025) 15 mL 0    cyanocobalamin (VITAMIN B-12) 1000 MCG tablet Take 1 tablet (1,000 mcg total) by mouth daily 30 tablet 0    ergocalciferol (VITAMIN D2) 50,000 units Take 1 capsule (50,000 Units total) by mouth once a week for 7 doses 4 capsule 0    fluticasone (FLONASE) 50 mcg/act nasal spray SPRAY 1 SPRAY INTO EACH NOSTRIL EVERY DAY (Patient not taking: Reported on 1/3/2025) 24 mL 2    folic acid (FOLVITE) 1 mg tablet Take 1 tablet (1 mg total) by mouth daily 30 tablet 0    hydroCHLOROthiazide 25 mg tablet TAKE 1 TABLET (25 MG TOTAL) BY MOUTH DAILY. 90 tablet 0    hydrOXYzine HCL (ATARAX) 25 mg tablet Take 2 tablets (50 mg total) by mouth every 6 (six) hours as needed for anxiety 30 tablet 0    losartan (COZAAR) 100 MG tablet TAKE 1 TABLET BY MOUTH EVERY DAY 90 tablet 2    magnesium Oxide (MAG-OX) 400 mg TABS Take 1 tablet (400 mg total) by mouth 2 (two) times a day 60 tablet 0    melatonin 3 mg Take 1 tablet (3 mg total) by mouth daily at bedtime 30 tablet 0    Omega-3 Fatty Acids (OMEGA 3 PO) Take by mouth      sertraline (ZOLOFT) 50 mg tablet Take 1 tablet (50 mg total) by mouth daily 30  tablet 0    traZODone (DESYREL) 50 mg tablet Take 1 tablet (50 mg total) by mouth daily at bedtime as needed for sleep 30 tablet 1     No current facility-administered medications for this visit.        No Known Allergies    Review of Systems    Video Exam    There were no vitals filed for this visit.    Physical Exam     Visit Time    Visit Start Time: 830  Visit Stop Time: 1430  Total Visit Duration:  300 minutes

## 2025-01-14 NOTE — PSYCH
Visit Time     Visit Start Time: 1215  Visit Stop Time: 1315  Total Visit Duration:  60 minutes     Subjective:        Subjective  Patient ID: Shireen Rueda is a 44 y.o. y.o. female.     [facility] Clinical Progress Notes       Specialized Services Documentation  Therapist must complete separate progress note for each specific clinical activity in which the individual participated during the day.     Shireen Rueda actively engaged in an open processing group.  The group discussed  healthy boundaries with close friends and family members that might be toxic for our mental health   . Shireen Rueda did participate in the conversations related to the topics. Shireen Rueda continues to make progress towards goals through verbal participation in group; to accomplish long term goals continue to utilize skills learned in programming. Continue with psychotherapy to educate and encourage use of wellness tools. Tx Plan Objective: 1.1,1.2 Therapist: Ashley Costenbader MA LMT

## 2025-01-14 NOTE — PSYCH
Visit Time    Visit Start Time: 0830  Visit Stop Time: 0930  Total Visit Duration:  30 minutes    Subjective:     Patient ID: Shireen Rueda is a 44 y.o. female.    [facility] Clinical Progress Notes      Specialized Services Documentation  Therapist must complete separate progress note for each specific clinical activity in which the individual participated during the day.       EDUCATION THERAPY      Shireen Rueda actively shared in morning assessment and goal review. Presented as Receptive related to readiness to learn. Shireen Rueda  did not complete goal from last treatment day identifying she wanted to go to the grocery store, gaining responsibility. Shireen Rueda did not present with any barriers to learning. Throughout morning group, Shireen Rueda participated in mindfulness exercise and gratitude practice. Shireen Rueda made positive progress toward goal. Continue education group to assess willingness to engage, assess transfer of knowledge, and participate in skill development.    Tx Plan Objective: 1.1, 1.2, 1.3, 1.4 , Therapist: Ian Hamilton    Visit Time    Visit Start Time: 1330  Visit Stop Time: 1430  Total Visit Duration:  60 minutes    Subjective:     Patient ID: Shireen Rueda is a 44 y.o. female.    [facility] Clinical Progress Notes      Specialized Services Documentation  Therapist must complete separate progress note for each specific clinical activity in which the individual participated during the day.       GROUP PSYCHOTHERAPY    Shireen Rueda participated actively in psychotherapy group.  This was observed Consistent throughout the treatment day. They were engaged in learning related to Wellness Tools. Staff utilized Verbal teaching methods.  Shireen Rueda shared area of learning and set a goal for outside of program to do the dishes.  Shireen Rueda was able to share items explored during the day and shared in adding to their WRAP.  Ended session with staff led exercise in visual breathing.  Shireen Rueda  demonstrated positive progress toward goal.  Continue group psychotherapy to actively practice learned skills and encourage transfer of knowledge to unstructured time.    Tx Plan Objective: 1.1, 1.2, 1.3, 1.4 , Therapist: Ian Hamilton    Group Psychotherapy      Visit Start Time: (930)  Visit Stop Time: (1030)  Total Visit Duration:  60 minutes    Group Therapy    Subjective:     Patient ID: Shireen Rueda 44 y.o.       This group was facilitated virtually in a private office using HIPAA Compliant and Approved Microsoft Teams.  Shireen Rueda actively engaged in psychoeducational group about emotional regulation. The skill helps to maintain and regulate emotional expression/regulation. Group discovered strategies that help them to manage emotional well being on a short term and long term basis. The group talked about understanding the purpose, and meaning of emotional regulation in intellectual and emotional expression, regulation , and recognition, and how it affects themselves and others.. Teaching on the emphasis of self monitoring and self-care, who the group can go to for help was brought up as well. Group was encouraged to ask questions in an open forum at the end of group. Positive progress displayed through engagement in topic, Shireen was an active listener during this group session. Shireen Rueda will continue to engage in psychotherapy to encourage positive self realization.  Treatment Plan Objective 1.1, 1.2, 1.3, 1.4 Therapist: Ian PAYTON Ed.

## 2025-01-14 NOTE — PSYCH
Visit Time    Visit Start Time: 1045  Visit Stop Time: 1145  Total Visit Duration:  60 minutes    Subjective:     Patient ID: Shireen Rueda is a 44 y.o. female.    [facility] Clinical Progress Notes      Specialized Services Documentation  Therapist must complete separate progress note for each specific clinical activity in which the individual participated during the day.     Allied Therapy [id number] This group was facilitated virtually in a private office using HIPAA Compliant and Approved Microsoft Teams. Shireen Rueda participated in music therapy group regarding healthy and unhealthy relationships. The group participated in a conversation about the health of the relationships in their life. The group then participated in a series of dereje discussions on songs demonstrating healthy and unhealthy relationships. The group read and discussed two handouts regarding relationship green flags and destructive/interfering relationships. Shireen shared her thoughts on the song and appeared attentive throughout. Some effort noted towards treatment goal. Continue AT to encourage the development and maintenance of healthy relationships.  Tx Plan Objective: 1.1,1.2,1.4, Therapist:  BERTHA Lane MT-BC    Case Management Note    BERTHA Lane MT-BC    Current suicide risk : Low     No case management requested or scheduled aware of next 1:1.    Medications changes/added/denied? No    Treatment session number: 4    Individual Case Management Visit provided today? No    [facility] follow up physician's orders: None at this time.

## 2025-01-15 ENCOUNTER — TELEMEDICINE (OUTPATIENT)
Dept: PSYCHIATRY | Facility: CLINIC | Age: 45
End: 2025-01-15
Payer: COMMERCIAL

## 2025-01-15 ENCOUNTER — TELEMEDICINE (OUTPATIENT)
Dept: PSYCHOLOGY | Facility: CLINIC | Age: 45
End: 2025-01-15
Payer: COMMERCIAL

## 2025-01-15 DIAGNOSIS — F43.21 GRIEF: ICD-10-CM

## 2025-01-15 DIAGNOSIS — F32.1 CURRENT MODERATE EPISODE OF MAJOR DEPRESSIVE DISORDER, UNSPECIFIED WHETHER RECURRENT (HCC): Primary | ICD-10-CM

## 2025-01-15 DIAGNOSIS — F41.0 PANIC DISORDER: ICD-10-CM

## 2025-01-15 DIAGNOSIS — F33.1 MODERATE EPISODE OF RECURRENT MAJOR DEPRESSIVE DISORDER (HCC): Primary | ICD-10-CM

## 2025-01-15 PROCEDURE — G0176 OPPS/PHP;ACTIVITY THERAPY: HCPCS

## 2025-01-15 PROCEDURE — 99214 OFFICE O/P EST MOD 30 MIN: CPT | Performed by: PHYSICIAN ASSISTANT

## 2025-01-15 PROCEDURE — G0410 GRP PSYCH PARTIAL HOSP 45-50: HCPCS

## 2025-01-15 PROCEDURE — G0177 OPPS/PHP; TRAIN & EDUC SERV: HCPCS

## 2025-01-15 NOTE — PSYCH
Virtual Regular Visit    Verification of patient location:    Patient is located at Home in the following state in which I hold an active license [place]        Reason for visit is   Chief Complaint   Patient presents with    Virtual Regular Visit          Encounter provider Annabelle Claudio PA-C      Recent Visits  Date Type Provider Dept   01/10/25 Telephone Nenita Velásquez MD  [facility]   01/09/25 Telemedicine Rubén Cardenas DO [facility]   Showing recent visits within past 7 days and meeting all other requirements  Today's Visits  Date Type Provider Dept   01/15/25 Telemedicine Annabelle Claudio PA-C [facility]   Showing today's visits and meeting all other requirements  Future Appointments  No visits were found meeting these conditions.  Showing future appointments within next 150 days and meeting all other requirements       The patient was identified by name and date of birth. Shireen Rueda was informed that this is a telemedicine visit and that the visit is being conducted throughthe Microsoft Teams platform. She agrees to proceed..  My office door was closed. No one else was in the room.  She acknowledged consent and understanding of privacy and security of the video platform. The patient has agreed to participate and understands they can discontinue the visit at any time.    Patient is aware this is a billable service.         Progress Note - Behavioral Health   [facility]  Shireen Rueda 44 y.o. female MRN: [id number]   This note was not shared with the patient due to reasonable likelihood of causing patient harm    Progress at partial program: some improvement.       Chart reviewed and discussed in treatment team.  Shireen seen by Dr Cardenas 1/9 at which time trazodone started.  Shireen feels she is benefiting from program -adjusting to being in treatment as she has never been in MH tx before.  Slept well with trazodone but started taking half  "-\"afraid I wouldn't get up\".  25 mg ineffective.  Usually takes atarax 25 mg prn mid-day- \"twitching\" \"bothered\" \"can't control my emotions\"- atarax gives her some benefit and helps her cope. Denies SI.  Appetite is low- drinking protein drinks.  Denies dizziness.       ROS:   As above otherwise negative    Active Ambulatory Problems     Diagnosis Date Noted    Thyroid nodule 05/14/2017    Depression with anxiety 07/12/2016    Elevated liver enzymes 06/27/2016    High triglycerides 07/12/2016    Overweight (BMI 25.0-29.9) 12/08/2015    Panic disorder 06/13/2016    Vitamin D deficiency 05/15/2017    Hyperpigmentation 06/03/2019    Essential hypertension 10/30/2019    Tobacco use 11/01/2019    Chest discomfort 09/21/2021    Anxiety 10/06/2021    Annual physical exam 07/19/2022    Dyslipidemia 11/29/2022    Urinary urgency 12/19/2022    Breast cancer screening by mammogram 02/24/2023    Dysuria 03/24/2023    Left non-suppurative otitis media 05/30/2023    Encounter for weight management 06/20/2023    Left ear pain 10/30/2023    Allergic rhinitis due to allergen 10/30/2023    Chronic fatigue 01/11/2024    Palpitations 07/08/2024    Other secondary hypertension 07/08/2024    Major depressive disorder, moderate, without psychotic features (HCC) 01/04/2025    Current moderate episode of major depressive disorder (HCC) 01/09/2025    Grief 01/09/2025     Resolved Ambulatory Problems     Diagnosis Date Noted    Symptomatic anemia 05/14/2017    Abnormal uterine bleeding (AUB) 05/14/2017    Transaminitis 05/14/2017    Depression 05/14/2017    Iron deficiency anemia 11/07/2018    Pelvic pain in female 12/28/2018    Fibroids, intramural 12/28/2018    Anemia 08/12/2014    Elevated blood-pressure reading without diagnosis of hypertension 12/08/2015    Irregular menstrual cycle 05/09/2017    Menorrhagia 08/11/2014    Tachycardia 08/04/2014    Vision changes 08/04/2014    Weight gain 05/09/2017    Close exposure to COVID-19 virus " 04/07/2020    Cracked lips 05/13/2020    COVID-19 ruled out 07/15/2020    Healthcare maintenance 07/15/2020    Hypokalemia 12/15/2020    Acute non-recurrent maxillary sinusitis 04/23/2021    Acute cystitis without hematuria 06/23/2021    Dizziness 10/06/2021    Non-recurrent acute suppurative otitis media of both ears without spontaneous rupture of tympanic membranes 05/19/2023    Acute otitis externa of left ear 05/19/2023    Medical clearance for psychiatric admission 10/30/2023    Screening for cardiovascular condition 07/08/2024    Hypomagnesemia 01/04/2025     Past Medical History:   Diagnosis Date    Chest tightness or pressure     Hypertension      Family History   Problem Relation Age of Onset    No Known Problems Mother     Heart disease Father     Hypertension Father     Heart attack Father 37    No Known Problems Sister     No Known Problems Daughter     No Known Problems Daughter     No Known Problems Maternal Grandmother     No Known Problems Maternal Grandfather     No Known Problems Paternal Grandmother     No Known Problems Paternal Grandfather     No Known Problems Son     No Known Problems Maternal Aunt     No Known Problems Maternal Aunt     No Known Problems Maternal Aunt     No Known Problems Maternal Aunt     No Known Problems Maternal Aunt     No Known Problems Paternal Aunt     No Known Problems Paternal Aunt     No Known Problems Half-Sister     Breast cancer Neg Hx     Colon cancer Neg Hx     Ovarian cancer Neg Hx     Endometrial cancer Neg Hx      Social History     Socioeconomic History    Marital status: /Civil Union     Spouse name: Not on file    Number of children: Not on file    Years of education: Not on file    Highest education level: Not on file   Occupational History    Not on file   Tobacco Use    Smoking status: Some Days     Types: Cigarettes    Smokeless tobacco: Never    Tobacco comments:     social smoker, less than 1 pack per weekend   Vaping Use    Vaping status:  Never Used   Substance and Sexual Activity    Alcohol use: Yes     Alcohol/week: 3.0 standard drinks of alcohol     Types: 3 Glasses of wine per week     Comment: on the weekends/socially    Drug use: No    Sexual activity: Yes     Partners: Male     Birth control/protection: Female Sterilization, Other   Other Topics Concern    Not on file   Social History Narrative    Uses safety equipment-seatbelts     Social Drivers of Health     Financial Resource Strain: Low Risk  (7/8/2024)    Overall Financial Resource Strain (CARDIA)     Difficulty of Paying Living Expenses: Not hard at all   Food Insecurity: No Food Insecurity (1/6/2025)    Hunger Vital Sign     Worried About Running Out of Food in the Last Year: Never true     Ran Out of Food in the Last Year: Never true   Transportation Needs: No Transportation Needs (1/6/2025)    PRAPARE - Transportation     Lack of Transportation (Medical): No     Lack of Transportation (Non-Medical): No   Physical Activity: Insufficiently Active (9/28/2023)    Exercise Vital Sign     Days of Exercise per Week: 2 days     Minutes of Exercise per Session: 50 min   Stress: No Stress Concern Present (7/8/2024)    [employer] - Occupational Stress Questionnaire     Feeling of Stress : Not at all   Social Connections: Moderately Isolated (9/28/2023)    Social Connection and Isolation Panel [NHANES]     Frequency of Communication with Friends and Family: More than three times a week     Frequency of Social Gatherings with Friends and Family: More than three times a week     Attends Alevism Services: Never     Active Member of Clubs or Organizations: No     Attends Club or Organization Meetings: Never     Marital Status:    Intimate Partner Violence: Not At Risk (1/6/2025)    Humiliation, Afraid, Rape, and Kick questionnaire     Fear of Current or Ex-Partner: No     Emotionally Abused: No     Physically Abused: No     Sexually Abused: No   Housing  Stability: Low Risk  (1/6/2025)    Housing Stability Vital Sign     Unable to Pay for Housing in the Last Year: No     Number of Times Moved in the Last Year: 1     Homeless in the Last Year: No     No Known Allergies       Mental Status Evaluation:    Appearance:  age appropriate, adequate grooming   Behavior:  pleasant, cooperative   Speech:  normal rate and volume   Mood:  low   Affect:  mood-congruent   Thought Process:  goal directed   Associations: intact associations   Thought Content:  no overt delusions   Perceptual Disturbances: none   Risk Potential: Suicidal ideation - None  Homicidal ideation - None   Sensorium:  oriented to person, place, and time/date   Memory:  recent and remote memory grossly intact   Consciousness:  alert and awake   Attention: attention span and concentration are age appropriate   Insight:  intact   Judgment: intact   Gait/Station: unable to assess   Motor Activity: unable to assess today due to virtual visit       Laboratory results: I have personally reviewed all pertinent laboratory/tests results.      Assessment   Diagnoses and all orders for this visit:    Moderate episode of recurrent major depressive disorder (HCC)    Grief    Panic disorder       Current Outpatient Medications   Medication Sig Dispense Refill    amLODIPine (NORVASC) 2.5 mg tablet Take 1 tablet (2.5 mg total) by mouth daily 30 tablet 0    atorvastatin (LIPITOR) 40 mg tablet TAKE 1 TABLET BY MOUTH EVERY DAY 90 tablet 2    carbamide peroxide (DEBROX) 6.5 % otic solution Administer 5 drops into the left ear 2 (two) times a day (Patient not taking: Reported on 1/3/2025) 15 mL 0    cyanocobalamin (VITAMIN B-12) 1000 MCG tablet Take 1 tablet (1,000 mcg total) by mouth daily 30 tablet 0    ergocalciferol (VITAMIN D2) 50,000 units Take 1 capsule (50,000 Units total) by mouth once a week for 7 doses 4 capsule 0    fluticasone (FLONASE) 50 mcg/act nasal spray SPRAY 1 SPRAY INTO EACH NOSTRIL EVERY DAY (Patient not  taking: Reported on 1/3/2025) 24 mL 2    folic acid (FOLVITE) 1 mg tablet Take 1 tablet (1 mg total) by mouth daily 30 tablet 0    hydroCHLOROthiazide 25 mg tablet TAKE 1 TABLET (25 MG TOTAL) BY MOUTH DAILY. 90 tablet 0    hydrOXYzine HCL (ATARAX) 25 mg tablet Take 2 tablets (50 mg total) by mouth every 6 (six) hours as needed for anxiety 30 tablet 0    losartan (COZAAR) 100 MG tablet TAKE 1 TABLET BY MOUTH EVERY DAY 90 tablet 2    magnesium Oxide (MAG-OX) 400 mg TABS Take 1 tablet (400 mg total) by mouth 2 (two) times a day 60 tablet 0    melatonin 3 mg Take 1 tablet (3 mg total) by mouth daily at bedtime 30 tablet 0    Omega-3 Fatty Acids (OMEGA 3 PO) Take by mouth      sertraline (ZOLOFT) 50 mg tablet Take 1 tablet (50 mg total) by mouth daily 30 tablet 0    traZODone (DESYREL) 50 mg tablet Take 1 tablet (50 mg total) by mouth daily at bedtime as needed for sleep 30 tablet 1     No current facility-administered medications for this visit.         Plan    Planned medication and treatment changes:   No med change.  Encouraged to take trazodone earlier- will monitor.  Cont zoloft 50 mg qam, atarax prn as prescribed.     All current active medications have been reviewed.  Continue treatment with group therapy and partial program      Risks / Benefits of Treatment:    Risks, benefits, and possible side effects of medications explained to patient and patient verbalizes understanding and agrees to medications.     Counseling / Coordination of Care:    Patient's progress discussed with staff in treatment team meeting.  Medications, treatment progress and treatment plan reviewed with patient.    Annabelle Claudio PA-C 01/15/25

## 2025-01-15 NOTE — PSYCH
Visit Time    Visit Start Time: 1330  Visit Stop Time: 1430  Total Visit Duration: 60 minutes    Subjective:     Patient ID: Shireen Rueda is a 44 y.o. female.    [facility] Clinical Progress Notes      Specialized Services Documentation  Therapist must complete separate progress note for each specific clinical activity in which the individual participated during the day.     GROUP PSYCHOTHERAPY    [id number]    Shireen Rueda participated actively in psychotherapy group.  This was observedConsistent throughout the treatment day. They were engaged in learning related to Illness, Medication, Aftercare, and Wellness Tools. Staff utilized Verbal, Written, A/V, and Demonstration teaching methods.  Shireen Rueda shared area of learning and set a goal for outside of program to separate the laundry.  Shireen Rueda was able to share items explored during the day.  Ended session with staff sharing websites to find virtual support groups. Shireen Rueda demonstrated good progress toward goal.  Continue group psychotherapy to actively practice learned skills and encourage transfer of knowledge to unstructured time.        This group was facilitated virtually in a private office using HIPAA Compliant and Approved Microsoft Teams.      Tx Plan Objective: 1.1,1.2,1.3,1.4 Therapist: AVERY Britton

## 2025-01-15 NOTE — PSYCH
"Visit Time    Visit Start Time: 1045  Visit Stop Time: 1145  Total Visit Duration:  60 minutes    Subjective:     Patient ID: Shireen Rueda is a 44 y.o. female.    [facility] Clinical Progress Notes      Specialized Services Documentation  Therapist must complete separate progress note for each specific clinical activity in which the individual participated during the day.     Allied Therapy [id number] This group was facilitated virtually in a private office using HIPAA Compliant and Approved [employer] Teams. Shireen Rueda actively participated in music therapy group regarding healthy music listening. The group discussed their current use of music for self care. The group read and discussed a handout on healthy music listening. The group learned about the Iso Principle and how it can be applied to music listening for self care. The group then created individualized playlists for mood modulation using the above skills. Shireen created a playlist to aid in moving from \"Sad/stuck\" to \"Light/moving forward\". Some effort noted towards treatment goal. Continue AT to encourage the development and proactive use of mood modulating techniques. Tx Plan Objective: 1.1,1.2,1.4, Therapist:  BERTHA Lane MT-BC    Case Management Note    BERTHA Lane MT-BC    Current suicide risk : Low     Medications changes/added/denied? No    Treatment session number: 5    Individual Case Management Visit provided today? Yes     [facility] follow up physician's orders: None at this time.     Individual Psychotherapy    Time: 4074-6184    DAP:    Data - Met with Shireen for individual therapy/case management. She stated she is doing well and feels she is benefiting from program. Stated that she has been seeing a benefit from setting daily goals, structure and routine. She stated she has been using coloring as a coping skill. Discussed worry time and creating coping skills list for before during and after work in preparation for after PHP. " Discussed following up with either grief support groups or AA.     Assessment - Shireen presented brighter and more open in CM. She continues to do well in program and is noticeably participating more.     Plan  - Continue work towards treatment goals. Identify a support group she would like to attend after PHP.     Tx plan Objectives: 1.0    BERTHA Lane, MT-BC

## 2025-01-15 NOTE — PSYCH
Visit Time    Visit Start Time: 0830  Visit Stop Time: 0930  Total Visit Duration:  60 minutes    Subjective:     Patient ID: Shireen Rueda is a 44 y.o. female.    [facility] Clinical Progress Notes      Specialized Services Documentation  Therapist must complete separate progress note for each specific clinical activity in which the individual participated during the day.       EDUCATION THERAPY      Shireen Rueda actively shared in morning assessment and goal review. Presented as Receptive related to readiness to learn. Shireen Rueda  did complete goal from last treatment day identifying gaining hope and responsibility. Shireen Rueda did not present with any barriers to learning. Throughout morning group, Shireen Rueda participated in mindfulness exercise. Shireen Rueda made good progress toward goal. Continue education group to assess willingness to engage, assess transfer of knowledge, and participate in skill development.    Tx Plan Objective: 1.1,1.2, Therapist: Ashley Costenbader, MA LMT       This group was facilitated virtually in a private office using HIPAA Compliant and Approved [employer] Teams.  Shireen Rueda consented to the use of tele-video modality of treatment.

## 2025-01-15 NOTE — PSYCH
"Visit Time    Visit Start Time: 1215  Visit Stop Time: 1315  Total Visit Duration: 46 minutes     Subjective:     Patient ID: Shireen Rueda is a 44 y.o. female.    [facility] Clinical Progress Notes      Specialized Services Documentation  Therapist must complete separate progress note for each specific clinical activity in which the individual participated during the day.       Group Psychotherapy  This group was facilitated virtually in a private office using HIPAA Compliant and Approved [employer] Teams.  Shireen Rueda  consented to the use of tele-video modality of treatment.  ([id number]) Shireen Rueda actively  shared in psychoeducational group which focused on nutrition to improve physical and mental wellbeing. Topics included:  How our diet affects our mental health  Mental health benefits of healthy eating  Barriers to eating healthy  Ways to overcome barriers  Macro and Micro nutrients   Appropriate serving sizes for all food groups as well as benefits to eating for health       The group then viewed a [employer] talk video titled \"The Brain Intervention at the End of our Belen\" by Dr Zachary Torres. They then discussed ideas on how to improve on their nutrition.  Good progress toward goal noted.  Continue psychoeducation to educate on the importance of making nutrition a priority.  Tx Plan Objectives: 1.1,1.2    Therapist: Gwen SHAW     "

## 2025-01-15 NOTE — PSYCH
"Group Psychotherapy      Visit Start Time: (930)  Visit Stop Time: (1030)  Total Visit Duration:  60 minutes      Group Therapy    Subjective:     Patient ID: Shireen Rueda 44 y.o.     This group was facilitated virtually in a private office using HIPAA Compliant and Approved Microsoft Teams Shireen Rueda consented to the use of tele-video modality of treatment.  Shireen Rueda actively engaged in psychoeducational group about radical acceptance. The skill helps an individual to learn to accept situations that are out of ones control. Reducing denial of situations and reducing distress..Group discovered strategies that help them to manage emotional well being on a short term and long term basis. The introduction of the concept of \"wise mid\" is used in this process.The group talked about understanding the purpose and meaning radical acceptance with  \"wise mind\" ,regulation , recognition, and how it affects themselves and others..Teaching on the emphasis of radical acceptance, who the group can go to for help was brought up as well. Group was encouraged to ask questions in an open forum at the end of group. Positive progress displayed through engagement in topic, Shireen was able to participate in the urrutia mind meditation. Shireen was able to acknowledge several emotions she has had difficulty confronting due to a family members death.  Shireen Rueda will continue to engage in psychotherapy to encourage positive self realization.  Treatment Plan Objective 1.1, 1.2, 1.3, 1.4 Therapist: Ian APYTON Ed.   "

## 2025-01-15 NOTE — PSYCH
Virtual Regular Visit    Verification of patient location:    Patient is located at Home in the following state in which I hold an active license [place]      Assessment/Plan:    Problem List Items Addressed This Visit       Panic disorder    Current moderate episode of major depressive disorder (HCC) - Primary    Grief       Goals addressed in session: PHP VIRTUAL GROUP DUE TO virtual preference of care      Depression Follow-up Plan Completed: Not applicable    Reason for visit is No chief complaint on file.       Encounter provider [facility] PARTIAL PSYCH PROGRAM      Recent Visits  Date Type Provider Dept   01/10/25 Telephone Crystal MD [facility]   Showing recent visits within past 7 days and meeting all other requirements  Future Appointments  No visits were found meeting these conditions.  Showing future appointments within next 150 days and meeting all other requirements       The patient was identified by name and date of birth. Shireen Rueda was informed that this is a telemedicine visit and that the visit is being conducted throughthe Microsoft Teams platform. She agrees to proceed..  My office door was closed. No one else was in the room.  She acknowledged consent and understanding of privacy and security of the video platform. The patient has agreed to participate and understands they can discontinue the visit at any time.    Patient is aware this is a billable service.     Subjective  Shireen Rueda is a 44 y.o. female I spent FIVE GROUP HOURS PLUS CASE MANAGEMENT minutes with patient today in which greater than 50% of time was spent in counseling/coordination of care regarding PHP - SEE NOTES  .      HPI     Past Medical History:   Diagnosis Date    Anemia     Chest tightness or pressure     8/19/2013    Depression 5/14/2017    Hypertension     Vitamin D deficiency        Past Surgical History:   Procedure Laterality Date    CYSTOSCOPY N/A 12/30/2018    Procedure: CYSTOSCOPY;  Surgeon: Laura  DO Valeriano;  Location: [facility];  Service: Gynecology    HYSTERECTOMY N/A 12/30/2018    Procedure: HYSTERECTOMY LAPAROSCOPIC TOTAL (LTH);  Surgeon: Laura Bal DO;  Location: [facility];  Service: Gynecology    KS LAPAROSCOPY W/RMVL ADNEXAL STRUCTURES Bilateral 12/30/2018    Procedure: SALPINGECTOMY, LAPAROSCOPIC;  Surgeon: Laura Bal DO;  Location: [facility];  Service: Gynecology    TONSILLECTOMY      TUBAL LIGATION      2006       Current Outpatient Medications   Medication Sig Dispense Refill    amLODIPine (NORVASC) 2.5 mg tablet Take 1 tablet (2.5 mg total) by mouth daily 30 tablet 0    atorvastatin (LIPITOR) 40 mg tablet TAKE 1 TABLET BY MOUTH EVERY DAY 90 tablet 2    carbamide peroxide (DEBROX) 6.5 % otic solution Administer 5 drops into the left ear 2 (two) times a day (Patient not taking: Reported on 1/3/2025) 15 mL 0    cyanocobalamin (VITAMIN B-12) 1000 MCG tablet Take 1 tablet (1,000 mcg total) by mouth daily 30 tablet 0    ergocalciferol (VITAMIN D2) 50,000 units Take 1 capsule (50,000 Units total) by mouth once a week for 7 doses 4 capsule 0    fluticasone (FLONASE) 50 mcg/act nasal spray SPRAY 1 SPRAY INTO EACH NOSTRIL EVERY DAY (Patient not taking: Reported on 1/3/2025) 24 mL 2    folic acid (FOLVITE) 1 mg tablet Take 1 tablet (1 mg total) by mouth daily 30 tablet 0    hydroCHLOROthiazide 25 mg tablet TAKE 1 TABLET (25 MG TOTAL) BY MOUTH DAILY. 90 tablet 0    hydrOXYzine HCL (ATARAX) 25 mg tablet Take 2 tablets (50 mg total) by mouth every 6 (six) hours as needed for anxiety 30 tablet 0    losartan (COZAAR) 100 MG tablet TAKE 1 TABLET BY MOUTH EVERY DAY 90 tablet 2    magnesium Oxide (MAG-OX) 400 mg TABS Take 1 tablet (400 mg total) by mouth 2 (two) times a day 60 tablet 0    melatonin 3 mg Take 1 tablet (3 mg total) by mouth daily at bedtime 30 tablet 0    Omega-3 Fatty Acids (OMEGA 3 PO) Take by mouth      sertraline (ZOLOFT) 50 mg tablet Take 1 tablet (50 mg total) by mouth daily 30  tablet 0    traZODone (DESYREL) 50 mg tablet Take 1 tablet (50 mg total) by mouth daily at bedtime as needed for sleep 30 tablet 1     No current facility-administered medications for this visit.        No Known Allergies    Review of Systems    Video Exam    There were no vitals filed for this visit.    Physical Exam     Visit Time    Visit Start Time: 830  Visit Stop Time: 1430  Total Visit Duration:  300 minutes

## 2025-01-16 ENCOUNTER — TELEMEDICINE (OUTPATIENT)
Dept: PSYCHOLOGY | Facility: CLINIC | Age: 45
End: 2025-01-16
Payer: COMMERCIAL

## 2025-01-16 DIAGNOSIS — F41.0 PANIC DISORDER: ICD-10-CM

## 2025-01-16 DIAGNOSIS — F33.1 MODERATE EPISODE OF RECURRENT MAJOR DEPRESSIVE DISORDER (HCC): Primary | ICD-10-CM

## 2025-01-16 DIAGNOSIS — F43.21 GRIEF: ICD-10-CM

## 2025-01-16 PROCEDURE — G0177 OPPS/PHP; TRAIN & EDUC SERV: HCPCS

## 2025-01-16 PROCEDURE — G0410 GRP PSYCH PARTIAL HOSP 45-50: HCPCS

## 2025-01-16 NOTE — PSYCH
Subjective:     Patient ID: Shireen Rueda is a 44 y.o. female.    [facility] Clinical Progress Notes      Specialized Services Documentation  Therapist must complete separate progress note for each specific clinical activity in which the individual participated during the day.       Case Management Note    BERTHA Lane, MT-BC    Current suicide risk : Low     No case management requested or scheduled aware of next 1:1.    Medications changes/added/denied? No    Treatment session number: 6    Individual Case Management Visit provided today? No    [facility] follow up physician's orders: None at this time.

## 2025-01-16 NOTE — PSYCH
Visit Time    Visit Start Time: 0930  Visit Stop Time: 1030  Total Visit Duration: 60 minutes    Subjective:     Patient ID: Shireen Rueda is a 44 y.o. female.    [facility] Clinical Progress Notes      Specialized Services Documentation  Therapist must complete separate progress note for each specific clinical activity in which the individual participated during the day.       Group Psychotherapy Life Skills (0930-1030) Shireen Rueda actively engaged in group focused on the empowerment triangle which was facilitated virtually in a private office using HIPAA Compliant and Approved [employer] Teams. Shireen consented to the use of tele-video modality of treatment and was virtually present for group psychotherapy today. The group learned about the empowerment triangle and the roles of the creator, , and challenger. The group examined how to shift their mindset from their role(s) in the drama triangle to their role(s) in the Empowerment Archer. Shireen discussed how they would shift their mindset from their role in the drama triangle to their role in the empowerment triangle by taking baby step and looking at what she can control.  Shireen continues to make progress towards goals through verbal participation in group; to accomplish long term goals continue to utilize skills learned in programming. Continue with psychotherapy to educate and encourage use of wellness tools. Tx Plan Objective: 1.1,1.2 Therapist: Fabiana Banks,SHALONDA,LSW

## 2025-01-16 NOTE — PSYCH
Virtual Regular Visit    Verification of patient location:    Patient is located at Home in the following state in which I hold an active license [place]      Assessment/Plan:    Problem List Items Addressed This Visit       Panic disorder    Major depressive disorder, moderate, without psychotic features (HCC) - Primary    Grief       Goals addressed in session: PHP VIRTUAL GROUP DUE TO virtual preference of care      Depression Follow-up Plan Completed: Not applicable    Reason for visit is No chief complaint on file.       Encounter provider  PARTIAL PSYCH PROGRAM      Recent Visits  Date Type Provider Dept   01/10/25 Telephone Crystal MD [facility]   Showing recent visits within past 7 days and meeting all other requirements  Future Appointments  No visits were found meeting these conditions.  Showing future appointments within next 150 days and meeting all other requirements       The patient was identified by name and date of birth. Shireen Rueda was informed that this is a telemedicine visit and that the visit is being conducted throughthe Microsoft Teams platform. She agrees to proceed..  My office door was closed. No one else was in the room.  She acknowledged consent and understanding of privacy and security of the video platform. The patient has agreed to participate and understands they can discontinue the visit at any time.    Patient is aware this is a billable service.     Subjective  Shireen Rueda is a 44 y.o. female I spent FIVE GROUP HOURS PLUS CASE MANAGEMENT minutes with patient today in which greater than 50% of time was spent in counseling/coordination of care regarding PHP - SEE NOTES  .      HPI     Past Medical History:   Diagnosis Date    Anemia     Chest tightness or pressure     8/19/2013    Depression 5/14/2017    Hypertension     Vitamin D deficiency        Past Surgical History:   Procedure Laterality Date    CYSTOSCOPY N/A 12/30/2018    Procedure: CYSTOSCOPY;  Surgeon:  Laura Bal DO;  [facility]: [facility];  Service: Gynecology    HYSTERECTOMY N/A 12/30/2018    Procedure: HYSTERECTOMY LAPAROSCOPIC TOTAL (LTH);  Surgeon: Laura Bal DO;  [facility];  Service: Gynecology    OR LAPAROSCOPY W/RMVL ADNEXAL STRUCTURES Bilateral 12/30/2018    Procedure: SALPINGECTOMY, LAPAROSCOPIC;  Surgeon: Laura Bal DO;  [facility];  Service: Gynecology    TONSILLECTOMY      TUBAL LIGATION      2006       Current Outpatient Medications   Medication Sig Dispense Refill    amLODIPine (NORVASC) 2.5 mg tablet Take 1 tablet (2.5 mg total) by mouth daily 30 tablet 0    atorvastatin (LIPITOR) 40 mg tablet TAKE 1 TABLET BY MOUTH EVERY DAY 90 tablet 2    carbamide peroxide (DEBROX) 6.5 % otic solution Administer 5 drops into the left ear 2 (two) times a day (Patient not taking: Reported on 1/3/2025) 15 mL 0    cyanocobalamin (VITAMIN B-12) 1000 MCG tablet Take 1 tablet (1,000 mcg total) by mouth daily 30 tablet 0    ergocalciferol (VITAMIN D2) 50,000 units Take 1 capsule (50,000 Units total) by mouth once a week for 7 doses 4 capsule 0    fluticasone (FLONASE) 50 mcg/act nasal spray SPRAY 1 SPRAY INTO EACH NOSTRIL EVERY DAY (Patient not taking: Reported on 1/3/2025) 24 mL 2    folic acid (FOLVITE) 1 mg tablet Take 1 tablet (1 mg total) by mouth daily 30 tablet 0    hydroCHLOROthiazide 25 mg tablet TAKE 1 TABLET (25 MG TOTAL) BY MOUTH DAILY. 90 tablet 0    hydrOXYzine HCL (ATARAX) 25 mg tablet Take 2 tablets (50 mg total) by mouth every 6 (six) hours as needed for anxiety 30 tablet 0    losartan (COZAAR) 100 MG tablet TAKE 1 TABLET BY MOUTH EVERY DAY 90 tablet 2    magnesium Oxide (MAG-OX) 400 mg TABS Take 1 tablet (400 mg total) by mouth 2 (two) times a day 60 tablet 0    melatonin 3 mg Take 1 tablet (3 mg total) by mouth daily at bedtime 30 tablet 0    Omega-3 Fatty Acids (OMEGA 3 PO) Take by mouth      sertraline (ZOLOFT) 50 mg tablet Take 1 tablet (50 mg total) by mouth  daily 30 tablet 0    traZODone (DESYREL) 50 mg tablet Take 1 tablet (50 mg total) by mouth daily at bedtime as needed for sleep 30 tablet 1     No current facility-administered medications for this visit.        No Known Allergies    Review of Systems    Video Exam    There were no vitals filed for this visit.    Physical Exam     Visit Time    Visit Start Time: 830  Visit Stop Time: 1430  Total Visit Duration:  300 minutes

## 2025-01-16 NOTE — PSYCH
Visit Time    Visit Start Time: 1215  Visit Stop Time: 1315  Total Visit Duration:  60 minutes  Subjective:     Patient ID: Shireen Rueda is a 44 y.o. female.     [facility] Clinical Progress Notes      Specialized Services Documentation  Therapist must complete separate progress note for each specific clinical activity in which the individual participated during the day.      Group Psychotherapy - Worry Time   Shireen Rueda participated actively in a psychotherapy group focused on Worry Time. This group was facilitated virtually in a private office using HIPAA compliant and approved [employer] teams. Shireen Rueda consented to the use of tele-video modality of treatment.   This writer utilized time to openly discus the effects of worrying on mental, emotional, and physical health. Group member then reviewed provided documents on the use of worry time and the various steps required to successfully complete a scheduled worry time. This writer encouraged open dialogue throughout the discussion to facilitate learning through the use of personal experience and applying said experience directly to the skill. Group members were encouraged to complete their very own worry time plan to utilize on a daily basis. Shireen Rueda made good efforts towards progress goals which were displayed through good participation, notetaking, or engagement in topic. Continue to run daily group psychotherapy to meet treatment needs and encourage Shireen Rueda to practice skills outside of program.      Tx Plan Objective: 1.1,1.2,1.4  Therapist: Ashley Costenbader MA LMT

## 2025-01-16 NOTE — PSYCH
Visit Time    Visit Start Time: 0830  Visit Stop Time: 0930  Total Visit Duration:  60 minutes    Subjective:     Patient ID: Shireen Rueda is a 44 y.o. female.    [facility] Clinical Progress Notes      Specialized Services Documentation  Therapist must complete separate progress note for each specific clinical activity in which the individual participated during the day.       EDUCATION THERAPY      Shireen Rueda actively shared in morning assessment and goal review. Presented as Receptive related to readiness to learn. Shireen Rueda  did complete goal from last treatment day identifying she did laundry, gaining hope and responsibility. Shireen Rueda did not present with any barriers to learning. Throughout morning group, Shireen Rueda participated in mindfulness exercise and gratitude practice. Shireen Rueda made positive progress toward goal. Continue education group to assess willingness to engage, assess transfer of knowledge, and participate in skill development.    Tx Plan Objective: 1.1, 1.2, 1.3, 1.4 , Therapist: Ian Hamilton    Visit Time    Visit Start Time: 1330  Visit Stop Time: 1430  Total Visit Duration:  60 minutes    Subjective:     Patient ID: Shireen Rueda is a 44 y.o. female.    [facility] Clinical Progress Notes      Specialized Services Documentation  Therapist must complete separate progress note for each specific clinical activity in which the individual participated during the day.       GROUP PSYCHOTHERAPY    Shireen Rueda participated actively in psychotherapy group.  This was observed Consistent throughout the treatment day. They were engaged in learning related to Wellness Tools. Staff utilized Verbal and Written teaching methods.  Shireen Rueda shared area of learning and set a goal for outside of program to put away laundry.  Shireen Rueda was able to share items explored during the day and shared in adding to their WRAP.  Ended session with staff led exercise in visual meditation.  Shireen Rueda  demonstrated positive progress toward goal.  Continue group psychotherapy to actively practice learned skills and encourage transfer of knowledge to unstructured time.    Tx Plan Objective: 1.1, 1.2, 1.3, 1.4 , Therapist: Ian Hamilton    Group Psychotherapy      Visit Start Time: (1045)  Visit Stop Time: (1145)  Total Visit Duration:  60 minutes    Subjective:     Patient ID: Shireen Rueda 44 y.o.     This group was facilitated virtually in a private office using HIPAA Compliant and Approved [employer] Teams.      Shireen Rueda participated actively in a psychotherapy group focused on setting boundaries. The group focused on the interpersonal effectiveness pillar of DBT; specifically, looking at the GEETHA acronym. Participants followed a step by step breakdown of the GEETHA process and were encouraged to engage in open discussion throughout. Group members were given a GEETHA practice worksheet and time to practice. Shireen Rueda took notes and filled out his practice sheet. Continue to note progress towards goals. Continue with psychotherapy to encourage further development of interpersonal effectiveness skills.  Tx Plan Objective 1.1, 1.2, 1.4 Therapist: MILVIA Hamilton

## 2025-01-17 ENCOUNTER — TELEMEDICINE (OUTPATIENT)
Dept: PSYCHOLOGY | Facility: CLINIC | Age: 45
End: 2025-01-17
Payer: COMMERCIAL

## 2025-01-17 DIAGNOSIS — F32.1 CURRENT MODERATE EPISODE OF MAJOR DEPRESSIVE DISORDER, UNSPECIFIED WHETHER RECURRENT (HCC): Primary | ICD-10-CM

## 2025-01-17 DIAGNOSIS — F41.0 PANIC DISORDER: ICD-10-CM

## 2025-01-17 DIAGNOSIS — F43.21 GRIEF: ICD-10-CM

## 2025-01-17 PROCEDURE — G0177 OPPS/PHP; TRAIN & EDUC SERV: HCPCS

## 2025-01-17 PROCEDURE — G0410 GRP PSYCH PARTIAL HOSP 45-50: HCPCS

## 2025-01-17 PROCEDURE — G0176 OPPS/PHP;ACTIVITY THERAPY: HCPCS

## 2025-01-17 NOTE — PSYCH
Virtual Regular Visit    Verification of patient location:    Patient is located at Home in the following state in which I hold an active license [place]      Assessment/Plan:    Problem List Items Addressed This Visit       Panic disorder    Current moderate episode of major depressive disorder (HCC) - Primary    Grief       Goals addressed in session: PHP VIRTUAL GROUP DUE TO virtual preference of care      Depression Follow-up Plan Completed: Not applicable    Reason for visit is No chief complaint on file.       Encounter provider [facility] PARTIAL PSYCH PROGRAM      Recent Visits  Date Type Provider Dept   01/10/25 Telephone Crystal MD [facility]   Showing recent visits within past 7 days and meeting all other requirements  Future Appointments  No visits were found meeting these conditions.  Showing future appointments within next 150 days and meeting all other requirements       The patient was identified by name and date of birth. Shireen Rueda was informed that this is a telemedicine visit and that the visit is being conducted throughthe Microsoft Teams platform. She agrees to proceed..  My office door was closed. No one else was in the room.  She acknowledged consent and understanding of privacy and security of the video platform. The patient has agreed to participate and understands they can discontinue the visit at any time.    Patient is aware this is a billable service.     Subjective  Shireen Rueda is a 44 y.o. female I spent FIVE GROUP HOURS PLUS CASE MANAGEMENT minutes with patient today in which greater than 50% of time was spent in counseling/coordination of care regarding PHP - SEE NOTES  .      HPI     Past Medical History:   Diagnosis Date    Anemia     Chest tightness or pressure     8/19/2013    Depression 5/14/2017    Hypertension     Vitamin D deficiency        Past Surgical History:   Procedure Laterality Date    CYSTOSCOPY N/A 12/30/2018    Procedure: CYSTOSCOPY;  Surgeon: Lauar  DO Valeriano;  [facility];  Service: Gynecology    HYSTERECTOMY N/A 12/30/2018    Procedure: HYSTERECTOMY LAPAROSCOPIC TOTAL (LTH);  Surgeon: Laura Bal DO;  [facility];  Service: Gynecology    MD LAPAROSCOPY W/RMVL ADNEXAL STRUCTURES Bilateral 12/30/2018    Procedure: SALPINGECTOMY, LAPAROSCOPIC;  Surgeon: Laura Bal DO;  [facility]: [facility];  Service: Gynecology    TONSILLECTOMY      TUBAL LIGATION      2006       Current Outpatient Medications   Medication Sig Dispense Refill    amLODIPine (NORVASC) 2.5 mg tablet Take 1 tablet (2.5 mg total) by mouth daily 30 tablet 0    atorvastatin (LIPITOR) 40 mg tablet TAKE 1 TABLET BY MOUTH EVERY DAY 90 tablet 2    carbamide peroxide (DEBROX) 6.5 % otic solution Administer 5 drops into the left ear 2 (two) times a day (Patient not taking: Reported on 1/3/2025) 15 mL 0    cyanocobalamin (VITAMIN B-12) 1000 MCG tablet Take 1 tablet (1,000 mcg total) by mouth daily 30 tablet 0    ergocalciferol (VITAMIN D2) 50,000 units Take 1 capsule (50,000 Units total) by mouth once a week for 7 doses 4 capsule 0    fluticasone (FLONASE) 50 mcg/act nasal spray SPRAY 1 SPRAY INTO EACH NOSTRIL EVERY DAY (Patient not taking: Reported on 1/3/2025) 24 mL 2    folic acid (FOLVITE) 1 mg tablet Take 1 tablet (1 mg total) by mouth daily 30 tablet 0    hydroCHLOROthiazide 25 mg tablet TAKE 1 TABLET (25 MG TOTAL) BY MOUTH DAILY. 90 tablet 0    hydrOXYzine HCL (ATARAX) 25 mg tablet Take 2 tablets (50 mg total) by mouth every 6 (six) hours as needed for anxiety 30 tablet 0    losartan (COZAAR) 100 MG tablet TAKE 1 TABLET BY MOUTH EVERY DAY 90 tablet 2    magnesium Oxide (MAG-OX) 400 mg TABS Take 1 tablet (400 mg total) by mouth 2 (two) times a day 60 tablet 0    melatonin 3 mg Take 1 tablet (3 mg total) by mouth daily at bedtime 30 tablet 0    Omega-3 Fatty Acids (OMEGA 3 PO) Take by mouth      sertraline (ZOLOFT) 50 mg tablet Take 1 tablet (50 mg total) by mouth daily 30  tablet 0    traZODone (DESYREL) 50 mg tablet Take 1 tablet (50 mg total) by mouth daily at bedtime as needed for sleep 30 tablet 1     No current facility-administered medications for this visit.        No Known Allergies    Review of Systems    Video Exam    There were no vitals filed for this visit.    Physical Exam     Visit Time    Visit Start Time: 830  Visit Stop Time: 1430  Total Visit Duration:  300 minutes

## 2025-01-17 NOTE — PSYCH
Visit Time    Visit Start Time: 0830  Visit Stop Time: 0930  Total Visit Duration:  60 minutes    Subjective:     Patient ID: Shireen Rueda is a 44 y.o. female.    [facility] Clinical Progress Notes      Specialized Services Documentation  Therapist must complete separate progress note for each specific clinical activity in which the individual participated during the day.       EDUCATION THERAPY      Shireen Rueda actively shared in morning assessment and goal review. Presented as Receptive related to readiness to learn. Shireen Rueda  did complete goal from last treatment day identifying she put away laundry, gaining responsibility. Shireen Rueda did not present with any barriers to learning. Throughout morning group, Shireen Rueda participated in mindfulness exercise and gratitude practice. Shireen Rueda made positive progress toward goal. Continue education group to assess willingness to engage, assess transfer of knowledge, and participate in skill development.    Tx Plan Objective: 1.1, 1.2, 1.3, 1.4 , Therapist: Ian Hamilton    Visit Time    Visit Start Time: 1330  Visit Stop Time: 1430  Total Visit Duration:  60 minutes    Subjective:     Patient ID: Shireen Rueda is a 44 y.o. female.    [facility] Clinical Progress Notes      Specialized Services Documentation  Therapist must complete separate progress note for each specific clinical activity in which the individual participated during the day.       GROUP PSYCHOTHERAPY    Shireen Rueda participated actively in psychotherapy group.  This was observed Consistent throughout the treatment day. They were engaged in learning related to Wellness Tools. Staff utilized Verbal teaching methods.  Shireen Rueda shared area of learning and set a goal for outside of program to go bowling with her family.  Shireen Rueda was able to share items explored during the day and shared in adding to their WRAP.  Ended session with staff led exercise in visual meditation.  Shireen Rueda demonstrated  positive progress toward goal.  Continue group psychotherapy to actively practice learned skills and encourage transfer of knowledge to unstructured time.    Tx Plan Objective: 1.1, 1.2, 1.3, 1.4 , Therapist: Ian Hamilton    Group Psychotherapy      Visit Start Time: (1045)  Visit Stop Time: (1145)  Total Visit Duration:  60 minutes    This group was facilitated virtually in a private office using HIPAA Compliant and Approved Microsoft Teams.  Shireen Rueda actively engaged in psychoeducational group about self affirmations. The skill helps to maintain and regulate positive self affirmations and positive self image. Group discovered strategies and statements that help them to manage positive well being on a short term and long term basis. The group talked about understanding the purpose, and meaning of self affirmation in intellectual and emotional expression, regulation , and recognition, and how it affects themselves and others. Teaching on the emphasis of positive self affirmation and self-care, who the group can go to for help was brought up as well. Group was encouraged to ask questions in an open forum at the end of group. Positive progress displayed through engagement in topic, Shireen identified strengths in her family that she admires and built a self-affirmation around those points.  Shireen Rueda will continue to engage in psychotherapy to encourage positive self realization.  Treatment Plan Objective 1.1, 1.2, 1.3, 1.4 Therapist: Ian PAYTON Ed.

## 2025-01-17 NOTE — PSYCH
"Visit Time    Visit Start Time: 0930  Visit Stop Time: 1030  Total Visit Duration:  60 minutes    Subjective:     Patient ID: Shireen Rueda is a 44 y.o. female.    [facility] Clinical Progress Notes      Specialized Services Documentation  Therapist must complete separate progress note for each specific clinical activity in which the individual participated during the day.     Allied Therapy 0930-1030 This group was facilitated virtually in a private office using HIPAA Compliant and Approved Microsoft Teams. Shireen Rueda actively participated in music therapy group regarding the locus of control. The group listened to and discussed the song \"Simple Song\" focusing on the perspective of the shi. The group discussed the concept of control and where they feel they struggle with control in their life. The group then read and discussed the \"locus of control\" handout. The group then worked on the \"what can I control\" worksheet, identifying what in their life is/is not in their control.  Shireen shared what is/is not in her control regarding the grief she has surrounding her brothers death. Some effort noted towards treatment goal. Continue AT to encourage the development of an internal locus of control.  Tx Plan Objective: 1.1,1.2,1.4, Therapist:  BERTHA Lane MT-BC    Case Management Note    BERTHA Lane MT-BC    Current suicide risk : Low     Medications changes/added/denied? No    Treatment session number: 7    Individual Case Management Visit provided today? Yes     [facility] follow up physician's orders: None at this time.     Individual Psychotherapy    Visit Time    Visit Start Time: 1043  Visit Stop Time: 1057  Total Visit Duration: 14 minutes    DAP:    Data - Met with Shireen for individual therapy/case management. She stated she feels she has noticed significant improvement over the last few days and is feeling more hopeful about the future. She stated that she has actively been using coping skills and " has been challenging herself to get out of the house more often. She stated she plans on going bowling this weekend with her family and this writer asked her to identify coping skills she could use if she feels overwhelmed. She stated she still has difficulty pulling herself out of depressive moments when triggered by thoughts of her brother, but she noted she has not been experiencing these emotions has intensely. Discussed upcoming treatment plan review and encouraged her to think of goals she has for herself during the rest of her time in program.     Assessment - Shireen presented brighter and more open in case management. She continues to make good progress in groups, and can identify improvements she notices in her life.     Plan  - Continue work towards treatment goals. Identify goals for her time in PHP.     Tx plan Objectives: 1.0    BERTHA Lane, [facility]

## 2025-01-17 NOTE — PSYCH
Visit Time    Visit Start Time: 1215  Visit Stop Time: 1315  Total Visit Duration: 60 minutes    Subjective:     Patient ID: Shireen Rueda is a 44 y.o. female.    [facility] Clinical Progress Notes      Specialized Services Documentation  Therapist must complete separate progress note for each specific clinical activity in which the individual participated during the day.       Group Psychotherapy Life Skills ([id number]) Shireen Rueda actively engaged in group focused on strengths mapping which was facilitated virtually in a private office using HIPAA Compliant and Approved [employer] Teams. Shireen Rueda consented to the use of tele-video modality of treatment and was virtually present for group psychotherapy today. Group members were instructed to think of a positive experience that they had because they made it positive. Shireen shared their experience and identified the things that they did to make it successful. Shireen Rueda shared the positive experience of raising her kids . Shireen identified the six strengths that they used to make it successful . The group discussed why knowing their strengths was important. Group members discussed how they could apply their strengths to a current stressor. Shireen Rueda continues to make progress towards goals through verbal participation in group; to accomplish long term goals continue to utilize skills learned in programming. Continue with psychotherapy to educate and encourage use of wellness tools. Tx Plan Objective: 1.1,1.2 Therapist: SHALONDA Britton,JORGEW

## 2025-01-20 ENCOUNTER — TELEMEDICINE (OUTPATIENT)
Dept: PSYCHOLOGY | Facility: CLINIC | Age: 45
End: 2025-01-20
Payer: COMMERCIAL

## 2025-01-20 DIAGNOSIS — F43.21 GRIEF: ICD-10-CM

## 2025-01-20 DIAGNOSIS — F41.0 PANIC DISORDER: Primary | ICD-10-CM

## 2025-01-20 DIAGNOSIS — F32.1 CURRENT MODERATE EPISODE OF MAJOR DEPRESSIVE DISORDER, UNSPECIFIED WHETHER RECURRENT (HCC): ICD-10-CM

## 2025-01-20 PROCEDURE — G0410 GRP PSYCH PARTIAL HOSP 45-50: HCPCS

## 2025-01-20 PROCEDURE — G0176 OPPS/PHP;ACTIVITY THERAPY: HCPCS

## 2025-01-20 PROCEDURE — G0177 OPPS/PHP; TRAIN & EDUC SERV: HCPCS

## 2025-01-20 NOTE — PSYCH
Visit Time    Visit Start Time: 0830  Visit Stop Time: 0930  Total Visit Duration:  60 minutes    Subjective:     Patient ID: Shireen Rueda is a 44 y.o. female.    [facility] Clinical Progress Notes      Specialized Services Documentation  Therapist must complete separate progress note for each specific clinical activity in which the individual participated during the day.       EDUCATION THERAPY      Shireen Rueda actively shared in morning assessment and goal review. Presented as Receptive related to readiness to learn. Shireen Rueda  did complete goal from last treatment day identifying she went bowling with her family, gaining hope and support. Shireen Rueda did not present with any barriers to learning. Throughout morning group, Shireen Rueda participated in mindfulness exercise and gratitude practice. Shireen Rueda made positive progress toward goal. Continue education group to assess willingness to engage, assess transfer of knowledge, and participate in skill development.    Tx Plan Objective: 1.1, 1.2, 1.3, 1.4 , Therapist: Ian Hamilton  Visit Time    Visit Start Time: 1330  Visit Stop Time: 1430  Total Visit Duration:  60 minutes    Subjective:     Patient ID: Shireen Rueda is a 44 y.o. female.    [facility] Clinical Progress Notes      Specialized Services Documentation  Therapist must complete separate progress note for each specific clinical activity in which the individual participated during the day.       GROUP PSYCHOTHERAPY    Shireen Rueda participated actively in psychotherapy group.  This was observed Consistent throughout the treatment day. They were engaged in learning related to Wellness Tools. Staff utilized Verbal and Demonstration teaching methods.  Shireen Rueda shared area of learning and set a goal for outside of program to call for a therapist.  Shireen Rueda was able to share items explored during the day and shared in adding to their WRAP.  Ended session with staff led exercise in breathing technique.   Shireen Rueda demonstrated positive progress toward goal.  Continue group psychotherapy to actively practice learned skills and encourage transfer of knowledge to unstructured time.    Tx Plan Objective: 1.1, 1.2, 1.3, 1.4 , Therapist: Ian Hamilton    Group Psychotherapy      Visit Start Time: (930)  Visit Stop Time: (1030)  Total Visit Duration:  60 minutes    Subjective:     Patient ID: Shireen Rueda 44 y.o.    This group was facilitated virtually in a private office using HIPAA Compliant and Approved Microsoft Teams.  Shireen Rueda actively engaged in psychoeducational group about motivation. The skill helps to explore purpose of motivation and the limiting beliefs that hold back motivation The group discovered strategies that help them to develop motivation and the potential barriers on a short term and long term basis. The group talked about understanding the purpose, and meaning of motivation in intellectual and emotional expression, regulation , and recognition, and b it affects themselves and others. Teaching on the emphasis of self monitoring , suggestive solutions, verbal and non-verbal communication, keeping commitments, and strategies were discusses to reduce limited motivation.  Group was encouraged to ask questions in an open forum at the end of group. Positive progress displayed through engagement in topic, Shireen was able to identify several limiting beliefs about her brother passing and her abilities to interact with her family. Shireen Rueda will continue to engage in psychotherapy to encourage positive self realization.  Treatment Plan Objective 1.1, 1.2, 1.3, 1.4 Therapist: Ian PAYTON Ed.

## 2025-01-20 NOTE — PSYCH
Virtual Regular Visit    Verification of patient location:    Patient is located at Home in the following state in which I hold an active license [place]      Assessment/Plan:    Problem List Items Addressed This Visit       Panic disorder - Primary    Current moderate episode of major depressive disorder (HCC)    Grief       Goals addressed in session: PHP VIRTUAL GROUP DUE TO virtual preference of care      Depression Follow-up Plan Completed: Not applicable    Reason for visit is No chief complaint on file.       Encounter provider  PARTIAL PSYCH PROGRAM      Recent Visits  No visits were found meeting these conditions.  Showing recent visits within past 7 days and meeting all other requirements  Future Appointments  No visits were found meeting these conditions.  Showing future appointments within next 150 days and meeting all other requirements       The patient was identified by name and date of birth. Shireen Rueda was informed that this is a telemedicine visit and that the visit is being conducted throughthe Microsoft Teams platform. She agrees to proceed..  My office door was closed. No one else was in the room.  She acknowledged consent and understanding of privacy and security of the video platform. The patient has agreed to participate and understands they can discontinue the visit at any time.    Patient is aware this is a billable service.     Subjective  Shireen Rueda is a 44 y.o. female I spent FIVE GROUP HOURS PLUS CASE MANAGEMENT minutes with patient today in which greater than 50% of time was spent in counseling/coordination of care regarding PHP - SEE NOTES  .      HPI     Past Medical History:   Diagnosis Date    Anemia     Chest tightness or pressure     8/19/2013    Depression 5/14/2017    Hypertension     Vitamin D deficiency        Past Surgical History:   Procedure Laterality Date    CYSTOSCOPY N/A 12/30/2018    Procedure: CYSTOSCOPY;  Surgeon: Laura Bal DO;  [facility];   Service: Gynecology    HYSTERECTOMY N/A 12/30/2018    Procedure: HYSTERECTOMY LAPAROSCOPIC TOTAL (LTH);  Surgeon: Laura Bal DO;  [facility];  Service: Gynecology    IA LAPAROSCOPY W/RMVL ADNEXAL STRUCTURES Bilateral 12/30/2018    Procedure: SALPINGECTOMY, LAPAROSCOPIC;  Surgeon: Laura Bal DO;  [facility];  Service: Gynecology    TONSILLECTOMY      TUBAL LIGATION      2006       Current Outpatient Medications   Medication Sig Dispense Refill    amLODIPine (NORVASC) 2.5 mg tablet Take 1 tablet (2.5 mg total) by mouth daily 30 tablet 0    atorvastatin (LIPITOR) 40 mg tablet TAKE 1 TABLET BY MOUTH EVERY DAY 90 tablet 2    carbamide peroxide (DEBROX) 6.5 % otic solution Administer 5 drops into the left ear 2 (two) times a day (Patient not taking: Reported on 1/3/2025) 15 mL 0    cyanocobalamin (VITAMIN B-12) 1000 MCG tablet Take 1 tablet (1,000 mcg total) by mouth daily 30 tablet 0    ergocalciferol (VITAMIN D2) 50,000 units Take 1 capsule (50,000 Units total) by mouth once a week for 7 doses 4 capsule 0    fluticasone (FLONASE) 50 mcg/act nasal spray SPRAY 1 SPRAY INTO EACH NOSTRIL EVERY DAY (Patient not taking: Reported on 1/3/2025) 24 mL 2    folic acid (FOLVITE) 1 mg tablet Take 1 tablet (1 mg total) by mouth daily 30 tablet 0    hydroCHLOROthiazide 25 mg tablet TAKE 1 TABLET (25 MG TOTAL) BY MOUTH DAILY. 90 tablet 0    hydrOXYzine HCL (ATARAX) 25 mg tablet Take 2 tablets (50 mg total) by mouth every 6 (six) hours as needed for anxiety 30 tablet 0    losartan (COZAAR) 100 MG tablet TAKE 1 TABLET BY MOUTH EVERY DAY 90 tablet 2    magnesium Oxide (MAG-OX) 400 mg TABS Take 1 tablet (400 mg total) by mouth 2 (two) times a day 60 tablet 0    melatonin 3 mg Take 1 tablet (3 mg total) by mouth daily at bedtime 30 tablet 0    Omega-3 Fatty Acids (OMEGA 3 PO) Take by mouth      sertraline (ZOLOFT) 50 mg tablet Take 1 tablet (50 mg total) by mouth daily 30 tablet 0    traZODone (DESYREL) 50 mg  tablet Take 1 tablet (50 mg total) by mouth daily at bedtime as needed for sleep 30 tablet 1     No current facility-administered medications for this visit.        No Known Allergies    Review of Systems    Video Exam    There were no vitals filed for this visit.    Physical Exam     Visit Time    Visit Start Time: 830  Visit Stop Time: 1430  Total Visit Duration:  300 minutes

## 2025-01-20 NOTE — BH TREATMENT PLAN
"Assessment/Plan:      Assessment  Diagnoses and all orders for this visit:     Moderate episode of recurrent major depressive disorder (HCC)     Grief     Panic disorder              Subjective:     Subjective  Patient ID: Shireen Rueda is a 44 y.o. female.     [facility] Treatment Plan   AREAS OF NEED: depression and anxiety as evidenced by low mood, poor appetite, weight loss, poor sleep, poor self care, anxious thoughts, social anxiety, poor focus, struggling with work responsibilities, and guilt. Stressors of brother's death in October of 2024, being out in public, and poor relationships with nieces.   Date Initiated: 01/09/25     Strengths: \"I am a good mom, friend and worker\"      LONG TERM GOAL:   Date Initiated: 01/09/25  1.0 I will identify 3 signs that my depression and anxiety have improved since starting program.   Target Date: 2/6/25  Completion Date:         SHORT TERM OBJECTIVES:      Date Initiated: 01/09/25  1.1 I will identify 3 coping skills I can use to address my depression and anxiety and practice at least 1 daily.   Revision Date: 01/20/25, continue  Target Date: 1/20/25  Completion Date:      Date Initiated: 01/09/25  1.2 I will identify 1 support group I can attend following my time in PHP to address the loss of my brother (grief support, AA, etc.)  Revision Date: 01/20/25, continue  Target Date: 1/20/25  Completion Date:      Date Initiated: 01/09/25  1.3 I will take medication as prescribed and share questions and concerns if they arise.  Revision Date: 01/20/25, continue  Target Date: 1/20/25  Completion Date:       Date Initiated: 01/09/25  1.4 I will identify 3 ways my supports can assist in my recovery and agree to staff/support contact as indicated.   Revision Date: 01/20/25, continue  Target Date: 1/20/25  Completion Date:             7 DAY REVISION:   1.5 I will reach out to and attempt to establish a follow up appointment with an outpatient therapist in preporation for discharge. "   Date Initiated: 01/20/25   Revision Date:   Target Date: 1/29/25  Completion Date:        PSYCHIATRY:  Date Initiated: 01/09/25  Medication Management and Education       Revision Date: 01/20/25  The person(s) responsible for carrying out the plan is Rubén Cardenas DO; Annabelle Claudio PA-C    NURSING/SYMPTOM EDUCATION:  Date Initiated: 01/09/25  1.1, 1.2. 1.3, 1.4 This RN and/or Therapist will provide wellness/symptoms and skill education groups three to five days weekly to educate Shireen Rueda on signs and symptoms of diagnoses, skills to manage, and medication questions that will be addressed by the treatment team.    Revision date: 01/20/25  This RN and/or Therapist will continue to provide wellness/symptoms and skill education groups three to five days weekly to educate Shireen Rueda on signs and symptoms of diagnoses, skills to manage, and medication questions that will be addressed by the treatment team.  The person(s) responsible for carrying out the plan is Gwen Thurman RN;  JAY Francis ED    PSYCHOLOGY:   Date Initiated: 01/09/25       1.1, 1.2, 1.4 Provide psychotherapy group 5 times per week to allow opportunity for Shireen Rueda  to explore stressors and ways of coping.   Revision Date: 01/20/25  Continue to provide psychotherapy group 5 times per week to allow opportunity for Shireen Rueda  to explore stressors and ways of coping.  The person(s) responsible for carrying out the plan is SHALONDA Britton, LSW; Ashley Costenbader, MA LMT    ALLIED THERAPY:   Date Initiated: 01/09/25  1.1,1.2 Engage Shireen Rueda in AT group 3-5 times per week to encourage development and use of wellness tools to decrease symptoms and promote recovery through meaningful activity.  Revision Date: 01/20/25  Continue to engage Shireen Rueda in AT group 3-5 times per week to encourage development and use of wellness tools to decrease symptoms and promote recovery through meaningful activity.  The person(s) responsible  for carrying out the plan is EDUAR Mtz; BERTHA Lane MT-BC    CASE MANAGEMENT:   Date Initiated: 01/09/25      1.0 This  will meet with Shireen Rueda  3-4 times weekly to assess treatment progress, discharge planning, connection to community supports and UR as indicated.  Revision Date: 01/20/25  This  will continue to meet with Shireen Rueda  3-4 times weekly to assess treatment progress, discharge planning, connection to community supports and UR as indicated.  The person(s) responsible for carrying out the plan is BERTHA Lane MT-BC        TREATMENT REVIEW/COMMENTS:      DISCHARGE CRITERIA:Identify 3 signs of progress and complete relapse prevention plan.   DISCHARGE PLAN: Discharge to OP therapy and psychiatry  Estimated Length of Stay: 10 treatment days               Diagnosis and Treatment Plan explained to hSireen, Shireen relates understanding diagnosis and is agreeable to Treatment Plan.            CLIENT COMMENTS / Please share your thoughts, feelings, need and/or experiences regarding your treatment plan with Staff.  Please see follow up note with comments.

## 2025-01-20 NOTE — PSYCH
Visit Time    Visit Start Time: 1215  Visit Stop Time: 1315  Total Visit Duration: 30 minutes    Subjective:     Patient ID: Shireen Rueda is a 44 y.o. female.    [facility] Clinical Progress Notes      Specialized Services Documentation  Therapist must complete separate progress note for each specific clinical activity in which the individual participated during the day.       Group Psychotherapy Life Skills ([id number]) Shireen Rueda actively engaged in group focused on values based behaviors which was facilitated virtually in a private office using HIPAA Compliant and Approved [employer] Teams. Shireen consented to the use of tele-video modality of treatment and was virtually present for group psychotherapy today. During the activity participants thought about each of their top three values and created a goal and action steps to take towards achieving that value-based behavior. Participants discussed one of their values and their intentions to respond to a situation. Shireen stated that one of their top values is communication and family . Participants shared their goal and action plan towards achieving that value-based behavior. We discussed the importance of acting and react with their values and not their emotions. Shireen continues to make progress towards goals through verbal participation in group; to accomplish long term goals continue to utilize skills learned in programming. Continue with psychotherapy to educate and encourage use of wellness tools. Tx Plan Objective: 1.1,1.2 Therapist: Fabiana Banks,SHALONDA, LSW

## 2025-01-20 NOTE — PSYCH
"Visit Time    Visit Start Time: 1045  Visit Stop Time: 1145  Total Visit Duration:  60 minutes    Subjective:     Patient ID: Shireen Rueda is a 44 y.o. female.    [facility] Clinical Progress Notes      Specialized Services Documentation  Therapist must complete separate progress note for each specific clinical activity in which the individual participated during the day.     Allied Therapy [id number] This group was facilitated virtually in a private office using HIPAA Compliant and Approved [employer] Teams. Shireen Rueda actively participated in an open process music therapy group regarding music auto-biographies. The group was asked to identify songs that carried meaning for different phases of their lives. The group was then asked to share a song from when they first became aware of their mental health struggles, their present self, and a future vision of themself. The group participated in an open processing of the songs shared. Shireen shared the song \"Let it Be\" which represented finding peace in hardship and letting go of her traumatic past. Some effort noted towards treatment goal. Continue AT to encourage the development of self understanding and a hopeful vision of the future. Tx Plan Objective: 1.1,1.2,1.4, Therapist:  BERTHA Lane MT-BC    Case Management Note    Visit Time    Visit Start Time: 1215  Visit Stop Time: 1242  Total Visit Duration: 27 minutes    BERTHA Lane MT-BC    Current suicide risk : Low     Met with Shireen for case management. Treatment plan reviewed and signed. She stated she feels she is making good progress in program and feels she us using coping skills more effectively than in the past. Discussed discharge and set a tentative discharge goal for late this week/early next week. Encouraged her to reach out to OP therapist and attempt to establish follow up.     I,Shireen Rueda,am physically unable to provide a signature; however, I understand the nature of and am in agreement with " the documentation presented to me via TEAMS.  I have received a copy through My Chart and/or the [employer].  I freely give verbal consent.  Name of Document (s): treatment plan  Witness to verbal consent: BERTHA Lane, MT-BC  Witness to verbal consent: Fabiana Banks    Medications changes/added/denied? No    Treatment session number: 8    Individual Case Management Visit provided today? Yes     [facility] follow up physician's orders: None at this time.

## 2025-01-21 ENCOUNTER — TELEMEDICINE (OUTPATIENT)
Dept: PSYCHOLOGY | Facility: CLINIC | Age: 45
End: 2025-01-21
Payer: COMMERCIAL

## 2025-01-21 DIAGNOSIS — F41.0 PANIC DISORDER: ICD-10-CM

## 2025-01-21 DIAGNOSIS — F33.1 MODERATE EPISODE OF RECURRENT MAJOR DEPRESSIVE DISORDER (HCC): Primary | ICD-10-CM

## 2025-01-21 DIAGNOSIS — F43.21 GRIEF: ICD-10-CM

## 2025-01-21 PROCEDURE — G0410 GRP PSYCH PARTIAL HOSP 45-50: HCPCS

## 2025-01-21 PROCEDURE — G0177 OPPS/PHP; TRAIN & EDUC SERV: HCPCS

## 2025-01-21 NOTE — PSYCH
Visit Time    Visit Start Time: 0830  Visit Stop Time: 0930  Total Visit Duration: 60 minutes     Subjective:     Patient ID: Shireen Rueda is a 44 y.o. female.    [facility] Clinical Progress Notes      Specialized Services Documentation  Therapist must complete separate progress note for each specific clinical activity in which the individual participated during the day.       EDUCATION THERAPY    This group was facilitated virtually in a private office using HIPAA Compliant and Approved [employer] Teams.  Shireen Rueda consented to the use of tele-video modality of treatment.    Shireen Rueda actively shared in morning assessment and goal review. Presented as Receptive related to readiness to learn. Shireen Rueda  did complete goal from last treatment day identifying gaining hope, advocacy, responsibility, and support. Shireen Rueda did not present with any barriers to learning. Throughout morning group, Shireen Rueda participated in mindfulness exercise. Shireen Rueda made some positive progress toward goal. Continue education group to assess willingness to engage, assess transfer of knowledge, and participate in skill development.    Tx Plan Objective: 1.1, 1.2, 1.3, 1.4 Gwen SHAW RN       Visit Time    Visit Start Time: 1215  Visit Stop Time: 1315  Total Visit Duration: 60 minutes       Group Psychotherapy  This group was facilitated virtually in a private office using HIPAA Compliant and Approved [employer] Teams.  Shireen Rueda consented to the use of tele-video modality of treatment.  ([id number]) Shireen Rueda actively participated in psychoeducation group this afternoon which focused on sleep hygiene.  Group then identified sleep hygiene habits that are currently effective and habits they wish to incorporate into their night time routine to promote sleep hygiene.  This writer explained the importance of quality sleep in relation to wellness.  Sleep diary and additional tips on sleep hygiene were discussed.  " Then, a video titled \"How to Improve Your Sleep\" was viewed. Positive progress toward goal observed.  Continue psychoeducation group to increase awareness of good sleeping habits to promote wellness.      Tx Plan Objectives: 1.1, 1.2      Gwen SHAW RN      "

## 2025-01-21 NOTE — PSYCH
Visit Time    Visit Start Time: 1330  Visit Stop Time: 1430  Total Visit Duration: 60 minutes    Subjective:     Patient ID: Shireen Rueda is a 44 y.o. female.    [facility] Clinical Progress Notes      Specialized Services Documentation  Therapist must complete separate progress note for each specific clinical activity in which the individual participated during the day.       GROUP PSYCHOTHERAPY    This group was facilitated virtually in a private office using HIPAA Compliant and Approved [employer] Teams. Shireen Rueda participated actively in psychotherapy group.  This was observed consistently throughout the treatment day. They were engaged in learning related to Illness and Wellness Tools. Staff utilized Verbal, A/V, and Demonstration teaching methods.  Shireen Rueda shared area of learning and set a goal for outside of program to go shopping at [employer].  Shireen Rueda was able to share items explored during the day and encouraged to add to their WRAP.  Ended session with staff led exercise of PMR.  Shireen Rueda demonstrated some progress toward goal.  Continue group psychotherapy to actively practice learned skills and encourage transfer of knowledge to unstructured time.    Tx Plan Objective: 1.1, Therapist: River Viera     Case Management Note    River Viera, [facility], [facility]    Current suicide risk : Low     No case management requested or scheduled aware of next 1:1.    Medications changes/added/denied? No    Treatment session number: 9    Individual Case Management Visit provided today? No    [facility] follow up physician's orders: None at this time.

## 2025-01-21 NOTE — PSYCH
Visit Time     Visit Start Time: 1045  Visit Stop Time: 1145  Total Visit Duration: 60   minutes       Subjective:     Patient ID: Shireen Rueda is a 44 y.o. female.     [facility] Clinical Progress Notes      Specialized Services Documentation  Therapist must complete separate progress note for each specific clinical activity in which the individual participated during the day.      Group Psychotherapy - Coping Ahead    Shireen Rueda participated actively in a psychotherapy group focused on coping ahead.  This writer detailed the coping ahead skill with group members and developed a coping ahead plan together. Following this, members were encouraged to work on their own coping ahead plans. Consistent discussion was encouraged throughout the group duration. Shireen Rueda made good efforts towards progress goals which were displayed through participation, notetaking, and engagement in topic.  Continue to run daily group psychotherapy to meet treatment needs and encourage Shireen Rueda to practice skills outside of program.      Tx Plan Objective:1.1,1.2,1.4  Therapist: Ashley Costenbader MA LMT      This group was facilitated virtually in a private office using HIPAA Compliant and Approved [employer] Teams.  Shireen Rueda consented to the use of tele-video modality of treatment.

## 2025-01-21 NOTE — PSYCH
Group Psychotherapy      Visit Start Time: (930)  Visit Stop Time: (1030)  Total Visit Duration:  60 minutes  Group Therapy    Subjective:     Patient ID: Shireen Rueda 44 y.o.       This group was facilitated virtually in a private office using HIPAA Compliant and Approved [employer] Teams.  Shireen Rueda actively engaged in psychoeducational group about coping with loneliness. The skill helps to identify way to decrease feeling of loneliness and behavior change focused toward a specified goal. Group explored the identifying factors that create loneliness, this process can help them to take care of emotional and intellectual moments of loneliness on a short term and long term basis. The group talked about understanding the meaning of loneliness in regards to physical, intellectual, and emotional expression, regulation , and recognition, and how it affects themselves and others. Teaching on the emphasis of self monitoring and relapse, the group explored who they can go to for help was brought up as well. Group was encouraged to ask questions in an open forum at the end of group. Positive progress displayed through engagement in topic, Ximena was an active member of the group discussion.Shireen Rueda will continue to engage in psychotherapy to encourage positive self realization.  Treatment Plan Objective 1.1, 1.2, 1.3, 1.4 Therapist: Ian PAYTON Ed.

## 2025-01-21 NOTE — PSYCH
Virtual Regular Visit    Verification of patient location:    Patient is located at Home in the following state in which I hold an active license [place]      Assessment/Plan:    Problem List Items Addressed This Visit       Panic disorder    Major depressive disorder, moderate, without psychotic features (HCC) - Primary    Grief       Goals addressed in session: PHP VIRTUAL GROUP DUE TO virtual preference of care      Depression Follow-up Plan Completed: Not applicable    Reason for visit is No chief complaint on file.       Encounter provider  PARTIAL PSYCH PROGRAM      Recent Visits  No visits were found meeting these conditions.  Showing recent visits within past 7 days and meeting all other requirements  Future Appointments  No visits were found meeting these conditions.  Showing future appointments within next 150 days and meeting all other requirements       The patient was identified by name and date of birth. Shireen Rueda was informed that this is a telemedicine visit and that the visit is being conducted throughthe [employer] Teams platform. She agrees to proceed..  My office door was closed. No one else was in the room.  She acknowledged consent and understanding of privacy and security of the video platform. The patient has agreed to participate and understands they can discontinue the visit at any time.    Patient is aware this is a billable service.     Subjective  Shireen Rueda is a 44 y.o. female I spent FIVE GROUP HOURS PLUS CASE MANAGEMENT minutes with patient today in which greater than 50% of time was spent in counseling/coordination of care regarding PHP - SEE NOTES  .      HPI     Past Medical History:   Diagnosis Date    Anemia     Chest tightness or pressure     8/19/2013    Depression 5/14/2017    Hypertension     Vitamin D deficiency        Past Surgical History:   Procedure Laterality Date    CYSTOSCOPY N/A 12/30/2018    Procedure: CYSTOSCOPY;  Surgeon: Laura Bal DO;  Location: [facility]  OR;  Service: Gynecology    HYSTERECTOMY N/A 12/30/2018    Procedure: HYSTERECTOMY LAPAROSCOPIC TOTAL (LTH);  Surgeon: Laura Bal DO;  Location: [facility] OR;  Service: Gynecology    NE LAPAROSCOPY W/RMVL ADNEXAL STRUCTURES Bilateral 12/30/2018    Procedure: SALPINGECTOMY, LAPAROSCOPIC;  Surgeon: Laura Bal DO;  Location: [facility];  Service: Gynecology    TONSILLECTOMY      TUBAL LIGATION      2006       Current Outpatient Medications   Medication Sig Dispense Refill    amLODIPine (NORVASC) 2.5 mg tablet Take 1 tablet (2.5 mg total) by mouth daily 30 tablet 0    atorvastatin (LIPITOR) 40 mg tablet TAKE 1 TABLET BY MOUTH EVERY DAY 90 tablet 2    carbamide peroxide (DEBROX) 6.5 % otic solution Administer 5 drops into the left ear 2 (two) times a day (Patient not taking: Reported on 1/3/2025) 15 mL 0    cyanocobalamin (VITAMIN B-12) 1000 MCG tablet Take 1 tablet (1,000 mcg total) by mouth daily 30 tablet 0    ergocalciferol (VITAMIN D2) 50,000 units Take 1 capsule (50,000 Units total) by mouth once a week for 7 doses 4 capsule 0    fluticasone (FLONASE) 50 mcg/act nasal spray SPRAY 1 SPRAY INTO EACH NOSTRIL EVERY DAY (Patient not taking: Reported on 1/3/2025) 24 mL 2    folic acid (FOLVITE) 1 mg tablet Take 1 tablet (1 mg total) by mouth daily 30 tablet 0    hydroCHLOROthiazide 25 mg tablet TAKE 1 TABLET (25 MG TOTAL) BY MOUTH DAILY. 90 tablet 0    hydrOXYzine HCL (ATARAX) 25 mg tablet Take 2 tablets (50 mg total) by mouth every 6 (six) hours as needed for anxiety 30 tablet 0    losartan (COZAAR) 100 MG tablet TAKE 1 TABLET BY MOUTH EVERY DAY 90 tablet 2    magnesium Oxide (MAG-OX) 400 mg TABS Take 1 tablet (400 mg total) by mouth 2 (two) times a day 60 tablet 0    melatonin 3 mg Take 1 tablet (3 mg total) by mouth daily at bedtime 30 tablet 0    Omega-3 Fatty Acids (OMEGA 3 PO) Take by mouth      sertraline (ZOLOFT) 50 mg tablet Take 1 tablet (50 mg total) by mouth daily 30 tablet 0    traZODone (DESYREL) 50  mg tablet Take 1 tablet (50 mg total) by mouth daily at bedtime as needed for sleep 30 tablet 1     No current facility-administered medications for this visit.        No Known Allergies    Review of Systems    Video Exam    There were no vitals filed for this visit.    Physical Exam     Visit Time    Visit Start Time: 830  Visit Stop Time: 1430  Total Visit Duration:  300 minutes

## 2025-01-22 ENCOUNTER — TELEMEDICINE (OUTPATIENT)
Dept: PSYCHOLOGY | Facility: CLINIC | Age: 45
End: 2025-01-22
Payer: COMMERCIAL

## 2025-01-22 ENCOUNTER — TELEMEDICINE (OUTPATIENT)
Dept: PSYCHIATRY | Facility: CLINIC | Age: 45
End: 2025-01-22
Payer: COMMERCIAL

## 2025-01-22 DIAGNOSIS — F43.21 GRIEF: ICD-10-CM

## 2025-01-22 DIAGNOSIS — F33.1 MODERATE EPISODE OF RECURRENT MAJOR DEPRESSIVE DISORDER (HCC): Primary | ICD-10-CM

## 2025-01-22 DIAGNOSIS — F41.0 PANIC DISORDER: ICD-10-CM

## 2025-01-22 PROCEDURE — G0410 GRP PSYCH PARTIAL HOSP 45-50: HCPCS

## 2025-01-22 PROCEDURE — G0177 OPPS/PHP; TRAIN & EDUC SERV: HCPCS

## 2025-01-22 PROCEDURE — G0176 OPPS/PHP;ACTIVITY THERAPY: HCPCS

## 2025-01-22 PROCEDURE — 99214 OFFICE O/P EST MOD 30 MIN: CPT | Performed by: PHYSICIAN ASSISTANT

## 2025-01-22 RX ORDER — HYDROXYZINE HYDROCHLORIDE 25 MG/1
TABLET, FILM COATED ORAL
Qty: 50 TABLET | Refills: 0 | Status: SHIPPED | OUTPATIENT
Start: 2025-01-22 | End: 2025-02-21

## 2025-01-22 NOTE — PSYCH
Virtual Regular Visit    Verification of patient location:    Patient is located at Home in the following state in which I hold an active license [place]          Reason for visit is   Chief Complaint   Patient presents with    Virtual Regular Visit          Encounter provider Annabelle Claudio PA-C      Recent Visits  Date Type Provider Dept   01/15/25 Telemedicine Annabelle Claudio PA-C [facility]   Showing recent visits within past 7 days and meeting all other requirements  Today's Visits  Date Type Provider Dept   01/22/25 Telemedicine Annabelle Claudio PA-C [facility]   Showing today's visits and meeting all other requirements  Future Appointments  No visits were found meeting these conditions.  Showing future appointments within next 150 days and meeting all other requirements       The patient was identified by name and date of birth. Shireen Rueda was informed that this is a telemedicine visit and that the visit is being conducted throughthe Microsoft Teams platform. She agrees to proceed..  My office door was closed. No one else was in the room.  She acknowledged consent and understanding of privacy and security of the video platform. The patient has agreed to participate and understands they can discontinue the visit at any time.    Patient is aware this is a billable service.           Progress Note - Behavioral Health   [facility]  Shireen Rueda 44 y.o. female MRN: [id number]   This note was not shared with the patient due to reasonable likelihood of causing patient harm    Progress at partial program: some improvement.      Chart reviewed and discussed in tx team.  Shireen seen by Porsceh 1/15 at which time meds unchanged.  Shireen reports that despite anxiety she was able to go bowling with her family last weekend -was able to cope with anxiety that she felt with family's support. Reports persistent anxiety with need for prn atarax every afternoon.  Has had these  afternoon symptoms in past prior to start of zoloft.  Is eating lunch. Reports pm anxiety accompanied by apprehension, restlessness, poor concentration.  Excessive worry also present most days. Denies SI.  +early am awakenings without trazodone.  Denies dizziness, abdominal pain, diarrhea.   Appetite normalizing.     ROS:   As above otherwise negative    Active Ambulatory Problems     Diagnosis Date Noted    Thyroid nodule 05/14/2017    Depression with anxiety 07/12/2016    Elevated liver enzymes 06/27/2016    High triglycerides 07/12/2016    Overweight (BMI 25.0-29.9) 12/08/2015    Panic disorder 06/13/2016    Vitamin D deficiency 05/15/2017    Hyperpigmentation 06/03/2019    Essential hypertension 10/30/2019    Tobacco use 11/01/2019    Chest discomfort 09/21/2021    Anxiety 10/06/2021    Annual physical exam 07/19/2022    Dyslipidemia 11/29/2022    Urinary urgency 12/19/2022    Breast cancer screening by mammogram 02/24/2023    Dysuria 03/24/2023    Left non-suppurative otitis media 05/30/2023    Encounter for weight management 06/20/2023    Left ear pain 10/30/2023    Allergic rhinitis due to allergen 10/30/2023    Chronic fatigue 01/11/2024    Palpitations 07/08/2024    Other secondary hypertension 07/08/2024    Major depressive disorder, moderate, without psychotic features (HCC) 01/04/2025    Current moderate episode of major depressive disorder (HCC) 01/09/2025    Grief 01/09/2025     Resolved Ambulatory Problems     Diagnosis Date Noted    Symptomatic anemia 05/14/2017    Abnormal uterine bleeding (AUB) 05/14/2017    Transaminitis 05/14/2017    Depression 05/14/2017    Iron deficiency anemia 11/07/2018    Pelvic pain in female 12/28/2018    Fibroids, intramural 12/28/2018    Anemia 08/12/2014    Elevated blood-pressure reading without diagnosis of hypertension 12/08/2015    Irregular menstrual cycle 05/09/2017    Menorrhagia 08/11/2014    Tachycardia 08/04/2014    Vision changes 08/04/2014    Weight gain  05/09/2017    Close exposure to COVID-19 virus 04/07/2020    Cracked lips 05/13/2020    COVID-19 ruled out 07/15/2020    Healthcare maintenance 07/15/2020    Hypokalemia 12/15/2020    Acute non-recurrent maxillary sinusitis 04/23/2021    Acute cystitis without hematuria 06/23/2021    Dizziness 10/06/2021    Non-recurrent acute suppurative otitis media of both ears without spontaneous rupture of tympanic membranes 05/19/2023    Acute otitis externa of left ear 05/19/2023    Medical clearance for psychiatric admission 10/30/2023    Screening for cardiovascular condition 07/08/2024    Hypomagnesemia 01/04/2025     Past Medical History:   Diagnosis Date    Chest tightness or pressure     Hypertension      Family History   Problem Relation Age of Onset    No Known Problems Mother     Heart disease Father     Hypertension Father     Heart attack Father 37    No Known Problems Sister     No Known Problems Daughter     No Known Problems Daughter     No Known Problems Maternal Grandmother     No Known Problems Maternal Grandfather     No Known Problems Paternal Grandmother     No Known Problems Paternal Grandfather     No Known Problems Son     No Known Problems Maternal Aunt     No Known Problems Maternal Aunt     No Known Problems Maternal Aunt     No Known Problems Maternal Aunt     No Known Problems Maternal Aunt     No Known Problems Paternal Aunt     No Known Problems Paternal Aunt     No Known Problems Half-Sister     Breast cancer Neg Hx     Colon cancer Neg Hx     Ovarian cancer Neg Hx     Endometrial cancer Neg Hx      Social History     Socioeconomic History    Marital status: /Civil Union     Spouse name: Not on file    Number of children: Not on file    Years of education: Not on file    Highest education level: Not on file   Occupational History    Not on file   Tobacco Use    Smoking status: Some Days     Types: Cigarettes    Smokeless tobacco: Never    Tobacco comments:     social smoker, less than 1  pack per weekend   Vaping Use    Vaping status: Never Used   Substance and Sexual Activity    Alcohol use: Yes     Alcohol/week: 3.0 standard drinks of alcohol     Types: 3 Glasses of wine per week     Comment: on the weekends/socially    Drug use: No    Sexual activity: Yes     Partners: Male     Birth control/protection: Female Sterilization, Other   Other Topics Concern    Not on file   Social History Narrative    Uses safety equipment-seatbelts     Social Drivers of Health     Financial Resource Strain: Low Risk  (7/8/2024)    Overall Financial Resource Strain (CARDIA)     Difficulty of Paying Living Expenses: Not hard at all   Food Insecurity: No Food Insecurity (1/6/2025)    Hunger Vital Sign     Worried About Running Out of Food in the Last Year: Never true     Ran Out of Food in the Last Year: Never true   Transportation Needs: No Transportation Needs (1/6/2025)    PRAPARE - Transportation     Lack of Transportation (Medical): No     Lack of Transportation (Non-Medical): No   Physical Activity: Insufficiently Active (9/28/2023)    Exercise Vital Sign     Days of Exercise per Week: 2 days     Minutes of Exercise per Session: 50 min   Stress: No Stress Concern Present (7/8/2024)    [employer] [place] [employer] - Occupational Stress Questionnaire     Feeling of Stress : Not at all   Social Connections: Moderately Isolated (9/28/2023)    Social Connection and Isolation Panel [NHANES]     Frequency of Communication with Friends and Family: More than three times a week     Frequency of Social Gatherings with Friends and Family: More than three times a week     Attends [employer] Services: Never     Active Member of Clubs or Organizations: No     Attends Club or Organization Meetings: Never     Marital Status:    Intimate Partner Violence: Not At Risk (1/6/2025)    Humiliation, Afraid, Rape, and Kick questionnaire     Fear of Current or Ex-Partner: No     Emotionally Abused: No     Physically  Abused: No     Sexually Abused: No   Housing Stability: Low Risk  (1/6/2025)    Housing Stability Vital Sign     Unable to Pay for Housing in the Last Year: No     Number of Times Moved in the Last Year: 1     Homeless in the Last Year: No     No Known Allergies       Mental Status Evaluation:    Appearance:  age appropriate, casually dressed   Behavior:  pleasant, cooperative   Speech:  normal rate and volume   Mood:  anxious   Affect:  mood-congruent   Thought Process:  goal directed   Associations: intact associations   Thought Content:  no overt delusions   Perceptual Disturbances: none   Risk Potential: Suicidal ideation - None  Homicidal ideation - None   Sensorium:  oriented to person, place, and time/date   Memory:  recent and remote memory grossly intact   Consciousness:  alert and awake   Attention: attention span and concentration are age appropriate   Insight:  intact   Judgment: intact   Gait/Station: unable to assess   Motor Activity: unable to assess today due to virtual visit       Laboratory results: I have personally reviewed all pertinent laboratory/tests results.      Assessment   Diagnoses and all orders for this visit:    Major depressive disorder, moderate, without psychotic features (HCC)  -     sertraline (ZOLOFT) 50 mg tablet; Take 1.5 tablets (75 mg total) by mouth daily    Grief  -     hydrOXYzine HCL (ATARAX) 25 mg tablet; 1-2 po q 6 hrs prn anxiety    Panic disorder       Current Outpatient Medications   Medication Sig Dispense Refill    hydrOXYzine HCL (ATARAX) 25 mg tablet 1-2 po q 6 hrs prn anxiety 50 tablet 0    sertraline (ZOLOFT) 50 mg tablet Take 1.5 tablets (75 mg total) by mouth daily 135 tablet 0    amLODIPine (NORVASC) 2.5 mg tablet Take 1 tablet (2.5 mg total) by mouth daily 30 tablet 0    atorvastatin (LIPITOR) 40 mg tablet TAKE 1 TABLET BY MOUTH EVERY DAY 90 tablet 2    carbamide peroxide (DEBROX) 6.5 % otic solution Administer 5 drops into the left ear 2 (two) times a day  (Patient not taking: Reported on 1/3/2025) 15 mL 0    cyanocobalamin (VITAMIN B-12) 1000 MCG tablet Take 1 tablet (1,000 mcg total) by mouth daily 30 tablet 0    ergocalciferol (VITAMIN D2) 50,000 units Take 1 capsule (50,000 Units total) by mouth once a week for 7 doses 4 capsule 0    fluticasone (FLONASE) 50 mcg/act nasal spray SPRAY 1 SPRAY INTO EACH NOSTRIL EVERY DAY (Patient not taking: Reported on 1/3/2025) 24 mL 2    folic acid (FOLVITE) 1 mg tablet Take 1 tablet (1 mg total) by mouth daily 30 tablet 0    hydroCHLOROthiazide 25 mg tablet TAKE 1 TABLET (25 MG TOTAL) BY MOUTH DAILY. 90 tablet 0    losartan (COZAAR) 100 MG tablet TAKE 1 TABLET BY MOUTH EVERY DAY 90 tablet 2    magnesium Oxide (MAG-OX) 400 mg TABS Take 1 tablet (400 mg total) by mouth 2 (two) times a day 60 tablet 0    melatonin 3 mg Take 1 tablet (3 mg total) by mouth daily at bedtime 30 tablet 0    Omega-3 Fatty Acids (OMEGA 3 PO) Take by mouth      traZODone (DESYREL) 50 mg tablet Take 1 tablet (50 mg total) by mouth daily at bedtime as needed for sleep 30 tablet 1     No current facility-administered medications for this visit.         Plan    Planned medication and treatment changes:   increase zoloft to 75 mg/d for anxiety.  Cont atarax, trazodone unchanged.  Ok to f/u with PCP on discharge but was given Hannah's number to call if they feel referral to psychiatry is needed.  Hannah encouraged Shireen to speak directly to me and I will give her appt.   Discharge per tx team- end of week ok.     All current active medications have been reviewed.  Continue treatment with group therapy and partial program      Risks / Benefits of Treatment:    Risks, benefits, and possible side effects of medications explained to patient and patient verbalizes understanding and agrees to medications.     Counseling / Coordination of Care:    Patient's progress discussed with staff in treatment team meeting.  Medications, treatment progress and treatment plan reviewed  with patient.    Annabelle Claudio PA-C 01/22/25

## 2025-01-22 NOTE — PSYCH
"Visit Time    Visit Start Time: 1045  Visit Stop Time: 1145  Total Visit Duration:  60 minutes    Subjective:     Patient ID: Shireen Rueda is a 44 y.o. female.    [facility] Clinical Progress Notes      Specialized Services Documentation  Therapist must complete separate progress note for each specific clinical activity in which the individual participated during the day.     Allied Therapy [id number] This group was facilitated virtually in a private office using HIPAA Compliant and Approved Microsoft Teams. Shireen Rueda actively participated in music therapy group regarding grief and loss. The group participated in a discussion about their own experiences with grief and loss. The group then participated in a dereje discussion on the song \"[employer]\". The group read and discussed handouts regarding the stages of grief and general facts about grief. The group participated in a dereje discussion on \"Because of You\". Shireen shared her thoughts on the songs and engaged in group discussion throughout. Some effort noted towards treatment goal. Continue AT to encourage the development and proactive use of coping techniques.  Tx Plan Objective: 1.1,1.2,1.4, Therapist:  BERTHA Lane MT-BC    Case Management Note    BRETHA Lane MT-BC    Current suicide risk : Low     Medications changes/added/denied? Yes, see Annabelle Claudio PA-C note    Treatment session number: 9    Individual Case Management Visit provided today? Yes     [facility] follow up physician's orders: None at this time.     Individual Psychotherapy    Visit Time    Visit Start Time: 1215  Visit Stop Time: 1230  Total Visit Duration: 15 minutes      DAP:    Data - Met with Shireen for individual therapy/case management. She stated that she struggled in the Grief group, but recognized that it was beneficial. She stated she realized that she has more grief related to her childhood and her relationship with her father than she previously realized and " stated she would like to work more on this. Discussed writing a letter to her father stating everything she would ideally like to express, and if she felt she needed to read it to her  or send it to her father. She stated that she feels she is continuing to benefit from program, but would like to work more on this trauma before leaving. Set tentative DC plan for 1/29/25. Safety plan to be created next CM meeting.     Assessment - Shireen continues to make good progress in group. She continues to open up more in groups and CM and shows greater insight into mental health needs.     Plan  - Continue work towards treatment goals. Write a letter to her father to begin addressing past trauma.     Tx plan Objectives: 1.0    BERTHA Lane, MT-BC

## 2025-01-22 NOTE — PSYCH
Visit Time    Visit Start Time: 0830  Visit Stop Time: 0930  Total Visit Duration:  60 minutes    Subjective:     Patient ID: Shireen Rueda is a 44 y.o. female.    [facility] Clinical Progress Notes      Specialized Services Documentation  Therapist must complete separate progress note for each specific clinical activity in which the individual participated during the day.       EDUCATION THERAPY      Shireen Rueda actively shared in morning assessment and goal review. Presented as Receptive related to readiness to learn. Shireen Rueda  did complete goal from last treatment day identifying she went to [employer],gaining responsibility. Shireen Rueda did not present with any barriers to learning. Throughout morning group, Shireen Rueda participated in mindfulness exercise and gratitude practice. Shireen Rueda made positive progress toward goal. Continue education group to assess willingness to engage, assess transfer of knowledge, and participate in skill development.    Tx Plan Objective: 1.1, 1.2, 1.3, 1.4 , Therapist: Ian Hamilton    Visit Time    Visit Start Time: 1330  Visit Stop Time: 1430  Total Visit Duration:  60 minutes    Subjective:     Patient ID: Shireen Rueda is a 44 y.o. female.    [facility] Clinical Progress Notes      Specialized Services Documentation  Therapist must complete separate progress note for each specific clinical activity in which the individual participated during the day.       GROUP PSYCHOTHERAPY    Shireen Rueda participated actively in psychotherapy group.  This was observed Consistent throughout the treatment day. They were engaged in learning related to Wellness Tools. Staff utilized Verbal and Demonstration teaching methods.  Shireen Rueda shared area of learning and set a goal for outside of program to work on self-care.  Shireen Rueda was able to share items explored during the day and shared in adding to their WRAP.  Ended session with peer  led exercise in breathing meditation.  Shireen Rueda  demonstrated positive progress toward goal.  Continue group psychotherapy to actively practice learned skills and encourage transfer of knowledge to unstructured time.    Tx Plan Objective: 1.1, 1.2, 1.3, 1.4 , Therapist: Ian Hamilton    Group Psychotherapy      Visit Start Time: (930)  Visit Stop Time: (1030)  Total Visit Duration:  60 minutes    Subjective:     Patient ID: Shireen Rueda 44 y.o.     [facility] Clinical Progress Notes    This group was facilitated virtually in a private office using HIPAA Compliant and Approved Microsoft Teams.  Shireen Rueda actively engaged in psychoeducational group about distress tolerances. The skills introduced help to maintain and regulate emotional fear and distress. Group discovered strategies that help them to manage emotional fear and distress on a short term and long term basis. The group talked about understanding the purpose, and meaning of emotional fear and distress in intellectual and emotional expression, regulation , and recognition, and how it affects themselves and others. Teaching on the skill process of ACCEPTS and DBT self-care, members of the group are able to go  for help or how to manage situations were discussed. Group was encouraged to ask questions in an open forum at the end of group. Positive progress displayed through engagement in topic, Shireen was an active listener during this group session. Shireen Rueda will continue to engage in psychotherapy to encourage positive self realization.  Treatment Plan Objective 1.1, 1.2, 1.3, 1.4 Therapist: Ian PAYTON Ed.

## 2025-01-22 NOTE — PSYCH
Virtual Regular Visit    Verification of patient location:    Patient is located at Home in the following state in which I hold an active license [place]      Assessment/Plan:    Problem List Items Addressed This Visit       Panic disorder    Major depressive disorder, moderate, without psychotic features (HCC) - Primary    Grief       Goals addressed in session: PHP VIRTUAL GROUP DUE TO virtual preference of care      Depression Follow-up Plan Completed: Not applicable    Reason for visit is No chief complaint on file.       Encounter provider  PARTIAL PSYCH PROGRAM      Recent Visits  No visits were found meeting these conditions.  Showing recent visits within past 7 days and meeting all other requirements  Future Appointments  No visits were found meeting these conditions.  Showing future appointments within next 150 days and meeting all other requirements       The patient was identified by name and date of birth. Shireen Rueda was informed that this is a telemedicine visit and that the visit is being conducted throughthe Microsoft Teams platform. She agrees to proceed..  My office door was closed. No one else was in the room.  She acknowledged consent and understanding of privacy and security of the video platform. The patient has agreed to participate and understands they can discontinue the visit at any time.    Patient is aware this is a billable service.     Subjective  Shireen Rueda is a 44 y.o. female I spent FIVE GROUP HOURS PLUS CASE MANAGEMENT minutes with patient today in which greater than 50% of time was spent in counseling/coordination of care regarding PHP - SEE NOTES  .      HPI     Past Medical History:   Diagnosis Date    Anemia     Chest tightness or pressure     8/19/2013    Depression 5/14/2017    Hypertension     Vitamin D deficiency        Past Surgical History:   Procedure Laterality Date    CYSTOSCOPY N/A 12/30/2018    Procedure: CYSTOSCOPY;  Surgeon: Laura Bal DO;  Location: [facility]  OR;  Service: Gynecology    HYSTERECTOMY N/A 12/30/2018    Procedure: HYSTERECTOMY LAPAROSCOPIC TOTAL (LTH);  Surgeon: Laura Bal DO;  Location: [facility];  Service: Gynecology    VA LAPAROSCOPY W/RMVL ADNEXAL STRUCTURES Bilateral 12/30/2018    Procedure: SALPINGECTOMY, LAPAROSCOPIC;  Surgeon: Laura Bal DO;  Location: [facility];  Service: Gynecology    TONSILLECTOMY      TUBAL LIGATION      2006       Current Outpatient Medications   Medication Sig Dispense Refill    amLODIPine (NORVASC) 2.5 mg tablet Take 1 tablet (2.5 mg total) by mouth daily 30 tablet 0    atorvastatin (LIPITOR) 40 mg tablet TAKE 1 TABLET BY MOUTH EVERY DAY 90 tablet 2    carbamide peroxide (DEBROX) 6.5 % otic solution Administer 5 drops into the left ear 2 (two) times a day (Patient not taking: Reported on 1/3/2025) 15 mL 0    cyanocobalamin (VITAMIN B-12) 1000 MCG tablet Take 1 tablet (1,000 mcg total) by mouth daily 30 tablet 0    ergocalciferol (VITAMIN D2) 50,000 units Take 1 capsule (50,000 Units total) by mouth once a week for 7 doses 4 capsule 0    fluticasone (FLONASE) 50 mcg/act nasal spray SPRAY 1 SPRAY INTO EACH NOSTRIL EVERY DAY (Patient not taking: Reported on 1/3/2025) 24 mL 2    folic acid (FOLVITE) 1 mg tablet Take 1 tablet (1 mg total) by mouth daily 30 tablet 0    hydroCHLOROthiazide 25 mg tablet TAKE 1 TABLET (25 MG TOTAL) BY MOUTH DAILY. 90 tablet 0    hydrOXYzine HCL (ATARAX) 25 mg tablet 1-2 po q 6 hrs prn anxiety 50 tablet 0    losartan (COZAAR) 100 MG tablet TAKE 1 TABLET BY MOUTH EVERY DAY 90 tablet 2    magnesium Oxide (MAG-OX) 400 mg TABS Take 1 tablet (400 mg total) by mouth 2 (two) times a day 60 tablet 0    melatonin 3 mg Take 1 tablet (3 mg total) by mouth daily at bedtime 30 tablet 0    Omega-3 Fatty Acids (OMEGA 3 PO) Take by mouth      sertraline (ZOLOFT) 50 mg tablet Take 1.5 tablets (75 mg total) by mouth daily 135 tablet 0    traZODone (DESYREL) 50 mg tablet Take 1 tablet (50 mg total) by mouth  daily at bedtime as needed for sleep 30 tablet 1     No current facility-administered medications for this visit.        No Known Allergies    Review of Systems    Video Exam    There were no vitals filed for this visit.    Physical Exam     Visit Time    Visit Start Time: 830  Visit Stop Time: 1430  Total Visit Duration:  300 minutes

## 2025-01-22 NOTE — PSYCH
Visit Time     Visit Start Time: 1215  Visit Stop Time: 1315  Total Visit Duration:  60 minutes     Subjective:        Subjective  Patient ID: Shireen Rueda is a 44 y.o. y.o. female.     [facility] Clinical Progress Notes       Specialized Services Documentation  Therapist must complete separate progress note for each specific clinical activity in which the individual participated during the day.     Shireen Rueda actively engaged in an open processing group.  The group discussed grief, guilt and shame  . Shireen Rueda did participate in the conversations related to the topics. Shireen Rueda continues to make progress towards goals through verbal participation in group; to accomplish long term goals continue to utilize skills learned in programming. Continue with psychotherapy to educate and encourage use of wellness tools. Tx Plan Objective: 1.1,1.2 Therapist: Ashley Costenbader MA LMT      This group was facilitated virtually in a private office using HIPAA Compliant and Approved [place] Teams.  Shireen Rueda consented to the use of tele-video modality of treatment.

## 2025-01-23 ENCOUNTER — TELEMEDICINE (OUTPATIENT)
Dept: PSYCHOLOGY | Facility: CLINIC | Age: 45
End: 2025-01-23
Payer: COMMERCIAL

## 2025-01-23 DIAGNOSIS — F33.1 MODERATE EPISODE OF RECURRENT MAJOR DEPRESSIVE DISORDER (HCC): Primary | ICD-10-CM

## 2025-01-23 DIAGNOSIS — F41.0 PANIC DISORDER: ICD-10-CM

## 2025-01-23 DIAGNOSIS — F43.21 GRIEF: ICD-10-CM

## 2025-01-23 PROCEDURE — G0176 OPPS/PHP;ACTIVITY THERAPY: HCPCS

## 2025-01-23 PROCEDURE — G0177 OPPS/PHP; TRAIN & EDUC SERV: HCPCS

## 2025-01-23 PROCEDURE — G0410 GRP PSYCH PARTIAL HOSP 45-50: HCPCS

## 2025-01-23 NOTE — PSYCH
Visit Time    Visit Start Time: 0830  Visit Stop Time: 0930  Total Visit Duration:  60 minutes    Subjective:     Patient ID: Shireen Rueda is a 44 y.o. female.    [facility] Clinical Progress Notes      Specialized Services Documentation  Therapist must complete separate progress note for each specific clinical activity in which the individual participated during the day.       EDUCATION THERAPY      Shireen Rueda actively shared in morning assessment and goal review. Presented as Receptive related to readiness to learn. Shireen Rueda  did complete goal from last treatment day identifying she worked on self-care,gaining advocacy, responsibility, and support. Shireen Rueda did not present with any barriers to learning. Throughout morning group, Shireen Rueda participated in mindfulness exercise and gratitude practice. Shireen Rueda made positive progress toward goal. Continue education group to assess willingness to engage, assess transfer of knowledge, and participate in skill development.    Tx Plan Objective: 1.1, 1.2, 1.3, 1.4 , Therapist: Ian [place]    Visit Time    Visit Start Time: 1330  Visit Stop Time: 1430  Total Visit Duration:  60 minutes    Subjective:     Patient ID: Shireen Rueda is a 44 y.o. female.    [facility] Clinical Progress Notes      Specialized Services Documentation  Therapist must complete separate progress note for each specific clinical activity in which the individual participated during the day.       GROUP PSYCHOTHERAPY    Shireen Rueda participated actively in psychotherapy group.  This was observed Consistent throughout the treatment day. They were engaged in learning related to Wellness Tools. Staff utilized Verbal and Demonstration teaching methods.  Shireen Rueda shared area of learning and set a goal for outside of program to go for a walk.  Shireen Rueda was able to share items explored during the day and shared in adding to their WRAP.  Ended session with staff led exercise in breathing.  Shireen  Mohit demonstrated positive progress toward goal.  Continue group psychotherapy to actively practice learned skills and encourage transfer of knowledge to unstructured time.    Tx Plan Objective: 1.1, 1.2, 1.3, 1.4 , Therapist: Ian [place]    Group Psychotherapy      Visit Start Time: (930)  Visit Stop Time: (1030)  Total Visit Duration:  60 minutes    Subjective:     Patient ID: Shireen Rueda 44 y.o.      This group was facilitated virtually in a private office using HIPAA Compliant and Approved Microsoft Teams.  Shireen Rueda actively engaged in psychoeducational group about employment stressors. The skill helps to create effective work relationships. The group discovered strategies that help them to manage work relationships on a short term and long term basis. The group talked about understanding the purpose, and meaning of work stressors  in intellectual and emotional expression, regulation , and recognition, and how it affects themselves and others.. Teaching on the emphasis of self monitoring , suggestive solutions, verbal and non-verbal communication,and keeping commitments, strategies were discusses to reduce work stressors.  Group was encouraged to ask questions in an open forum at the end of group. Positive progress displayed through engagement in topic, Shireen was able to identify several triggers in her work that she would like to start applying coing skills to.  Shireen Rueda will continue to engage in psychotherapy to encourage positive self realization.  Treatment Plan Objective 1.1, 1.2, 1.3, 1.4 Therapist: Ian PAYTON Ed.

## 2025-01-23 NOTE — PSYCH
Visit Time    Visit Start Time: 1045  Visit Stop Time: 1145  Total Visit Duration: 60 minutes     Subjective:     Patient ID: Shireen Rueda is a 44 y.o. female.    [facility] Clinical Progress Notes      Specialized Services Documentation  Therapist must complete separate progress note for each specific clinical activity in which the individual participated during the day.          Group Psychotherapy - Anxiety  This group was facilitated virtually in a private office using HIPAA Compliant and Approved [employer] Teams. Shireen Rueda consented to the use of tele-video modality of treatment.   Shireen Rueda was present in psychoeducational group about anxiety. The group followed a slide show presentation on Anxiety and the Brain. Group was educated on:  Signs and symptoms of anxiety   Causes and treatments of anxiety  How the brain processes anxiety  Group members then discussed anxiety causing situations and positive ways to cope with these stressors.   Coping skills discussed were breathing paired with sitting/noticing with our thoughts and emotions exercise, challenging irrational thoughts, progressive muscle relaxation, Dropping anchor, deep breathing, and imagery.    Positive progress towards goal noted through participation in group. Continue psychoeducational groups on depression to teach the value of learning about diagnosis and treatments available.     Tx Plan Objective:  1.1, 1.2, 1.3, 1.4 Therapist:  Gwen SHAW RN

## 2025-01-23 NOTE — PSYCH
Virtual Regular Visit    Verification of patient location:    Patient is located at Home in the following state in which I hold an active license [place]      Assessment/Plan:    Problem List Items Addressed This Visit       Panic disorder    Major depressive disorder, moderate, without psychotic features (HCC) - Primary    Grief       Goals addressed in session:      Depression Follow-up Plan Completed: Not applicable    Reason for visit is No chief complaint on file.       Encounter provider [facility] PARTIAL PSYCH PROGRAM      Recent Visits  No visits were found meeting these conditions.  Showing recent visits within past 7 days and meeting all other requirements  Future Appointments  No visits were found meeting these conditions.  Showing future appointments within next 150 days and meeting all other requirements       The patient was identified by name and date of birth. Shireen Rueda was informed that this is a telemedicine visit and that the visit is being conducted throughthe Microsoft Teams platform. She agrees to proceed..  My office door was closed. No one else was in the room.  She acknowledged consent and understanding of privacy and security of the video platform. The patient has agreed to participate and understands they can discontinue the visit at any time.    Patient is aware this is a billable service.     Subjective  Shireen Rueda is a 44 y.o. female I spent FIVE GROUP HOURS PLUS CASE MANAGEMENT minutes with patient today in which greater than 50% of time was spent in counseling/coordination of care regarding PHP - SEE NOTES  .      HPI     Past Medical History:   Diagnosis Date    Anemia     Chest tightness or pressure     8/19/2013    Depression 5/14/2017    Hypertension     Vitamin D deficiency        Past Surgical History:   Procedure Laterality Date    CYSTOSCOPY N/A 12/30/2018    Procedure: CYSTOSCOPY;  Surgeon: Laura Bal DO;  [facility];  Service: Gynecology    HYSTERECTOMY N/A  12/30/2018    Procedure: HYSTERECTOMY LAPAROSCOPIC TOTAL (LTH);  Surgeon: Laura Bal DO;  [facility];  Service: Gynecology    MD LAPAROSCOPY W/RMVL ADNEXAL STRUCTURES Bilateral 12/30/2018    Procedure: SALPINGECTOMY, LAPAROSCOPIC;  Surgeon: Laura Bal DO;  [facility]: [facility];  Service: Gynecology    TONSILLECTOMY      TUBAL LIGATION      2006       Current Outpatient Medications   Medication Sig Dispense Refill    amLODIPine (NORVASC) 2.5 mg tablet Take 1 tablet (2.5 mg total) by mouth daily 30 tablet 0    atorvastatin (LIPITOR) 40 mg tablet TAKE 1 TABLET BY MOUTH EVERY DAY 90 tablet 2    carbamide peroxide (DEBROX) 6.5 % otic solution Administer 5 drops into the left ear 2 (two) times a day (Patient not taking: Reported on 1/3/2025) 15 mL 0    cyanocobalamin (VITAMIN B-12) 1000 MCG tablet Take 1 tablet (1,000 mcg total) by mouth daily 30 tablet 0    ergocalciferol (VITAMIN D2) 50,000 units Take 1 capsule (50,000 Units total) by mouth once a week for 7 doses 4 capsule 0    fluticasone (FLONASE) 50 mcg/act nasal spray SPRAY 1 SPRAY INTO EACH NOSTRIL EVERY DAY (Patient not taking: Reported on 1/3/2025) 24 mL 2    folic acid (FOLVITE) 1 mg tablet Take 1 tablet (1 mg total) by mouth daily 30 tablet 0    hydroCHLOROthiazide 25 mg tablet TAKE 1 TABLET (25 MG TOTAL) BY MOUTH DAILY. 90 tablet 0    hydrOXYzine HCL (ATARAX) 25 mg tablet 1-2 po q 6 hrs prn anxiety 50 tablet 0    losartan (COZAAR) 100 MG tablet TAKE 1 TABLET BY MOUTH EVERY DAY 90 tablet 2    magnesium Oxide (MAG-OX) 400 mg TABS Take 1 tablet (400 mg total) by mouth 2 (two) times a day 60 tablet 0    melatonin 3 mg Take 1 tablet (3 mg total) by mouth daily at bedtime 30 tablet 0    Omega-3 Fatty Acids (OMEGA 3 PO) Take by mouth      sertraline (ZOLOFT) 50 mg tablet Take 1.5 tablets (75 mg total) by mouth daily 135 tablet 0    traZODone (DESYREL) 50 mg tablet Take 1 tablet (50 mg total) by mouth daily at bedtime as needed for sleep 30 tablet  1     No current facility-administered medications for this visit.        No Known Allergies    Review of Systems    Video Exam    There were no vitals filed for this visit.    Physical Exam     Visit Time    Visit Start Time: 830  Visit Stop Time: 1430  Total Visit Duration:  300 minutes

## 2025-01-23 NOTE — PSYCH
"Visit Time    Visit Start Time: 1215  Visit Stop Time: 1315  Total Visit Duration:  60 minutes    Subjective:     Patient ID: Shireen Rueda is a 44 y.o. female.    [facility] Clinical Progress Notes      Specialized Services Documentation  Therapist must complete separate progress note for each specific clinical activity in which the individual participated during the day.     Allied Therapy [id number] This group was facilitated virtually in a private office using HIPAA Compliant and Approved Microsoft Teams. Shireen Rueda actively participated in music therapy group regarding self-validation. The group participated in a dereje discussion on the song “Hated”. Group members were asked to share examples of times where they felt invalidated by others. The group then read and discussed the handout on self validation and related the content to their experience. The group participated in a dereje rewrite of the song “I am Light”, identifying affirmations for self-validation. Shireen shared \"I am a good person/sister/mother, and I am not a bad person\" as an affirmation of self-validation to use outside of program. Some effort noted towards treatment goal. Continue AT to encourage the development and proactive use of self-validating techniques.  Tx Plan Objective: 1.1,1.2,1.4, Therapist:  BERTAH Lane MT-BC    Case Management Note    BERTHA Lane MT-BC    Current suicide risk : Low     No case management requested or scheduled aware of next 1:1.    Medications changes/added/denied? No    Treatment session number: 10    Individual Case Management Visit provided today? No    [facility] follow up physician's orders: None at this time.     "

## 2025-01-24 ENCOUNTER — TELEMEDICINE (OUTPATIENT)
Dept: PSYCHOLOGY | Facility: CLINIC | Age: 45
End: 2025-01-24
Payer: COMMERCIAL

## 2025-01-24 DIAGNOSIS — F41.0 PANIC DISORDER: ICD-10-CM

## 2025-01-24 DIAGNOSIS — F33.1 MODERATE EPISODE OF RECURRENT MAJOR DEPRESSIVE DISORDER (HCC): Primary | ICD-10-CM

## 2025-01-24 DIAGNOSIS — F43.21 GRIEF: ICD-10-CM

## 2025-01-24 PROCEDURE — G0410 GRP PSYCH PARTIAL HOSP 45-50: HCPCS

## 2025-01-24 PROCEDURE — G0177 OPPS/PHP; TRAIN & EDUC SERV: HCPCS

## 2025-01-24 NOTE — PSYCH
Visit Time    Visit Start Time: 0830  Visit Stop Time: 0930  Total Visit Duration: 60 minutes    Subjective:     Patient ID: Shireen Rueda is a 44 y.o. female.    [facility] Clinical Progress Notes      Specialized Services Documentation  Therapist must complete separate progress note for each specific clinical activity in which the individual participated during the day.       EDUCATION THERAPY      Shireen Rueda actively shared in morning assessment and goal review. Presented as Receptive related to readiness to learn. Shireen Rueda  did complete goal from last treatment day identifying gaining responsibility. Shireen Rueda did not present with any barriers to learning. Throughout morning group, Shireen Rueda participated in movement experience. Shireen Rueda made positive progress toward goal. Continue education group to assess willingness to engage, assess transfer of knowledge, and participate in skill development.    Tx Plan Objective: 1.1, 1.2, 1.3, 1.4 , Therapist: Gwen SHAW RN    Visit Time    Visit Start Time: 1045  Visit Stop Time: 1145  Total Visit Duration: 60 minutes       GROUP PSYCHOTHERAPY  ([id number]) Group was facilitated virtually in a private office using HIPAA Compliant and Approved Microsoft Teams. Shireen Rueda consented to the use of tele-video modality of treatment and was virtually present for group psychotherapy today.  she attentively listened to CPS Luz Maria share her life story as she co-led this session.  Group encouraged power of learning about self, accepting illness and personal responsibility in recovery.  Community resources reviewed in addition to personal resources like the affirmations.  Progress toward goals noted.  Continue psychotherapy to encourage self -awareness and healthy engagement of supports.    TX Plan Objectives: 1.1, 1.2, 1.4   Therapist: Gwen SHAW RN

## 2025-01-24 NOTE — BH CRISIS PLAN
Client Name: Shireen Rueda       Client YOB: 1980    [facility] Safety Plan      Creation Date: 1/24/25 Update Date: 11/24/25   Created By: River Viera Last Updated By: River Viera      Step 1: Warning Signs:   Warning Signs   Sleep difficulties   poor appetite   poor focus/concentration   tearfulness   shaking   isolation   no motivation            Step 2: Internal Coping Strategies:   Internal Coping Strategies   breathing techniques   meditaion   coloring   tapping   massaging hands            Step 3: People and social settings that provide distraction:   Name Contact Information    in phone   kids in phone   dog in phone    Places   Home           Step 4: People whom I can ask for help during a crisis:      Name Contact Information    My family phone    friend Thong in phone    In-Laws in phone      Step 5: Professionals or agencies I can contact during a crisis:      Clinican/Agency Name Phone Emergency Contact    Crisis 988     Therapist in phone       [facility] Emergency Department Emergency Department Phone Emergency Department Address    [facility]  [address] [address]        Crisis Phone Numbers:   Suicide Prevention Lifeline: Call or Text  988 Crisis Text Line: Text HOME to 043-231   Please note: Some [facility] do not have a separate number for Child/Adolescent specific crisis. If your county is not listed under Child/Adolescent, please call the adult number for your county      Adult Crisis Numbers: Child/Adolescent Crisis Numbers   [facility]: [phone] [facility]: [phone]   [facility]: [phone] [facility]: [phone]   [facility]: [phone] [place]: [phone]   [facility]: [phone] [place]: [phone]   [place]: [phone]   [facility]: [phone]   [facility]: [phone]   [facility]: [phone] (daytime) [phone]  (after hours, weekends, holidays)      Step 6: Making the environment safer (plan for lethal means safety):   Patient did not identify any lethal methods: Yes     Optional: What is most important to me and worth living for?   Myself, my family     Michelle Safety Plan. Rossy Kowalski and Ventura Rodriguez. Used with permission of the authors.

## 2025-01-24 NOTE — PSYCH
Visit Time    Visit Start Time: 1330  Visit Stop Time: 1430  Total Visit Duration: 60 minutes    Subjective:     Patient ID: Shireen Rueda is a 44 y.o. female.    [facility] Clinical Progress Notes      Specialized Services Documentation  Therapist must complete separate progress note for each specific clinical activity in which the individual participated during the day.     GROUP PSYCHOTHERAPY    [id number]    Shireen Rueda participated actively in psychotherapy group.  This was observedConsistent throughout the treatment day. They were engaged in learning related to Illness, Medication, Aftercare, and Wellness Tools. Staff utilized Verbal, Written, A/V, and Demonstration teaching methods.  Shireen Rueda shared area of learning and set a goal for outside of program to go out to dinner with her mother in-law and see her nephew.  Shireen Rueda was able to share items explored during the day.  Ended session with staff led exercise in mindfulness.  Shireen Rueda demonstrated good progress toward goal.  Continue group psychotherapy to actively practice learned skills and encourage transfer of knowledge to unstructured time.        This group was facilitated virtually in a private office using HIPAA Compliant and Approved Microsoft Teams.      Tx Plan Objective: 1.1,1.2,1.3,1.4 Therapist: AVERY Britton

## 2025-01-24 NOTE — PSYCH
Visit Time    Visit Start Time: 1215  Visit Stop Time: 1315  Total Visit Duration:  60 minutes    Subjective:     Patient ID: Shireen Rueda is a 44 y.o. female.     [facility] Clinical Progress Notes      Specialized Services Documentation  Therapist must complete separate progress note for each specific clinical activity in which the individual participated during the day.      Group Psychotherapy - Habit Stacking   Shireen Rueda participated actively in a psychotherapy group focused on habit stacking. This writer led a discussion on healthily and unhealthy habits and which group member use most. Group members discussed their personal use of habits and how it correlates to their current routines. This writer shared a video detailing how to practice the habit stacking skill. At the conclusion of the video group members were encouraged to reflect on their takeaway. This writer reviewed the 9 rules of habit stacking while encouraging group members to read along, take notes, and share their experience. This writer encouraged the group members to partake in group discussion and utilize supplemental materials inside and outside of the program.  Shireen Rueda made good efforts towards progress goals which were displayed through participation in the discussion, note taking, and engagement in topic. Continue to run daily group psychotherapy to meet treatment needs and encourage Shireen Rueda to practice skills outside of program.    Tx Plan Objective: 1.1,1.2,1.4 Therapist: Ashley Costenbader MA LMT

## 2025-01-24 NOTE — PSYCH
Virtual Regular Visit    Verification of patient location:    Patient is located at Home in the following state in which I hold an active license [place]      Assessment/Plan:    Problem List Items Addressed This Visit       Panic disorder    Major depressive disorder, moderate, without psychotic features (HCC) - Primary    Grief       Goals addressed in session: PHP VIRTUAL GROUP DUE TO virtual preference of care      Depression Follow-up Plan Completed: Not applicable    Reason for visit is No chief complaint on file.       Encounter provider  PARTIAL PSYCH PROGRAM      Recent Visits  No visits were found meeting these conditions.  Showing recent visits within past 7 days and meeting all other requirements  Future Appointments  No visits were found meeting these conditions.  Showing future appointments within next 150 days and meeting all other requirements       The patient was identified by name and date of birth. Shireen Rueda was informed that this is a telemedicine visit and that the visit is being conducted throughthe Microsoft Teams platform. She agrees to proceed..  My office door was closed. No one else was in the room.  She acknowledged consent and understanding of privacy and security of the video platform. The patient has agreed to participate and understands they can discontinue the visit at any time.    Patient is aware this is a billable service.     Subjective  Shireen Rueda is a 44 y.o. female I spent FIVE GROUP HOURS PLUS CASE MANAGEMENT minutes with patient today in which greater than 50% of time was spent in counseling/coordination of care regarding PHP - SEE NOTES  .      HPI     Past Medical History:   Diagnosis Date    Anemia     Chest tightness or pressure     8/19/2013    Depression 5/14/2017    Hypertension     Vitamin D deficiency        Past Surgical History:   Procedure Laterality Date    CYSTOSCOPY N/A 12/30/2018    Procedure: CYSTOSCOPY;  Surgeon: Laura Bal DO;  Location: [facility]  OR;  Service: Gynecology    HYSTERECTOMY N/A 12/30/2018    Procedure: HYSTERECTOMY LAPAROSCOPIC TOTAL (LTH);  Surgeon: Laura Bal DO;  [facility];  Service: Gynecology    [place] LAPAROSCOPY W/RMVL ADNEXAL STRUCTURES Bilateral 12/30/2018    Procedure: SALPINGECTOMY, LAPAROSCOPIC;  Surgeon: Laura Bal DO;  [facility];  Service: Gynecology    TONSILLECTOMY      TUBAL LIGATION      2006       Current Outpatient Medications   Medication Sig Dispense Refill    amLODIPine (NORVASC) 2.5 mg tablet Take 1 tablet (2.5 mg total) by mouth daily 30 tablet 0    atorvastatin (LIPITOR) 40 mg tablet TAKE 1 TABLET BY MOUTH EVERY DAY 90 tablet 2    carbamide peroxide (DEBROX) 6.5 % otic solution Administer 5 drops into the left ear 2 (two) times a day (Patient not taking: Reported on 1/3/2025) 15 mL 0    cyanocobalamin (VITAMIN B-12) 1000 MCG tablet Take 1 tablet (1,000 mcg total) by mouth daily 30 tablet 0    ergocalciferol (VITAMIN D2) 50,000 units Take 1 capsule (50,000 Units total) by mouth once a week for 7 doses 4 capsule 0    fluticasone (FLONASE) 50 mcg/act nasal spray SPRAY 1 SPRAY INTO EACH NOSTRIL EVERY DAY (Patient not taking: Reported on 1/3/2025) 24 mL 2    folic acid (FOLVITE) 1 mg tablet Take 1 tablet (1 mg total) by mouth daily 30 tablet 0    hydroCHLOROthiazide 25 mg tablet TAKE 1 TABLET (25 MG TOTAL) BY MOUTH DAILY. 90 tablet 0    hydrOXYzine HCL (ATARAX) 25 mg tablet 1-2 po q 6 hrs prn anxiety 50 tablet 0    losartan (COZAAR) 100 MG tablet TAKE 1 TABLET BY MOUTH EVERY DAY 90 tablet 2    magnesium Oxide (MAG-OX) 400 mg TABS Take 1 tablet (400 mg total) by mouth 2 (two) times a day 60 tablet 0    melatonin 3 mg Take 1 tablet (3 mg total) by mouth daily at bedtime 30 tablet 0    Omega-3 Fatty Acids (OMEGA 3 PO) Take by mouth      sertraline (ZOLOFT) 50 mg tablet Take 1.5 tablets (75 mg total) by mouth daily 135 tablet 0    traZODone (DESYREL) 50 mg tablet Take 1 tablet (50 mg total) by mouth  daily at bedtime as needed for sleep 30 tablet 1     No current facility-administered medications for this visit.        No Known Allergies    Review of Systems    Video Exam    There were no vitals filed for this visit.    Physical Exam     Visit Time    Visit Start Time: 830  Visit Stop Time: 1430  Total Visit Duration:  300 minutes

## 2025-01-24 NOTE — PSYCH
Subjective:     Patient ID: Shireen Rueda is a 44 y.o. female.    [facility] Clinical Progress Notes      Specialized Services Documentation  Therapist must complete separate progress note for each specific clinical activity in which the individual participated during the day.     Case Management Note    Visit Time    Visit Start Time: 1316  Visit Stop Time: 1334  Total Visit Duration:  18 minutes    BRETHA Lane MT-BC    Current suicide risk : Low     Met with Shireen for case management. Discussed upcoming discharge for 1/29/25. She stated she feels confident for this discharge and is happy with progress made in group so far. Safety plan created and signed.     I,Shireen Rueda,am physically unable to provide a signature; however, I understand the nature of and am in agreement with the documentation presented to me via TEAMS.  I have received a copy through My Chart and/or the [email or web address] Service.  I freely give verbal consent.  Name of Document (s): safety plan  Witness to verbal consent: BERTHA Lane MT-BC  Witness to verbal consent: Fabiana Banks    Medications changes/added/denied? No    Treatment session number: 11    Individual Case Management Visit provided today? Yes     [facility] follow up physician's orders: None at this time.

## 2025-01-24 NOTE — PSYCH
Group Psychotherapy      Visit Start Time: (930)  Visit Stop Time: (1030)  Total Visit Duration:  60 minutes    Subjective:     Patient ID: Shireen Rueda 44 y.o.      This group was facilitated virtually in a private office using HIPAA Compliant and Approved Microsoft Teams.  Shireen Rueda actively engaged in psychoeducational group about conflict resolution. The skill helps to develop skills to create resolution with self and others with whom one may interact with. Group discovered strategies that help them to develop positive assertiveness skills on a short term and long term basis. The group talked about understanding the purpose, and meaning of conflict resolution in intellectual and emotional expression, regulation , and recognition, and how it affects themselves and others.Teaching on the emphasis of self monitoring and resolution techniques, discussions on how to create positive resolutions through effective assertive tools were discussed. Group was encouraged to ask questions in an open forum at the end of group. Positive progress displayed through engagement in topic, Shireen was active in the group discussions and participated in role play scenes. Shireen Rueda will continue to engage in psychotherapy to encourage positive conflict resolution.  Treatment Plan Objective 1.1, 1.2, 1.3, 1.4 Therapist: Ian PAYTON Ed.

## 2025-01-27 ENCOUNTER — TELEMEDICINE (OUTPATIENT)
Dept: PSYCHOLOGY | Facility: CLINIC | Age: 45
End: 2025-01-27
Payer: COMMERCIAL

## 2025-01-27 DIAGNOSIS — F41.0 PANIC DISORDER: ICD-10-CM

## 2025-01-27 DIAGNOSIS — F43.21 GRIEF: ICD-10-CM

## 2025-01-27 DIAGNOSIS — F33.1 MODERATE EPISODE OF RECURRENT MAJOR DEPRESSIVE DISORDER (HCC): Primary | ICD-10-CM

## 2025-01-27 PROCEDURE — G0177 OPPS/PHP; TRAIN & EDUC SERV: HCPCS

## 2025-01-27 PROCEDURE — G0176 OPPS/PHP;ACTIVITY THERAPY: HCPCS

## 2025-01-27 PROCEDURE — G0410 GRP PSYCH PARTIAL HOSP 45-50: HCPCS

## 2025-01-27 NOTE — PSYCH
Virtual Regular Visit    Verification of patient location:    Patient is located at Home in the following state in which I hold an active license [place]      Assessment/Plan:    Problem List Items Addressed This Visit       Panic disorder    Major depressive disorder, moderate, without psychotic features (HCC) - Primary    Grief       Goals addressed in session: PHP VIRTUAL GROUP DUE TO virtual preference of care      Depression Follow-up Plan Completed: Not applicable    Reason for visit is No chief complaint on file.       Encounter provider  PARTIAL PSYCH PROGRAM      Recent Visits  No visits were found meeting these conditions.  Showing recent visits within past 7 days and meeting all other requirements  Future Appointments  No visits were found meeting these conditions.  Showing future appointments within next 150 days and meeting all other requirements       The patient was identified by name and date of birth. Shireen Rueda was informed that this is a telemedicine visit and that the visit is being conducted throughthe Microsoft Teams platform. She agrees to proceed..  My office door was closed. No one else was in the room.  She acknowledged consent and understanding of privacy and security of the video platform. The patient has agreed to participate and understands they can discontinue the visit at any time.    Patient is aware this is a billable service.     Subjective  Shireen Rueda is a 44 y.o. female I spent FIVE GROUP HOURS PLUS CASE MANAGEMENT minutes with patient today in which greater than 50% of time was spent in counseling/coordination of care regarding PHP - SEE NOTES  .      HPI     Past Medical History:   Diagnosis Date    Anemia     Chest tightness or pressure     8/19/2013    Depression 5/14/2017    Hypertension     Vitamin D deficiency        Past Surgical History:   Procedure Laterality Date    CYSTOSCOPY N/A 12/30/2018    Procedure: CYSTOSCOPY;  Surgeon: Laura Bal DO;  Location: [facility]  OR;  Service: Gynecology    HYSTERECTOMY N/A 12/30/2018    Procedure: HYSTERECTOMY LAPAROSCOPIC TOTAL (LTH);  Surgeon: Laura Bal DO;  [facility];  Service: Gynecology    CA LAPAROSCOPY W/RMVL ADNEXAL STRUCTURES Bilateral 12/30/2018    Procedure: SALPINGECTOMY, LAPAROSCOPIC;  Surgeon: Laura Bal DO;  [facility]: [facility];  Service: Gynecology    TONSILLECTOMY      TUBAL LIGATION      2006       Current Outpatient Medications   Medication Sig Dispense Refill    amLODIPine (NORVASC) 2.5 mg tablet Take 1 tablet (2.5 mg total) by mouth daily 30 tablet 0    atorvastatin (LIPITOR) 40 mg tablet TAKE 1 TABLET BY MOUTH EVERY DAY 90 tablet 2    carbamide peroxide (DEBROX) 6.5 % otic solution Administer 5 drops into the left ear 2 (two) times a day (Patient not taking: Reported on 1/3/2025) 15 mL 0    cyanocobalamin (VITAMIN B-12) 1000 MCG tablet Take 1 tablet (1,000 mcg total) by mouth daily 30 tablet 0    ergocalciferol (VITAMIN D2) 50,000 units Take 1 capsule (50,000 Units total) by mouth once a week for 7 doses 4 capsule 0    fluticasone (FLONASE) 50 mcg/act nasal spray SPRAY 1 SPRAY INTO EACH NOSTRIL EVERY DAY (Patient not taking: Reported on 1/3/2025) 24 mL 2    folic acid (FOLVITE) 1 mg tablet Take 1 tablet (1 mg total) by mouth daily 30 tablet 0    hydroCHLOROthiazide 25 mg tablet TAKE 1 TABLET (25 MG TOTAL) BY MOUTH DAILY. 90 tablet 0    hydrOXYzine HCL (ATARAX) 25 mg tablet 1-2 po q 6 hrs prn anxiety 50 tablet 0    losartan (COZAAR) 100 MG tablet TAKE 1 TABLET BY MOUTH EVERY DAY 90 tablet 2    magnesium Oxide (MAG-OX) 400 mg TABS Take 1 tablet (400 mg total) by mouth 2 (two) times a day 60 tablet 0    melatonin 3 mg Take 1 tablet (3 mg total) by mouth daily at bedtime 30 tablet 0    Omega-3 Fatty Acids (OMEGA 3 PO) Take by mouth      sertraline (ZOLOFT) 50 mg tablet Take 1.5 tablets (75 mg total) by mouth daily 135 tablet 0    traZODone (DESYREL) 50 mg tablet Take 1 tablet (50 mg total) by mouth  daily at bedtime as needed for sleep 30 tablet 1     No current facility-administered medications for this visit.        No Known Allergies    Review of Systems    Video Exam    There were no vitals filed for this visit.    Physical Exam     Visit Time    Visit Start Time: 830  Visit Stop Time: 1430  Total Visit Duration:  300 minutes

## 2025-01-27 NOTE — PSYCH
Visit Time    Visit Start Time: 1215  Visit Stop Time: 1315  Total Visit Duration: 60 minutes    Subjective:     Patient ID: Shireen Rueda is a 44 y.o. female.    [facility] Clinical Progress Notes      Specialized Services Documentation  Therapist must complete separate progress note for each specific clinical activity in which the individual participated during the day.       Group Psychotherapy  This group was facilitated virtually in a private office using HIPAA Compliant and Approved [employer] Teams. Shireen Rueda consented to the use of tele-video modality of treatment  Shireen Rueda actively  engaged in psychoeducational group about medication management, types of antidepressants/side effects, and medication tips. Group came up with strategies that helped them remember to take their medications and developed ideas to make it easier in the future. The group talked about understanding the purpose, dose, type, timing and side effects of their medications. Teaching on emergency situations and who to go to for help was brought up as well. Group was encouraged to ask questions in an open forum at the end of group. Good progress displayed through engagement in topic.  Treatment Plan Objective 1.1, 1.2, 1.3, 1.4 Therapist: Gwen SHAW RN        cardiovascular/diabetes

## 2025-01-27 NOTE — PSYCH
"Visit Time    Visit Start Time: 1045  Visit Stop Time: 1145  Total Visit Duration:  60 minutes    Subjective:     Patient ID: Shireen Rueda is a 44 y.o. female.    [facility] Clinical Progress Notes      Specialized Services Documentation  Therapist must complete separate progress note for each specific clinical activity in which the individual participated during the day.     Allied Therapy [id number] This group was facilitated virtually in a private office using HIPAA Compliant and Approved Microsoft Teams. Shireen Rueda actively participated in music therapy group regarding progressive muscle relaxation and making art to music. The group participated in a progressive muscle relaxation. The group then participated in art making to music. The group then shared and discussed their art with the group relating it to their experience and the music. Shireen shared throughout and engaged in group discussion. Some effort noted towards treatment goal. Continue AT to encourage the development and proactive use of relaxation and coping skills. Tx Plan Objective: 1.1,1.2,1.4, Therapist:  BERTHA Lane MT-BC    Case Management Note    BERTHA Lane MT-BC    Current suicide risk : Low     Medications changes/added/denied? No    Treatment session number: 12    Individual Case Management Visit provided today? Yes     [facility] follow up physician's orders: None at this time.     Individual Psychotherapy    Visit Time    Visit Start Time: 0829  Visit Stop Time: 0840  Total Visit Duration:  11 minutes    DAP:    Data - Met with Shireen for individual therapy/case management. She stated she had a challenging weekend after having a confrontational conversation with a friend. She stated her friend pressured her to talk about her mental health and told her to \"just be better\" and Shireen felt overwhelmed and embarrassed. She stated that afterwards she was able to identify skills/tools she could have used in the moment but was frustrated " that she didn't use them in the moment. Discussed how she should recognize the progress she's made in being able to identify what skills she could have used, and reinforced the importance of practice to use skills effectively. She stated she is still feeling ready for discharge on Wednesday 1/29/25.      Assessment - Shireen continues to make good progress in PHP. She continues to practice her skills but continues to be slow in application.     Plan  - Continue work towards treatment goals. Prepare for DC on 1/29/25    Tx plan Objectives: 1.0    BERTHA Lane, MT-BC

## 2025-01-27 NOTE — PSYCH
Visit Time    Visit Start Time: 0830  Visit Stop Time: 0930  Total Visit Duration:  50 minutes    Subjective:     Patient ID: Shireen Rueda is a 44 y.o. female.    [facility] Clinical Progress Notes      Specialized Services Documentation  Therapist must complete separate progress note for each specific clinical activity in which the individual participated during the day.       EDUCATION THERAPY      Shireen Rueda was in case management for a portion of this session. Shireen actively shared in morning assessment and goal review. Presented as Receptive related to readiness to learn. Shireen Rueda  did complete goal from last treatment day identifying she went to her mother-in-laws home this weekend, gaining responsibility. Shireen Rueda did not present with any barriers to learning. Throughout morning group, Shireen Rueda participated in mindfulness exercise and gratitude practice. Shireen Rueda made positive progress toward goal. Continue education group to assess willingness to engage, assess transfer of knowledge, and participate in skill development.    Tx Plan Objective: 1.1, 1.2, 1.3, 1.4 , Therapist: Ian Hamilton  Visit Time    Visit Start Time: 1330  Visit Stop Time: 1430  Total Visit Duration:  60 minutes    Subjective:     Patient ID: Shireen Rueda is a 44 y.o. female.    [facility] Clinical Progress Notes      Specialized Services Documentation  Therapist must complete separate progress note for each specific clinical activity in which the individual participated during the day.       GROUP PSYCHOTHERAPY    Shireen Rueda participated actively in psychotherapy group.  This was observed Consistent throughout the treatment day. They were engaged in learning related to Wellness Tools. Staff utilized Verbal and Demonstration teaching methods.  Shireen Rueda shared area of learning and set a goal for outside of program to empty the .  Shireen Rueda was able to share items explored during the day and shared in adding to  their WRAP.  Ended session with staff led exercise in meditation.  Shireen Rueda demonstrated positive progress toward goal.  Continue group psychotherapy to actively practice learned skills and encourage transfer of knowledge to unstructured time.    Tx Plan Objective: 1.1, 1.2, 1.3, 1.4 , Therapist: Ian Hamilton  Allied Group Therapy      Visit Start Time: (930)  Visit Stop Time: (1030)  Total Visit Duration:  60 minutes    Subjective:     Patient ID: Shireen Rueda 44 y.o.       This group was facilitated virtually in a private office using HIPAA Compliant and Approved [employer] Teams.  Shireen Rueda actively engaged in psychoeducational group about positive self soothing activities. These activities help to self-soothe an individual and decrease decrease feelings of loneliness,anxiety, and boredom. Positive behavior change is focused toward a specified goal. Group explored the identifying factors that create loneliness,anxiety, and boredom. This process can help them to take care of emotional and intellectual moments of anxiety on a short term and long term basis. The group talked about understanding the meaning of self-soothing in regards to physical, intellectual, and emotional expression, regulation , and recognition, and how it affects themselves and others. Teaching on the emphasis of self monitoring and relapse, the group explored who they can go to for help was brought up as well. Group was encouraged to ask questions in an open forum at the end of group. Positive progress displayed through engagement in topic, Shireen consistently contributed to the the group activity in self-soothe activity. Shireen Rueda will continue to engage in psychotherapy to encourage positive self realization.  Treatment Plan Objective 1.1, 1.2, 1.3, 1.4 Therapist: Ian PAYTON Ed.

## 2025-01-28 ENCOUNTER — TELEMEDICINE (OUTPATIENT)
Dept: PSYCHOLOGY | Facility: CLINIC | Age: 45
End: 2025-01-28
Payer: COMMERCIAL

## 2025-01-28 DIAGNOSIS — I10 ESSENTIAL HYPERTENSION: ICD-10-CM

## 2025-01-28 DIAGNOSIS — F43.21 GRIEF: ICD-10-CM

## 2025-01-28 DIAGNOSIS — F33.1 MODERATE EPISODE OF RECURRENT MAJOR DEPRESSIVE DISORDER (HCC): Primary | ICD-10-CM

## 2025-01-28 DIAGNOSIS — F41.0 PANIC DISORDER: ICD-10-CM

## 2025-01-28 PROCEDURE — G0177 OPPS/PHP; TRAIN & EDUC SERV: HCPCS

## 2025-01-28 PROCEDURE — G0410 GRP PSYCH PARTIAL HOSP 45-50: HCPCS

## 2025-01-28 RX ORDER — HYDROCHLOROTHIAZIDE 25 MG/1
25 TABLET ORAL DAILY
Qty: 90 TABLET | Refills: 1 | Status: SHIPPED | OUTPATIENT
Start: 2025-01-28

## 2025-01-28 RX ORDER — AMLODIPINE BESYLATE 2.5 MG/1
2.5 TABLET ORAL DAILY
Qty: 90 TABLET | Refills: 0 | Status: SHIPPED | OUTPATIENT
Start: 2025-01-28

## 2025-01-28 NOTE — PSYCH
Subjective:     Patient ID: Shireen Rueda is a 44 y.o. female.    [facility] Clinical Progress Notes      Specialized Services Documentation  Therapist must complete separate progress note for each specific clinical activity in which the individual participated during the day.     Case Management Note    BERTHA Lane, MT-BC    Current suicide risk : Low     No case management requested or scheduled aware of next 1:1.    Medications changes/added/denied? No    Treatment session number: 13    Individual Case Management Visit provided today? No    [facility] follow up physician's orders: None at this time.

## 2025-01-28 NOTE — PSYCH
Virtual Regular Visit    Verification of patient location:    Patient is located at Home in the following state in which I hold an active license [place]      Assessment/Plan:    Problem List Items Addressed This Visit       Panic disorder    Major depressive disorder, moderate, without psychotic features (HCC) - Primary    Grief       Goals addressed in session: PHP VIRTUAL GROUP DUE TO virtual preference of care      Depression Follow-up Plan Completed: Not applicable    Reason for visit is No chief complaint on file.       Encounter provider  PARTIAL PSYCH PROGRAM      Recent Visits  No visits were found meeting these conditions.  Showing recent visits within past 7 days and meeting all other requirements  Future Appointments  No visits were found meeting these conditions.  Showing future appointments within next 150 days and meeting all other requirements       The patient was identified by name and date of birth. Shireen Rueda was informed that this is a telemedicine visit and that the visit is being conducted throughthe Microsoft Teams platform. She agrees to proceed..  My office door was closed. No one else was in the room.  She acknowledged consent and understanding of privacy and security of the video platform. The patient has agreed to participate and understands they can discontinue the visit at any time.    Patient is aware this is a billable service.     Subjective  Shireen Rueda is a 44 y.o. female I spent FIVE GROUP HOURS PLUS CASE MANAGEMENT minutes with patient today in which greater than 50% of time was spent in counseling/coordination of care regarding PHP - SEE NOTES  .      HPI     Past Medical History:   Diagnosis Date    Anemia     Chest tightness or pressure     8/19/2013    Depression 5/14/2017    Hypertension     Vitamin D deficiency        Past Surgical History:   Procedure Laterality Date    CYSTOSCOPY N/A 12/30/2018    Procedure: CYSTOSCOPY;  Surgeon: Laura Bal DO;  Location: [facility]  OR;  Service: Gynecology    HYSTERECTOMY N/A 12/30/2018    Procedure: HYSTERECTOMY LAPAROSCOPIC TOTAL (LTH);  Surgeon: Laura Bal DO;  [facility];  Service: Gynecology    NV LAPAROSCOPY W/RMVL ADNEXAL STRUCTURES Bilateral 12/30/2018    Procedure: SALPINGECTOMY, LAPAROSCOPIC;  Surgeon: Laura Bal DO;  [facility];  Service: Gynecology    TONSILLECTOMY      TUBAL LIGATION      2006       Current Outpatient Medications   Medication Sig Dispense Refill    amLODIPine (NORVASC) 2.5 mg tablet TAKE 1 TABLET BY MOUTH EVERY DAY 90 tablet 0    atorvastatin (LIPITOR) 40 mg tablet TAKE 1 TABLET BY MOUTH EVERY DAY 90 tablet 2    carbamide peroxide (DEBROX) 6.5 % otic solution Administer 5 drops into the left ear 2 (two) times a day (Patient not taking: Reported on 1/3/2025) 15 mL 0    cyanocobalamin (VITAMIN B-12) 1000 MCG tablet Take 1 tablet (1,000 mcg total) by mouth daily 30 tablet 0    ergocalciferol (VITAMIN D2) 50,000 units Take 1 capsule (50,000 Units total) by mouth once a week for 7 doses 4 capsule 0    fluticasone (FLONASE) 50 mcg/act nasal spray SPRAY 1 SPRAY INTO EACH NOSTRIL EVERY DAY (Patient not taking: Reported on 1/3/2025) 24 mL 2    folic acid (FOLVITE) 1 mg tablet Take 1 tablet (1 mg total) by mouth daily 30 tablet 0    hydroCHLOROthiazide 25 mg tablet TAKE 1 TABLET (25 MG TOTAL) BY MOUTH DAILY. 90 tablet 1    hydrOXYzine HCL (ATARAX) 25 mg tablet 1-2 po q 6 hrs prn anxiety 50 tablet 0    losartan (COZAAR) 100 MG tablet TAKE 1 TABLET BY MOUTH EVERY DAY 90 tablet 2    magnesium Oxide (MAG-OX) 400 mg TABS Take 1 tablet (400 mg total) by mouth 2 (two) times a day 60 tablet 0    melatonin 3 mg Take 1 tablet (3 mg total) by mouth daily at bedtime 30 tablet 0    Omega-3 Fatty Acids (OMEGA 3 PO) Take by mouth      sertraline (ZOLOFT) 50 mg tablet Take 1.5 tablets (75 mg total) by mouth daily 135 tablet 0    traZODone (DESYREL) 50 mg tablet Take 1 tablet (50 mg total) by mouth daily at  bedtime as needed for sleep 30 tablet 1     No current facility-administered medications for this visit.        No Known Allergies    Review of Systems    Video Exam    There were no vitals filed for this visit.    Physical Exam     Visit Time    Visit Start Time: 830  Visit Stop Time: 1430  Total Visit Duration:  300 minutes

## 2025-01-28 NOTE — PSYCH
Visit Time     Visit Start Time: 1045  Visit Stop Time: 1145  Total Visit Duration: 60  minutes     Subjective:        Subjective  Patient ID: Shireen Rueda is a 44 y.o. y.o. female.     [facility] Clinical Progress Notes       Specialized Services Documentation  Therapist must complete separate progress note for each specific clinical activity in which the individual participated during the day.     Shireen Rueda actively engaged in an open processing group.  The group discussed discharge planning and coping skills . Shireen Rueda did participate in the conversations related to the topics. Shireen Rueda continues to make progress towards goals through verbal participation in group; to accomplish long term goals continue to utilize skills learned in programming. Continue with psychotherapy to educate and encourage use of wellness tools. Tx Plan Objective: 1.1,1.2 Therapist: Ashley Costenbader MA LMT

## 2025-01-28 NOTE — PSYCH
Group Therapy:      Visit Start Time: (930)  Visit Stop Time: (1030)  Total Visit Duration:  60 minutes    Subjective:     Patient ID: Shireen Rueda 44 y.o.   This group was facilitated virtually in a private office using HIPAA Compliant and Approved [employer] [email or web address] consented to the use of tele-video modality of treatment.  Shireen Rueda actively engaged in psychoeducational group about the PLEASE skill. The skill helps to maintain and regulate emotional expression/regulation. Group discovered strategies that help them to take care of physical and emotional well being on a short term and long term basis. The group talked about understanding the purpose, and meaning of self care in regards to physical, intellectual, and emotional expression, regulation , and recognition, and how it affects themselves and others.. Teaching on the emphasis of self monitoring and self-care, who the group can go to for help was brought up as well. Group was encouraged to ask questions in an open forum at the end of group. Positive progress displayed through engagement in topic, Shireen was an active participant in the group discussions.  Pt will continue to engage in psychotherapy to encourage self realization in treatment.  Treatment Plan Objective 1.1, 1.2, 1.3, 1.4 Therapist: Ian PAYTON Ed.

## 2025-01-28 NOTE — PSYCH
Visit Time    Visit Start Time: 1215  Visit Stop Time: 1315  Total Visit Duration: 60 minutes    Subjective:     Patient ID: Shireen Rueda is a 44 y.o. female.    [facility] Clinical Progress Notes      Specialized Services Documentation  Therapist must complete separate progress note for each specific clinical activity in which the individual participated during the day.       Group Psychotherapy    This group was facilitated virtually in a private office using HIPAA Compliant and Approved [employer] Teams.  Shireen Rueda consented to the use of tele-video modality of treatment.    Shireen Rueda  attended group- Part I of Wellness Recovery Action Plan. Today, members focused learning what WRAP is and how it can be used to   Decrease and prevent intrusive or troubling feelings and behaviors  Increase personal empowerment  Improve quality of life  Assist people in achieving their own life goals and dreams     Then, together the group worked on completing WRAP's toolbox to aide in development of remainder of WRAP.  Good effort towards progress of goals which were displayed through participation and engagement in topic.  Treatment Plan Objectives: 1.1, 1.2  Therapist: Gwen SHAW RN

## 2025-01-28 NOTE — PSYCH
[facility] Discharge Instructions:   Disposition: home  Address: [address] [place] [address] [address]  [place] [address].   Diagnosis:  Encounter Diagnoses   Name Primary?    Major depressive disorder, moderate, without psychotic features (HCC) Yes    Panic disorder     Grief    .   Allergies (Drug/Food): No Known Allergies  Activity: n/a  Diet:no recommendations  Smoking Cessation:The best thing you can do to improve your health is to stop using tobacco  Diagnostic/Laboratory Orders: n/a  Vaccines: If you received a vaccine, please notify your family physician on your next visit. For more information, please call [phone].   Follow-up appointments/Referrals:    Medication Management  PCP to monitor medication. [facility] recommends reaching out to and attempting to establish follow up care with a psychiatrist if medications need more skilled adjustment/monitoring.     PCP (awaiting apt info)  Nenita Velásquez MD  [address] [place] [address]  Ph: [phone]  F: [phone]    Therapy (awaiting apt info)  Khalida Laguna LPC  [address] [facility]  [place] [address]  Ph: [phone]  F: [phone]    ICM/CTT:n/a  [facility] [phone].    Intake/Referral/Evaluation (Non-Emergency) *NON INSURED FOR FUNDING: [facility]: [phone], [facility]: [phone], [facility]: [phone] and [facility]: [phone]. Crisis Intervention (Emergency) County Service: [facility]: [phone], [facility]: [phone], [facility]: [phone], [facility]: [phone], [facility]: [phone] and [facility]: [phone]. _________________________________  National Crisis Intervention Hotline: 1-581.219.5951.  National Suicide Crisis Hotline: 1-271.351.6475.     I, the undersigned, have received and understand the above instructions.      Reviewed via teams due to preference of virtual care.     Patient/Rep Signature: __________________________________       Date/Time:  ______________         Physician Signature: ____________________________________      Date/Time: ______________               Signature: ________________________________       Date/Time: ______________

## 2025-01-28 NOTE — PSYCH
Subjective:     Patient ID: Shireen Rueda is a 44 y.o. female.    [facility] Progress Notes      Specialized Services Documentation  Therapist must complete separate progress note for each specific clinical activity in which the individual participated during the day.     EDUCATION THERAPY    Visit Time    Visit Start Time: 0830  Visit Stop Time: 0930  Total Visit Duration: 60 minutes      [id number]    Shireen Rueda actively shared in morning assessment and goal review. Presented as Receptive related to readiness to learn. Shireen Rueda  did complete goal from last treatment day identifying gaining hope and responsibility. Shireen Rueda did not present with any barriers to learning. Throughout morning group, Shireen Rueda participated in a guided meditation and a journaling prompt. Shireen Rueda made good progress toward goal. Continue education group to assess willingness to engage, assess transfer of knowledge, and participate in skill development.      This group was facilitated virtually in a private office using HIPAA Compliant and Approved Microsoft Teams.      Tx Plan Objective: 1.1,1.2, Therapist: AVERY Britton      GROUP PSYCHOTHERAPY    Visit Time    Visit Start Time: 1330  Visit Stop Time: 1430  Total Visit Duration: 60 minutes      [id number]    Shireen Rueda participated actively in psychotherapy group.  This was observedConsistent throughout the treatment day. They were engaged in learning related to Illness, Medication, Aftercare, and Wellness Tools. Staff utilized Verbal, Written, A/V, and Demonstration teaching methods.  Shireen Rueda shared area of learning and set a goal for outside of program to lay down earlier for bed.  Shireen Rueda was able to share items explored during the day.  Ended session with staff led exercise in mindfulness.  Shireen Rueda demonstrated good progress toward goal.  Continue group psychotherapy to actively practice learned skills and encourage transfer of knowledge to  unstructured time.        This group was facilitated virtually in a private office using HIPAA Compliant and Approved Microsoft Teams.      Tx Plan Objective: 1.1,1.2,1.3,1.4 Therapist: AVERY Britton

## 2025-01-29 ENCOUNTER — TELEMEDICINE (OUTPATIENT)
Dept: PSYCHIATRY | Facility: CLINIC | Age: 45
End: 2025-01-29
Payer: COMMERCIAL

## 2025-01-29 ENCOUNTER — TELEMEDICINE (OUTPATIENT)
Dept: PSYCHOLOGY | Facility: CLINIC | Age: 45
End: 2025-01-29
Payer: COMMERCIAL

## 2025-01-29 DIAGNOSIS — F41.0 PANIC DISORDER: ICD-10-CM

## 2025-01-29 DIAGNOSIS — F43.21 GRIEF: ICD-10-CM

## 2025-01-29 DIAGNOSIS — F33.1 MODERATE EPISODE OF RECURRENT MAJOR DEPRESSIVE DISORDER (HCC): Primary | ICD-10-CM

## 2025-01-29 PROCEDURE — 99213 OFFICE O/P EST LOW 20 MIN: CPT | Performed by: PHYSICIAN ASSISTANT

## 2025-01-29 PROCEDURE — G0410 GRP PSYCH PARTIAL HOSP 45-50: HCPCS

## 2025-01-29 PROCEDURE — G0176 OPPS/PHP;ACTIVITY THERAPY: HCPCS

## 2025-01-29 PROCEDURE — G0177 OPPS/PHP; TRAIN & EDUC SERV: HCPCS

## 2025-01-29 NOTE — PSYCH
Virtual Regular Visit    Verification of patient location:    Patient is located at Home in the following state in which I hold an active license [place]      Assessment/Plan:    Problem List Items Addressed This Visit       Panic disorder    Major depressive disorder, moderate, without psychotic features (HCC) - Primary    Grief       Goals addressed in session: PHP VIRTUAL GROUP DUE TO virtual preference of care      Depression Follow-up Plan Completed: Not applicable    Reason for visit is No chief complaint on file.       Encounter provider  PARTIAL PSYCH PROGRAM      Recent Visits  No visits were found meeting these conditions.  Showing recent visits within past 7 days and meeting all other requirements  Future Appointments  No visits were found meeting these conditions.  Showing future appointments within next 150 days and meeting all other requirements       The patient was identified by name and date of birth. Shireen Rueda was informed that this is a telemedicine visit and that the visit is being conducted throughthe Microsoft Teams platform. She agrees to proceed..  My office door was closed. No one else was in the room.  She acknowledged consent and understanding of privacy and security of the video platform. The patient has agreed to participate and understands they can discontinue the visit at any time.    Patient is aware this is a billable service.     Subjective  Shireen Rueda is a 44 y.o. female I spent FIVE GROUP HOURS PLUS CASE MANAGEMENT minutes with patient today in which greater than 50% of time was spent in counseling/coordination of care regarding PHP - SEE NOTES  .      HPI     Past Medical History:   Diagnosis Date    Anemia     Chest tightness or pressure     8/19/2013    Depression 5/14/2017    Hypertension     Vitamin D deficiency        Past Surgical History:   Procedure Laterality Date    CYSTOSCOPY N/A 12/30/2018    Procedure: CYSTOSCOPY;  Surgeon: Laura Bal DO;  Location: [facility]  OR;  Service: Gynecology    HYSTERECTOMY N/A 12/30/2018    Procedure: HYSTERECTOMY LAPAROSCOPIC TOTAL (LTH);  Surgeon: Laura Bal DO;  Location: [facility] OR;  Service: Gynecology    MO LAPAROSCOPY W/RMVL ADNEXAL STRUCTURES Bilateral 12/30/2018    Procedure: SALPINGECTOMY, LAPAROSCOPIC;  Surgeon: Laura Bal DO;  Location: [facility];  Service: Gynecology    TONSILLECTOMY      TUBAL LIGATION      2006       Current Outpatient Medications   Medication Sig Dispense Refill    amLODIPine (NORVASC) 2.5 mg tablet TAKE 1 TABLET BY MOUTH EVERY DAY 90 tablet 0    atorvastatin (LIPITOR) 40 mg tablet TAKE 1 TABLET BY MOUTH EVERY DAY 90 tablet 2    carbamide peroxide (DEBROX) 6.5 % otic solution Administer 5 drops into the left ear 2 (two) times a day (Patient not taking: Reported on 1/3/2025) 15 mL 0    cyanocobalamin (VITAMIN B-12) 1000 MCG tablet Take 1 tablet (1,000 mcg total) by mouth daily 30 tablet 0    ergocalciferol (VITAMIN D2) 50,000 units Take 1 capsule (50,000 Units total) by mouth once a week for 7 doses 4 capsule 0    fluticasone (FLONASE) 50 mcg/act nasal spray SPRAY 1 SPRAY INTO EACH NOSTRIL EVERY DAY (Patient not taking: Reported on 1/3/2025) 24 mL 2    folic acid (FOLVITE) 1 mg tablet Take 1 tablet (1 mg total) by mouth daily 30 tablet 0    hydroCHLOROthiazide 25 mg tablet TAKE 1 TABLET (25 MG TOTAL) BY MOUTH DAILY. 90 tablet 1    hydrOXYzine HCL (ATARAX) 25 mg tablet 1-2 po q 6 hrs prn anxiety 50 tablet 0    losartan (COZAAR) 100 MG tablet TAKE 1 TABLET BY MOUTH EVERY DAY 90 tablet 2    magnesium Oxide (MAG-OX) 400 mg TABS Take 1 tablet (400 mg total) by mouth 2 (two) times a day 60 tablet 0    melatonin 3 mg Take 1 tablet (3 mg total) by mouth daily at bedtime 30 tablet 0    Omega-3 Fatty Acids (OMEGA 3 PO) Take by mouth      sertraline (ZOLOFT) 50 mg tablet Take 1.5 tablets (75 mg total) by mouth daily 135 tablet 0    traZODone (DESYREL) 50 mg tablet Take 1 tablet (50 mg total) by mouth daily at  bedtime as needed for sleep 30 tablet 1     No current facility-administered medications for this visit.        No Known Allergies    Review of Systems    Video Exam    There were no vitals filed for this visit.    Physical Exam     Visit Time    Visit Start Time: 830  Visit Stop Time: 1430  Total Visit Duration:  300 minutes

## 2025-01-29 NOTE — PSYCH
Visit Time    Visit Start Time: 1215  Visit Stop Time: 1315  Total Visit Duration: 40 minutes    Subjective:     Patient ID: Shireen Rueda is a 44 y.o. female.    [facility] Clinical Progress Notes      Specialized Services Documentation  Therapist must complete separate progress note for each specific clinical activity in which the individual participated during the day.       Group Psychotherapy Life Skills ([id number]) - Shireen Rueda actively engaged in group focused on gratitude which was facilitated virtually in a private office using HIPAA Compliant and Approved "[employer] Teams. Shireen consented to the use of tele-video modality of treatment and was virtually present for group psychotherapy today. Shireen arrived late due to case management. Group members discussed why it is important to think about what we are grateful for. Group members worked on a gratefulness worksheet . Group members learned about the G.L.A.D. technique and gratitude journaling for practicing gratefulness. Group watched a short video about the 365 grateful project with speaker Nicole Aguilar. We discussed the benefits of thinking, reflecting and talking about what they are grateful for. Shireen stated that she is grateful for her , kids and dog. Shireen continues to make progress towards goals through verbal participation in group; will discharge at the end of the treatment day. Tx Plan Objective: 1.1,1.2 Therapist: Fabiana Banks,SHALONDA, LSW

## 2025-01-29 NOTE — PSYCH
Virtual Regular Visit    Verification of patient location:    Patient is located at Home in the following state in which I hold an active license [place]      Reason for visit is   Chief Complaint   Patient presents with    Virtual Regular Visit          Encounter provider Annabelle Claudio PA-C      Recent Visits  Date Type Provider Dept   01/22/25 Telemedicine Annabelle Claudio PA-C [facility]   Showing recent visits within past 7 days and meeting all other requirements  Today's Visits  Date Type Provider Dept   01/29/25 Telemedicine Annabelle Claudio PA-C [facility]   Showing today's visits and meeting all other requirements  Future Appointments  No visits were found meeting these conditions.  Showing future appointments within next 150 days and meeting all other requirements       The patient was identified by name and date of birth. Shireen Rueda was informed that this is a telemedicine visit and that the visit is being conducted through the Microsoft Teams Embedded platform. She agrees to proceed..  My office door was closed. No one else was in the room.  She acknowledged consent and understanding of privacy and security of the video platform. The patient has agreed to participate and understands they can discontinue the visit at any time.    Patient is aware this is a billable service.          Progress Note - [facility] [place] [facility]  Shireen Rueda 44 y.o. female MRN: [id number]   This note was not shared with the patient due to reasonable likelihood of causing patient harm    Progress at partial program: improved.       Chart reviewed.  Shireen seen by Hannah 1/22 at which time zoloft increased.  Shireen tolerating increase and no symptoms worse.  Denies nausea, vomiting, diarrhea. Symptoms have lessened in intensity and mid-day symptoms better. Feels appropriately anxious about discharge and has f/u.  Denies SI.       ROS:   As above otherwise negative    Active  Ambulatory Problems     Diagnosis Date Noted    Thyroid nodule 05/14/2017    Depression with anxiety 07/12/2016    Elevated liver enzymes 06/27/2016    High triglycerides 07/12/2016    Overweight (BMI 25.0-29.9) 12/08/2015    Panic disorder 06/13/2016    Vitamin D deficiency 05/15/2017    Hyperpigmentation 06/03/2019    Essential hypertension 10/30/2019    Tobacco use 11/01/2019    Chest discomfort 09/21/2021    Anxiety 10/06/2021    Annual physical exam 07/19/2022    Dyslipidemia 11/29/2022    Urinary urgency 12/19/2022    Breast cancer screening by mammogram 02/24/2023    Dysuria 03/24/2023    Left non-suppurative otitis media 05/30/2023    Encounter for weight management 06/20/2023    Left ear pain 10/30/2023    Allergic rhinitis due to allergen 10/30/2023    Chronic fatigue 01/11/2024    Palpitations 07/08/2024    Other secondary hypertension 07/08/2024    Major depressive disorder, moderate, without psychotic features (HCC) 01/04/2025    Current moderate episode of major depressive disorder (HCC) 01/09/2025    Grief 01/09/2025     Resolved Ambulatory Problems     Diagnosis Date Noted    Symptomatic anemia 05/14/2017    Abnormal uterine bleeding (AUB) 05/14/2017    Transaminitis 05/14/2017    Depression 05/14/2017    Iron deficiency anemia 11/07/2018    Pelvic pain in female 12/28/2018    Fibroids, intramural 12/28/2018    Anemia 08/12/2014    Elevated blood-pressure reading without diagnosis of hypertension 12/08/2015    Irregular menstrual cycle 05/09/2017    Menorrhagia 08/11/2014    Tachycardia 08/04/2014    Vision changes 08/04/2014    Weight gain 05/09/2017    Close exposure to COVID-19 virus 04/07/2020    Cracked lips 05/13/2020    COVID-19 ruled out 07/15/2020    Healthcare maintenance 07/15/2020    Hypokalemia 12/15/2020    Acute non-recurrent maxillary sinusitis 04/23/2021    Acute cystitis without hematuria 06/23/2021    Dizziness 10/06/2021    Non-recurrent acute suppurative otitis media of both ears  without spontaneous rupture of tympanic membranes 05/19/2023    Acute otitis externa of left ear 05/19/2023    Medical clearance for psychiatric admission 10/30/2023    Screening for cardiovascular condition 07/08/2024    Hypomagnesemia 01/04/2025     Past Medical History:   Diagnosis Date    Chest tightness or pressure     Hypertension      Family History   Problem Relation Age of Onset    No Known Problems Mother     Heart disease Father     Hypertension Father     Heart attack Father 37    No Known Problems Sister     No Known Problems Daughter     No Known Problems Daughter     No Known Problems Maternal Grandmother     No Known Problems Maternal Grandfather     No Known Problems Paternal Grandmother     No Known Problems Paternal Grandfather     No Known Problems Son     No Known Problems Maternal Aunt     No Known Problems Maternal Aunt     No Known Problems Maternal Aunt     No Known Problems Maternal Aunt     No Known Problems Maternal Aunt     No Known Problems Paternal Aunt     No Known Problems Paternal Aunt     No Known Problems Half-Sister     Breast cancer Neg Hx     Colon cancer Neg Hx     Ovarian cancer Neg Hx     Endometrial cancer Neg Hx      Social History     Socioeconomic History    Marital status: /Civil Union     Spouse name: Not on file    Number of children: Not on file    Years of education: Not on file    Highest education level: Not on file   Occupational History    Not on file   Tobacco Use    Smoking status: Some Days     Types: Cigarettes    Smokeless tobacco: Never    Tobacco comments:     social smoker, less than 1 pack per weekend   Vaping Use    Vaping status: Never Used   Substance and Sexual Activity    Alcohol use: Yes     Alcohol/week: 3.0 standard drinks of alcohol     Types: 3 Glasses of wine per week     Comment: on the weekends/socially    Drug use: No    Sexual activity: Yes     Partners: Male     Birth control/protection: Female Sterilization, Other   Other Topics  Concern    Not on file   Social History Narrative    Uses safety equipment-seatbelts     Social Drivers of Health     Financial Resource Strain: Low Risk  (7/8/2024)    Overall Financial Resource Strain (CARDIA)     Difficulty of Paying Living Expenses: Not hard at all   Food Insecurity: No Food Insecurity (1/6/2025)    Hunger Vital Sign     Worried About Running Out of Food in the Last Year: Never true     Ran Out of Food in the Last Year: Never true   Transportation Needs: No Transportation Needs (1/6/2025)    PRAPARE - Transportation     Lack of Transportation (Medical): No     Lack of Transportation (Non-Medical): No   Physical Activity: Insufficiently Active (9/28/2023)    Exercise Vital Sign     Days of Exercise per Week: 2 days     Minutes of Exercise per Session: 50 min   Stress: No Stress Concern Present (7/8/2024)    [employer] [facility] [employer] - Occupational Stress Questionnaire     Feeling of Stress : Not at all   Social Connections: Moderately Isolated (9/28/2023)    Social Connection and Isolation Panel [NHANES]     Frequency of Communication with Friends and Family: More than three times a week     Frequency of Social Gatherings with Friends and Family: More than three times a week     Attends Zoroastrian Services: Never     Active Member of Clubs or Organizations: No     Attends Club or Organization Meetings: Never     Marital Status:    Intimate Partner Violence: Not At Risk (1/6/2025)    Humiliation, Afraid, Rape, and Kick questionnaire     Fear of Current or Ex-Partner: No     Emotionally Abused: No     Physically Abused: No     Sexually Abused: No   Housing Stability: Low Risk  (1/6/2025)    Housing Stability Vital Sign     Unable to Pay for Housing in the Last Year: No     Number of Times Moved in the Last Year: 1     Homeless in the Last Year: No     No Known Allergies       Mental Status Evaluation:    Appearance:  age appropriate, casually dressed   Behavior:  pleasant,  cooperative   Speech:  normal rate and volume   Mood:  Less anxious, improved mood   Affect:  brighter   Thought Process:  goal directed   Associations: intact associations   Thought Content:  no overt delusions   Perceptual Disturbances: none   Risk Potential: Suicidal ideation - None  Homicidal ideation - None   Sensorium:  oriented to person, place, and time/date   Memory:  recent and remote memory grossly intact   Consciousness:  alert and awake   Attention: attention span and concentration are age appropriate   Insight:  intact   Judgment: intact   Gait/Station: unable to assess   Motor Activity: unable to assess today due to virtual visit       Laboratory results: I have personally reviewed all pertinent laboratory/tests results.      Assessment   Diagnoses and all orders for this visit:    Major depressive disorder, moderate, without psychotic features (HCC)    Grief    Panic disorder       Current Outpatient Medications   Medication Sig Dispense Refill    amLODIPine (NORVASC) 2.5 mg tablet TAKE 1 TABLET BY MOUTH EVERY DAY 90 tablet 0    atorvastatin (LIPITOR) 40 mg tablet TAKE 1 TABLET BY MOUTH EVERY DAY 90 tablet 2    carbamide peroxide (DEBROX) 6.5 % otic solution Administer 5 drops into the left ear 2 (two) times a day (Patient not taking: Reported on 1/3/2025) 15 mL 0    cyanocobalamin (VITAMIN B-12) 1000 MCG tablet Take 1 tablet (1,000 mcg total) by mouth daily 30 tablet 0    ergocalciferol (VITAMIN D2) 50,000 units Take 1 capsule (50,000 Units total) by mouth once a week for 7 doses 4 capsule 0    fluticasone (FLONASE) 50 mcg/act nasal spray SPRAY 1 SPRAY INTO EACH NOSTRIL EVERY DAY (Patient not taking: Reported on 1/3/2025) 24 mL 2    folic acid (FOLVITE) 1 mg tablet Take 1 tablet (1 mg total) by mouth daily 30 tablet 0    hydroCHLOROthiazide 25 mg tablet TAKE 1 TABLET (25 MG TOTAL) BY MOUTH DAILY. 90 tablet 1    hydrOXYzine HCL (ATARAX) 25 mg tablet 1-2 po q 6 hrs prn anxiety 50 tablet 0    losartan  (COZAAR) 100 MG tablet TAKE 1 TABLET BY MOUTH EVERY DAY 90 tablet 2    magnesium Oxide (MAG-OX) 400 mg TABS Take 1 tablet (400 mg total) by mouth 2 (two) times a day 60 tablet 0    melatonin 3 mg Take 1 tablet (3 mg total) by mouth daily at bedtime 30 tablet 0    Omega-3 Fatty Acids (OMEGA 3 PO) Take by mouth      sertraline (ZOLOFT) 50 mg tablet Take 1.5 tablets (75 mg total) by mouth daily 135 tablet 0    traZODone (DESYREL) 50 mg tablet Take 1 tablet (50 mg total) by mouth daily at bedtime as needed for sleep 30 tablet 1     No current facility-administered medications for this visit.         Plan    Planned medication and treatment changes:  ok for discharge.  Will f/u with PCP for meds but has PANGUYỄN's number if needed.  Cont zoloft 75 mg/d, trazodone 50 mg q bedtime and atarax 25 mg 1-2 q 6 hrs prn    All current active medications have been reviewed.  Discharge planning      Risks / Benefits of Treatment:    Risks, benefits, and possible side effects of medications explained to patient and patient verbalizes understanding and agrees to medications.     Counseling / Coordination of Care:    Patient's progress discussed with staff in treatment team meeting.  Medications, treatment progress and treatment plan reviewed with patient.    Annabelle Claudio PA-C 01/29/25
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
## 2025-01-29 NOTE — PSYCH
"Visit Time    Visit Start Time: 1045  Visit Stop Time: 1145  Total Visit Duration:  60 minutes    Subjective:     Patient ID: Shireen Rueda is a 44 y.o. female.    [facility] Clinical Progress Notes      Specialized Services Documentation  Therapist must complete separate progress note for each specific clinical activity in which the individual participated during the day.     Allied Therapy [id number] This group was facilitated virtually in a private office using HIPAA Compliant and Approved Microsoft Teams. Shireen Rueda actively participated in music therapy group regarding time management. The group listened to and discussed the song \"Time\" and how it relates to their philosophy of time management. The group participated in a discussion about time management in there lives and where they could use improvement. The group read and discussed handouts about tips for time management, time wasters, and daily/weekly schedules. The group then discussed the pomodoro method of time management and created a music playlist to as a method of tracking the 25 minute module. Shireen identified planning her week as a way to improve time management in their life. Some effort noted towards treatment goal. Continue AT to encourage the development and proactive use of time management skills. Tx Plan Objective: 1.1,1.2,1.4, Therapist:  BERTHA Lane MT-BC    Case Management Note    Visit Time    Visit Start Time: 1215  Visit Stop Time: 1235  Total Visit Duration: 20 minutes    BERTHA Lane MT-BC    Current suicide risk : Low     Met with Shireen Rueda. Reviewed relapse prevention plan, aftercare plan, and medication list (copies provided). Shireen Rueda shared improvement through improved mood, better knowledge and use of coping skills, feeling less alone in mental health struggles and improved grief. Denied SI, HI, and psychosis. Aftercare providers to receive summary.     Medications changes/added/denied? No    Treatment session " number: 14    Individual Case Management Visit provided today? Yes     [facility] follow up physician's orders: Discharge to OP therapy and psychiatry

## 2025-01-29 NOTE — PSYCH
Visit Time    Visit Start Time: 0830  Visit Stop Time: 0930  Total Visit Duration:  60 minutes    Subjective:     Patient ID: Shireen Rueda is a 44 y.o. female.    [facility] Clinical Progress Notes      Specialized Services Documentation  Therapist must complete separate progress note for each specific clinical activity in which the individual participated during the day.       EDUCATION THERAPY      Shireen Rueda actively shared in morning assessment and goal review. Presented as Receptive related to readiness to learn. Shireen Rueda  did complete goal from last treatment day identifying she went to bed early, gaining hope and responsibility. Shireen Rueda did not present with any barriers to learning. Throughout morning group, Shireen Rueda participated in mindfulness exercise and gratitude practice. Shireen Rueda made positive progress toward goal. Continue education group to assess willingness to engage, assess transfer of knowledge, and participate in skill development.    Tx Plan Objective: 1.1, 1.2, 1.3, 1.4 , Therapist: Ian Hamilton    Visit Time    Visit Start Time: 1330  Visit Stop Time: 1430  Total Visit Duration:  60 minutes    Subjective:     Patient ID: Shireen Rueda is a 44 y.o. female.    [facility] Clinical Progress Notes      Specialized Services Documentation  Therapist must complete separate progress note for each specific clinical activity in which the individual participated during the day.       GROUP PSYCHOTHERAPY    Shireen Rueda participated actively in psychotherapy group.  This was observed Consistent throughout the treatment day. They were engaged in learning related to Wellness Tools. Staff utilized Verbal and Demonstration teaching methods.  Shireen Rueda shared area of learning and set a goal for outside of program to do the laundry.  Shireen Rueda was able to share items explored during the day and shared in adding to their WRAP.  Ended session with staff led exercise in breathing tehnique.  Shireen  Mohit demonstrated positive progress toward goal.  Continue group psychotherapy to actively practice learned skills and encourage transfer of knowledge to unstructured time.    Tx Plan Objective: 1.1, 1.2, 1.3, 1.4 , Therapist: Ian Jack Therapy:      Visit Start Time: (930)  Visit Stop Time: (1030)  Total Visit Duration:  60 minutes    Subjective:     Patient ID: Shireen Rueda 44 y.o.   This group was facilitated virtually in a private office using HIPAA Compliant and Approved Microsoft [email or web address] consented to the use of tele-video modality of treatment.  Shireen Rueda actively engaged in psychoeducational group about the PLEASE skill. The skill helps to maintain and regulate emotional expression/regulation. Group discovered strategies that help them to take care of physical and emotional well being on a short term and long term basis. The group talked about understanding the purpose, and meaning of self care in regards to physical, intellectual, and emotional expression, regulation , and recognition, and how it affects themselves and others.. Teaching on the emphasis of self monitoring and self-care, who the group can go to for help was brought up as well. Group was encouraged to ask questions in an open forum at the end of group. Positive progress displayed through engagement in topic, Shireen was actively engaged in the group discussion.  Pt will continue to engage in psychotherapy to encourage self realization in treatment.  Treatment Plan Objective 1.1, 1.2, 1.3, 1.4 Therapist: Ian PAYTON Ed.

## 2025-01-29 NOTE — PSYCH
"Assessment/Plan:      Assessment  Diagnoses and all orders for this visit:     Moderate episode of recurrent major depressive disorder (HCC)     Grief     Panic disorder              Subjective:     Subjective  Patient ID: Sihreen Rueda is a 44 y.o. female.     [facility] Treatment Plan   AREAS OF NEED: depression and anxiety as evidenced by low mood, poor appetite, weight loss, poor sleep, poor self care, anxious thoughts, social anxiety, poor focus, struggling with work responsibilities, and guilt. Stressors of brother's death in October of 2024, being out in public, and poor relationships with nieces.   Date Initiated: 01/09/25     Strengths: \"I am a good mom, friend and worker\"      LONG TERM GOAL:   Date Initiated: 01/09/25  1.0 I will identify 3 signs that my depression and anxiety have improved since starting program.   Target Date: 2/6/25  Completion Date: 01/29/25        SHORT TERM OBJECTIVES:      Date Initiated: 01/09/25  1.1 I will identify 3 coping skills I can use to address my depression and anxiety and practice at least 1 daily.   Revision Date: 01/20/25, continue  Target Date: 1/20/25  Completion Date: 01/29/25     Date Initiated: 01/09/25  1.2 I will identify 1 support group I can attend following my time in [facility] to address the loss of my brother (grief support, AA, etc.)  Revision Date: 01/20/25, continue  Target Date: 1/20/25  Completion Date: 01/29/25     Date Initiated: 01/09/25  1.3 I will take medication as prescribed and share questions and concerns if they arise.  Revision Date: 01/20/25, continue  Target Date: 1/20/25  Completion Date:  01/29/25     Date Initiated: 01/09/25  1.4 I will identify 3 ways my supports can assist in my recovery and agree to staff/support contact as indicated.   Revision Date: 01/20/25, continue  Target Date: 1/20/25  Completion Date:01/29/25             7 DAY REVISION:   1.5 I will reach out to and attempt to establish a follow up appointment with an outpatient " therapist in preporation for discharge.   Date Initiated: 01/20/25   Revision Date:   Target Date: 1/29/25  Completion Date:01/29/25        PSYCHIATRY:  Date Initiated: 01/09/25  Medication Management and Education       Revision Date: 01/20/25  The person(s) responsible for carrying out the plan is Rubén Cardenas DO; Annabelle Claudio PA-C     NURSING/SYMPTOM EDUCATION:  Date Initiated: 01/09/25  1.1, 1.2. 1.3, 1.4 This RN and/or Therapist will provide wellness/symptoms and skill education groups three to five days weekly to educate Shireen Rueda on signs and symptoms of diagnoses, skills to manage, and medication questions that will be addressed by the treatment team.    Revision date: 01/20/25  This RN and/or Therapist will continue to provide wellness/symptoms and skill education groups three to five days weekly to educate Shireen Rueda on signs and symptoms of diagnoses, skills to manage, and medication questions that will be addressed by the treatment team.  Completion Date:01/29/25  The person(s) responsible for carrying out the plan is Gwen Thurman RN;  JAY Francis ED     PSYCHOLOGY:   Date Initiated: 01/09/25       1.1, 1.2, 1.4 Provide psychotherapy group 5 times per week to allow opportunity for Shireen Rueda  to explore stressors and ways of coping.   Revision Date: 01/20/25  Continue to provide psychotherapy group 5 times per week to allow opportunity for Shireen Rueda  to explore stressors and ways of coping.  Completion Date:01/29/25  The person(s) responsible for carrying out the plan is SHALONDA Britton, LSW; Ashley Costenbader, MA LMT     ALLIED THERAPY:   Date Initiated: 01/09/25  1.1,1.2 Engage Shireen Rueda in AT group 3-5 times per week to encourage development and use of wellness tools to decrease symptoms and promote recovery through meaningful activity.  Revision Date: 01/20/25  Continue to engage Shireen Rueda in AT group 3-5 times per week to encourage development and use of wellness  tools to decrease symptoms and promote recovery through meaningful activity.  Completion Date:01/29/25  The person(s) responsible for carrying out the plan is EDUAR Mtz; BERTHA Lane MT-BC     CASE MANAGEMENT:   Date Initiated: 01/09/25      1.0 This  will meet with Shireen Rueda  3-4 times weekly to assess treatment progress, discharge planning, connection to community supports and UR as indicated.  Revision Date: 01/20/25  This  will continue to meet with Shireen Rueda  3-4 times weekly to assess treatment progress, discharge planning, connection to community supports and UR as indicated.  Completion Date:01/29/25  The person(s) responsible for carrying out the plan is BERTHA Lane MT-BC        TREATMENT REVIEW/COMMENTS:      DISCHARGE CRITERIA:Identify 3 signs of progress and complete relapse prevention plan.   DISCHARGE PLAN: Discharge to OP therapy and psychiatry  Estimated Length of Stay: 10 treatment days               Diagnosis and Treatment Plan explained to Shireen, Shireen relates understanding diagnosis and is agreeable to Treatment Plan.            CLIENT COMMENTS / Please share your thoughts, feelings, need and/or experiences regarding your treatment plan with Staff.  Please see follow up note with comments.

## 2025-01-30 ENCOUNTER — APPOINTMENT (OUTPATIENT)
Dept: PSYCHOLOGY | Facility: CLINIC | Age: 45
End: 2025-01-30
Payer: COMMERCIAL

## 2025-01-31 ENCOUNTER — APPOINTMENT (OUTPATIENT)
Dept: PSYCHOLOGY | Facility: CLINIC | Age: 45
End: 2025-01-31
Payer: COMMERCIAL

## 2025-01-31 DIAGNOSIS — F33.1 MODERATE EPISODE OF RECURRENT MAJOR DEPRESSIVE DISORDER (HCC): ICD-10-CM

## 2025-01-31 RX ORDER — TRAZODONE HYDROCHLORIDE 50 MG/1
50 TABLET, FILM COATED ORAL
Qty: 30 TABLET | Refills: 0 | Status: SHIPPED | OUTPATIENT
Start: 2025-01-31

## 2025-01-31 NOTE — TELEPHONE ENCOUNTER
Discharge from [facility]; patient has follow up with PCP, can discuss with them then if they will continue with current medication.

## 2025-02-03 ENCOUNTER — TELEPHONE (OUTPATIENT)
Dept: FAMILY MEDICINE CLINIC | Facility: CLINIC | Age: 45
End: 2025-02-03

## 2025-02-03 NOTE — TELEPHONE ENCOUNTER
"Late entry from 1/31/25.    Patient left message on clinical stating the following \"Hi, my name is Shireen Rebolledo and I'm calling for Doctor Velásquez. I was officially discharged from my partial inpatient program and Doctor Christen had signed my out of work short term paperwork. So I just need a letter saying it's OK for me to return to work on Monday February 3rd. If somebody can give me back a call [phone] Thank you. Tobi mijares.    "

## 2025-02-04 NOTE — TELEPHONE ENCOUNTER
LVM for patient to schedule appointment with PCP so to get letter    Patient can be reached at [phone]

## 2025-02-05 ENCOUNTER — DOCUMENTATION (OUTPATIENT)
Dept: PSYCHOLOGY | Facility: CLINIC | Age: 45
End: 2025-02-05

## 2025-02-05 NOTE — PROGRESS NOTES
[facility] Suicide Follow Up Action    This writer attempted to speak with Shireen Rueda today - 7 days post discharge from [facility].   Reviewed crisis number on message and requested return call.    River Viera

## 2025-02-06 ENCOUNTER — DOCUMENTATION (OUTPATIENT)
Dept: PSYCHOLOGY | Facility: CLINIC | Age: 45
End: 2025-02-06

## 2025-02-06 NOTE — PROGRESS NOTES
Zero Suicide Follow Up Action    This writer attempted to speak with Shireen Rueda today - 7 days post discharge from [facility].   Reviewed crisis number on message and requested return call.    River Viera

## 2025-02-27 DIAGNOSIS — F33.1 MODERATE EPISODE OF RECURRENT MAJOR DEPRESSIVE DISORDER (HCC): ICD-10-CM

## 2025-02-28 RX ORDER — TRAZODONE HYDROCHLORIDE 50 MG/1
50 TABLET ORAL
Qty: 90 TABLET | Refills: 1 | OUTPATIENT
Start: 2025-02-28

## 2025-03-02 DIAGNOSIS — F33.1 MODERATE EPISODE OF RECURRENT MAJOR DEPRESSIVE DISORDER (HCC): ICD-10-CM

## 2025-03-03 ENCOUNTER — OFFICE VISIT (OUTPATIENT)
Dept: FAMILY MEDICINE CLINIC | Facility: CLINIC | Age: 45
End: 2025-03-03

## 2025-03-03 VITALS
OXYGEN SATURATION: 98 % | SYSTOLIC BLOOD PRESSURE: 143 MMHG | WEIGHT: 144 LBS | DIASTOLIC BLOOD PRESSURE: 90 MMHG | RESPIRATION RATE: 18 BRPM | HEART RATE: 76 BPM | BODY MASS INDEX: 27.21 KG/M2 | TEMPERATURE: 97.9 F

## 2025-03-03 DIAGNOSIS — F41.8 DEPRESSION WITH ANXIETY: ICD-10-CM

## 2025-03-03 DIAGNOSIS — I10 ESSENTIAL HYPERTENSION: Primary | ICD-10-CM

## 2025-03-03 DIAGNOSIS — R22.32 LUMP OF LEFT WRIST: ICD-10-CM

## 2025-03-03 PROCEDURE — 99213 OFFICE O/P EST LOW 20 MIN: CPT

## 2025-03-03 RX ORDER — TRAZODONE HYDROCHLORIDE 50 MG/1
50 TABLET ORAL
Qty: 30 TABLET | Refills: 0 | OUTPATIENT
Start: 2025-03-03

## 2025-03-03 NOTE — ASSESSMENT & PLAN NOTE
Patient taking amlodipine 2.5 mg p.o. daily, HTCz 25 mg and losartan 100 mg   We will continue to monitor her blood pressure on ongoing basis and additively agents as needed  C/w current dosage; montior BP's at homes   Follow-up in a month    Orders:    Comprehensive metabolic panel; Future    Magnesium; Future    CBC and differential; Future

## 2025-03-03 NOTE — ASSESSMENT & PLAN NOTE
Depression Screening Follow-up Plan: Patient's depression screening was positive with a PHQ-9 score of 11. Patient assessed for underlying major depression. They have no active suicidal ideations. Brief counseling provided and recommend additional follow-up/re-evaluation next office visit.    Patient treated for both anxiety and depression.  Previously on Zoloft and trazodone for which patient stopped taking both of them as she was concerned for weight gain.  Had long discussion with patient about starting medications versus switching new med.  Ultimately patient would like to hold off on medication and focus on therapy.  Discussed my concerns with patient.  Patient reports understanding states she will let me know.    Patient denies any SI or HI on exam.  States that she will let me know earlier if she begins to become anxious or has worsening depression.  Patient will continue with CBT for now and follow-up with me in 1 month.  Patient has plans in place if symptoms worsen.

## 2025-03-03 NOTE — PROGRESS NOTES
Name: Shireen Rueda      : 1980      MRN: [id number]  Encounter Provider: Nenita Velásquez MD  Encounter Date: 3/3/2025   Encounter department: [facility] [place]  :  Assessment & Plan  Essential hypertension  Patient taking amlodipine 2.5 mg p.o. daily, HTCz 25 mg and losartan 100 mg   We will continue to monitor her blood pressure on ongoing basis and additively agents as needed  C/w current dosage; montior BP's at homes   Follow-up in a month    Orders:    Comprehensive metabolic panel; Future    Magnesium; Future    CBC and differential; Future    Depression with anxiety  Depression Screening Follow-up Plan: Patient's depression screening was positive with a PHQ-9 score of 11. Patient assessed for underlying major depression. They have no active suicidal ideations. Brief counseling provided and recommend additional follow-up/re-evaluation next office visit.    Patient treated for both anxiety and depression.  Previously on Zoloft and trazodone for which patient stopped taking both of them as she was concerned for weight gain.  Had long discussion with patient about starting medications versus switching new med.  Ultimately patient would like to hold off on medication and focus on therapy.  Discussed my concerns with patient.  Patient reports understanding states she will let me know.    Patient denies any SI or HI on exam.  States that she will let me know earlier if she begins to become anxious or has worsening depression.  Patient will continue with CBT for now and follow-up with me in 1 month.  Patient has plans in place if symptoms worsen.       Lump of left wrist  Patient noted to have the lump on the wrist likely ganglion cyst  Will order ultrasound  Orders:    US extremity soft tissue; Future           History of Present Illness   Rhythm partial program and hospitalized for anxiety and depression.  She was started on Zoloft and trazodone.  Patient has since  stopped taking both for concern of weight gain.  Overall patient states she is doing okay.  Starting work again today.    Hypertension  This is a chronic problem. Pertinent negatives include no anxiety, blurred vision, chest pain, neck pain, shortness of breath or sweats. Past treatments include calcium channel blockers and angiotensin blockers. The current treatment provides significant improvement.   Depression  Pertinent negatives include no abdominal pain, anorexia, chest pain, coughing, fever or neck pain.     Review of Systems   Constitutional:  Negative for fever.   Eyes:  Negative for blurred vision.   Respiratory:  Negative for cough and shortness of breath.    Cardiovascular:  Negative for chest pain.   Gastrointestinal:  Negative for abdominal pain and anorexia.   Musculoskeletal:  Negative for neck pain.   Psychiatric/Behavioral:  Positive for depression. Negative for confusion and suicidal ideas. The patient is not nervous/anxious.        Objective   /90 (BP Location: Left arm, Patient Position: Sitting, Cuff Size: Standard)   Pulse 76   Temp 97.9 °F (36.6 °C) (Temporal)   Resp 18   Wt 65.3 kg (144 lb)   LMP 10/17/2018   SpO2 98%   BMI 27.21 kg/m²      Physical Exam  Vitals and nursing note reviewed.   Constitutional:       General: She is not in acute distress.     Appearance: She is well-developed.   HENT:      Head: Normocephalic and atraumatic.   Eyes:      Conjunctiva/sclera: Conjunctivae normal.   Cardiovascular:      Rate and Rhythm: Normal rate and regular rhythm.      Heart sounds: No murmur heard.  Pulmonary:      Effort: Pulmonary effort is normal. No respiratory distress.      Breath sounds: Normal breath sounds.   Abdominal:      Palpations: Abdomen is soft.      Tenderness: There is no abdominal tenderness.   Musculoskeletal:         General: No swelling.   Skin:     General: Skin is warm.      Capillary Refill: Capillary refill takes less than 2 seconds.   Neurological:       Mental Status: She is alert.   Psychiatric:         Mood and Affect: Mood normal.

## 2025-03-26 DIAGNOSIS — F43.21 GRIEF: ICD-10-CM

## 2025-03-26 RX ORDER — HYDROXYZINE HYDROCHLORIDE 25 MG/1
25 TABLET, FILM COATED ORAL EVERY 8 HOURS PRN
Qty: 25 TABLET | Refills: 0 | Status: SHIPPED | OUTPATIENT
Start: 2025-03-26

## 2025-04-18 ENCOUNTER — RESULTS FOLLOW-UP (OUTPATIENT)
Dept: FAMILY MEDICINE CLINIC | Facility: CLINIC | Age: 45
End: 2025-04-18

## 2025-04-18 ENCOUNTER — APPOINTMENT (OUTPATIENT)
Dept: LAB | Age: 45
End: 2025-04-18
Payer: COMMERCIAL

## 2025-04-18 ENCOUNTER — HOSPITAL ENCOUNTER (OUTPATIENT)
Dept: RADIOLOGY | Age: 45
Discharge: HOME | End: 2025-04-18
Payer: COMMERCIAL

## 2025-04-18 DIAGNOSIS — Z12.31 BREAST CANCER SCREENING BY MAMMOGRAM: ICD-10-CM

## 2025-04-18 DIAGNOSIS — Z13.6 SCREENING FOR CARDIOVASCULAR CONDITION: ICD-10-CM

## 2025-04-18 DIAGNOSIS — R00.2 PALPITATIONS: ICD-10-CM

## 2025-04-18 DIAGNOSIS — I10 ESSENTIAL HYPERTENSION: ICD-10-CM

## 2025-04-18 DIAGNOSIS — E55.9 VITAMIN D DEFICIENCY: ICD-10-CM

## 2025-04-18 DIAGNOSIS — R62.7 FTT (FAILURE TO THRIVE) IN ADULT: ICD-10-CM

## 2025-04-18 LAB
25(OH)D3 SERPL-MCNC: 47.1 NG/ML (ref 30–100)
ALBUMIN SERPL BCG-MCNC: 4.4 G/DL (ref 3.5–5)
ALP SERPL-CCNC: 93 U/L (ref 34–104)
ALT SERPL W P-5'-P-CCNC: 9 U/L (ref 7–52)
ANION GAP SERPL CALCULATED.3IONS-SCNC: 10 MMOL/L (ref 4–13)
AST SERPL W P-5'-P-CCNC: 13 U/L (ref 13–39)
BASOPHILS # BLD AUTO: 0.03 THOUSANDS/ÂΜL (ref 0–0.1)
BASOPHILS NFR BLD AUTO: 1 % (ref 0–1)
BILIRUB SERPL-MCNC: 0.51 MG/DL (ref 0.2–1)
BUN SERPL-MCNC: 17 MG/DL (ref 5–25)
CALCIUM SERPL-MCNC: 9.5 MG/DL (ref 8.4–10.2)
CHLORIDE SERPL-SCNC: 103 MMOL/L (ref 96–108)
CHOLEST SERPL-MCNC: 139 MG/DL (ref ?–200)
CO2 SERPL-SCNC: 28 MMOL/L (ref 21–32)
CREAT SERPL-MCNC: 0.75 MG/DL (ref 0.6–1.3)
EOSINOPHIL # BLD AUTO: 0.11 THOUSAND/ÂΜL (ref 0–0.61)
EOSINOPHIL NFR BLD AUTO: 2 % (ref 0–6)
ERYTHROCYTE [DISTWIDTH] IN BLOOD BY AUTOMATED COUNT: 12.7 % (ref 11.6–15.1)
EST. AVERAGE GLUCOSE BLD GHB EST-MCNC: 111 MG/DL
GFR SERPL CREATININE-BSD FRML MDRD: 96 ML/MIN/1.73SQ M
GLUCOSE P FAST SERPL-MCNC: 130 MG/DL (ref 65–99)
HBA1C MFR BLD: 5.5 %
HCT VFR BLD AUTO: 41.3 % (ref 34.8–46.1)
HDLC SERPL-MCNC: 71 MG/DL
HGB BLD-MCNC: 14.2 G/DL (ref 11.5–15.4)
IMM GRANULOCYTES # BLD AUTO: 0.02 THOUSAND/UL (ref 0–0.2)
IMM GRANULOCYTES NFR BLD AUTO: 0 % (ref 0–2)
LDLC SERPL CALC-MCNC: 51 MG/DL (ref 0–100)
LYMPHOCYTES # BLD AUTO: 1.53 THOUSANDS/ÂΜL (ref 0.6–4.47)
LYMPHOCYTES NFR BLD AUTO: 26 % (ref 14–44)
MAGNESIUM SERPL-MCNC: 1.9 MG/DL (ref 1.9–2.7)
MCH RBC QN AUTO: 31.3 PG (ref 26.8–34.3)
MCHC RBC AUTO-ENTMCNC: 34.4 G/DL (ref 31.4–37.4)
MCV RBC AUTO: 91 FL (ref 82–98)
MONOCYTES # BLD AUTO: 0.4 THOUSAND/ÂΜL (ref 0.17–1.22)
MONOCYTES NFR BLD AUTO: 7 % (ref 4–12)
NEUTROPHILS # BLD AUTO: 3.77 THOUSANDS/ÂΜL (ref 1.85–7.62)
NEUTS SEG NFR BLD AUTO: 64 % (ref 43–75)
NONHDLC SERPL-MCNC: 68 MG/DL
NRBC BLD AUTO-RTO: 0 /100 WBCS
PHOSPHATE SERPL-MCNC: 3.3 MG/DL (ref 2.7–4.5)
PLATELET # BLD AUTO: 239 THOUSANDS/UL (ref 149–390)
PMV BLD AUTO: 10.8 FL (ref 8.9–12.7)
POTASSIUM SERPL-SCNC: 3.5 MMOL/L (ref 3.5–5.3)
PROT SERPL-MCNC: 7 G/DL (ref 6.4–8.4)
RBC # BLD AUTO: 4.53 MILLION/UL (ref 3.81–5.12)
SODIUM SERPL-SCNC: 141 MMOL/L (ref 135–147)
TRIGL SERPL-MCNC: 87 MG/DL (ref ?–150)
TSH SERPL DL<=0.05 MIU/L-ACNC: 2.09 UIU/ML (ref 0.45–4.5)
WBC # BLD AUTO: 5.86 THOUSAND/UL (ref 4.31–10.16)

## 2025-04-18 PROCEDURE — 36415 COLL VENOUS BLD VENIPUNCTURE: CPT

## 2025-04-18 PROCEDURE — 85025 COMPLETE CBC W/AUTO DIFF WBC: CPT

## 2025-04-18 PROCEDURE — 84100 ASSAY OF PHOSPHORUS: CPT

## 2025-04-18 PROCEDURE — 80053 COMPREHEN METABOLIC PANEL: CPT

## 2025-04-18 PROCEDURE — 82306 VITAMIN D 25 HYDROXY: CPT

## 2025-04-18 PROCEDURE — 83735 ASSAY OF MAGNESIUM: CPT

## 2025-04-18 PROCEDURE — 83036 HEMOGLOBIN GLYCOSYLATED A1C: CPT

## 2025-04-18 PROCEDURE — 84443 ASSAY THYROID STIM HORMONE: CPT

## 2025-04-18 PROCEDURE — 80061 LIPID PANEL: CPT

## 2025-04-18 PROCEDURE — 77063 BREAST TOMOSYNTHESIS BI: CPT

## 2025-04-18 PROCEDURE — 77067 SCR MAMMO BI INCL CAD: CPT

## 2025-04-21 ENCOUNTER — HOSPITAL ENCOUNTER (INPATIENT)
Facility: HOSPITAL | Age: 45
LOS: 10 days | Discharge: HOME | End: 2025-05-02
Attending: EMERGENCY MEDICINE | Admitting: PSYCHIATRY & NEUROLOGY
Payer: COMMERCIAL

## 2025-04-21 DIAGNOSIS — Z00.8 MEDICAL CLEARANCE FOR PSYCHIATRIC ADMISSION: ICD-10-CM

## 2025-04-21 DIAGNOSIS — F32.A DEPRESSION: ICD-10-CM

## 2025-04-21 DIAGNOSIS — F33.9 EPISODE OF RECURRENT MAJOR DEPRESSIVE DISORDER, UNSPECIFIED DEPRESSION EPISODE SEVERITY (HCC): ICD-10-CM

## 2025-04-21 DIAGNOSIS — F33.2 SEVERE EPISODE OF RECURRENT MAJOR DEPRESSIVE DISORDER, WITHOUT PSYCHOTIC FEATURES (HCC): ICD-10-CM

## 2025-04-21 DIAGNOSIS — F33.9 MAJOR DEPRESSION, RECURRENT (HCC): Primary | ICD-10-CM

## 2025-04-21 DIAGNOSIS — I10 ESSENTIAL HYPERTENSION: ICD-10-CM

## 2025-04-21 DIAGNOSIS — G47.00 INSOMNIA: ICD-10-CM

## 2025-04-21 DIAGNOSIS — Z00.8 ENCOUNTER FOR PSYCHOLOGICAL EVALUATION: ICD-10-CM

## 2025-04-21 LAB
AMPHETAMINES SERPL QL SCN: NEGATIVE
BARBITURATES UR QL: NEGATIVE
BENZODIAZ UR QL: NEGATIVE
COCAINE UR QL: NEGATIVE
ETHANOL EXG-MCNC: 0 MG/DL
EXT PREGNANCY TEST URINE: NEGATIVE
EXT. CONTROL: NORMAL
FENTANYL UR QL SCN: NEGATIVE
HYDROCODONE UR QL SCN: NEGATIVE
METHADONE UR QL: NEGATIVE
OPIATES UR QL SCN: NEGATIVE
OXYCODONE+OXYMORPHONE UR QL SCN: NEGATIVE
PCP UR QL: NEGATIVE
THC UR QL: NEGATIVE

## 2025-04-21 PROCEDURE — 99285 EMERGENCY DEPT VISIT HI MDM: CPT

## 2025-04-21 PROCEDURE — 82075 ASSAY OF BREATH ETHANOL: CPT

## 2025-04-21 PROCEDURE — 80307 DRUG TEST PRSMV CHEM ANLYZR: CPT

## 2025-04-21 PROCEDURE — 81025 URINE PREGNANCY TEST: CPT

## 2025-04-21 RX ORDER — LOSARTAN POTASSIUM 25 MG/1
100 TABLET ORAL DAILY
Status: DISCONTINUED | OUTPATIENT
Start: 2025-04-22 | End: 2025-05-02 | Stop reason: HOSPADM

## 2025-04-21 RX ORDER — AMLODIPINE BESYLATE 2.5 MG/1
2.5 TABLET ORAL DAILY
Status: DISCONTINUED | OUTPATIENT
Start: 2025-04-22 | End: 2025-05-02 | Stop reason: HOSPADM

## 2025-04-21 RX ORDER — TRAZODONE HYDROCHLORIDE 50 MG/1
50 TABLET ORAL
Status: DISCONTINUED | OUTPATIENT
Start: 2025-04-21 | End: 2025-05-02 | Stop reason: HOSPADM

## 2025-04-21 RX ORDER — HYDROXYZINE HYDROCHLORIDE 25 MG/1
25 TABLET, FILM COATED ORAL EVERY 8 HOURS PRN
Status: DISCONTINUED | OUTPATIENT
Start: 2025-04-21 | End: 2025-05-02 | Stop reason: HOSPADM

## 2025-04-21 RX ORDER — HYDROCHLOROTHIAZIDE 25 MG/1
25 TABLET ORAL DAILY
Status: DISCONTINUED | OUTPATIENT
Start: 2025-04-22 | End: 2025-04-23

## 2025-04-21 RX ORDER — ATORVASTATIN CALCIUM 40 MG/1
40 TABLET, FILM COATED ORAL DAILY
Status: DISCONTINUED | OUTPATIENT
Start: 2025-04-22 | End: 2025-05-02 | Stop reason: HOSPADM

## 2025-04-21 RX ORDER — IBUPROFEN 400 MG/1
800 TABLET, FILM COATED ORAL ONCE
Status: COMPLETED | OUTPATIENT
Start: 2025-04-21 | End: 2025-04-21

## 2025-04-21 RX ORDER — LORAZEPAM 0.5 MG/1
0.5 TABLET ORAL ONCE
Status: COMPLETED | OUTPATIENT
Start: 2025-04-21 | End: 2025-04-21

## 2025-04-21 RX ADMIN — LORAZEPAM 0.5 MG: 0.5 TABLET ORAL at 20:59

## 2025-04-21 RX ADMIN — IBUPROFEN 800 MG: 400 TABLET, FILM COATED ORAL at 20:47

## 2025-04-22 PROBLEM — Z91.199 NONCOMPLIANCE: Status: ACTIVE | Noted: 2025-04-22

## 2025-04-22 PROBLEM — E78.2 MIXED HYPERLIPIDEMIA: Status: ACTIVE | Noted: 2025-04-22

## 2025-04-22 LAB
ATRIAL RATE: 67 BPM
P AXIS: 60 DEGREES
PR INTERVAL: 144 MS
QRS AXIS: 73 DEGREES
QRSD INTERVAL: 74 MS
QT INTERVAL: 402 MS
QTC INTERVAL: 425 MS
T WAVE AXIS: 55 DEGREES
VENTRICULAR RATE: 67 BPM

## 2025-04-22 PROCEDURE — GZ59ZZZ INDIVIDUAL PSYCHOTHERAPY, PSYCHOPHYSIOLOGICAL: ICD-10-PCS | Performed by: PSYCHIATRY & NEUROLOGY

## 2025-04-22 PROCEDURE — GZ63ZZZ OTHER COUNSELING: ICD-10-PCS | Performed by: PSYCHIATRY & NEUROLOGY

## 2025-04-22 PROCEDURE — 93005 ELECTROCARDIOGRAM TRACING: CPT

## 2025-04-22 PROCEDURE — 93010 ELECTROCARDIOGRAM REPORT: CPT | Performed by: STUDENT IN AN ORGANIZED HEALTH CARE EDUCATION/TRAINING PROGRAM

## 2025-04-22 PROCEDURE — GZHZZZZ GROUP PSYCHOTHERAPY: ICD-10-PCS | Performed by: PSYCHIATRY & NEUROLOGY

## 2025-04-22 PROCEDURE — 99222 1ST HOSP IP/OBS MODERATE 55: CPT | Performed by: NURSE PRACTITIONER

## 2025-04-22 RX ORDER — HALOPERIDOL 5 MG/1
5 TABLET ORAL
Status: DISCONTINUED | OUTPATIENT
Start: 2025-04-22 | End: 2025-05-02 | Stop reason: HOSPADM

## 2025-04-22 RX ORDER — HYDROXYZINE HYDROCHLORIDE 25 MG/1
25 TABLET, FILM COATED ORAL
Status: DISCONTINUED | OUTPATIENT
Start: 2025-04-22 | End: 2025-05-02 | Stop reason: HOSPADM

## 2025-04-22 RX ORDER — LORAZEPAM 2 MG/ML
2 INJECTION INTRAMUSCULAR
Status: DISCONTINUED | OUTPATIENT
Start: 2025-04-22 | End: 2025-05-02 | Stop reason: HOSPADM

## 2025-04-22 RX ORDER — HALOPERIDOL 5 MG/ML
2.5 INJECTION INTRAMUSCULAR
Status: DISCONTINUED | OUTPATIENT
Start: 2025-04-22 | End: 2025-05-02 | Stop reason: HOSPADM

## 2025-04-22 RX ORDER — ACETAMINOPHEN 325 MG/1
650 TABLET ORAL EVERY 6 HOURS PRN
Status: DISCONTINUED | OUTPATIENT
Start: 2025-04-22 | End: 2025-05-02 | Stop reason: HOSPADM

## 2025-04-22 RX ORDER — BENZTROPINE MESYLATE 1 MG/ML
0.5 INJECTION, SOLUTION INTRAMUSCULAR; INTRAVENOUS
Status: DISCONTINUED | OUTPATIENT
Start: 2025-04-22 | End: 2025-05-02 | Stop reason: HOSPADM

## 2025-04-22 RX ORDER — HALOPERIDOL 1 MG/1
1 TABLET ORAL EVERY 6 HOURS PRN
Status: DISCONTINUED | OUTPATIENT
Start: 2025-04-22 | End: 2025-05-02 | Stop reason: HOSPADM

## 2025-04-22 RX ORDER — HYDROXYZINE HYDROCHLORIDE 50 MG/1
50 TABLET, FILM COATED ORAL
Status: DISCONTINUED | OUTPATIENT
Start: 2025-04-22 | End: 2025-05-02 | Stop reason: HOSPADM

## 2025-04-22 RX ORDER — DIPHENHYDRAMINE HYDROCHLORIDE 50 MG/ML
50 INJECTION, SOLUTION INTRAMUSCULAR; INTRAVENOUS EVERY 6 HOURS PRN
Status: DISCONTINUED | OUTPATIENT
Start: 2025-04-22 | End: 2025-05-02 | Stop reason: HOSPADM

## 2025-04-22 RX ORDER — MIRTAZAPINE 7.5 MG/1
7.5 TABLET, FILM COATED ORAL
Status: DISCONTINUED | OUTPATIENT
Start: 2025-04-22 | End: 2025-05-02 | Stop reason: HOSPADM

## 2025-04-22 RX ORDER — LORAZEPAM 0.5 MG/1
0.5 TABLET ORAL ONCE
Status: COMPLETED | OUTPATIENT
Start: 2025-04-22 | End: 2025-04-22

## 2025-04-22 RX ORDER — BENZTROPINE MESYLATE 1 MG/ML
1 INJECTION, SOLUTION INTRAMUSCULAR; INTRAVENOUS
Status: DISCONTINUED | OUTPATIENT
Start: 2025-04-22 | End: 2025-05-02 | Stop reason: HOSPADM

## 2025-04-22 RX ORDER — ACETAMINOPHEN 325 MG/1
975 TABLET ORAL EVERY 6 HOURS PRN
Status: DISCONTINUED | OUTPATIENT
Start: 2025-04-22 | End: 2025-05-02 | Stop reason: HOSPADM

## 2025-04-22 RX ORDER — PROPRANOLOL HYDROCHLORIDE 10 MG/1
10 TABLET ORAL EVERY 8 HOURS PRN
Status: DISCONTINUED | OUTPATIENT
Start: 2025-04-22 | End: 2025-05-02 | Stop reason: HOSPADM

## 2025-04-22 RX ORDER — LORAZEPAM 2 MG/ML
1 INJECTION INTRAMUSCULAR
Status: DISCONTINUED | OUTPATIENT
Start: 2025-04-22 | End: 2025-05-02 | Stop reason: HOSPADM

## 2025-04-22 RX ORDER — ACETAMINOPHEN 325 MG/1
650 TABLET ORAL EVERY 4 HOURS PRN
Status: DISCONTINUED | OUTPATIENT
Start: 2025-04-22 | End: 2025-05-02 | Stop reason: HOSPADM

## 2025-04-22 RX ORDER — HYDROXYZINE HYDROCHLORIDE 50 MG/1
100 TABLET, FILM COATED ORAL
Status: DISCONTINUED | OUTPATIENT
Start: 2025-04-22 | End: 2025-05-02 | Stop reason: HOSPADM

## 2025-04-22 RX ORDER — BENZTROPINE MESYLATE 1 MG/1
1 TABLET ORAL
Status: DISCONTINUED | OUTPATIENT
Start: 2025-04-22 | End: 2025-05-02 | Stop reason: HOSPADM

## 2025-04-22 RX ORDER — LORAZEPAM 2 MG/ML
2 INJECTION INTRAMUSCULAR EVERY 6 HOURS PRN
Status: DISCONTINUED | OUTPATIENT
Start: 2025-04-22 | End: 2025-05-02 | Stop reason: HOSPADM

## 2025-04-22 RX ORDER — HALOPERIDOL 5 MG/ML
5 INJECTION INTRAMUSCULAR
Status: DISCONTINUED | OUTPATIENT
Start: 2025-04-22 | End: 2025-05-02 | Stop reason: HOSPADM

## 2025-04-22 RX ADMIN — LOSARTAN POTASSIUM 100 MG: 25 TABLET, FILM COATED ORAL at 08:52

## 2025-04-22 RX ADMIN — HYDROCHLOROTHIAZIDE 25 MG: 25 TABLET ORAL at 08:52

## 2025-04-22 RX ADMIN — Medication 3 MG: at 21:13

## 2025-04-22 RX ADMIN — AMLODIPINE BESYLATE 2.5 MG: 2.5 TABLET ORAL at 08:52

## 2025-04-22 RX ADMIN — ATORVASTATIN CALCIUM 40 MG: 40 TABLET, FILM COATED ORAL at 08:52

## 2025-04-22 RX ADMIN — LORAZEPAM 0.5 MG: 0.5 TABLET ORAL at 12:08

## 2025-04-22 RX ADMIN — HYDROXYZINE HYDROCHLORIDE 25 MG: 25 TABLET, FILM COATED ORAL at 08:52

## 2025-04-22 RX ADMIN — ACETAMINOPHEN 975 MG: 325 TABLET, FILM COATED ORAL at 14:09

## 2025-04-22 NOTE — ED NOTES
[facility] Suicide Risk Assessment deferred, as unable to assess while patient sleeping.  Behavioral Health Assessment deferred as patient is sleeping and would benefit from additional rest.  Vital signs deferred until patient awake, no signs or symptoms of respiratory distress at this time.     Once patient is awake and able to participate, will complete assessments.           Nafisa Lake RN  04/22/25 0641

## 2025-04-22 NOTE — ASSESSMENT & PLAN NOTE
Vital signs stable afebrile patient seen and examined by myself at the bedside labs from 4/18/2025 were reviewed by myself sodium 141 potassium 3.5 creatinine 0.75  Patient medically stable for admit  Please reach out to [facility] with any medical questions or concerns

## 2025-04-22 NOTE — ED NOTES
Insurance Authorization for admission:   Phone call placed to [facility] ([employer])  Phone number: [phone]  (Mental health [facility] - [phone])  Spoke to Angelina Alfaro states that no precert is required for the is policy.       Level of care: inpatient mental health admission  Review on **.   Authorization #   Ref# [id number].

## 2025-04-22 NOTE — ED NOTES
Patient singed 201 at this time, faxed back to crisis.     Princess White, REMEDIOS  04/22/25 0003

## 2025-04-22 NOTE — NURSING NOTE
Patient is isolative to self and room, during the afternoon. Patient affect is flat. Patient is calm, but withdrawn. Patient denies SI/HI/AH/VH. Patient is compliant with meds and meals.

## 2025-04-22 NOTE — ED PROVIDER NOTES
Time reflects when diagnosis was documented in both MDM as applicable and the Disposition within this note       Time User Action Codes Description Comment    4/21/2025  8:31 PM Laura Joseph [Z00.8] Encounter for psychological evaluation     4/21/2025  8:31 PM Laura Joseph [F32.A] Depression           ED Disposition       ED Disposition   Transfer to Behavioral Health Condition   --    Date/Time   Mon Apr 21, 2025 11:23 PM    Comment   Shireen Rueda should be transferred out to a behavioral health facility and has been medically cleared.               Assessment & Plan         Medical Decision Making  Patient with a history of depression presents for psychiatric evaluation.  She discontinued her antidepressant several weeks ago due to side effects and reports worsening depression over the last 3 weeks with fleeting SI earlier today.  Differential diagnosis includes but is not limited to depression, suicidal ideation, other mood disorder, schizoaffective disorder, medication noncompliance, or insomnia.  Patient spoke with the ED crisis worker and signed a 201 for voluntary inpatient mental health treatment.  No acute events overnight.  Bed search is pending.  Patient signed out to oncoming morning provider for final disposition pending bed placement.    Amount and/or Complexity of Data Reviewed  Labs: ordered.    Risk  OTC drugs.  Prescription drug management.  Decision regarding hospitalization.         Medications   amLODIPine (NORVASC) tablet 2.5 mg (has no administration in time range)   atorvastatin (LIPITOR) tablet 40 mg (has no administration in time range)   hydroCHLOROthiazide tablet 25 mg (has no administration in time range)   losartan (COZAAR) tablet 100 mg (has no administration in time range)   hydrOXYzine HCL (ATARAX) tablet 25 mg (has no administration in time range)   traZODone (DESYREL) tablet 50 mg (has no administration in time range)   melatonin tablet 3 mg (3 mg Oral Not Given  4/22/25 0007)   ibuprofen (MOTRIN) tablet 800 mg (800 mg Oral Given 4/21/25 2047)   LORazepam (ATIVAN) tablet 0.5 mg (0.5 mg Oral Given 4/21/25 2059)       ED Risk Strat Scores        SBIRT 20yo+      Flowsheet Row Most Recent Value   Initial Alcohol Screen: US AUDIT-C     1. How often do you have a drink containing alcohol? 0 Filed at: 04/21/2025 2033   2. How many drinks containing alcohol do you have on a typical day you are drinking?  0 Filed at: 04/21/2025 2033   3b. FEMALE Any Age, or MALE 65+: How often do you have 4 or more drinks on one occassion? 0 Filed at: 04/21/2025 2033   Audit-C Score 0 Filed at: 04/21/2025 2033   [place]: How many times in the past year have you...    Used an illegal drug or used a prescription medication for non-medical reasons? Never Filed at: 04/21/2025 2033              History of Present Illness       Chief Complaint   Patient presents with    Psychiatric Evaluation     Pt c/o increasing depression for a few weeks after stopping her medications 6 weeks ago. Pt reports shew as seen [facility] in January for depression after the loss of her brother and it helped but she stopped her meds d/t weight gain and feeling like she was becoming forgetful. Pt reports feeling hopeless and having SI w/ plan to OD on medications, denies HI/VH/AH.       Past Medical History:   Diagnosis Date    Anemia     Chest tightness or pressure     8/19/2013    Depression 5/14/2017    Hypertension     Vitamin D deficiency       Past Surgical History:   Procedure Laterality Date    CYSTOSCOPY N/A 12/30/2018    Procedure: CYSTOSCOPY;  Surgeon: Laura Bal DO;  Location: [facility];  Service: Gynecology    HYSTERECTOMY N/A 12/30/2018    Procedure: HYSTERECTOMY LAPAROSCOPIC TOTAL (LTH);  Surgeon: Laura Bal DO;  Location[facility];  Service: Gynecology    SD LAPAROSCOPY W/RMVL ADNEXAL STRUCTURES Bilateral 12/30/2018    Procedure: SALPINGECTOMY, LAPAROSCOPIC;  Surgeon: Laura Bal DO;  Location[facility];  Service: Gynecology    TONSILLECTOMY      TUBAL LIGATION      2006      Family History   Problem Relation Age of Onset    No Known Problems Mother     Heart disease Father     Hypertension Father     Heart attack Father 37    No Known Problems Sister     No Known Problems Daughter     No Known Problems Daughter     No Known Problems Maternal Grandmother     No Known Problems Maternal Grandfather     No Known Problems Paternal Grandmother     No Known Problems Paternal Grandfather     No Known Problems Son     No Known Problems Maternal Aunt     No Known Problems Maternal Aunt     No Known Problems Maternal Aunt     No Known Problems Maternal Aunt     No Known Problems Maternal Aunt     No Known Problems Paternal Aunt     No Known Problems Paternal Aunt     No Known Problems Half-Sister     Breast cancer Neg Hx     Colon cancer Neg Hx     Ovarian cancer Neg Hx     Endometrial cancer Neg Hx       Social History     Tobacco Use    Smoking status: Some Days     Types: Cigarettes    Smokeless tobacco: Never    Tobacco comments:     social smoker, less than 1 pack per weekend   Vaping Use    Vaping status: Never Used   Substance Use Topics    Alcohol use: Yes     Alcohol/week: 3.0 standard drinks of alcohol     Types: 3 Glasses of wine per week     Comment: on the weekends/socially    Drug use: No      E-Cigarette/Vaping    E-Cigarette Use Never User       E-Cigarette/Vaping Substances    Nicotine No     THC No     CBD No     Flavoring No     Other No     Unknown No       I have reviewed and agree with the history as documented.     The patient is a 44-year-old female with a past medical history of major depressive disorder, panic disorder, and hypertension, who presents for psychiatric evaluation.  She reports a history of worsening depression since her younger brother unexpectedly  in 2024.  She previously underwent inpatient BH treatment in 2025 during which time she was started on Zoloft and  "Trazodone.  Patient does report some improvement following her hospitalization, however she discontinued her Zoloft several weeks ago as she felt the medication made her feel \"too well\" and caused weight gain.  April 6 was the 6-month anniversary of her brother's death and since that time she is experienced worsening depression and sleep difficulties.  She took trazodone tonight but was unable to sleep.  Patient denies SI at this time, but does state that earlier today she did briefly think about taking her medications to harm herself.  She has no prior history of suicidal ideation or suicide attempts.  No HI, hallucinations, or paranoia.  She denies alcohol or substance use.          Review of Systems   Constitutional:  Negative for chills and fever.   HENT:  Negative for ear pain and sore throat.    Eyes:  Negative for pain and visual disturbance.   Respiratory:  Negative for cough and shortness of breath.    Cardiovascular:  Negative for chest pain and palpitations.   Gastrointestinal:  Negative for abdominal pain and vomiting.   Genitourinary:  Negative for dysuria and hematuria.   Musculoskeletal:  Negative for arthralgias and back pain.   Skin:  Negative for color change and rash.   Neurological:  Negative for seizures and syncope.   Psychiatric/Behavioral:  Positive for dysphoric mood and sleep disturbance.    All other systems reviewed and are negative.      Objective       ED Triage Vitals   Temperature Pulse Blood Pressure Respirations SpO2 Patient Position - Orthostatic VS   04/21/25 2010 04/21/25 2014 04/21/25 2014 04/21/25 2014 04/21/25 2014 04/21/25 2014   98.1 °F (36.7 °C) 86 147/91 18 97 % Sitting      Temp Source Heart Rate Source BP Location FiO2 (%) Pain Score    04/21/25 2010 04/21/25 2014 04/21/25 2014 -- 04/21/25 2047    Oral Monitor Right arm  6      Vitals      Date and Time Temp Pulse SpO2 Resp BP Pain Score FACES Pain Rating User   04/21/25 0234 -- -- -- -- -- 3 -- KS   04/21/25 2047 -- -- " -- -- -- 6 -- KS   04/21/25 2014 -- 86 97 % 18 147/91 -- -- SD   04/21/25 2010 98.1 °F (36.7 °C) -- -- -- -- -- -- KR            Physical Exam  Vitals and nursing note reviewed.   Constitutional:       General: She is awake. She is not in acute distress.     Appearance: Normal appearance. She is well-developed and overweight. She is not toxic-appearing or diaphoretic.   HENT:      Head: Normocephalic and atraumatic.      Right Ear: External ear normal.      Left Ear: External ear normal.      Nose: Nose normal.      Mouth/Throat:      Lips: Pink.      Mouth: Mucous membranes are moist.   Eyes:      General: Lids are normal. Gaze aligned appropriately.      Conjunctiva/sclera: Conjunctivae normal.      Pupils: Pupils are equal, round, and reactive to light.   Cardiovascular:      Rate and Rhythm: Normal rate and regular rhythm.   Pulmonary:      Effort: Pulmonary effort is normal. No respiratory distress.   Musculoskeletal:      Cervical back: Full passive range of motion without pain and neck supple.   Lymphadenopathy:      Cervical: No cervical adenopathy.   Skin:     General: Skin is warm.      Capillary Refill: Capillary refill takes less than 2 seconds.      Coloration: Skin is not pale.      Findings: No rash.   Neurological:      Mental Status: She is alert and oriented to person, place, and time.   Psychiatric:         Attention and Perception: Attention and perception normal. She does not perceive auditory or visual hallucinations.         Mood and Affect: Mood is depressed. Affect is tearful.         Speech: Speech normal. Speech is not rapid and pressured, slurred or tangential.         Behavior: Behavior is cooperative.         Thought Content: Thought content is not paranoid or delusional. Thought content includes suicidal ideation. Thought content does not include homicidal ideation. Thought content includes suicidal plan. Thought content does not include homicidal plan.         Results Reviewed        Procedure Component Value Units Date/Time    Rapid drug screen, urine [[id number]]  (Normal) Collected: 04/21/25 2047    Lab Status: Final result Specimen: Urine, Other Updated: 04/21/25 2115     Amph/Meth UR Negative     Barbiturate Ur Negative     Benzodiazepine Urine Negative     Cocaine Urine Negative     Methadone Urine Negative     Opiate Urine Negative     PCP Ur Negative     THC Urine Negative     Oxycodone Urine Negative     Fentanyl Urine Negative     HYDROCODONE URINE Negative    Narrative:      FOR MEDICAL PURPOSES ONLY.   IF CONFIRMATION NEEDED PLEASE CONTACT THE LAB WITHIN 5 DAYS.    Drug Screen Cutoff Levels:  AMPHETAMINE/METHAMPHETAMINES  1000 ng/mL  BARBITURATES     200 ng/mL  BENZODIAZEPINES     200 ng/mL  COCAINE      300 ng/mL  METHADONE      300 ng/mL  OPIATES      300 ng/mL  PHENCYCLIDINE     25 ng/mL  THC       50 ng/mL  OXYCODONE      100 ng/mL  FENTANYL      5 ng/mL  HYDROCODONE     300 ng/mL    POCT pregnancy, urine [[id number]]  (Normal) Collected: 04/21/25 2047    Lab Status: Final result Specimen: Urine Updated: 04/21/25 2050     EXT Preg Test, Ur Negative     Control Valid    POCT alcohol breath test [[id number]]  (Normal) Resulted: 04/21/25 2033    Lab Status: Final result Updated: 04/21/25 2033     EXTBreath Alcohol 0.000            No orders to display       Procedures    ED Medication and Procedure Management   Prior to Admission Medications   Prescriptions Last Dose Informant Patient Reported? Taking?   Omega-3 Fatty Acids (OMEGA 3 PO)  Self Yes No   Sig: Take by mouth   amLODIPine (NORVASC) 2.5 mg tablet   No No   Sig: TAKE 1 TABLET BY MOUTH EVERY DAY   atorvastatin (LIPITOR) 40 mg tablet   No No   Sig: TAKE 1 TABLET BY MOUTH EVERY DAY   carbamide peroxide (DEBROX) 6.5 % otic solution   No No   Sig: Administer 5 drops into the left ear 2 (two) times a day   Patient not taking: Reported on 1/11/2024   cyanocobalamin (VITAMIN B-12) 1000 MCG tablet   No No   Sig: Take 1 tablet (1,000  mcg total) by mouth daily   ergocalciferol (VITAMIN D2) 50,000 units   No No   Sig: Take 1 capsule (50,000 Units total) by mouth once a week for 7 doses   Patient not taking: Reported on 3/3/2025   fluticasone (FLONASE) 50 mcg/act nasal spray   No No   Sig: SPRAY 1 SPRAY INTO EACH NOSTRIL EVERY DAY   Patient not taking: Reported on 3/3/2025   folic acid (FOLVITE) 1 mg tablet   No No   Sig: Take 1 tablet (1 mg total) by mouth daily   Patient not taking: Reported on 3/3/2025   hydrOXYzine HCL (ATARAX) 25 mg tablet   No No   Sig: Take 1 tablet (25 mg total) by mouth every 8 (eight) hours as needed for anxiety   hydroCHLOROthiazide 25 mg tablet   No No   Sig: TAKE 1 TABLET (25 MG TOTAL) BY MOUTH DAILY.   losartan (COZAAR) 100 MG tablet   No No   Sig: TAKE 1 TABLET BY MOUTH EVERY DAY   magnesium Oxide (MAG-OX) 400 mg TABS   No No   Sig: Take 1 tablet (400 mg total) by mouth 2 (two) times a day   Patient not taking: Reported on 3/3/2025   melatonin 3 mg   No No   Sig: Take 1 tablet (3 mg total) by mouth daily at bedtime   sertraline (ZOLOFT) 50 mg tablet   No No   Sig: Take 1.5 tablets (75 mg total) by mouth daily   Patient not taking: Reported on 3/3/2025   traZODone (DESYREL) 50 mg tablet   No No   Sig: Take 1 tablet (50 mg total) by mouth daily at bedtime as needed for sleep      Facility-Administered Medications: None     Patient's Medications   Discharge Prescriptions    No medications on file     No discharge procedures on file.  ED SEPSIS DOCUMENTATION   Time reflects when diagnosis was documented in both MDM as applicable and the Disposition within this note       Time User Action Codes Description Comment    4/21/2025  8:31 PM Laura Joseph [Z00.8] Encounter for psychological evaluation     4/21/2025  8:31 PM Laura Joseph [F32.A] Depression                  Laura Joseph PA-C  04/22/25 0400

## 2025-04-22 NOTE — ED NOTES
Patient is accepted at  [facility]    Patient is accepted by Dr. chi.     Patient may go to the floor at 1300        Nurse report is to be called to [phone] prior to patient transfer.

## 2025-04-22 NOTE — NURSING NOTE
"201 admitted from -ED. Reports stopping her medication ( Zoloft 75 mg ) approximately 6 weeks ago due to \"weight gain\" and making her \"feel numb\". Increase in depression and anxiety, decreased sleep and appetite. Fleeing SI with plan to overdose prior to admission but is currently denying. Denies past suicide attempts. Previous inpatient admission to [facility] in January of  then attended partial program. Stressors include anniversary of her brothers death at the beginning of April. Denies D&A.   "

## 2025-04-22 NOTE — PLAN OF CARE
Problem: DISCHARGE PLANNING  Goal: Discharge to home or other facility with appropriate resources  Description: INTERVENTIONS:- Identify barriers to discharge w/patient and caregiver- Arrange for needed discharge resources and transportation as appropriate- Identify discharge learning needs (meds, wound care, etc.)- Arrange for interpretive services to assist at discharge as needed- Refer to Case Management Department for coordinating discharge planning if the patient needs post-hospital services based on physician/advanced practitioner order or complex needs related to functional status, cognitive ability, or social support system  Outcome: Progressing     Problem: SELF HARM/SUICIDALITY  Goal: Will have no self-injury during hospital stay  Description: INTERVENTIONS:- Q 15 MINUTES: Routine safety checks- Q WAKING SHIFT & PRN: Assess risk to determine if routine checks are adequate to maintain patient safety- Encourage patient to participate actively in care by formulating a plan to combat response to suicidal ideation, identify supports and resources  Outcome: Progressing     Problem: DEPRESSION  Goal: Will be euthymic at discharge  Description: INTERVENTIONS:- Administer medication as ordered- Provide emotional support via 1:1 interaction with staff- Encourage involvement in milieu/groups/activities- Monitor for social isolation  Outcome: Not Progressing     Problem: ANXIETY  Goal: Will report anxiety at manageable levels  Description: INTERVENTIONS:- Administer medication as ordered- Teach and encourage coping skills- Provide emotional support- Assess patient/family for anxiety and ability to cope  Outcome: Not Progressing  Goal: By discharge: Patient will verbalize 2 strategies to deal with anxiety  Description: Interventions:- Identify any obvious source/trigger to anxiety- Staff will assist patient in applying identified coping technique/skills- Encourage attendance of scheduled groups and  activities  Outcome: Not Progressing     Problem: Ineffective Coping  Goal: Participates in unit activities  Description: Interventions:- Provide therapeutic environment - Provide required programming - Redirect inappropriate behaviors   Outcome: Not Progressing

## 2025-04-22 NOTE — ED NOTES
"Patient came to the ED reporting increased depression and overwhelming sadness due to the recent death of her brother 6 months ago. She reports that she was inpatient and then in partial hospital treatment in January and February, but stopped her medicine and didn't follow through with scheduled outpatient, and now feels as if she's just as depressed as she was in the beginning. She says she has a decreased appetite, insomnia, can't concentrate, feels unmotivated for most everything, and also reported thoughts of suicide by medication overdose. She denied HI, psychosis, and substance abuse issues.     She would like to return to inpatient treatment and \"be more honest with myself and the staff this time\". She says she feels she was not honest with herself and focused more on getting treatment finished asap than she did on focusing on full participation in treatment and getting all she can from it. She would prefer to return to a BHU at [place] where she says she \"feels comfortable\" if possible. If no bed is available Tuesday, please speak with her about alternative placements before referring her out of the area.   "

## 2025-04-22 NOTE — CONSULTS
Consultation - Hospitalist   Name: Shireen Rueda 44 y.o. female I MRN: [id number]  Unit/Bed#: U 340-01 I Date of Admission: 4/21/2025   Date of Service: 4/22/2025 I Hospital Day: 0   Inpatient consult for Medical Clearance for  patient  Consult performed by: EDER Welch  Consult ordered by: Conrado Mariee MD        Physician Requesting Evaluation: Braulio Darden MD   Reason for Evaluation / Principal Problem: Medical clearance for psychiatric admission    Assessment & Plan  Medical clearance for psychiatric admission  Vital signs stable afebrile patient seen and examined by myself at the bedside labs from 4/18/2025 were reviewed by myself sodium 141 potassium 3.5 creatinine 0.75  Patient medically stable for admit  Please reach out to [facility] with any medical questions or concerns  Essential hypertension  Patient will continue on her home dose of amlodipine 2.5 mg p.o. daily  Patient will continue on her home dose of HCTZ 25 mg p.o. daily  Patient will continue on her losartan 100 mg p.o. daily  Will continue to monitor her blood pressure on ongoing basis and additively agents as needed  Mixed hyperlipidemia  Patient will continue on her home dose of Lipitor 40 mg p.o. daily  Noncompliance  Approximately 6 weeks ago patient discontinued her psych meds and psych follow-up secondary to weight gain  Please educate patient on the importance of medication adherence as well as medical and psychological follow-up        Collaboration of Care: Were Recommendations Directly Discussed with Primary Treatment Team? - No     History of Present Illness   Shireen Rueda is a 44 y.o. female who is originally admitted to the psychiatry service due to increasing depression over the last 3 weeks with fleeting suicidal ideation. We are consulted for medical clearance for admission to Behavioral Health Unit and treatment of underlying psychiatric illness.      4/21/2025 patient presents to [facility] ED complaining of increased  depression over the last 3 weeks with fleeting suicidal ideation.  Patient's brother passed away after having a liver transplant abruptly and October 2024 which was her younger brother.  Patient had a inpatient stay back in January 2025.  She is incredibly grief stricken and is having ongoing grief and processing issues.  When she left [facility] she was on medication and did a couple of partial programs.  About 6 weeks ago she stopped being noncompliant with her meds and her follow-up secondary to some weight gain.  Patient had recently when she was admitted in January 2025 lost 16 pounds and had a BMI of 24.83 today she has a BMI of 27.08.  She is now transferred to the [facility] for stabilization of her mental health issues.    Review of Systems   Constitutional:  Positive for activity change and appetite change. Negative for chills and fever.   HENT:  Negative for ear pain and sore throat.    Eyes:  Negative for pain and visual disturbance.   Respiratory:  Negative for cough and shortness of breath.    Cardiovascular:  Negative for chest pain and palpitations.   Gastrointestinal:  Negative for abdominal pain and vomiting.   Genitourinary:  Negative for dysuria and hematuria.   Musculoskeletal:  Negative for arthralgias and back pain.   Skin:  Negative for color change and rash.   Neurological:  Negative for seizures and syncope.   Psychiatric/Behavioral:  Positive for decreased concentration, dysphoric mood and sleep disturbance.    All other systems reviewed and are negative.      Historical Information   Past Medical History:   Diagnosis Date    Anemia     Anxiety     Chest tightness or pressure     8/19/2013    Depression 05/14/2017    Hypertension     Vitamin D deficiency      Past Surgical History:   Procedure Laterality Date    CYSTOSCOPY N/A 12/30/2018    Procedure: CYSTOSCOPY;  Surgeon: Laura Bal DO;  Location: [facility];  Service: Gynecology    HYSTERECTOMY N/A 12/30/2018    Procedure: HYSTERECTOMY  LAPAROSCOPIC TOTAL (LTH);  Surgeon: Laura Bal DO;  Location[facility];  Service: Gynecology    MT LAPAROSCOPY W/RMVL ADNEXAL STRUCTURES Bilateral 12/30/2018    Procedure: SALPINGECTOMY, LAPAROSCOPIC;  Surgeon: Laura Bal DO;  Location: [facility];  Service: Gynecology    TONSILLECTOMY      TUBAL LIGATION      2006     Social History     Tobacco Use    Smoking status: Never    Smokeless tobacco: Never    Tobacco comments:     social smoker, less than 1 pack per weekend   Vaping Use    Vaping status: Never Used   Substance and Sexual Activity    Alcohol use: Yes     Alcohol/week: 3.0 standard drinks of alcohol     Types: 3 Glasses of wine per week     Comment: on the weekends/socially    Drug use: No    Sexual activity: Yes     Partners: Male     Birth control/protection: Female Sterilization, Other     E-Cigarette/Vaping    E-Cigarette Use Never User      E-Cigarette/Vaping Substances    Nicotine No     THC No     CBD No     Flavoring No     Other No     Unknown No        Marital Status: /Civil Union    Meds/Allergies   I have reviewed home medications with patient personally.  Prior to Admission medications    Medication Sig Start Date End Date Taking? Authorizing Provider   amLODIPine (NORVASC) 2.5 mg tablet TAKE 1 TABLET BY MOUTH EVERY DAY 1/28/25  Yes Nenita Velásquez MD   atorvastatin (LIPITOR) 40 mg tablet TAKE 1 TABLET BY MOUTH EVERY DAY 9/23/24  Yes Nenita Velásquez MD   cyanocobalamin (VITAMIN B-12) 1000 MCG tablet Take 1 tablet (1,000 mcg total) by mouth daily 1/7/25  Yes Brooke Mendelson, DO   hydroCHLOROthiazide 25 mg tablet TAKE 1 TABLET (25 MG TOTAL) BY MOUTH DAILY. 1/28/25  Yes Nenita Velásquez MD   hydrOXYzine HCL (ATARAX) 25 mg tablet Take 1 tablet (25 mg total) by mouth every 8 (eight) hours as needed for anxiety 3/26/25  Yes Nenita Velásquez MD   losartan (COZAAR) 100 MG tablet TAKE 1 TABLET BY MOUTH EVERY DAY 9/23/24  Yes Nenita Velásquez MD  "  melatonin 3 mg Take 1 tablet (3 mg total) by mouth daily at bedtime 1/6/25  Yes Brooke Mendelson, DO   carbamide peroxide (DEBROX) 6.5 % otic solution Administer 5 drops into the left ear 2 (two) times a day  Patient not taking: Reported on 1/11/2024 10/30/23   Camacho Kirby MD   ergocalciferol (VITAMIN D2) 50,000 units Take 1 capsule (50,000 Units total) by mouth once a week for 7 doses  Patient not taking: Reported on 3/3/2025 1/12/25 2/24/25  Brooke Mendelson, DO   fluticasone (FLONASE) 50 mcg/act nasal spray SPRAY 1 SPRAY INTO EACH NOSTRIL EVERY DAY  Patient not taking: Reported on 3/3/2025 12/28/23   Selam Zaragoza DO   folic acid (FOLVITE) 1 mg tablet Take 1 tablet (1 mg total) by mouth daily  Patient not taking: Reported on 3/3/2025 1/7/25   Brooke Mendelson, DO   magnesium Oxide (MAG-OX) 400 mg TABS Take 1 tablet (400 mg total) by mouth 2 (two) times a day  Patient not taking: Reported on 3/3/2025 1/6/25   Brooke Mendelson, DO   Omega-3 Fatty Acids (OMEGA 3 PO) Take by mouth  Patient not taking: Reported on 4/22/2025    Historical Provider, MD   sertraline (ZOLOFT) 50 mg tablet Take 1.5 tablets (75 mg total) by mouth daily  Patient not taking: Reported on 4/22/2025 1/22/25 4/22/25  Annabelle Claudio PA-C   traZODone (DESYREL) 50 mg tablet Take 1 tablet (50 mg total) by mouth daily at bedtime as needed for sleep  Patient not taking: Reported on 4/22/2025 1/31/25   Rubén Cardenas,      No Known Allergies    Objective :  Temp:  [97.4 °F (36.3 °C)-98.1 °F (36.7 °C)] 97.7 °F (36.5 °C)  HR:  [64-86] 66  BP: (103-147)/(64-91) 103/64  Resp:  [16-20] 16  SpO2:  [97 %-98 %] 97 %  O2 Device: None (Room air)    Height: 5' 1\" (154.9 cm) (04/22/25 1325)  Weight - Scale: 65 kg (143 lb 4.8 oz) (04/22/25 1325)  Physical Exam  Constitutional:       Appearance: Normal appearance.   HENT:      Head: Normocephalic and atraumatic.      Nose: Nose normal.      Mouth/Throat:      Mouth: Mucous membranes are " moist.      Pharynx: Oropharynx is clear.   Eyes:      Extraocular Movements: Extraocular movements intact.      Pupils: Pupils are equal, round, and reactive to light.   Cardiovascular:      Rate and Rhythm: Normal rate and regular rhythm.      Pulses: Normal pulses.      Heart sounds: Normal heart sounds.   Pulmonary:      Effort: Pulmonary effort is normal.      Breath sounds: Normal breath sounds.   Abdominal:      General: Abdomen is flat. Bowel sounds are normal.      Palpations: Abdomen is soft.   Musculoskeletal:         General: Normal range of motion.      Cervical back: Normal range of motion and neck supple.   Skin:     General: Skin is warm and dry.   Neurological:      Mental Status: She is alert and oriented to person, place, and time.   Psychiatric:         Attention and Perception: Attention normal.         Mood and Affect: Mood is depressed. Affect is labile and tearful.         Speech: Speech normal.         Behavior: Behavior normal. Behavior is cooperative.         Thought Content: Thought content normal.         Cognition and Memory: Cognition normal.         Judgment: Judgment normal.           Lab Results: I have reviewed the following results:  Results from last 7 days   Lab Units 04/18/25  0754   WBC Thousand/uL 5.86   HEMOGLOBIN g/dL 14.2   HEMATOCRIT % 41.3   PLATELETS Thousands/uL 239   SEGS PCT % 64   LYMPHO PCT % 26   MONO PCT % 7   EOS PCT % 2     Results from last 7 days   Lab Units 04/18/25  0754   SODIUM mmol/L 141   POTASSIUM mmol/L 3.5   CHLORIDE mmol/L 103   CO2 mmol/L 28   BUN mg/dL 17   CREATININE mg/dL 0.75   ANION GAP mmol/L 10   CALCIUM mg/dL 9.5   ALBUMIN g/dL 4.4   TOTAL BILIRUBIN mg/dL 0.51   ALK PHOS U/L 93   ALT U/L 9   AST U/L 13             Lab Results   Component Value Date/Time    HGBA1C 5.5 04/18/2025 07:54 AM    HGBA1C 5.4 01/04/2025 05:42 AM    HGBA1C 5.4 01/27/2024 08:45 AM           Imaging Results Review: No pertinent imaging studies reviewed.  Other Study  Results Review: EKG was reviewed.   4/21/2025 EKG reviewed by myself as well as interpreted by myself ventricular rate 67 QT interval 402 QTc 425 normal sinus rhythm normal EKG when compared with an EKG from 1/5/2025 there are no new acute EKG findings noted in this EKG.  Administrative Statements   I have spent a total time of 45 minutes in caring for this patient on the day of the visit/encounter including Diagnostic results, Prognosis, Risks and benefits of tx options, Instructions for management, Patient and family education, Importance of tx compliance, Risk factor reductions, Impressions, Counseling / Coordination of care, Documenting in the medical record, Reviewing/placing orders in the medical record (including tests, medications, and/or procedures), Obtaining or reviewing history  , and Communicating with other healthcare professionals .  ** Please Note: This note has been constructed using a voice recognition system. **

## 2025-04-22 NOTE — NURSING NOTE
PTA medications confirmed with patients pharmacy. [facility] secure chat sent to Dr Darden.

## 2025-04-22 NOTE — ED NOTES
Per EVS, patient's MA coverage is inactive. Patient has active Zoji, verified via [employer].     [facility] Dejesus[facility]  04/21/25    [id number]

## 2025-04-22 NOTE — ED NOTES
Pt still anxious and tearful, states previous PRN medication did not help that much.  Spoke with provider and ativan order obtained and given to pt     Teja Londono RN  04/22/25 [id number]

## 2025-04-22 NOTE — ASSESSMENT & PLAN NOTE
Approximately 6 weeks ago patient discontinued her psych meds and psych follow-up secondary to weight gain  Please educate patient on the importance of medication adherence as well as medical and psychological follow-up

## 2025-04-22 NOTE — ASSESSMENT & PLAN NOTE
Patient will continue on her home dose of amlodipine 2.5 mg p.o. daily  Patient will continue on her home dose of HCTZ 25 mg p.o. daily  Patient will continue on her losartan 100 mg p.o. daily  Will continue to monitor her blood pressure on ongoing basis and additively agents as needed

## 2025-04-23 DIAGNOSIS — F33.1 MODERATE EPISODE OF RECURRENT MAJOR DEPRESSIVE DISORDER (HCC): ICD-10-CM

## 2025-04-23 PROBLEM — F33.9 MAJOR DEPRESSION, RECURRENT (HCC): Status: ACTIVE | Noted: 2025-01-04

## 2025-04-23 LAB
ANION GAP SERPL CALCULATED.3IONS-SCNC: 12 MMOL/L (ref 4–13)
BASOPHILS # BLD AUTO: 0.03 THOUSANDS/ÂΜL (ref 0–0.1)
BASOPHILS NFR BLD AUTO: 1 % (ref 0–1)
BUN SERPL-MCNC: 16 MG/DL (ref 5–25)
CALCIUM SERPL-MCNC: 9.2 MG/DL (ref 8.4–10.2)
CHLORIDE SERPL-SCNC: 99 MMOL/L (ref 96–108)
CO2 SERPL-SCNC: 27 MMOL/L (ref 21–32)
CREAT SERPL-MCNC: 0.66 MG/DL (ref 0.6–1.3)
EOSINOPHIL # BLD AUTO: 0.19 THOUSAND/ÂΜL (ref 0–0.61)
EOSINOPHIL NFR BLD AUTO: 3 % (ref 0–6)
ERYTHROCYTE [DISTWIDTH] IN BLOOD BY AUTOMATED COUNT: 12.2 % (ref 11.6–15.1)
FOLATE SERPL-MCNC: 13.7 NG/ML
GFR SERPL CREATININE-BSD FRML MDRD: 107 ML/MIN/1.73SQ M
GLUCOSE P FAST SERPL-MCNC: 98 MG/DL (ref 65–99)
GLUCOSE SERPL-MCNC: 98 MG/DL (ref 65–140)
HCT VFR BLD AUTO: 39.8 % (ref 34.8–46.1)
HGB BLD-MCNC: 13.4 G/DL (ref 11.5–15.4)
IMM GRANULOCYTES # BLD AUTO: 0.01 THOUSAND/UL (ref 0–0.2)
IMM GRANULOCYTES NFR BLD AUTO: 0 % (ref 0–2)
LYMPHOCYTES # BLD AUTO: 2.11 THOUSANDS/ÂΜL (ref 0.6–4.47)
LYMPHOCYTES NFR BLD AUTO: 38 % (ref 14–44)
MAGNESIUM SERPL-MCNC: 1.9 MG/DL (ref 1.9–2.7)
MCH RBC QN AUTO: 30.9 PG (ref 26.8–34.3)
MCHC RBC AUTO-ENTMCNC: 33.7 G/DL (ref 31.4–37.4)
MCV RBC AUTO: 92 FL (ref 82–98)
MONOCYTES # BLD AUTO: 0.48 THOUSAND/ÂΜL (ref 0.17–1.22)
MONOCYTES NFR BLD AUTO: 9 % (ref 4–12)
NEUTROPHILS # BLD AUTO: 2.71 THOUSANDS/ÂΜL (ref 1.85–7.62)
NEUTS SEG NFR BLD AUTO: 49 % (ref 43–75)
NRBC BLD AUTO-RTO: 0 /100 WBCS
PLATELET # BLD AUTO: 195 THOUSANDS/UL (ref 149–390)
PMV BLD AUTO: 11.3 FL (ref 8.9–12.7)
POTASSIUM SERPL-SCNC: 3.1 MMOL/L (ref 3.5–5.3)
RBC # BLD AUTO: 4.33 MILLION/UL (ref 3.81–5.12)
SODIUM SERPL-SCNC: 138 MMOL/L (ref 135–147)
TREPONEMA PALLIDUM IGG+IGM AB [PRESENCE] IN SERUM OR PLASMA BY IMMUNOASSAY: NORMAL
VIT B12 SERPL-MCNC: 353 PG/ML (ref 180–914)
WBC # BLD AUTO: 5.53 THOUSAND/UL (ref 4.31–10.16)

## 2025-04-23 PROCEDURE — 83735 ASSAY OF MAGNESIUM: CPT

## 2025-04-23 PROCEDURE — 86780 TREPONEMA PALLIDUM: CPT | Performed by: NURSE PRACTITIONER

## 2025-04-23 PROCEDURE — 82746 ASSAY OF FOLIC ACID SERUM: CPT

## 2025-04-23 PROCEDURE — 99222 1ST HOSP IP/OBS MODERATE 55: CPT | Performed by: PSYCHIATRY & NEUROLOGY

## 2025-04-23 PROCEDURE — 82607 VITAMIN B-12: CPT

## 2025-04-23 PROCEDURE — 80048 BASIC METABOLIC PNL TOTAL CA: CPT | Performed by: NURSE PRACTITIONER

## 2025-04-23 PROCEDURE — 85025 COMPLETE CBC W/AUTO DIFF WBC: CPT | Performed by: NURSE PRACTITIONER

## 2025-04-23 RX ORDER — POTASSIUM CHLORIDE 1500 MG/1
40 TABLET, EXTENDED RELEASE ORAL EVERY 4 HOURS
Status: COMPLETED | OUTPATIENT
Start: 2025-04-23 | End: 2025-04-23

## 2025-04-23 RX ORDER — TRAZODONE HYDROCHLORIDE 50 MG/1
75 TABLET ORAL
Status: DISCONTINUED | OUTPATIENT
Start: 2025-04-23 | End: 2025-04-25

## 2025-04-23 RX ORDER — DULOXETIN HYDROCHLORIDE 30 MG/1
30 CAPSULE, DELAYED RELEASE ORAL DAILY
Status: DISCONTINUED | OUTPATIENT
Start: 2025-04-23 | End: 2025-04-24

## 2025-04-23 RX ORDER — PRAZOSIN HYDROCHLORIDE 1 MG/1
3 CAPSULE ORAL
Status: DISCONTINUED | OUTPATIENT
Start: 2025-04-23 | End: 2025-04-28

## 2025-04-23 RX ADMIN — HYDROXYZINE HYDROCHLORIDE 100 MG: 50 TABLET, FILM COATED ORAL at 08:14

## 2025-04-23 RX ADMIN — HYDROXYZINE HYDROCHLORIDE 100 MG: 50 TABLET, FILM COATED ORAL at 18:56

## 2025-04-23 RX ADMIN — DULOXETINE HYDROCHLORIDE 30 MG: 30 CAPSULE, DELAYED RELEASE ORAL at 10:37

## 2025-04-23 RX ADMIN — TRAZODONE HYDROCHLORIDE 75 MG: 50 TABLET ORAL at 21:25

## 2025-04-23 RX ADMIN — LOSARTAN POTASSIUM 100 MG: 25 TABLET, FILM COATED ORAL at 08:09

## 2025-04-23 RX ADMIN — POTASSIUM CHLORIDE 40 MEQ: 1500 TABLET, EXTENDED RELEASE ORAL at 10:05

## 2025-04-23 RX ADMIN — Medication 3 MG: at 21:27

## 2025-04-23 RX ADMIN — ATORVASTATIN CALCIUM 40 MG: 40 TABLET, FILM COATED ORAL at 08:09

## 2025-04-23 RX ADMIN — POTASSIUM CHLORIDE 40 MEQ: 1500 TABLET, EXTENDED RELEASE ORAL at 14:45

## 2025-04-23 RX ADMIN — HYDROCHLOROTHIAZIDE 25 MG: 25 TABLET ORAL at 08:09

## 2025-04-23 RX ADMIN — AMLODIPINE BESYLATE 2.5 MG: 2.5 TABLET ORAL at 08:09

## 2025-04-23 NOTE — PLAN OF CARE
Problem: DISCHARGE PLANNING  Goal: Discharge to home or other facility with appropriate resources  Description: INTERVENTIONS:- Identify barriers to discharge w/patient and caregiver- Arrange for needed discharge resources and transportation as appropriate- Identify discharge learning needs (meds, wound care, etc.)- Arrange for interpretive services to assist at discharge as needed- Refer to Case Management Department for coordinating discharge planning if the patient needs post-hospital services based on physician/advanced practitioner order or complex needs related to functional status, cognitive ability, or social support system  Outcome: Progressing     Problem: SELF HARM/SUICIDALITY  Goal: Will have no self-injury during hospital stay  Description: INTERVENTIONS:- Q 15 MINUTES: Routine safety checks- Q WAKING SHIFT & PRN: Assess risk to determine if routine checks are adequate to maintain patient safety- Encourage patient to participate actively in care by formulating a plan to combat response to suicidal ideation, identify supports and resources  Outcome: Progressing     Problem: DEPRESSION  Goal: Will be euthymic at discharge  Description: INTERVENTIONS:- Administer medication as ordered- Provide emotional support via 1:1 interaction with staff- Encourage involvement in milieu/groups/activities- Monitor for social isolation  Outcome: Progressing     Problem: DISCHARGE PLANNING - CARE MANAGEMENT  Goal: Discharge to post-acute care or home with appropriate resources  Description: INTERVENTIONS:- Conduct assessment to determine patient/family and health care team treatment goals, and need for post-acute services based on payer coverage, community resources, and patient preferences, and barriers to discharge- Address psychosocial, clinical, and financial barriers to discharge as identified in assessment in conjunction with the patient/family and health care team- Arrange appropriate level of post-acute services  according to patient’s   needs and preference and payer coverage in collaboration with the physician and health care team- Communicate with and update the patient/family, physician, and health care team regarding progress on the discharge plan- Arrange appropriate transportation to post-acute venues  Outcome: Progressing     Problem: ANXIETY  Goal: Will report anxiety at manageable levels  Description: INTERVENTIONS:- Administer medication as ordered- Teach and encourage coping skills- Provide emotional support- Assess patient/family for anxiety and ability to cope  Outcome: Not Progressing  Goal: By discharge: Patient will verbalize 2 strategies to deal with anxiety  Description: Interventions:- Identify any obvious source/trigger to anxiety- Staff will assist patient in applying identified coping technique/skills- Encourage attendance of scheduled groups and activities  Outcome: Not Progressing

## 2025-04-23 NOTE — NURSING NOTE
Compliant with first dose of Cymbalta. Visible but quiet and to herself. Not attending groups or interacting with peers. Pleasant and cooperative. Denies SI/HI, denies hallucinations. Appears depressed. Tearful at times.

## 2025-04-23 NOTE — CMS CERTIFICATION NOTE
Recertification: Based upon physical, mental and social evaluations, I certify that inpatient psychiatric services continue to be medically necessary for this patient for a duration of greater than 2 midnights for the treatment of  Major depression, recurrent (HCC) Available alternative community resources still do not meet the patient's mental health care needs. I further attest that an established written individualized plan of care has been updated and is outlined in the patient's medical records.

## 2025-04-23 NOTE — PLAN OF CARE
Problem: DISCHARGE PLANNING  Goal: Discharge to home or other facility with appropriate resources  Description: INTERVENTIONS:- Identify barriers to discharge w/patient and caregiver- Arrange for needed discharge resources and transportation as appropriate- Identify discharge learning needs (meds, wound care, etc.)- Arrange for interpretive services to assist at discharge as needed- Refer to Case Management Department for coordinating discharge planning if the patient needs post-hospital services based on physician/advanced practitioner order or complex needs related to functional status, cognitive ability, or social support system  Outcome: Progressing     Problem: SELF HARM/SUICIDALITY  Goal: Will have no self-injury during hospital stay  Description: INTERVENTIONS:- Q 15 MINUTES: Routine safety checks- Q WAKING SHIFT & PRN: Assess risk to determine if routine checks are adequate to maintain patient safety- Encourage patient to participate actively in care by formulating a plan to combat response to suicidal ideation, identify supports and resources  Outcome: Progressing     Problem: DEPRESSION  Goal: Will be euthymic at discharge  Description: INTERVENTIONS:- Administer medication as ordered- Provide emotional support via 1:1 interaction with staff- Encourage involvement in milieu/groups/activities- Monitor for social isolation  Outcome: Progressing     Problem: ANXIETY  Goal: Will report anxiety at manageable levels  Description: INTERVENTIONS:- Administer medication as ordered- Teach and encourage coping skills- Provide emotional support- Assess patient/family for anxiety and ability to cope  Outcome: Progressing  Goal: By discharge: Patient will verbalize 2 strategies to deal with anxiety  Description: Interventions:- Identify any obvious source/trigger to anxiety- Staff will assist patient in applying identified coping technique/skills- Encourage attendance of scheduled groups and activities  Outcome:  Progressing     Problem: Ineffective Coping  Goal: Participates in unit activities  Description: Interventions:- Provide therapeutic environment - Provide required programming - Redirect inappropriate behaviors   Outcome: Progressing

## 2025-04-23 NOTE — TREATMENT TEAM
04/23/25 0926   Team Meeting   Meeting Type Daily Rounds   Team Members Present   Team Members Present Physician;Nurse;   Physician Team Member Adelso/Mitchell   [facility]   Care Management Team Member Sandi/Deanna   Patient/Family Present   Patient Present No   Patient's Family Present No     Pt is a 44 y.o. Female with a readmit score of 26. Pt presented to  ED  as a 201 due to the anniversary of her brothers death. Stopped taking her medication. Denied SI/HI/AVH. PRN Atarax for anxiety.

## 2025-04-23 NOTE — ASSESSMENT & PLAN NOTE
Per SLIM  Patient will continue on her home dose of amlodipine 2.5 mg p.o. daily  Patient will continue on her home dose of HCTZ 25 mg p.o. daily  Patient will continue on her losartan 100 mg p.o. daily  Will continue to monitor her blood pressure on ongoing basis and additively agents as needed

## 2025-04-23 NOTE — ASSESSMENT & PLAN NOTE
Per SLIM  Vital signs stable afebrile patient seen and examined by myself at the bedside labs from 4/18/2025 were reviewed by myself sodium 141 potassium 3.5 creatinine 0.75  Patient medically stable for admit  Please reach out to  IM with any medical questions or concerns

## 2025-04-23 NOTE — SOCIAL WORK
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    History Pt denied   Access to Firearms Pt denied   Marital Status/Children /Civil Union: 3 children 24 22 19   Spirituality/[employer]   Transportation Family drives   Preferred Pharmacy  [facility] [address]        Patient History   Presenting Problem Increased Depression and SI   Stressor/Trigger Anniversary of her brother's death and medication non-compliance   Treatment History 1 previous U Admit in January    Current psychiatrist/therapist Pt denied, PHP interested when she is discharged   ACT/ICM Pt denied   Family History of Mental Health Pt denied   Suicide Attempts Pt denied   Legal Issues Pt denied   Trauma/Psychosocial loss Brothers death     Substance Abuse Assessment   UDS:  (-) everything  Audit Score: 2  Nicotine/Tobacco: occsionally   Substance First use Last Use and amount Frequency Amount Used How long Longest period of sobriety and when Method of use   THC            Heroin            Cocaine            ETOH            Meth            Benzos            Other:            History of ANTHONY Pt denied   Family History of ANTHONY Mother, fathers side   Prior Inpatient ANTHONY Treatment N/A   Current Outpatient treatment N/A   Response to Referral N/A     Referrals/ROIs   Referrals Needed PHP   ROIs Signed , PHP

## 2025-04-23 NOTE — H&P
Psychiatric Evaluation - Behavioral Health   Name: Shireen Rueda 44 y.o. female I MRN: [id number]  Unit/Bed#: -01 I Date of Admission: 4/21/2025   Date of Service: 4/23/2025 I Hospital Day: 1    Assessment & Plan  Major depression, recurrent (HCC)  Patient is a 44-year-old woman with past medical history significant for hypertension and hyperlipidemia and past psychiatric history significant for depression and anxiety.  Patient presented to the ED secondary to increased feelings of suicidality with plan to overdose on medications.  She signed a 201 and is currently on [place] 3B for further psychiatric stabilization.     At this time patient presents quite similar to how she presented in January.  I will start her on Cymbalta and prazosin to help her depression and with her nightmares.  Plan will be for patient to be referred to PHP on discharge.  I believe that this can be done by early next week and will plan for discharge sometime Tuesday or Wednesday once patient is stable.  Will also do trazodone for sleep at this time.  Social work to follow-up for referrals.    -cymbalta 30 mg QD, titrate up as indicated (likely 60 or 90)  -prazosin 3 mg HS  -trazadone 75 mg HS  -SW to refer pt to virtual PHP on d/c  Essential hypertension  Per SLIM  Patient will continue on her home dose of amlodipine 2.5 mg p.o. daily  Patient will continue on her home dose of HCTZ 25 mg p.o. daily  Patient will continue on her losartan 100 mg p.o. daily  Will continue to monitor her blood pressure on ongoing basis and additively agents as needed  Medical clearance for psychiatric admission  Per SLIM  Vital signs stable afebrile patient seen and examined by myself at the bedside labs from 4/18/2025 were reviewed by myself sodium 141 potassium 3.5 creatinine 0.75  Patient medically stable for admit  Please reach out to [facility] with any medical questions or concerns  Mixed hyperlipidemia  Per SLIM    Patient will continue on her home  dose of Lipitor 40 mg p.o. daily       Current Facility-Administered Medications   Medication Dose Route Frequency Provider Last Rate    acetaminophen  650 mg Oral Q6H PRN Conrado Mariee MD      acetaminophen  650 mg Oral Q4H PRN Conrado Mariee MD      acetaminophen  975 mg Oral Q6H PRN Conrado Mariee MD      amLODIPine  2.5 mg Oral Daily Laura Joseph PA-C      atorvastatin  40 mg Oral Daily Laura Joseph PA-C      haloperidol lactate  2.5 mg Intramuscular Q4H PRN Max 4/day Conrado Mariee MD      And    LORazepam  1 mg Intramuscular Q4H PRN Max 4/day Conrado Mariee MD      And    benztropine  0.5 mg Intramuscular Q4H PRN Max 4/day Conrado Mariee MD      haloperidol lactate  5 mg Intramuscular Q4H PRN Max 4/day Conrado Mariee MD      And    LORazepam  2 mg Intramuscular Q4H PRN Max 4/day Conrado Mariee MD      And    benztropine  1 mg Intramuscular Q4H PRN Max 4/day Conrado Mariee MD      benztropine  1 mg Intramuscular Q4H PRN Max 6/day Conrado Mariee MD      benztropine  1 mg Oral Q4H PRN Max 6/day Conrado Mariee MD      hydrOXYzine HCL  50 mg Oral Q6H PRN Max 4/day Conrado Mariee MD      Or    diphenhydrAMINE  50 mg Intramuscular Q6H PRN Conrado Mariee MD      haloperidol  1 mg Oral Q6H PRN Conrado Mariee MD      haloperidol  2.5 mg Oral Q4H PRN Max 4/day Conrado Mariee MD      haloperidol  5 mg Oral Q4H PRN Max 4/day Conrado Mariee MD      hydroCHLOROthiazide  25 mg Oral Daily Laura Joseph PA-C      hydrOXYzine HCL  100 mg Oral Q6H PRN Max 4/day Conrado Mariee MD      Or    LORazepam  2 mg Intramuscular Q6H PRN Conrado Mariee MD      hydrOXYzine HCL  25 mg Oral Q8H PRN Laura Joseph PA-C      hydrOXYzine HCL  25 mg Oral Q6H PRN Max 4/day Conrado Mariee MD      losartan  100 mg Oral Daily Laura Joseph PA-C      melatonin  3 mg Oral HS Conrado Mariee MD      mirtazapine  7.5 mg Oral HS PRN Conrado Mariee MD      nicotine polacrilex  2 mg Oral Q4H PRN Conrado Mariee MD      propranolol  10 mg Oral Q8H PRN Conrado Mariee MD      traZODone  50 mg Oral HS PRN Laura Kellogg  "JACKIE Joseph         Risks/Benefits of Treatment:     Risks, benefits, and possible side effects of medications explained to patient and patient verbalizes understanding and agreement for treatment.    Treatment Planning:     All current active medications have been reviewed.  Continue to monitor response to treatment and assess for potential side effects of medications.  Encourage group therapy, milieu therapy and occupational therapy  Collaboration with medical service for medical comorbidities as indicated.  Behavioral Health checks for safety monitoring.  Estimated Discharge Day:     Psychiatric Evaluation    Chief Complaint: \"I rushed through my treatment last time\"    History of Present Illness     Per ED provider note:    Laura Joseph PA-C 4/21/25    Patient with a history of depression presents for psychiatric evaluation. She discontinued her antidepressant several weeks ago due to side effects and reports worsening depression over the last 3 weeks with fleeting SI earlier today. Differential diagnosis includes but is not limited to depression, suicidal ideation, other mood disorder, schizoaffective disorder, medication noncompliance, or insomnia. Patient spoke with the ED crisis worker and signed a 201 for voluntary inpatient mental health treatment. No acute events overnight. Bed search is pending. [facility] pending bed placement.     Per Crisis worker note:    Nevin Saenz 4/21/25    Patient came to the ED reporting increased depression and overwhelming sadness due to the recent death of her brother 6 months ago. She reports that she was inpatient and then in partial hospital treatment in January and February, but stopped her medicine and didn't follow through with scheduled outpatient, and now feels as if she's just as depressed as she was in the beginning. She says she has a decreased appetite, insomnia, can't concentrate, feels unmotivated " "for most everything, and also reported thoughts of suicide by medication overdose. She denied HI, psychosis, and substance abuse issues.      She would like to return to inpatient treatment and \"be more honest with myself and the staff this time\". She says she feels she was not honest with herself and focused more on getting treatment finished asap than she did on focusing on full participation in treatment and getting all she can from it. She would prefer to return to a U at [facility] where she says she \"feels comfortable\" if possible. If no bed is available Tuesday, please speak with her about alternative placements before referring her out of the area.     On admission to Inpatient Psychiatric Unit:    Patient is a 44-year-old woman with past medical history significant for hypertension and hyperlipidemia and past psychiatric history significant for depression and anxiety.  Patient presented to the ED secondary to increased feelings of suicidality with plan to overdose on medications.  She signed a 201 and is currently on [place] 3B for further psychiatric stabilization.     Patient was seen this morning in the group room.  She is calm and cooperative and pleasant upon approach.  Patient is known to me from prior admission when I was supervising her intake and patient does express remembering me from last time.  She notes that at this time she is feeling depressed like she was back in January.  Patient notes that it is the 6-month anniversary of her brother's passing which was a significant inciting trigger leading to her current admission.    Patient notes that after her discharge the last time she was hospitalized she participated in the virtual PHP which she found helpful.  However she states that she was not honest with herself and rushed through her treatment.  She also notes that while the Zoloft did help with her depression and anxiety it also made her feel more emotionally blunted and \"numb\" " than she would like.  She also noticed that she was gaining weight and so she self discontinued it.    At this time patient notes that she is feeling depressed with anhedonic symptoms and low mood as her primary concerns as well as nightmares regarding her brother's passing.  Patient is tearful appropriately so throughout conversation.  She states that her goals for herself are to participate more fully in her treatment and she would like to be referred back to [facility] [place] on discharge.  In regards to her medications, patient is amenable to starting Cymbalta at this time.  Medication was chosen as it is in a different class compared to Zoloft and I expressed that hopefully this medication will help with her symptoms without the emotional numbing that she experienced on the Zoloft.  I also expressed that we would be starting prazosin at this time given her issues with her sleep.  Patient notes that she was taking trazodone up to 100 mg in the outpatient setting.  I expressed that I would be able to start her on 75 at this time and we can continue to adjust as necessary.  At this time patient denies acute SI HI or AVH but does endorse significant depression.      Psychiatric Review Of Systems:  Medication side effects: none  Sleep: decreased  Appetite: no change  Hygiene: able to tend to instrumental and basic ADLs  Anxiety Symptoms: endorses  Psychotic Symptoms: denies  Depression Symptoms: endorses  Manic Symptoms: denies  PTSD Symptoms: denies  Suicidal Thoughts: denies  Homicidal Thoughts: denies    Historical Information     Past Psychiatric History:   Inpatient Treatment: 1x prior at   Outpatient Treatment: attended [facility] [place] but did not f/u w/ outpatient  Past Suicide Attempts: denies  Past Violent Behavior: denies  Past Psychiatric Medication Trials: lexapro, zoloft, atarax    Substance Abuse History:  Social History     Substance and Sexual Activity   Alcohol Use Yes    Alcohol/week: 3.0 standard drinks of alcohol     Types: 3 Glasses of wine per week    Comment: on the weekends/socially     Social History     Substance and Sexual Activity   Drug Use No       I have assessed this patient for substance use within the past 12 months.    Family Psychiatric History:  Family History   Problem Relation Age of Onset    No Known Problems Mother     Heart disease Father     Hypertension Father     Heart attack Father 37    No Known Problems Sister     No Known Problems Daughter     No Known Problems Daughter     No Known Problems Maternal Grandmother     No Known Problems Maternal Grandfather     No Known Problems Paternal Grandmother     No Known Problems Paternal Grandfather     No Known Problems Son     No Known Problems Maternal Aunt     No Known Problems Maternal Aunt     No Known Problems Maternal Aunt     No Known Problems Maternal Aunt     No Known Problems Maternal Aunt     No Known Problems Paternal Aunt     No Known Problems Paternal Aunt     No Known Problems Half-Sister     Breast cancer Neg Hx     Colon cancer Neg Hx     Ovarian cancer Neg Hx     Endometrial cancer Neg Hx        Social History:  Highest education: GED  Currently living: w/  and children  Relationships:   Children: 4  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  Firearms: none    Rest of social history as per below:    Social History     Socioeconomic History    Marital status: /Civil Union     Spouse name: Not on file    Number of children: Not on file    Years of education: Not on file    Highest education level: Not on file   Occupational History    Not on file   Tobacco Use    Smoking status: Never    Smokeless tobacco: Never    Tobacco comments:     social smoker, less than 1 pack per weekend   Vaping Use    Vaping status: Never Used   Substance and Sexual Activity    Alcohol use: Yes     Alcohol/week: 3.0 standard drinks of alcohol     Types: 3 Glasses of wine per week     Comment: on the weekends/socially    Drug  use: No    Sexual activity: Yes     Partners: Male     Birth control/protection: Female Sterilization, Other   Other Topics Concern    Not on file   Social History Narrative    Uses safety equipment-seatbelts     Social Drivers of Health     Financial Resource Strain: Low Risk  (7/8/2024)    Overall Financial Resource Strain (CARDIA)     Difficulty of Paying Living Expenses: Not hard at all   Food Insecurity: No Food Insecurity (4/22/2025)    Nursing - Inadequate Food Risk Classification     Worried About Running Out of Food in the Last Year: Never true     Ran Out of Food in the Last Year: Never true     Ran Out of Food in the Last Year: Never true   Transportation Needs: No Transportation Needs (4/22/2025)    Nursing - Transportation Risk Classification     Lack of Transportation: Not on file     Lack of Transportation: No   Physical Activity: Insufficiently Active (9/28/2023)    Exercise Vital Sign     Days of Exercise per Week: 2 days     Minutes of Exercise per Session: 50 min   Stress: Stress Concern Present (3/3/2025)    [place] [employer] - Occupational Stress Questionnaire     Feeling of Stress : Very much   Social Connections: Moderately Isolated (9/28/2023)    Social Connection and Isolation Panel [NHANES]     Frequency of Communication with Friends and Family: More than three times a week     Frequency of Social Gatherings with Friends and Family: More than three times a week     Attends Faith Services: Never     Active Member of Clubs or Organizations: No     Attends Club or Organization Meetings: Never     Marital Status:    Intimate Partner Violence: Unknown (4/22/2025)    Nursing IPS     Feels Physically and Emotionally Safe: Not on file     Physically Hurt by Someone: Not on file     Humiliated or Emotionally Abused by Someone: Not on file     Physically Hurt by Someone: No     Hurt or Threatened by Someone: No   Housing Stability: Unknown (4/22/2025)    Nursing:  "Inadequate Housing Risk Classification     Has Housing: Not on file     Worried About Losing Housing: Not on file     Unable to Get Utilities: Not on file     Unable to Pay for Housing in the Last Year: No     Has Housin       Traumatic History:  Pertinent hx documented as per HPI    Past Medical History:  Past Medical History:   Diagnosis Date    Anemia     Anxiety     Chest tightness or pressure     2013    Depression 2017    Hypertension     Vitamin D deficiency         Vital signs in last 24 hours:    Temp:  [97.4 °F (36.3 °C)-97.7 °F (36.5 °C)] 97.4 °F (36.3 °C)  HR:  [64-74] 67  BP: (103-116)/(59-77) 112/75  Resp:  [16-20] 16  SpO2:  [97 %-98 %] 97 %  O2 Device: None (Room air)    Mental Status Evaluation:    Appearance: casually dressed, consistent with stated age  Motor: no psychomotor retardation, no gait abnormalities  Behavior: cooperative, answers questions appropriately  Speech: soft, normal rhythm  Mood: \"sad\"  Affect: constricted, depressed-appearing  Thought Process: linear and goal-oriented  Thought Content: denies delusions  Risk Potential: denies suicidal ideation, plan, or intent. Denies homicidal ideation  Perceptions: denies auditory hallucinations, denies visual hallucinations,   Sensorium: Oriented to person, place, time, and situation  Cognition: cognitive ability appears intact but was not quantitatively tested  Consciousness: alert and awake  Attention: intact, able to focus without difficulty  Insight: fair  Judgement: limited      Patient Strengths/Assets: negotiates basic needs    Patient Barriers/Limitations: biopsychosocial stressors contributing to psychiatric decompensation    Lab Results: I have reviewed the following results:  Recent Results (from the past 48 hours)   POCT alcohol breath test    Collection Time: 25  8:33 PM   Result Value Ref Range    EXTBreath Alcohol 0.000    Rapid drug screen, urine    Collection Time: 25  8:47 PM   Result Value Ref " Range    Amph/Meth UR Negative Negative    Barbiturate Ur Negative Negative    Benzodiazepine Urine Negative Negative    Cocaine Urine Negative Negative    Methadone Urine Negative Negative    Opiate Urine Negative Negative    PCP Ur Negative Negative    THC Urine Negative Negative    Oxycodone Urine Negative Negative    Fentanyl Urine Negative Negative    HYDROCODONE URINE Negative Negative   POCT pregnancy, urine    Collection Time: 04/21/25  8:47 PM   Result Value Ref Range    EXT Preg Test, Ur Negative     Control Valid    ECG 12 lead    Collection Time: 04/22/25  2:20 PM   Result Value Ref Range    Ventricular Rate 67 BPM    Atrial Rate 67 BPM    NJ Interval 144 ms    QRSD Interval 74 ms    QT Interval 402 ms    QTC Interval 425 ms    P Axis 60 degrees    QRS Axis 73 degrees    T Wave Axis 55 degrees   Magnesium    Collection Time: 04/23/25  5:42 AM   Result Value Ref Range    Magnesium 1.9 1.9 - 2.7 mg/dL   Basic metabolic panel    Collection Time: 04/23/25  5:42 AM   Result Value Ref Range    Sodium 138 135 - 147 mmol/L    Potassium 3.1 (L) 3.5 - 5.3 mmol/L    Chloride 99 96 - 108 mmol/L    CO2 27 21 - 32 mmol/L    ANION GAP 12 4 - 13 mmol/L    BUN 16 5 - 25 mg/dL    Creatinine 0.66 0.60 - 1.30 mg/dL    Glucose 98 65 - 140 mg/dL    Glucose, Fasting 98 65 - 99 mg/dL    Calcium 9.2 8.4 - 10.2 mg/dL    eGFR 107 ml/min/1.73sq m   CBC and differential    Collection Time: 04/23/25  5:42 AM   Result Value Ref Range    WBC 5.53 4.31 - 10.16 Thousand/uL    RBC 4.33 3.81 - 5.12 Million/uL    Hemoglobin 13.4 11.5 - 15.4 g/dL    Hematocrit 39.8 34.8 - 46.1 %    MCV 92 82 - 98 fL    MCH 30.9 26.8 - 34.3 pg    MCHC 33.7 31.4 - 37.4 g/dL    RDW 12.2 11.6 - 15.1 %    MPV 11.3 8.9 - 12.7 fL    Platelets 195 149 - 390 Thousands/uL    nRBC 0 /100 WBCs    Segmented % 49 43 - 75 %    Immature Grans % 0 0 - 2 %    Lymphocytes % 38 14 - 44 %    Monocytes % 9 4 - 12 %    Eosinophils Relative 3 0 - 6 %    Basophils Relative 1 0 - 1 %     Absolute Neutrophils 2.71 1.85 - 7.62 Thousands/µL    Absolute Immature Grans 0.01 0.00 - 0.20 Thousand/uL    Absolute Lymphocytes 2.11 0.60 - 4.47 Thousands/µL    Absolute Monocytes 0.48 0.17 - 1.22 Thousand/µL    Eosinophils Absolute 0.19 0.00 - 0.61 Thousand/µL    Basophils Absolute 0.03 0.00 - 0.10 Thousands/µL        Code Status: Level 1 - Full Code  Advance Directive and Living Will: <no information>  Next of Kin: Extended Emergency Contact Information  Primary Emergency Contact: Jef Rueda  Address: [address] [place] [address] [place] [address] [place]  Mobile Phone: [phone]  Relation: Spouse    Counseling / Coordination of Care:   Patient's progress discussed with staff in treatment team meeting.  Medication changes reviewed with staff in treatment team meeting..    Inpatient Psychiatric Certification:  Based upon physical, mental, and social evaluations, I certify that inpatient psychiatric services are medically necessary for this patient for a duration of 5-7 midnights (exact duration TBD pending patient's response to psychiatric treatment) for the diagnosis listed above.  Available alternative community resources do not meet the patient's mental health care needs.  I further attest that an established written individualized plan of care has been implemented and is outlined in the patient's medical records.    This note has been constructed using a voice recognition system. There may be translation, syntax, or grammatical errors. If you have any questions, please contact the dictating provider.

## 2025-04-23 NOTE — ASSESSMENT & PLAN NOTE
Patient is a 44-year-old woman with past medical history significant for hypertension and hyperlipidemia and past psychiatric history significant for depression and anxiety.  Patient presented to the ED secondary to increased feelings of suicidality with plan to overdose on medications.  She signed a 201 and is currently on [place] 3B for further psychiatric stabilization.     At this time patient presents quite similar to how she presented in January.  I will start her on Cymbalta and prazosin to help her depression and with her nightmares.  Plan will be for patient to be referred to PHP on discharge.  I believe that this can be done by early next week and will plan for discharge sometime Tuesday or Wednesday once patient is stable.  Will also do trazodone for sleep at this time.  Social work to follow-up for referrals.    -cymbalta 30 mg QD, titrate up as indicated (likely 60 or 90)  -prazosin 3 mg HS  -trazadone 75 mg HS  -SW to refer pt to virtual PHP on d/c

## 2025-04-23 NOTE — TREATMENT PLAN
TREATMENT PLAN REVIEW - Behavioral Health Shireen Rueda 44 y.o. 1980 female MRN: [id number]    [facility] 3B BEHAVIORAL [facility] Room / Bed: [facility] 340/[facility] 340-01 Encounter: [id number]          Admit Date/Time:  4/21/2025  7:55 PM    Treatment Team:   MD Jose Sharma DO Jasmine Monge Desmon Williams Stephanie L Ditmer, RN Sean Washburn Rebecca Sussman Fatima Chehouri Jennifer King Karissa Marie Kormandy, CRNP    Diagnosis: Principal Problem:    Major depression, recurrent (HCC)  Active Problems:    Essential hypertension    Medical clearance for psychiatric admission    Mixed hyperlipidemia    Noncompliance      Patient Strengths/Assets: ability for insight, capable of independent living, general fund of knowledge, good support system, negotiates basic needs    Patient Barriers/Limitations: difficulty adapting, medical problems    Short Term Goals: decrease in depressive symptoms, decrease in suicidal thoughts    Long Term Goals: improvement in depression, stabilization of mood, free of suicidal thoughts    Progress Towards Goals: starting psychiatric medications as prescribed    Recommended Treatment: medication management, patient medication education, group therapy, milieu therapy, continued Behavioral Health psychiatric evaluation/assessment process    Treatment Frequency: daily medication monitoring, group and milieu therapy daily, monitoring through interdisciplinary rounds, monitoring through weekly patient care conferences    Expected Discharge Date:  Greater than 2 midnights    Discharge Plan: referral to partial hospitalization program, return to previous living arrangement    Treatment Plan Created/Updated By: Braulio Darden MD

## 2025-04-23 NOTE — NURSING NOTE
Patient is visible on the milieu and in the patient lounge. She is social with select peers but remains anxious with a depressed affect. Patient is compliant with medications and denies all psych symptoms at this time. Q 15 min rounds maintained for safety.

## 2025-04-23 NOTE — NURSING NOTE
Patient approached nurse station reporting anxiety. She was given atarax 100mg po prn for severe anxiety. Will monitor patient for medication effectiveness.

## 2025-04-24 PROCEDURE — 99232 SBSQ HOSP IP/OBS MODERATE 35: CPT | Performed by: PSYCHIATRY & NEUROLOGY

## 2025-04-24 RX ORDER — DULOXETIN HYDROCHLORIDE 60 MG/1
60 CAPSULE, DELAYED RELEASE ORAL DAILY
Status: DISCONTINUED | OUTPATIENT
Start: 2025-04-25 | End: 2025-04-30

## 2025-04-24 RX ORDER — HYDROXYZINE HYDROCHLORIDE 50 MG/1
50 TABLET, FILM COATED ORAL 2 TIMES DAILY
Status: DISCONTINUED | OUTPATIENT
Start: 2025-04-24 | End: 2025-04-29

## 2025-04-24 RX ADMIN — Medication 1000 MCG: at 08:06

## 2025-04-24 RX ADMIN — DULOXETINE HYDROCHLORIDE 30 MG: 30 CAPSULE, DELAYED RELEASE ORAL at 08:06

## 2025-04-24 RX ADMIN — ATORVASTATIN CALCIUM 40 MG: 40 TABLET, FILM COATED ORAL at 08:06

## 2025-04-24 RX ADMIN — PRAZOSIN HYDROCHLORIDE 3 MG: 1 CAPSULE ORAL at 22:05

## 2025-04-24 RX ADMIN — HYDROXYZINE HYDROCHLORIDE 100 MG: 50 TABLET, FILM COATED ORAL at 08:09

## 2025-04-24 RX ADMIN — TRAZODONE HYDROCHLORIDE 75 MG: 50 TABLET ORAL at 22:04

## 2025-04-24 RX ADMIN — Medication 3 MG: at 22:04

## 2025-04-24 RX ADMIN — HYDROXYZINE HYDROCHLORIDE 50 MG: 50 TABLET, FILM COATED ORAL at 17:30

## 2025-04-24 NOTE — ASSESSMENT & PLAN NOTE
Patient is a 44-year-old woman with past medical history significant for hypertension and hyperlipidemia and past psychiatric history significant for depression and anxiety.  Patient presented to the ED secondary to increased feelings of suicidality with plan to overdose on medications.  She signed a 201 and is currently on [place] 3B for further psychiatric stabilization.     At this time patient continues to be progressing in terms of her mood.  She is engaging in group and milieu treatment.  Continues to have depression but less overtly depressed affect today.  Continues have some anxiety.  Will adjust patient's regimen to include standing Atarax.  Will continue with Cymbalta titration.  Patient reports better sleep and so will not adjust prazosin or trazodone dosing at this time.    -cymbalta 30 mg QD, titrate up to 60 tomorrow.  -atrax 50 mg BID  -prazosin 3 mg HS  -trazadone 75 mg HS  -SW to refer pt to virtual PHP on d/c

## 2025-04-24 NOTE — NURSING NOTE
Patient c/o  anxiety. Given PRN of Atarax 100 mg po for severe anxiety. Will monitor effectiveness.

## 2025-04-24 NOTE — PLAN OF CARE
PATIENT NAME: PARK HUNTER   MEDICAL RECORD NUMBER: [id number]   ACCOUNT NUMBER: [id number]   ADMIT DATE: 09/07/2017   DISCHARGE DATE: 09/07/2017   ATTENDING PHYSICIAN: REILLY MATHIS M.D. CARDIOLOGY   ROOM #:   SERVICE: [facility]                                   CARDIAC CATHETERIZATION         DATE OF PROCEDURE:  09/07/2017      PROCEDURES PERFORMED:  Left heart catheterization, selective coronary   angiography, left ventricular angiography, iFR assessment of the left anterior   descending and circumflex coronary arteries, right common femoral artery   angiogram with placement of Perclose closure device, and administration of   conscious sedation.      INDICATIONS FOR PROCEDURE:  The patient with known coronary artery disease.  The   patient has been complaining of some recent effort-related symptoms of dyspnea.      She has a prior stent placed in the circumflex obtuse marginal system back in   2005.  She also at that time documented a lesion in the left main coronary   artery that was assessed with ultrasound that felt to be clinically   insignificant.  No intervention was completed.      A SPECT study showed an anterior located defect.  Cardiac catheterization was   recommended.      DESCRIPTION OF PROCEDURE:  After consent was obtained from the patient, she was   brought forward to the cardiac catheterization laboratory and prepped and draped   in usual sterile fashion.  Access to her central arterial system via the right   common femoral artery.  A micropuncture technique was used.      Selective injection of the left coronary artery revealed the left main to have   what appeared to be a shelf-like lesion, that was intermediate in terms of its   ability to be graded for stenosis.      It was not high-grade.      The circumflex was identified.  It was a nondominant vessel.  The circumflex was   normal with no evidence for disease.  In the obtuse marginal, there was evidence   of a prior placed stents.     Problem: DISCHARGE PLANNING  Goal: Discharge to home or other facility with appropriate resources  Description: INTERVENTIONS:- Identify barriers to discharge w/patient and caregiver- Arrange for needed discharge resources and transportation as appropriate- Identify discharge learning needs (meds, wound care, etc.)- Arrange for interpretive services to assist at discharge as needed- Refer to Case Management Department for coordinating discharge planning if the patient needs post-hospital services based on physician/advanced practitioner order or complex needs related to functional status, cognitive ability, or social support system  Outcome: Progressing     Problem: SELF HARM/SUICIDALITY  Goal: Will have no self-injury during hospital stay  Description: INTERVENTIONS:- Q 15 MINUTES: Routine safety checks- Q WAKING SHIFT & PRN: Assess risk to determine if routine checks are adequate to maintain patient safety- Encourage patient to participate actively in care by formulating a plan to combat response to suicidal ideation, identify supports and resources  Outcome: Progressing     Problem: DEPRESSION  Goal: Will be euthymic at discharge  Description: INTERVENTIONS:- Administer medication as ordered- Provide emotional support via 1:1 interaction with staff- Encourage involvement in milieu/groups/activities- Monitor for social isolation  Outcome: Progressing     Problem: ANXIETY  Goal: Will report anxiety at manageable levels  Description: INTERVENTIONS:- Administer medication as ordered- Teach and encourage coping skills- Provide emotional support- Assess patient/family for anxiety and ability to cope  Outcome: Progressing  Goal: By discharge: Patient will verbalize 2 strategies to deal with anxiety  Description: Interventions:- Identify any obvious source/trigger to anxiety- Staff will assist patient in applying identified coping technique/skills- Encourage attendance of scheduled groups and activities  Outcome:  Progressing     Problem: Ineffective Coping  Goal: Participates in unit activities  Description: Interventions:- Provide therapeutic environment - Provide required programming - Redirect inappropriate behaviors   Outcome: Progressing     Problem: DISCHARGE PLANNING - CARE MANAGEMENT  Goal: Discharge to post-acute care or home with appropriate resources  Description: INTERVENTIONS:- Conduct assessment to determine patient/family and health care team treatment goals, and need for post-acute services based on payer coverage, community resources, and patient preferences, and barriers to discharge- Address psychosocial, clinical, and financial barriers to discharge as identified in assessment in conjunction with the patient/family and health care team- Arrange appropriate level of post-acute services according to patient’s   needs and preference and payer coverage in collaboration with the physician and health care team- Communicate with and update the patient/family, physician, and health care team regarding progress on the discharge plan- Arrange appropriate transportation to post-acute venues  Outcome: Progressing      These were widely patent with CONSTANCE grade 3 runoff   seen through the entire circumflex system including the prior placed stents.      The LAD was identified.  There was no evidence for disease.  The LAD did   traverse the LV apex.  It and its attendant diagonal vessels had no evidence for   coronary artery disease.      The right coronary artery was selectively injected.  It was a dominant vessel.   There was no evidence for disease.  The vessel did appear normal.      Left ventricular angiogram was completed in the CATHERINE projection.  There was no   evidence for left ventricular region wall motion abnormalities.  Ejection   fraction was 60% to 65%.      HEMODYNAMICS:  Left ventricular systolic pressure was 136 with end diastolic of   0 to 14.  There is no gradient across the aortic valve.  Diastolic blood   pressure was 67 with a mean of 99.      Because of intermediate grade appearance of the left main coronary artery, iFR   assessments were completed on both of the circumflex and the LAD.      The iFR assessment of the circumflex was 1.01.  The iFR assessment of the LAD   was 0.93.  Both of these results revealed clinically insignificant hemodynamic   lesions suggesting no intervention be completed.      At this point in time, the procedure was terminated.  An angiogram was completed   through the sheath.  Its position was adequate to allow for its removal and   placement of closure device.  A Perclose closure device was delivered.   Hemostasis was achieved without incident.      No complications occurred.      The patient did receive 5000 units of unfractionated heparin during the   procedure as anticoagulant.      Sedation, the patient did receive anesthesia in the form of conscious sedation.   I was the physician who administered conscious sedation.  Sedation was consisted   of Versed and fentanyl, initiated at 10:33.  The patient was monitored   throughout the procedure by critical care nurses.      Sedation  recovery began at 11:02.      IMPRESSION:   1.  Prior placed circumflex stents, widely patent.   2.  Preserved left ventricular systolic function.   3.  Normal iFR assessment of the left anterior descending and circumflex       coronaries to assess the left main lesion.      RECOMMENDATION:  Medical therapy.            _________________________________   Duane Alejandre M.D. CARDIOLOGY         CC:   KAISER Lieberman M.D. Stewart Segal, M.D. GC/RAISA   DD: 09/07/2017  DT: 09/07/2017   TD: 11:14  TT: 17:27   [id number]

## 2025-04-24 NOTE — SOCIAL WORK
Cm called Pt's , Ruben [phone].  reported that Pt was doing well until about 2 or 3 weeks ago. He stated that she started consistently saying that she was not feeling well.  stated that it was not until Pt's admission that he realized that she stopped taking some of her medications.      stated that he would like whatever is best for the Pt. Cm informed him that the plan would be to refer Pt to PHP program at discharge and that following up with OP psychiatry after that would be important.      stated there are no issues with Pt returning home. He stated that there are no firearms in the home at this time.    Cm provided  with nurses station number, this Cm's number and Raul Junior's number if questions arise.

## 2025-04-24 NOTE — NURSING NOTE
Held morning dose of Norvasc and Cozaar due to blood pressure not meeting parameters. Was compliant with all other meds. Visible in the milieu all morning and is currently attending art group. Denies SI/HI, continues to appear depressed. Tearful at times. Pleasant. Cooperative. No current complaints.   Isabel Roth(Resident)

## 2025-04-24 NOTE — TREATMENT TEAM
04/24/25 0834   Team Meeting   Meeting Type Daily Rounds   Team Members Present   Team Members Present Physician;Nurse;   Physician Team Member Enrique/Adelso   Nursing Team Member [facility] Management Team Member Sadni/Deanna   Patient/Family Present   Patient Present No   Patient's Family Present No     Pt is presenting as tearful. Pt was given PRN atarax, anxious effective. Pt is pleasant, and visible on the unit. Pt denied SI/HI/AVH

## 2025-04-24 NOTE — PROGRESS NOTES
04/24/25 1000   Activity/Group Checklist   Group Other (Comment)  (Group Art Therapy/Psychodynamic, Creatively Explore Internal and External States followed by Process Discussion)   Attendance Attended   Attendance Duration (min) Greater than 60  (90 min)   Interactions Interacted appropriately   Affect/Mood Appropriate   Goals Achieved Identified feelings;Discussed coping strategies;Able to listen to others;Able to engage in interactions;Able to reflect/comment on own behavior;Able to manage/cope with feelings;Able to recieve feedback;Able to give feedback to another

## 2025-04-24 NOTE — PROGRESS NOTES
Progress Note - Behavioral Health   Name: Shireen Rueda 44 y.o. female I MRN: [id number]  Unit/Bed#: -01 I Date of Admission: 4/21/2025   Date of Service: 4/24/2025 I Hospital Day: 2     Assessment & Plan  Major depression, recurrent (HCC)  Patient is a 44-year-old woman with past medical history significant for hypertension and hyperlipidemia and past psychiatric history significant for depression and anxiety.  Patient presented to the ED secondary to increased feelings of suicidality with plan to overdose on medications.  She signed a 201 and is currently on [place] 3B for further psychiatric stabilization.     At this time patient continues to be progressing in terms of her mood.  She is engaging in group and milieu treatment.  Continues to have depression but less overtly depressed affect today.  Continues have some anxiety.  Will adjust patient's regimen to include standing Atarax.  Will continue with Cymbalta titration.  Patient reports better sleep and so will not adjust prazosin or trazodone dosing at this time.    -cymbalta 30 mg QD, titrate up to 60 tomorrow.  -atrax 50 mg BID  -prazosin 3 mg HS  -trazadone 75 mg HS  -SW to refer pt to virtual PHP on d/c  Essential hypertension  Per SLIM  Patient will continue on her home dose of amlodipine 2.5 mg p.o. daily  Patient will continue on her home dose of HCTZ 25 mg p.o. daily  Patient will continue on her losartan 100 mg p.o. daily  Will continue to monitor her blood pressure on ongoing basis and additively agents as needed  Medical clearance for psychiatric admission  Per [facility]  Vital signs stable afebrile patient seen and examined by myself at the bedside labs from 4/18/2025 were reviewed by myself sodium 141 potassium 3.5 creatinine 0.75  Patient medically stable for admit  Please reach out to [facility] with any medical questions or concerns  Mixed hyperlipidemia  Per SLIM    Patient will continue on her home dose of Lipitor 40 mg p.o. daily      Current Medications:    Current Facility-Administered Medications:     amLODIPine (NORVASC) tablet 2.5 mg, Oral, Daily    atorvastatin (LIPITOR) tablet 40 mg, Oral, Daily    cyanocobalamin (VITAMIN B-12) tablet 1,000 mcg, Oral, Daily    DULoxetine (CYMBALTA) delayed release capsule 30 mg, Oral, Daily    losartan (COZAAR) tablet 100 mg, Oral, Daily    melatonin tablet 3 mg, Oral, HS    prazosin (MINIPRESS) capsule 3 mg, Oral, HS    traZODone (DESYREL) tablet 75 mg, Oral, HS      Risks/Benefits of Treatment:     Risks, benefits, and possible side effects of medications explained to patient and patient verbalizes understanding and agreement for treatment.    Progress Toward Goals: improving    Treatment Planning:     All current active medications have been reviewed.  Continue to monitor response to treatment and assess for potential side effects of medications.  Encourage group therapy, milieu therapy and occupational therapy.  Collaboration with medical service for medical comorbidities as indicated.  Behavioral Health checks for safety monitoring.  Estimated Discharge Day: 4/30/2025         Subjective     Behavior over the last 24 hours: improving    Patient was visited on unit for continuing care; chart reviewed and discussed with multidisciplinary treatment team.  On approach, the patient was calm and cooperative. Denied any changes in mood, appetite, and energy level. No problem initiating and maintaining sleep.  Denied A/VH currently.  Denied SI/HI, intent or plan upon direct inquiry at this time.  Patient continuing to report depression but less overtly tearful today.  No side effects on her medications and patient is willing to continue titration.  Will change patient's medication dosing to be Atarax twice daily standing in addition to her Cymbalta.  Patient continues to be interested in PHP on discharge.    Patient continues to be visible in the milieu and interacts with staff and peers. No reports of  "aggression or self-injurious behavior on unit. No PRN medications used in the past 24 hours.    Patient accepted all offered medications and no adverse effects of medications noted or reported.          Sleep: normal  Appetite: normal  Medication side effects: No  ROS: review of systems as noted above in HPI/Subjective report, all other systems are negative    Objective :  Temp:  [98.1 °F (36.7 °C)-98.3 °F (36.8 °C)] 98.3 °F (36.8 °C)  HR:  [57-71] 57  BP: (102-103)/(61-64) 103/61  Resp:  [16] 16  SpO2:  [96 %-97 %] 96 %  O2 Device: None (Room air)    Mental Status Evaluation:    Appearance: casually dressed, consistent with stated age  Motor: no psychomotor retardation, no gait abnormalities  Behavior: cooperative, answers questions appropriately  Speech: soft, normal rhythm  Mood: \"ok\"  Affect: constricted, depressed-appearing  Thought Process: linear and goal-oriented  Thought Content: denies delusions  Risk Potential: denies suicidal ideation, plan, or intent. Denies homicidal ideation  Perceptions: denies auditory hallucinations, denies visual hallucinations,   Sensorium: Oriented to person, place, time, and situation  Cognition: cognitive ability appears intact but was not quantitatively tested  Consciousness: alert and awake  Attention: intact, able to focus without difficulty  Insight: fair  Judgement: fair      Lab Results: I have reviewed the following results:      Administrative Statements     Counseling / Coordination of Care:   Patient's progress discussed with staff in treatment team meeting.  Medication changes reviewed with staff in treatment team meeting.    Portions of the record may have been created with voice recognition software. Occasional wrong word or \"sound a like\" substitutions may have occurred due to the inherent limitations of voice recognition software. Read the chart carefully and recognize, using context, where substitutions have occurred.    Braulio Darden MD 04/24/25  "

## 2025-04-25 ENCOUNTER — TRANSITIONAL CARE MANAGEMENT (OUTPATIENT)
Dept: FAMILY MEDICINE CLINIC | Facility: CLINIC | Age: 45
End: 2025-04-25

## 2025-04-25 LAB
ANION GAP SERPL CALCULATED.3IONS-SCNC: 8 MMOL/L (ref 4–13)
BUN SERPL-MCNC: 14 MG/DL (ref 5–25)
CALCIUM SERPL-MCNC: 9.1 MG/DL (ref 8.4–10.2)
CHLORIDE SERPL-SCNC: 102 MMOL/L (ref 96–108)
CO2 SERPL-SCNC: 29 MMOL/L (ref 21–32)
CREAT SERPL-MCNC: 0.71 MG/DL (ref 0.6–1.3)
GFR SERPL CREATININE-BSD FRML MDRD: 103 ML/MIN/1.73SQ M
GLUCOSE P FAST SERPL-MCNC: 89 MG/DL (ref 65–99)
GLUCOSE SERPL-MCNC: 89 MG/DL (ref 65–140)
POTASSIUM SERPL-SCNC: 3.5 MMOL/L (ref 3.5–5.3)
SODIUM SERPL-SCNC: 139 MMOL/L (ref 135–147)

## 2025-04-25 PROCEDURE — 80048 BASIC METABOLIC PNL TOTAL CA: CPT | Performed by: NURSE PRACTITIONER

## 2025-04-25 PROCEDURE — 99232 SBSQ HOSP IP/OBS MODERATE 35: CPT | Performed by: PSYCHIATRY & NEUROLOGY

## 2025-04-25 RX ORDER — TRAZODONE HYDROCHLORIDE 100 MG/1
100 TABLET ORAL
Status: DISCONTINUED | OUTPATIENT
Start: 2025-04-25 | End: 2025-05-02 | Stop reason: HOSPADM

## 2025-04-25 RX ADMIN — ATORVASTATIN CALCIUM 40 MG: 40 TABLET, FILM COATED ORAL at 08:18

## 2025-04-25 RX ADMIN — HYDROXYZINE HYDROCHLORIDE 50 MG: 50 TABLET, FILM COATED ORAL at 17:13

## 2025-04-25 RX ADMIN — TRAZODONE HYDROCHLORIDE 100 MG: 100 TABLET ORAL at 22:11

## 2025-04-25 RX ADMIN — Medication 3 MG: at 22:11

## 2025-04-25 RX ADMIN — HYDROXYZINE HYDROCHLORIDE 50 MG: 50 TABLET, FILM COATED ORAL at 08:18

## 2025-04-25 RX ADMIN — DULOXETINE 60 MG: 60 CAPSULE, DELAYED RELEASE ORAL at 08:18

## 2025-04-25 RX ADMIN — PRAZOSIN HYDROCHLORIDE 3 MG: 1 CAPSULE ORAL at 22:11

## 2025-04-25 RX ADMIN — Medication 1000 MCG: at 08:18

## 2025-04-25 NOTE — NURSING NOTE
Patient visible on the unit,appears to be in good spirits. Patient makes needs known ,compliant with medications and routine vitals. Denies SI/HI/AH/VH at this time.

## 2025-04-25 NOTE — PLAN OF CARE
Problem: SELF HARM/SUICIDALITY  Goal: Will have no self-injury during hospital stay  Description: INTERVENTIONS:- Q 15 MINUTES: Routine safety checks- Q WAKING SHIFT & PRN: Assess risk to determine if routine checks are adequate to maintain patient safety- Encourage patient to participate actively in care by formulating a plan to combat response to suicidal ideation, identify supports and resources  Outcome: Progressing     Problem: DEPRESSION  Goal: Will be euthymic at discharge  Description: INTERVENTIONS:- Administer medication as ordered- Provide emotional support via 1:1 interaction with staff- Encourage involvement in milieu/groups/activities- Monitor for social isolation  Outcome: Progressing     Problem: ANXIETY  Goal: Will report anxiety at manageable levels  Description: INTERVENTIONS:- Administer medication as ordered- Teach and encourage coping skills- Provide emotional support- Assess patient/family for anxiety and ability to cope  Outcome: Progressing  Goal: By discharge: Patient will verbalize 2 strategies to deal with anxiety  Description: Interventions:- Identify any obvious source/trigger to anxiety- Staff will assist patient in applying identified coping technique/skills- Encourage attendance of scheduled groups and activities  Outcome: Progressing

## 2025-04-25 NOTE — PROGRESS NOTES
Progress Note - Behavioral Health   Name: Shireen Rueda 44 y.o. female I MRN: [id number]  Unit/Bed#: -01 I Date of Admission: 4/21/2025   Date of Service: 4/26/2025 I Hospital Day: 4     Assessment & Plan  Major depression, recurrent (HCC)  Patient is a 44-year-old woman with past medical history significant for hypertension and hyperlipidemia and past psychiatric history significant for depression and anxiety.  Patient presented to the ED secondary to increased feelings of suicidality with plan to overdose on medications.  She signed a 201 and is currently on [place] 3B for further psychiatric stabilization.     At this time patient continues to be progressing in terms of her mood.  She is engaging in group and milieu treatment.  Continues to have depression but less overtly depressed affect today.  Continues have some anxiety.  Will adjust patient's regimen to include standing Atarax.  Will continue with Cymbalta titration.  Patient reports better sleep and so will not adjust prazosin or trazodone dosing at this time.    -cymbalta 30 mg QD, titrate up to 60 tomorrow.  -atrax 50 mg BID  -prazosin 3 mg HS  -trazadone 75 mg HS  -SW to refer pt to virtual PHP on d/c  Essential hypertension  Per SLIM  Patient will continue on her home dose of amlodipine 2.5 mg p.o. daily  Patient will continue on her home dose of HCTZ 25 mg p.o. daily  Patient will continue on her losartan 100 mg p.o. daily  Will continue to monitor her blood pressure on ongoing basis and additively agents as needed  Medical clearance for psychiatric admission  Per [facility]  Vital signs stable afebrile patient seen and examined by myself at the bedside labs from 4/18/2025 were reviewed by myself sodium 141 potassium 3.5 creatinine 0.75  Patient medically stable for admit  Please reach out to [facility] with any medical questions or concerns  Mixed hyperlipidemia  Per SLIM    Patient will continue on her home dose of Lipitor 40 mg p.o. daily  "    Current Medications:    Current Facility-Administered Medications:     amLODIPine (NORVASC) tablet 2.5 mg, Oral, Daily    atorvastatin (LIPITOR) tablet 40 mg, Oral, Daily    cyanocobalamin (VITAMIN B-12) tablet 1,000 mcg, Oral, Daily    DULoxetine (CYMBALTA) delayed release capsule 60 mg, Oral, Daily    hydrOXYzine HCL (ATARAX) tablet 50 mg, Oral, BID    losartan (COZAAR) tablet 100 mg, Oral, Daily    melatonin tablet 3 mg, Oral, HS    prazosin (MINIPRESS) capsule 3 mg, Oral, HS    traZODone (DESYREL) tablet 100 mg, Oral, HS    Risks/Benefits of Treatment:     Risks, benefits, and possible side effects of medications explained to patient and patient verbalizes understanding and agreement for treatment.    Progress Toward Goals: progressing    Treatment Planning:     All current active medications have been reviewed.  Continue to monitor response to treatment and assess for potential side effects of medications.  Encourage group therapy, milieu therapy and occupational therapy.  Collaboration with medical service for medical comorbidities as indicated.  Behavioral Health checks for safety monitoring.  Estimated Discharge Day: 4/30/2025     Subjective Per Rn report has been visible and social with peers and cooperative with medications.   On interview she appears depressed and has been trying to stay out with peers to help with mood. No complaints with medications and has approached her treatment this time differently than prior admission \"I dont want to rush it this time\"    Her BP was low and her medication was held so will ask medicine to evaluate.    Behavior over the last 24 hours: slowly improving    Sleep: normal  Appetite: normal  Medication side effects: No  ROS: review of systems as noted above in HPI/Subjective report, all other systems are negative    Objective :  Temp:  [96.7 °F (35.9 °C)-96.9 °F (36.1 °C)] 96.7 °F (35.9 °C)  HR:  [64-72] 64  BP: (106-121)/(52-65) 106/52  Resp:  [16] 16  SpO2:  [96 " "%-97 %] 96 %  O2 Device: None (Room air)    Mental Status Evaluation:  Appearance: casually dressed, consistent with stated age  Motor: no psychomotor retardation, no gait abnormalities  Behavior: cooperative, answers questions appropriately  Speech: soft, normal rhythm  Mood: \"ok\"  Affect: depressed but less tearful  Thought Process: linear and goal-oriented  Thought Content: denies delusions  Risk Potential: denies suicidal ideation, plan, or intent. Denies homicidal ideation  Perceptions: denies auditory hallucinations, denies visual hallucinations,   Sensorium: Oriented to person, place, time, and situation  Cognition: cognitive ability appears intact but was not quantitatively tested  Consciousness: alert and awake  Attention: intact, able to focus without difficulty  Insight: fair  Judgement: limited      Lab Results: I have reviewed the following results:  Results from the past 24 hours: No results found for this or any previous visit (from the past 24 hours).    Administrative Statements     Counseling / Coordination of Care:   I have spent a total time of 25 minutes in caring for this patient on the day of the visit/encounter including:  Patient's progress discussed with staff in treatment team meeting.  Medication changes reviewed with staff in treatment team meeting.    Portions of the record may have been created with voice recognition software. Occasional wrong word or \"sound a like\" substitutions may have occurred due to the inherent limitations of voice recognition software. Read the chart carefully and recognize, using context, where substitutions have occurred.    Nela Coy MD 04/26/25  "

## 2025-04-25 NOTE — PROGRESS NOTES
Progress Note - Behavioral Health   Name: Shireen Rueda 44 y.o. female I MRN: [id number]  Unit/Bed#: -01 I Date of Admission: 4/21/2025   Date of Service: 4/25/2025 I Hospital Day: 3     Assessment & Plan  Major depression, recurrent (HCC)  Patient is a 44-year-old woman with past medical history significant for hypertension and hyperlipidemia and past psychiatric history significant for depression and anxiety.  Patient presented to the ED secondary to increased feelings of suicidality with plan to overdose on medications.  She signed a 201 and is currently on [place] 3B for further psychiatric stabilization.     Some gradual improvement noted today compared to prior.  Patient going to groups more and affect is less tearful.  Patient seemingly responded well to psychoeducation/brief psychotherapy provided.  Patient still on track for discharge mid next week once stable on medications.  Will have patient attend virtual PHP on discharge.  Could consider adding gabapentin if anxiety continues to persist over the weekend.    -cymbalta 60 mg QD  -atrax 50 mg BID  -t/c addition of gabapentin 300 BID  -prazosin 3 mg HS  -trazadone 100 mg HS  -SW to refer pt to virtual PHP on d/c  Essential hypertension  Per SLIM  Patient will continue on her home dose of amlodipine 2.5 mg p.o. daily  Patient will continue on her home dose of HCTZ 25 mg p.o. daily  Patient will continue on her losartan 100 mg p.o. daily  Will continue to monitor her blood pressure on ongoing basis and additively agents as needed  Medical clearance for psychiatric admission  Per SLIM  Vital signs stable afebrile patient seen and examined by myself at the bedside labs from 4/18/2025 were reviewed by myself sodium 141 potassium 3.5 creatinine 0.75  Patient medically stable for admit  Please reach out to [facility] with any medical questions or concerns  Mixed hyperlipidemia  Per SLIM    Patient will continue on her home dose of Lipitor 40 mg p.o. daily      Current Medications:    Current Facility-Administered Medications:     amLODIPine (NORVASC) tablet 2.5 mg, Oral, Daily    atorvastatin (LIPITOR) tablet 40 mg, Oral, Daily    cyanocobalamin (VITAMIN B-12) tablet 1,000 mcg, Oral, Daily    DULoxetine (CYMBALTA) delayed release capsule 60 mg, Oral, Daily    hydrOXYzine HCL (ATARAX) tablet 50 mg, Oral, BID    losartan (COZAAR) tablet 100 mg, Oral, Daily    melatonin tablet 3 mg, Oral, HS    prazosin (MINIPRESS) capsule 3 mg, Oral, HS    traZODone (DESYREL) tablet 75 mg, Oral, HS      Risks/Benefits of Treatment:     Risks, benefits, and possible side effects of medications explained to patient and patient verbalizes understanding and agreement for treatment.    Progress Toward Goals: improving    Treatment Planning:     All current active medications have been reviewed.  Continue to monitor response to treatment and assess for potential side effects of medications.  Encourage group therapy, milieu therapy and occupational therapy.  Collaboration with medical service for medical comorbidities as indicated.  Behavioral Health checks for safety monitoring.  Estimated Discharge Day: 4/30/2025         Subjective     Behavior over the last 24 hours: improving    Patient was visited on unit for continuing care; chart reviewed and discussed with multidisciplinary treatment team.  On approach, the patient was calm and cooperative. Endorsed better mood but continues to have negative thinking and ruminating thoughts. Reported interrupted sleep. Denied A/VH currently.  Denied SI/HI, intent or plan upon direct inquiry at this time.  Patient denies any side effects from Cymbalta at this time.  Notes that anxiety seems to be doing slightly better.  Continues to have issues with her sleep secondary to anxiety and is amenable with adjustment of trazodone at this time.  Patient also endorses that she continues to feel sad regarding her brother's death and especially considering that  "this is the 6-month anniversary of it.  Provided psychoeducation regarding grieving process to the patient and how it is in fact normal and expected for her to feel sad at this time however the goal of the medications and therapy is to help her deal with the sadness in a more constructive manner and prevent depression which gets in the way of her being able to live her daily life.  Provided some brief psychotherapy in this regard to patient who seemed to take it well.  She otherwise continues to be amenable for further med management and PHP on discharge.    Patient continues to be visible in the milieu and interacts with staff and peers. No reports of aggression or self-injurious behavior on unit.  Received atarax for anxiety.    Patient accepted all offered medications and no adverse effects of medications noted or reported.          Sleep: slept off and on  Appetite: normal  Medication side effects: No  ROS: review of systems as noted above in HPI/Subjective report, all other systems are negative    Objective :  Temp:  [96.4 °F (35.8 °C)-97.6 °F (36.4 °C)] 96.4 °F (35.8 °C)  HR:  [68-97] 97  BP: ()/(50-76) 93/50  Resp:  [16] 16  SpO2:  [95 %-97 %] 97 %  O2 Device: None (Room air)    Mental Status Evaluation:  Appearance: casually dressed, consistent with stated age  Motor: no psychomotor retardation, no gait abnormalities  Behavior: cooperative, answers questions appropriately  Speech: soft, normal rhythm  Mood: \"ok\"  Affect: depressed but less tearful  Thought Process: linear and goal-oriented  Thought Content: denies delusions  Risk Potential: denies suicidal ideation, plan, or intent. Denies homicidal ideation  Perceptions: denies auditory hallucinations, denies visual hallucinations,   Sensorium: Oriented to person, place, time, and situation  Cognition: cognitive ability appears intact but was not quantitatively tested  Consciousness: alert and awake  Attention: intact, able to focus without " "difficulty  Insight: fair  Judgement: limited        Lab Results: I have reviewed the following results:      Administrative Statements     Counseling / Coordination of Care:   Patient's progress discussed with staff in treatment team meeting.  Medication changes reviewed with staff in treatment team meeting.    Portions of the record may have been created with voice recognition software. Occasional wrong word or \"sound a like\" substitutions may have occurred due to the inherent limitations of voice recognition software. Read the chart carefully and recognize, using context, where substitutions have occurred.    Braulio Darden MD 04/25/25  "

## 2025-04-25 NOTE — TREATMENT TEAM
04/23/25 131   Team Meeting   Meeting Type Tx Team Meeting   Team Members Present   Team Members Present Physician;Nurse;   Physician Team Member Adelso   [facility]   Care Management Team Member Deanna   Patient/Family Present   Patient Present Yes   Patient's Family Present No     Tx plan was reviewed and discussed with Pt. Pt was encouraged to attend groups. Medication was discussed with Pt. Pt signed tx plan.

## 2025-04-25 NOTE — ASSESSMENT & PLAN NOTE
Patient is a 44-year-old woman with past medical history significant for hypertension and hyperlipidemia and past psychiatric history significant for depression and anxiety.  Patient presented to the ED secondary to increased feelings of suicidality with plan to overdose on medications.  She signed a 201 and is currently on [place] 3B for further psychiatric stabilization.     Some gradual improvement noted today compared to prior.  Patient going to groups more and affect is less tearful.  Patient seemingly responded well to psychoeducation/brief psychotherapy provided.  Patient still on track for discharge mid next week once stable on medications.  Will have patient attend virtual PHP on discharge.  Could consider adding gabapentin if anxiety continues to persist over the weekend.    -cymbalta 60 mg QD  -atrax 50 mg BID  -t/c addition of gabapentin 300 BID  -prazosin 3 mg HS  -trazadone 100 mg HS  -SW to refer pt to virtual PHP on d/c

## 2025-04-25 NOTE — PROGRESS NOTES
04/25/25 1456   Activity/Group Checklist   Group Admission/Discharge  (relapse prevention plan)   Attendance Attended   Attendance Duration (min) 0-15   Interactions Interacted appropriately   Affect/Mood Appropriate   Goals Achieved Identified feelings;Identified triggers;Identified relapse prevention strategies;Identified medication adherence strategies;Discussed safety plan;Discussed coping strategies;Discussed discharge plans;Identified resources and support systems;Able to listen to others;Able to reflect/comment on own behavior;Able to self-disclose;Able to recieve feedback     Relapse prevention plan completed with pt. Pt pleasant and cooperative. Plan completed appropriately with minimal prompting from writer. Pt agreeable to discharge when determined appropriate with treatment team.

## 2025-04-25 NOTE — TREATMENT TEAM
04/25/25 0842   Team Meeting   Meeting Type Daily Rounds   Team Members Present   Team Members Present Physician;Nurse;   Physician Team Member Enrique/Adelso   [facility]   Care Management Team Member Sandi/Deanna   Patient/Family Present   Patient Present No   Patient's Family Present No     Pt visible on the unit. Pt reported that she did not sleep the best last. Pt denied SI/HI/AVH.

## 2025-04-25 NOTE — NURSING NOTE
Patient has been visible in the milieu. Pleasant, calm and cooperative. Interacting with select peers. Denies SI. Remains depressed. No complaints of anxiety as of this time. Reports broken sleep last night. Held Cozaar and Norvasc this morning due to blood pressure not meeting parameters. Compliant with all other medications.

## 2025-04-26 PROCEDURE — 99232 SBSQ HOSP IP/OBS MODERATE 35: CPT | Performed by: PSYCHIATRY & NEUROLOGY

## 2025-04-26 RX ADMIN — Medication 1000 MCG: at 08:49

## 2025-04-26 RX ADMIN — HYDROXYZINE HYDROCHLORIDE 100 MG: 50 TABLET, FILM COATED ORAL at 16:17

## 2025-04-26 RX ADMIN — DULOXETINE 60 MG: 60 CAPSULE, DELAYED RELEASE ORAL at 08:49

## 2025-04-26 RX ADMIN — TRAZODONE HYDROCHLORIDE 100 MG: 100 TABLET ORAL at 22:18

## 2025-04-26 RX ADMIN — ATORVASTATIN CALCIUM 40 MG: 40 TABLET, FILM COATED ORAL at 08:49

## 2025-04-26 RX ADMIN — PRAZOSIN HYDROCHLORIDE 3 MG: 1 CAPSULE ORAL at 22:18

## 2025-04-26 RX ADMIN — LOSARTAN POTASSIUM 100 MG: 25 TABLET, FILM COATED ORAL at 08:49

## 2025-04-26 RX ADMIN — Medication 3 MG: at 22:18

## 2025-04-26 RX ADMIN — HYDROXYZINE HYDROCHLORIDE 50 MG: 50 TABLET, FILM COATED ORAL at 08:49

## 2025-04-26 NOTE — ASSESSMENT & PLAN NOTE
Patient is a 44-year-old woman with past medical history significant for hypertension and hyperlipidemia and past psychiatric history significant for depression and anxiety.  Patient presented to the ED secondary to increased feelings of suicidality with plan to overdose on medications.  She signed a 201 and is currently on [place] 3B for further psychiatric stabilization.     Some gradual improvement noted today compared to prior.  Patient going to groups more and affect is less tearful.  Patient seemingly responded well to psychoeducation/brief psychotherapy provided.  Patient still on track for discharge mid next week once stable on medications.  Will have patient attend virtual PHP on discharge.  Could consider adding gabapentin if anxiety continues to persist over the weekend.    -cymbalta 60 mg QD  -atrax 50 mg BID  -started gabapentin 300 BID  -prazosin 3 mg HS  -trazadone 100 mg HS  -SW to refer pt to virtual PHP on d/c

## 2025-04-26 NOTE — NURSING NOTE
Pt is visible and social with select peers. Medication and meal compliant. Denies SI/HI/AH/VH at this time but does report being sad. Denies any unmet needs or complaints at this time.

## 2025-04-26 NOTE — PROGRESS NOTES
Progress Note - Behavioral Health   Name: Shireen Rueda 44 y.o. female I MRN: [id number]  Unit/Bed#: -01 I Date of Admission: 4/21/2025   Date of Service: 4/26/2025 I Hospital Day: 4     Assessment & Plan  Major depression, recurrent (HCC)  Patient is a 44-year-old woman with past medical history significant for hypertension and hyperlipidemia and past psychiatric history significant for depression and anxiety.  Patient presented to the ED secondary to increased feelings of suicidality with plan to overdose on medications.  She signed a 201 and is currently on [place] 3B for further psychiatric stabilization.     Some gradual improvement noted today compared to prior.  Patient going to groups more and affect is less tearful.  Patient seemingly responded well to psychoeducation/brief psychotherapy provided.  Patient still on track for discharge mid next week once stable on medications.  Will have patient attend virtual PHP on discharge.  Could consider adding gabapentin if anxiety continues to persist over the weekend.    -cymbalta 60 mg QD  -atrax 50 mg BID  -started gabapentin 300 BID  -prazosin 3 mg HS  -trazadone 100 mg HS  -SW to refer pt to virtual PHP on d/c  Essential hypertension  Per SLIM  Patient will continue on her home dose of amlodipine 2.5 mg p.o. daily  Patient will continue on her home dose of HCTZ 25 mg p.o. daily  Patient will continue on her losartan 100 mg p.o. daily  Will continue to monitor her blood pressure on ongoing basis and additively agents as needed  Medical clearance for psychiatric admission  Per [facility]  Vital signs stable afebrile patient seen and examined by myself at the bedside labs from 4/18/2025 were reviewed by myself sodium 141 potassium 3.5 creatinine 0.75  Patient medically stable for admit  Please reach out to [facility] with any medical questions or concerns  Mixed hyperlipidemia  Per SLIM    Patient will continue on her home dose of Lipitor 40 mg p.o. daily      Current Medications:    Current Facility-Administered Medications:     amLODIPine (NORVASC) tablet 2.5 mg, Oral, Daily    atorvastatin (LIPITOR) tablet 40 mg, Oral, Daily    cyanocobalamin (VITAMIN B-12) tablet 1,000 mcg, Oral, Daily    DULoxetine (CYMBALTA) delayed release capsule 60 mg, Oral, Daily    hydrOXYzine HCL (ATARAX) tablet 50 mg, Oral, BID    losartan (COZAAR) tablet 100 mg, Oral, Daily    melatonin tablet 3 mg, Oral, HS    prazosin (MINIPRESS) capsule 3 mg, Oral, HS    traZODone (DESYREL) tablet 100 mg, Oral, HS    Risks/Benefits of Treatment:     Risks, benefits, and possible side effects of medications explained to patient and patient verbalizes understanding and agreement for treatment.    Progress Toward Goals:  depression and anxiety persist    Treatment Planning:     All current active medications have been reviewed.  Continue to monitor response to treatment and assess for potential side effects of medications.  Encourage group therapy, milieu therapy and occupational therapy.  Collaboration with medical service for medical comorbidities as indicated.  Behavioral Health checks for safety monitoring.  Estimated Discharge Day: 4/30/2025     Subjective Per RN report she is out in the tv area, has peers she has been socializing. This AM she is seen out near her room, more tearful appearing than yesterday but could not state reason other than the unit being disrupted at that time by a peer acting out.  Atarax has been helpful for PRN relief of anxiety.     will add gabapentin for assistance for anxiety     Behavior over the last 24 hours: regressed    Sleep: broken sleep  Appetite: normal  Medication side effects: No  ROS: review of systems as noted above in HPI/Subjective report, all other systems are negative    Objective :  Temp:  [97.1 °F (36.2 °C)-97.6 °F (36.4 °C)] 97.6 °F (36.4 °C)  HR:  [74-77] 74  BP: (110-117)/(71-82) 117/82  Resp:  [16] 16  SpO2:  [95 %-96 %] 96 %  O2 Device: None (Room  air)    Mental Status Evaluation:    Appearance:  age appropriate, casually dressed, adequate grooming   Behavior:  cooperative   Speech:  slow, soft   Mood:  depressed, anxious   Affect:  mood-congruent   Thought Process:  coherent   Thought Content:  no overt delusions   Perceptual Disturbances: does not appear responding to internal stimuli   Risk Potential: Suicidal Ideation -  None at present     Sensorium:  oriented to person, place, and situation   Memory:  recent and remote memory grossly intact   Consciousness:  alert and awake   Attention/Concentration: attention span and concentration appear shorter than expected for age   Insight:  fair   Judgment: fair   Gait/Station: normal gait/station   Motor Activity: no abnormal movements       Lab Results: I have reviewed the following results:  Results from the past 24 hours: No results found for this or any previous visit (from the past 24 hours).    Administrative Statements     Counseling / Coordination of Care:   I have spent a total time of 25 minutes in caring for this patient on the day of the visit/encounter including:  Patient's progress discussed with staff in treatment team meeting.  Medication changes reviewed with staff in treatment team meeting.      Nela Coy MD 04/27/25

## 2025-04-26 NOTE — NURSING NOTE
Patient visible on the unit, social with peers. Patient appears to be tearful at times. Patient denies SI/HI/AH/VH.Compliant with medications and routine vitals.

## 2025-04-26 NOTE — PLAN OF CARE
Problem: SELF HARM/SUICIDALITY  Goal: Will have no self-injury during hospital stay  Description: INTERVENTIONS:- Q 15 MINUTES: Routine safety checks- Q WAKING SHIFT & PRN: Assess risk to determine if routine checks are adequate to maintain patient safety- Encourage patient to participate actively in care by formulating a plan to combat response to suicidal ideation, identify supports and resources  Outcome: Progressing     Problem: DEPRESSION  Goal: Will be euthymic at discharge  Description: INTERVENTIONS:- Administer medication as ordered- Provide emotional support via 1:1 interaction with staff- Encourage involvement in milieu/groups/activities- Monitor for social isolation  Outcome: Progressing     Problem: ANXIETY  Goal: Will report anxiety at manageable levels  Description: INTERVENTIONS:- Administer medication as ordered- Teach and encourage coping skills- Provide emotional support- Assess patient/family for anxiety and ability to cope  Outcome: Progressing  Goal: By discharge: Patient will verbalize 2 strategies to deal with anxiety  Description: Interventions:- Identify any obvious source/trigger to anxiety- Staff will assist patient in applying identified coping technique/skills- Encourage attendance of scheduled groups and activities  Outcome: Progressing     Problem: Ineffective Coping  Goal: Participates in unit activities  Description: Interventions:- Provide therapeutic environment - Provide required programming - Redirect inappropriate behaviors   Outcome: Progressing

## 2025-04-27 PROCEDURE — 99232 SBSQ HOSP IP/OBS MODERATE 35: CPT | Performed by: PSYCHIATRY & NEUROLOGY

## 2025-04-27 RX ORDER — GABAPENTIN 300 MG/1
300 CAPSULE ORAL 2 TIMES DAILY
Status: DISCONTINUED | OUTPATIENT
Start: 2025-04-27 | End: 2025-04-28

## 2025-04-27 RX ADMIN — Medication 3 MG: at 21:46

## 2025-04-27 RX ADMIN — Medication 1000 MCG: at 08:16

## 2025-04-27 RX ADMIN — PRAZOSIN HYDROCHLORIDE 3 MG: 1 CAPSULE ORAL at 21:46

## 2025-04-27 RX ADMIN — HYDROXYZINE HYDROCHLORIDE 50 MG: 50 TABLET, FILM COATED ORAL at 17:19

## 2025-04-27 RX ADMIN — DULOXETINE 60 MG: 60 CAPSULE, DELAYED RELEASE ORAL at 08:16

## 2025-04-27 RX ADMIN — LOSARTAN POTASSIUM 100 MG: 25 TABLET, FILM COATED ORAL at 08:17

## 2025-04-27 RX ADMIN — HYDROXYZINE HYDROCHLORIDE 50 MG: 50 TABLET, FILM COATED ORAL at 08:16

## 2025-04-27 RX ADMIN — TRAZODONE HYDROCHLORIDE 100 MG: 100 TABLET ORAL at 21:46

## 2025-04-27 RX ADMIN — ATORVASTATIN CALCIUM 40 MG: 40 TABLET, FILM COATED ORAL at 08:16

## 2025-04-27 RX ADMIN — AMLODIPINE BESYLATE 2.5 MG: 2.5 TABLET ORAL at 08:17

## 2025-04-27 RX ADMIN — HYDROXYZINE HYDROCHLORIDE 100 MG: 50 TABLET, FILM COATED ORAL at 11:43

## 2025-04-27 RX ADMIN — GABAPENTIN 300 MG: 300 CAPSULE ORAL at 17:20

## 2025-04-27 NOTE — NURSING NOTE
Pt received atarax 100 mg @ 1143 for severe anxiety related to volume on the unit. Reassessed @ 1243 and pt reports she is feeling less anxious at this time. Pt has been visible on the unit and social with select peers. Pt reports having broken sleep the previous night. Denies SI/HI/AH/VH at this time but does report having depression. Med and meal compliant. Denies any unmet needs or complaints.

## 2025-04-27 NOTE — NURSING NOTE
Patient visible on the unit ,appears to be depressed and with drawn to self. Patient partakes in social activities with select peers. Patient denies SI/HI/AH/VH at this time. Compliant with medications and routine vitals.

## 2025-04-27 NOTE — NURSING NOTE
Pt has been visible this evening on the unit and social with select peers. Denies SI/HI/AH/VH at this time. Med and meal compliant. Denies any unmet needs or complaints at this time.

## 2025-04-28 DIAGNOSIS — I10 ESSENTIAL HYPERTENSION: ICD-10-CM

## 2025-04-28 PROCEDURE — 99232 SBSQ HOSP IP/OBS MODERATE 35: CPT | Performed by: PSYCHIATRY & NEUROLOGY

## 2025-04-28 RX ORDER — AMLODIPINE BESYLATE 2.5 MG/1
2.5 TABLET ORAL DAILY
Qty: 90 TABLET | Refills: 0 | Status: SHIPPED | OUTPATIENT
Start: 2025-04-28

## 2025-04-28 RX ORDER — PRAZOSIN HYDROCHLORIDE 5 MG/1
5 CAPSULE ORAL
Status: DISCONTINUED | OUTPATIENT
Start: 2025-04-28 | End: 2025-05-02 | Stop reason: HOSPADM

## 2025-04-28 RX ORDER — GABAPENTIN 300 MG/1
600 CAPSULE ORAL EVERY 12 HOURS
Status: DISCONTINUED | OUTPATIENT
Start: 2025-04-28 | End: 2025-04-29

## 2025-04-28 RX ADMIN — AMLODIPINE BESYLATE 2.5 MG: 2.5 TABLET ORAL at 08:16

## 2025-04-28 RX ADMIN — Medication 3 MG: at 22:05

## 2025-04-28 RX ADMIN — PROPRANOLOL HYDROCHLORIDE 10 MG: 10 TABLET ORAL at 11:20

## 2025-04-28 RX ADMIN — GABAPENTIN 600 MG: 300 CAPSULE ORAL at 22:05

## 2025-04-28 RX ADMIN — TRAZODONE HYDROCHLORIDE 100 MG: 100 TABLET ORAL at 22:05

## 2025-04-28 RX ADMIN — HYDROXYZINE HYDROCHLORIDE 50 MG: 50 TABLET, FILM COATED ORAL at 08:16

## 2025-04-28 RX ADMIN — Medication 1000 MCG: at 08:16

## 2025-04-28 RX ADMIN — PRAZOSIN HYDROCHLORIDE 5 MG: 5 CAPSULE ORAL at 22:05

## 2025-04-28 RX ADMIN — DULOXETINE 60 MG: 60 CAPSULE, DELAYED RELEASE ORAL at 08:16

## 2025-04-28 RX ADMIN — HYDROXYZINE HYDROCHLORIDE 50 MG: 50 TABLET, FILM COATED ORAL at 17:21

## 2025-04-28 RX ADMIN — LOSARTAN POTASSIUM 100 MG: 25 TABLET, FILM COATED ORAL at 08:16

## 2025-04-28 RX ADMIN — ATORVASTATIN CALCIUM 40 MG: 40 TABLET, FILM COATED ORAL at 08:16

## 2025-04-28 RX ADMIN — GABAPENTIN 300 MG: 300 CAPSULE ORAL at 08:16

## 2025-04-28 NOTE — ASSESSMENT & PLAN NOTE
Patient is a 44-year-old woman with past medical history significant for hypertension and hyperlipidemia and past psychiatric history significant for depression and anxiety.  Patient presented to the ED secondary to increased feelings of suicidality with plan to overdose on medications.  She signed a 201 and is currently on [place] 3B for further psychiatric stabilization.     Patient continues to be somewhat tearful this morning.  Notes that weekend was tough for her and she has had poor sleep.  Will increase prazosin tonight as well as increased dosage of gabapentin and slightly change the timing to better help with anxiety in the evening hours and hopefully promote sleep.  I believe the patient is still on track for discharge on Wednesday to PHP but will continue to evaluate daily to see if discharge needs to be delayed.    -cymbalta 60 mg QD  -atrax 50 mg BID  -started gabapentin 600 Q12  -prazosin 5 mg HS  -trazadone 100 mg HS  -SW to refer pt to virtual PHP on d/c

## 2025-04-28 NOTE — NURSING NOTE
Pt approached nurses station and has c/o severe anxiety. Pt was visibly tremulous and appears anxious. Pt has difficulty sitting still, c/o feeling fidgety and restless. Pt had scheduled Hydroxyzine this morning.  1120 PRN Propanolol 10 mg PO given. No further concerns at this time. Pt sitting in dayroom coloring with peers.

## 2025-04-28 NOTE — NURSING NOTE
Patient is visible on unit, socializing with peers. Patient is calm and cooperative upon approach. Patient denies SI/HI/AH/VH, but reported anxiety and depression. Patient compliant with meds and meals.

## 2025-04-28 NOTE — ASSESSMENT & PLAN NOTE
Per SLIM  Patient will continue on her home dose of amlodipine 2.5 mg p.o. daily  Patient will continue on her home dose of HCTZ 25 mg p.o. daily  Patient will continue on her losartan 100 mg p.o. daily  Will continue to monitor her blood pressure on ongoing basis and additively agents as needed   No

## 2025-04-28 NOTE — NURSING NOTE
Patient has been visible in the milieu. Interacting with peers and attending groups. Pleasant, calm and cooperative. Denies SI/HI. Reports poor sleep at night. Compliant with all morning medications.

## 2025-04-28 NOTE — PROGRESS NOTES
Progress Note - Behavioral Health   Name: Shireen Rueda 44 y.o. female I MRN: [id number]  Unit/Bed#: -01 I Date of Admission: 4/21/2025   Date of Service: 4/28/2025 I Hospital Day: 6     Assessment & Plan  Major depression, recurrent (HCC)  Patient is a 44-year-old woman with past medical history significant for hypertension and hyperlipidemia and past psychiatric history significant for depression and anxiety.  Patient presented to the ED secondary to increased feelings of suicidality with plan to overdose on medications.  She signed a 201 and is currently on [place] 3[facility] for further psychiatric stabilization.     Patient continues to be somewhat tearful this morning.  Notes that weekend was tough for her and she has had poor sleep.  Will increase prazosin tonight as well as increased dosage of gabapentin and slightly change the timing to better help with anxiety in the evening hours and hopefully promote sleep.  I believe the patient is still on track for discharge on Wednesday to PHP but will continue to evaluate daily to see if discharge needs to be delayed.    -cymbalta 60 mg QD  -atrax 50 mg BID  -started gabapentin 600 Q12  -prazosin 5 mg HS  -trazadone 100 mg HS  -SW to refer pt to virtual PHP on d/c  Essential hypertension  Per SLIM  Patient will continue on her home dose of amlodipine 2.5 mg p.o. daily  Patient will continue on her home dose of HCTZ 25 mg p.o. daily  Patient will continue on her losartan 100 mg p.o. daily  Will continue to monitor her blood pressure on ongoing basis and additively agents as needed  Medical clearance for psychiatric admission  Per [facility]  Vital signs stable afebrile patient seen and examined by myself at the bedside labs from 4/18/2025 were reviewed by myself sodium 141 potassium 3.5 creatinine 0.75  Patient medically stable for admit  Please reach out to [facility] with any medical questions or concerns  Mixed hyperlipidemia  Per SLIM    Patient will continue on her home  dose of Lipitor 40 mg p.o. daily     Current Medications:    Current Facility-Administered Medications:     amLODIPine (NORVASC) tablet 2.5 mg, Oral, Daily    atorvastatin (LIPITOR) tablet 40 mg, Oral, Daily    cyanocobalamin (VITAMIN B-12) tablet 1,000 mcg, Oral, Daily    DULoxetine (CYMBALTA) delayed release capsule 60 mg, Oral, Daily    gabapentin (NEURONTIN) capsule 600 mg, Oral, Q12H    hydrOXYzine HCL (ATARAX) tablet 50 mg, Oral, BID    losartan (COZAAR) tablet 100 mg, Oral, Daily    melatonin tablet 3 mg, Oral, HS    prazosin (MINIPRESS) capsule 5 mg, Oral, HS    traZODone (DESYREL) tablet 100 mg, Oral, HS      Risks/Benefits of Treatment:     Risks, benefits, and possible side effects of medications explained to patient and patient verbalizes understanding and agreement for treatment.    Progress Toward Goals: progressing    Treatment Planning:     All current active medications have been reviewed.  Continue to monitor response to treatment and assess for potential side effects of medications.  Encourage group therapy, milieu therapy and occupational therapy.  Collaboration with medical service for medical comorbidities as indicated.  Behavioral Health checks for safety monitoring.  Estimated Discharge Day: 4/30/2025         Subjective     Behavior over the last 24 hours: improving        Patient was visited on unit for continuing care; chart reviewed and discussed with multidisciplinary treatment team.  On approach, the patient was calm and cooperative. Endorsed better mood and less anxiety sxs, but continues to have negative thinking and ruminating thoughts. Reported interrupted sleep. Denied A/VH currently.  Denied SI/HI, intent or plan upon direct inquiry at this time.  Patient continues to report some poor sleep at nighttime.  Continues to have anxiety that she states is better throughout the day but is more troublesome at nighttime.  Discussed with patient about increasing patient's gabapentin and  "timing the dose for making it every 12 so that it will be closer to when she is going to go to bed additionally will increase prazosin to help decrease sympathetic tone prior to that time to again assist with anxiety.  Patient continues to report low mood although better than when she came in secondary to continued grief secondary to her brother passing.    Patient continues to be visible in the milieu and interacts with staff and peers. No reports of aggression or self-injurious behavior on unit.  Received atarax for anxiety.    Patient accepted all offered medications and no adverse effects of medications noted or reported.      Sleep: normal  Appetite: normal  Medication side effects: No  ROS: review of systems as noted above in HPI/Subjective report, all other systems are negative    Objective :  Temp:  [97.6 °F (36.4 °C)-97.8 °F (36.6 °C)] 97.8 °F (36.6 °C)  HR:  [70-83] 70  BP: (115-126)/(74-82) 126/74  Resp:  [16] 16  SpO2:  [96 %-97 %] 97 %  O2 Device: None (Room air)    Mental Status Evaluation:  Appearance: casually dressed, consistent with stated age  Motor: no psychomotor retardation, no gait abnormalities  Behavior: cooperative, answers questions appropriately  Speech: soft, normal rhythm  Mood: \"sad\"  Affect: slightly tearful, depressed-appearing, moderate range  Thought Process: linear and goal-oriented  Thought Content: denies delusions  Risk Potential: denies suicidal ideation, plan, or intent. Denies homicidal ideation  Perceptions: denies auditory hallucinations, denies visual hallucinations,   Sensorium: Oriented to person, place, time, and situation  Cognition: cognitive ability appears intact but was not quantitatively tested  Consciousness: alert and awake  Attention: intact, able to focus without difficulty  Insight: improving  Judgement: improving        Lab Results: I have reviewed the following results:      Administrative Statements     Counseling / Coordination of Care:   Patient's " "progress discussed with staff in treatment team meeting.  Medication changes reviewed with staff in treatment team meeting.    Portions of the record may have been created with voice recognition software. Occasional wrong word or \"sound a like\" substitutions may have occurred due to the inherent limitations of voice recognition software. Read the chart carefully and recognize, using context, where substitutions have occurred.    Braulio Darden MD 04/28/25  "

## 2025-04-28 NOTE — TREATMENT TEAM
04/28/25 0827   Team Meeting   Meeting Type Daily Rounds   Team Members Present   Team Members Present Physician;Nurse;   Physician Team Member Adelso   [facility]   Care Management Team Member Sandi/Deanna   Patient/Family Present   Patient Present No   Patient's Family Present No     Pt is visible on the unit and social with peers. PRN Atarax Saturday and Sunday for anxiety- effective. Pt denied SI/HI/AVH.

## 2025-04-29 PROCEDURE — 99232 SBSQ HOSP IP/OBS MODERATE 35: CPT | Performed by: PSYCHIATRY & NEUROLOGY

## 2025-04-29 RX ORDER — GABAPENTIN 300 MG/1
300 CAPSULE ORAL 3 TIMES DAILY
Status: DISCONTINUED | OUTPATIENT
Start: 2025-04-29 | End: 2025-04-30

## 2025-04-29 RX ORDER — SENNOSIDES 8.6 MG
1 TABLET ORAL
Status: DISCONTINUED | OUTPATIENT
Start: 2025-04-29 | End: 2025-05-02 | Stop reason: HOSPADM

## 2025-04-29 RX ORDER — LACTULOSE 10 G/15ML
20 SOLUTION ORAL 3 TIMES DAILY PRN
Status: DISCONTINUED | OUTPATIENT
Start: 2025-04-29 | End: 2025-05-02 | Stop reason: HOSPADM

## 2025-04-29 RX ORDER — POLYETHYLENE GLYCOL 3350 17 G/17G
17 POWDER, FOR SOLUTION ORAL DAILY
Status: DISCONTINUED | OUTPATIENT
Start: 2025-04-29 | End: 2025-04-30

## 2025-04-29 RX ADMIN — HYDROXYZINE HYDROCHLORIDE 50 MG: 50 TABLET, FILM COATED ORAL at 08:07

## 2025-04-29 RX ADMIN — Medication 1000 MCG: at 08:08

## 2025-04-29 RX ADMIN — GABAPENTIN 600 MG: 300 CAPSULE ORAL at 08:07

## 2025-04-29 RX ADMIN — DULOXETINE 60 MG: 60 CAPSULE, DELAYED RELEASE ORAL at 08:08

## 2025-04-29 RX ADMIN — GABAPENTIN 300 MG: 300 CAPSULE ORAL at 22:37

## 2025-04-29 RX ADMIN — LOSARTAN POTASSIUM 100 MG: 25 TABLET, FILM COATED ORAL at 08:08

## 2025-04-29 RX ADMIN — AMLODIPINE BESYLATE 2.5 MG: 2.5 TABLET ORAL at 08:08

## 2025-04-29 RX ADMIN — ATORVASTATIN CALCIUM 40 MG: 40 TABLET, FILM COATED ORAL at 08:08

## 2025-04-29 RX ADMIN — POLYETHYLENE GLYCOL 3350 17 G: 17 POWDER, FOR SOLUTION ORAL at 10:36

## 2025-04-29 RX ADMIN — PRAZOSIN HYDROCHLORIDE 5 MG: 5 CAPSULE ORAL at 22:37

## 2025-04-29 RX ADMIN — GABAPENTIN 300 MG: 300 CAPSULE ORAL at 16:26

## 2025-04-29 RX ADMIN — Medication 3 MG: at 22:37

## 2025-04-29 RX ADMIN — TRAZODONE HYDROCHLORIDE 100 MG: 100 TABLET ORAL at 22:37

## 2025-04-29 RX ADMIN — SENNOSIDES 8.6 MG: 8.6 TABLET, FILM COATED ORAL at 22:37

## 2025-04-29 NOTE — PROGRESS NOTES
Progress Note - Behavioral Health   Name: Shireen Rueda 44 y.o. female I MRN: [id number]  Unit/Bed#: -01 I Date of Admission: 4/21/2025   Date of Service: 4/29/2025 I Hospital Day: 7     Assessment & Plan  Major depression, recurrent (HCC)  Patient is a 44-year-old woman with past medical history significant for hypertension and hyperlipidemia and past psychiatric history significant for depression and anxiety.  Patient presented to the ED secondary to increased feelings of suicidality with plan to overdose on medications.  She signed a 201 and is currently on [place] 3B for further psychiatric stabilization.     Will push back patient's discharge a couple days to help her consolidate gains.  Will make adjustments to her current medication regimen to better target anxiety namely discontinuing Atarax secondary to increasing gabapentin and patient's current constipation.  Will also add MiraLAX and senna for constipation.    -cymbalta 60 mg QD  -d/c atrax 50 mg BID  -gabapentin 300 TID  -prazosin 5 mg HS  -trazadone 100 mg HS  -SW to refer pt to virtual PHP on d/c  Essential hypertension  Per SLIM  Patient will continue on her home dose of amlodipine 2.5 mg p.o. daily  Patient will continue on her home dose of HCTZ 25 mg p.o. daily  Patient will continue on her losartan 100 mg p.o. daily  Will continue to monitor her blood pressure on ongoing basis and additively agents as needed  Medical clearance for psychiatric admission  Per [facility]  Vital signs stable afebrile patient seen and examined by myself at the bedside labs from 4/18/2025 were reviewed by myself sodium 141 potassium 3.5 creatinine 0.75  Patient medically stable for admit  Please reach out to [facility] with any medical questions or concerns  Mixed hyperlipidemia  Per SLIM    Patient will continue on her home dose of Lipitor 40 mg p.o. daily     Current Medications:    Current Facility-Administered Medications:     amLODIPine (NORVASC) tablet 2.5 mg,  Oral, Daily    atorvastatin (LIPITOR) tablet 40 mg, Oral, Daily    cyanocobalamin (VITAMIN B-12) tablet 1,000 mcg, Oral, Daily    DULoxetine (CYMBALTA) delayed release capsule 60 mg, Oral, Daily    gabapentin (NEURONTIN) capsule 300 mg, Oral, TID    losartan (COZAAR) tablet 100 mg, Oral, Daily    melatonin tablet 3 mg, Oral, HS    polyethylene glycol (MIRALAX) packet 17 g, Oral, Daily    prazosin (MINIPRESS) capsule 5 mg, Oral, HS    senna (SENOKOT) tablet 8.6 mg, Oral, HS    traZODone (DESYREL) tablet 100 mg, Oral, HS      Risks/Benefits of Treatment:     Risks, benefits, and possible side effects of medications explained to patient and patient verbalizes understanding and agreement for treatment.    Progress Toward Goals: improving    Treatment Planning:     All current active medications have been reviewed.  Continue to monitor response to treatment and assess for potential side effects of medications.  Encourage group therapy, milieu therapy and occupational therapy.  Collaboration with medical service for medical comorbidities as indicated.  Behavioral Health checks for safety monitoring.  Estimated Discharge Day: 4/30/2025         Subjective     Behavior over the last 24 hours: unchanged    Patient was visited on unit for continuing care; chart reviewed and discussed with multidisciplinary treatment team.  On approach, the patient was calm and cooperative. Endorsed better mood and less anxiety sxs, but continues to have negative thinking and ruminating thoughts. Reported interrupted sleep. Denied A/VH currently.  Denied SI/HI, intent or plan upon direct inquiry at this time.  Patient states that although mood is improving she is still endorsing anxiety and difficulty sleeping.  Discussed with patient about prospective discharge tomorrow.  Patient feels that she needs a couple more days to consolidate gains.  I discussed with patient about adjusting her medication regimen to better target anxiety.  She is  "amenable with increasing her dose of gabapentin and getting rid of the Atarax.  I informed her that I am amenable with these changes.  Patient states that she had a good call with her  yesterday and he also feels that she is improving.  Patient still amenable with PHP on discharge.  I will also add some MiraLAX and senna to patient's medication regimen as she reports constipation.    Patient continues to be visible in the milieu and interacts with staff and peers. No reports of aggression or self-injurious behavior on unit.  Received propanolol prn.    Patient accepted all offered medications and no adverse effects of medications noted or reported.          Sleep: improved  Appetite: normal  Medication side effects: No  ROS: review of systems as noted above in HPI/Subjective report, all other systems are negative    Objective :  Temp:  [96.9 °F (36.1 °C)-98.2 °F (36.8 °C)] 98.1 °F (36.7 °C)  HR:  [60-80] 72  BP: (100-125)/(53-75) 113/59  Resp:  [16] 16  SpO2:  [97 %-98 %] 97 %  O2 Device: None (Room air)    Mental Status Evaluation:  Appearance: moderately kempt  Motor: no psychomotor agitation  Behavior: cooperative, pleasant  Speech: normal rate, rhythm, and volume  Mood: \"still anxious\"  Affect: mood-congruent, less anxious appearing  Thought Process: organized and linear  Thought Content: denies delusions  Risk Potential: denies suicidal ideation, plan, or intent. Denies homicidal ideation  Perceptions: denies auditory hallucinations, denies visual hallucinations, no IP/RIS  Sensorium: Oriented to person, place, time, and situation  Cognition: cognitive ability appears intact but was not quantitatively tested  Consciousness: alert and awake  Attention: currently intact  Insight: fair  Judgement: fair        Lab Results: I have reviewed the following results:      Administrative Statements     Counseling / Coordination of Care:   Patient's progress discussed with staff in treatment team " "meeting.  Medication changes reviewed with staff in treatment team meeting.    Portions of the record may have been created with voice recognition software. Occasional wrong word or \"sound a like\" substitutions may have occurred due to the inherent limitations of voice recognition software. Read the chart carefully and recognize, using context, where substitutions have occurred.    Braulio Darden MD 04/29/25  "

## 2025-04-29 NOTE — ASSESSMENT & PLAN NOTE
Patient is a 44-year-old woman with past medical history significant for hypertension and hyperlipidemia and past psychiatric history significant for depression and anxiety.  Patient presented to the ED secondary to increased feelings of suicidality with plan to overdose on medications.  She signed a 201 and is currently on [place] 3B for further psychiatric stabilization.     Will push back patient's discharge a couple days to help her consolidate gains.  Will make adjustments to her current medication regimen to better target anxiety namely discontinuing Atarax secondary to increasing gabapentin and patient's current constipation.  Will also add MiraLAX and senna for constipation.    -cymbalta 60 mg QD  -d/c atrax 50 mg BID  -gabapentin 300 TID  -prazosin 5 mg HS  -trazadone 100 mg HS  -SW to refer pt to virtual PHP on d/c

## 2025-04-29 NOTE — NURSING NOTE
"Patient reports improved sleep last night. \" I only woke up one time\". Interacting with peers appropriately. Attending groups. Compliant with medications. Decreased depression, no complaints of anxiety. Denies SI/HI. Looking forward to discharge tomorrow.   "

## 2025-04-29 NOTE — TREATMENT TEAM
04/29/25 1223   Team Meeting   Meeting Type Daily Rounds   Team Members Present   Team Members Present Physician;Nurse;   Physician Team Member Adelso   [facility] Team Member Deanna   Patient/Family Present   Patient Present No   Patient's Family Present No     Pt reported that she slept better last night. Pt denied SI/HI/AVH. Pt is medication and meal compliant. Pt is visible on the unit and social with select peers.

## 2025-04-30 PROCEDURE — 99232 SBSQ HOSP IP/OBS MODERATE 35: CPT | Performed by: PSYCHIATRY & NEUROLOGY

## 2025-04-30 RX ORDER — GABAPENTIN 400 MG/1
400 CAPSULE ORAL 3 TIMES DAILY
Status: DISCONTINUED | OUTPATIENT
Start: 2025-04-30 | End: 2025-05-02 | Stop reason: HOSPADM

## 2025-04-30 RX ORDER — MAGNESIUM HYDROXIDE/ALUMINUM HYDROXICE/SIMETHICONE 120; 1200; 1200 MG/30ML; MG/30ML; MG/30ML
30 SUSPENSION ORAL EVERY 4 HOURS PRN
Status: DISCONTINUED | OUTPATIENT
Start: 2025-04-30 | End: 2025-05-02 | Stop reason: HOSPADM

## 2025-04-30 RX ADMIN — PRAZOSIN HYDROCHLORIDE 5 MG: 5 CAPSULE ORAL at 22:41

## 2025-04-30 RX ADMIN — Medication 1000 MCG: at 08:03

## 2025-04-30 RX ADMIN — ALUMINUM HYDROXIDE, MAGNESIUM HYDROXIDE, AND DIMETHICONE 30 ML: 200; 20; 200 SUSPENSION ORAL at 21:57

## 2025-04-30 RX ADMIN — GABAPENTIN 400 MG: 400 CAPSULE ORAL at 16:34

## 2025-04-30 RX ADMIN — HYDROXYZINE HYDROCHLORIDE 100 MG: 50 TABLET, FILM COATED ORAL at 08:14

## 2025-04-30 RX ADMIN — GABAPENTIN 400 MG: 400 CAPSULE ORAL at 21:28

## 2025-04-30 RX ADMIN — DULOXETINE 60 MG: 60 CAPSULE, DELAYED RELEASE ORAL at 08:03

## 2025-04-30 RX ADMIN — SENNOSIDES 8.6 MG: 8.6 TABLET, FILM COATED ORAL at 22:41

## 2025-04-30 RX ADMIN — TRAZODONE HYDROCHLORIDE 100 MG: 100 TABLET ORAL at 22:41

## 2025-04-30 RX ADMIN — ATORVASTATIN CALCIUM 40 MG: 40 TABLET, FILM COATED ORAL at 08:03

## 2025-04-30 RX ADMIN — GABAPENTIN 300 MG: 300 CAPSULE ORAL at 08:03

## 2025-04-30 RX ADMIN — Medication 3 MG: at 22:41

## 2025-04-30 NOTE — PLAN OF CARE
Problem: DISCHARGE PLANNING  Goal: Discharge to home or other facility with appropriate resources  Description: INTERVENTIONS:- Identify barriers to discharge w/patient and caregiver- Arrange for needed discharge resources and transportation as appropriate- Identify discharge learning needs (meds, wound care, etc.)- Arrange for interpretive services to assist at discharge as needed- Refer to Case Management Department for coordinating discharge planning if the patient needs post-hospital services based on physician/advanced practitioner order or complex needs related to functional status, cognitive ability, or social support system  4/30/2025 0822 by Sera Best RN  Outcome: Progressing  4/30/2025 0821 by Sera Best RN  Reactivated     Problem: SELF HARM/SUICIDALITY  Goal: Will have no self-injury during hospital stay  Description: INTERVENTIONS:- Q 15 MINUTES: Routine safety checks- Q WAKING SHIFT & PRN: Assess risk to determine if routine checks are adequate to maintain patient safety- Encourage patient to participate actively in care by formulating a plan to combat response to suicidal ideation, identify supports and resources  4/30/2025 0822 by Sera Best RN  Outcome: Progressing  4/30/2025 0821 by Sera Best RN  Reactivated     Problem: DEPRESSION  Goal: Will be euthymic at discharge  Description: INTERVENTIONS:- Administer medication as ordered- Provide emotional support via 1:1 interaction with staff- Encourage involvement in milieu/groups/activities- Monitor for social isolation  4/30/2025 0822 by Sera Best RN  Outcome: Progressing  4/30/2025 0821 by Sera Best RN  Reactivated     Problem: ANXIETY  Goal: Will report anxiety at manageable levels  Description: INTERVENTIONS:- Administer medication as ordered- Teach and encourage coping skills- Provide emotional support- Assess patient/family for anxiety and ability to cope  4/30/2025 0822 by Sera SHIELDS  REMEDIOS Best  Outcome: Progressing  4/30/2025 0821 by Sera Best RN  Reactivated  Goal: By discharge: Patient will verbalize 2 strategies to deal with anxiety  Description: Interventions:- Identify any obvious source/trigger to anxiety- Staff will assist patient in applying identified coping technique/skills- Encourage attendance of scheduled groups and activities  4/30/2025 0822 by Sera Best RN  Outcome: Progressing  4/30/2025 0821 by Sera Best RN  Reactivated     Problem: SELF HARM/SUICIDALITY  Goal: Will have no self-injury during hospital stay  Description: INTERVENTIONS:- Q 15 MINUTES: Routine safety checks- Q WAKING SHIFT & PRN: Assess risk to determine if routine checks are adequate to maintain patient safety- Encourage patient to participate actively in care by formulating a plan to combat response to suicidal ideation, identify supports and resources  4/30/2025 0822 by Sera Best RN  Outcome: Progressing  4/30/2025 0821 by Sera Best RN  Reactivated  4/30/2025 0719 by Sera Best RN  Outcome: Completed     Problem: DEPRESSION  Goal: Will be euthymic at discharge  Description: INTERVENTIONS:- Administer medication as ordered- Provide emotional support via 1:1 interaction with staff- Encourage involvement in milieu/groups/activities- Monitor for social isolation  4/30/2025 0822 by Sera Best RN  Outcome: Progressing  4/30/2025 0821 by Sera Best RN  Reactivated  4/30/2025 0719 by Sera Best RN  Outcome: Completed     Problem: ANXIETY  Goal: Will report anxiety at manageable levels  Description: INTERVENTIONS:- Administer medication as ordered- Teach and encourage coping skills- Provide emotional support- Assess patient/family for anxiety and ability to cope  4/30/2025 0822 by Sera Best RN  Outcome: Progressing  4/30/2025 0821 by Sera Best RN  Reactivated  4/30/2025 0719 by Sera Best RN  Outcome:  Completed  Goal: By discharge: Patient will verbalize 2 strategies to deal with anxiety  Description: Interventions:- Identify any obvious source/trigger to anxiety- Staff will assist patient in applying identified coping technique/skills- Encourage attendance of scheduled groups and activities  4/30/2025 0822 by Sera Best RN  Outcome: Progressing  4/30/2025 0821 by Sera Best RN  Reactivated  4/30/2025 0719 by Sera Best RN  Outcome: Completed     Problem: Ineffective Coping  Goal: Participates in unit activities  Description: Interventions:- Provide therapeutic environment - Provide required programming - Redirect inappropriate behaviors   4/30/2025 0822 by Srea Best RN  Outcome: Progressing  4/30/2025 0821 by Sera Best RN  Reactivated     Problem: Ineffective Coping  Goal: Participates in unit activities  Description: Interventions:- Provide therapeutic environment - Provide required programming - Redirect inappropriate behaviors   4/30/2025 0822 by Sera Best RN  Outcome: Progressing  4/30/2025 0821 by Sera Best RN  Reactivated  4/30/2025 0719 by Sera Best RN  Outcome: Completed     Problem: DISCHARGE PLANNING - CARE MANAGEMENT  Goal: Discharge to post-acute care or home with appropriate resources  Description: INTERVENTIONS:- Conduct assessment to determine patient/family and health care team treatment goals, and need for post-acute services based on payer coverage, community resources, and patient preferences, and barriers to discharge- Address psychosocial, clinical, and financial barriers to discharge as identified in assessment in conjunction with the patient/family and health care team- Arrange appropriate level of post-acute services according to patient’s   needs and preference and payer coverage in collaboration with the physician and health care team- Communicate with and update the patient/family, physician, and health care  team regarding progress on the discharge plan- Arrange appropriate transportation to post-acute venues  4/30/2025 0822 by Sera Best RN  Outcome: Progressing  4/30/2025 0821 by Sera Best RN  Reactivated

## 2025-04-30 NOTE — TREATMENT TEAM
04/30/25 0758   Team Meeting   Meeting Type Daily Rounds   Team Members Present   Team Members Present Physician;Nurse;   Physician Team Member Adelso   [facility] Member Deanna   Patient/Family Present   Patient Present No   Patient's Family Present No     Pt discharge pushed to Friday. Pt is calm and cooperative. Pt denied SI/HI/AVH.

## 2025-04-30 NOTE — PROGRESS NOTES
Progress Note - Behavioral Health   Name: Shireen Rueda 44 y.o. female I MRN: [id number]  Unit/Bed#: -01 I Date of Admission: 4/21/2025   Date of Service: 4/30/2025 I Hospital Day: 8     Assessment & Plan  Major depression, recurrent (HCC)  Patient is a 44-year-old woman with past medical history significant for hypertension and hyperlipidemia and past psychiatric history significant for depression and anxiety.  Patient presented to the ED secondary to increased feelings of suicidality with plan to overdose on medications.  She signed a 201 and is currently on [place] 3B for further psychiatric stabilization.     Patient continues to be on track for discharge on Friday.  Will adjust Cymbalta and gabapentin today.  Patient had bowel movement and so we will discontinue standing MiraLAX but maintain senna.    -cymbalta 90 mg QD  -gabapentin 400 TID  -prazosin 5 mg HS  -trazadone 100 mg HS  -SW to refer pt to virtual PHP on d/c  Essential hypertension  Per SLIM  Patient will continue on her home dose of amlodipine 2.5 mg p.o. daily  Patient will continue on her home dose of HCTZ 25 mg p.o. daily  Patient will continue on her losartan 100 mg p.o. daily  Will continue to monitor her blood pressure on ongoing basis and additively agents as needed  Medical clearance for psychiatric admission  Per [facility]  Vital signs stable afebrile patient seen and examined by myself at the bedside labs from 4/18/2025 were reviewed by myself sodium 141 potassium 3.5 creatinine 0.75  Patient medically stable for admit  Please reach out to [facility] with any medical questions or concerns  Mixed hyperlipidemia  Per SLIM    Patient will continue on her home dose of Lipitor 40 mg p.o. daily     Current Medications:    Current Facility-Administered Medications:     amLODIPine (NORVASC) tablet 2.5 mg, Oral, Daily    atorvastatin (LIPITOR) tablet 40 mg, Oral, Daily    cyanocobalamin (VITAMIN B-12) tablet 1,000 mcg, Oral, Daily    [START ON  5/1/2025] DULoxetine (CYMBALTA) delayed release capsule 90 mg, Oral, Daily    gabapentin (NEURONTIN) capsule 400 mg, Oral, TID    losartan (COZAAR) tablet 100 mg, Oral, Daily    melatonin tablet 3 mg, Oral, HS    prazosin (MINIPRESS) capsule 5 mg, Oral, HS    senna (SENOKOT) tablet 8.6 mg, Oral, HS    traZODone (DESYREL) tablet 100 mg, Oral, HS      Risks/Benefits of Treatment:     Risks, benefits, and possible side effects of medications explained to patient and patient verbalizes understanding and agreement for treatment.    Progress Toward Goals: improving    Treatment Planning:     All current active medications have been reviewed.  Continue to monitor response to treatment and assess for potential side effects of medications.  Encourage group therapy, milieu therapy and occupational therapy.  Collaboration with medical service for medical comorbidities as indicated.  Behavioral Health checks for safety monitoring.  Estimated Discharge Day: 5/2/2025         Subjective     Behavior over the last 24 hours: improving    Patient was visited on unit for continuing care; chart reviewed and discussed with multidisciplinary treatment team.  On approach, the patient was calm and cooperative. Endorsed better mood and less anxiety sxs, but continues to have negative thinking and ruminating thoughts.  Reported improved sleep but still interrupted secondary to need to go to the bathroom at night due to MiraLAX and senna. Denied A/VH currently.  Denied SI/HI, intent or plan upon direct inquiry at this time.  Patient overall reports that she is improved compared to admission but still notes that she is feeling anxious especially in the morning.  Patient notes that this morning she woke up very irritable.  Should be noted however the patient had reduced sleep last night secondary to need to get up to use the restroom multiple times.  Discussed with patient utilization of different coping mechanisms and provided psychoeducation.  " Patient is amenable additionally to increasing her gabapentin as well as her Cymbalta.  She remains amenable to PHP and is looking forward to starting this and seeing her family after discharge.  Patient is able to verbalize to me the gains that she has had while hospitalized here and states that her family has also noticed improvement when they have talked with her over the phone and are proud of her for advocating for herself.    Patient continues to be visible in the milieu and interacts with staff and peers. No reports of aggression or self-injurious behavior on unit. No PRN medications used in the past 24 hours.    Patient accepted all offered medications and no adverse effects of medications noted or reported.          Sleep: slept off and on  Appetite: normal  Medication side effects: No  ROS: review of systems as noted above in HPI/Subjective report, all other systems are negative    Objective :  Temp:  [97 °F (36.1 °C)-97.4 °F (36.3 °C)] 97.4 °F (36.3 °C)  HR:  [68-74] 74  BP: ()/(58-77) 97/58  Resp:  [16] 16  SpO2:  [97 %-100 %] 97 %  O2 Device: None (Room air)    Mental Status Evaluation:  Appearance: casually dressed, consistent with stated age  Motor: no psychomotor retardation, no gait abnormalities  Behavior: cooperative, answers questions appropriately  Speech: soft, normal rhythm  Mood: \"ok\"  Affect: mostly euthymic  Thought Process: linear and goal-oriented  Thought Content: denies delusions  Risk Potential: denies suicidal ideation, plan, or intent. Denies homicidal ideation  Perceptions: denies auditory hallucinations, denies visual hallucinations,   Sensorium: Oriented to person, place, time, and situation  Cognition: cognitive ability appears intact but was not quantitatively tested  Consciousness: alert and awake  Attention: intact, able to focus without difficulty  Insight: fair  Judgement: improving        Lab Results: I have reviewed the following results:      Administrative " "Statements     Counseling / Coordination of Care:   Patient's progress discussed with staff in treatment team meeting.  Medication changes reviewed with staff in treatment team meeting.    Portions of the record may have been created with voice recognition software. Occasional wrong word or \"sound a like\" substitutions may have occurred due to the inherent limitations of voice recognition software. Read the chart carefully and recognize, using context, where substitutions have occurred.    Braulio Darden MD 04/30/25  "

## 2025-04-30 NOTE — PLAN OF CARE
Problem: DISCHARGE PLANNING  Goal: Discharge to home or other facility with appropriate resources  Description: INTERVENTIONS:- Identify barriers to discharge w/patient and caregiver- Arrange for needed discharge resources and transportation as appropriate- Identify discharge learning needs (meds, wound care, etc.)- Arrange for interpretive services to assist at discharge as needed- Refer to Case Management Department for coordinating discharge planning if the patient needs post-hospital services based on physician/advanced practitioner order or complex needs related to functional status, cognitive ability, or social support system  4/30/2025 0822 by Sera Best RN  Outcome: Progressing  4/30/2025 0821 by Sera Best RN  Reactivated     Problem: SELF HARM/SUICIDALITY  Goal: Will have no self-injury during hospital stay  Description: INTERVENTIONS:- Q 15 MINUTES: Routine safety checks- Q WAKING SHIFT & PRN: Assess risk to determine if routine checks are adequate to maintain patient safety- Encourage patient to participate actively in care by formulating a plan to combat response to suicidal ideation, identify supports and resources  4/30/2025 0822 by Sera Best RN  Outcome: Progressing  4/30/2025 0821 by Sera Best RN  Reactivated     Problem: DEPRESSION  Goal: Will be euthymic at discharge  Description: INTERVENTIONS:- Administer medication as ordered- Provide emotional support via 1:1 interaction with staff- Encourage involvement in milieu/groups/activities- Monitor for social isolation  4/30/2025 0822 by Sera Best RN  Outcome: Progressing  4/30/2025 0821 by Sera Best RN  Reactivated     Problem: ANXIETY  Goal: Will report anxiety at manageable levels  Description: INTERVENTIONS:- Administer medication as ordered- Teach and encourage coping skills- Provide emotional support- Assess patient/family for anxiety and ability to cope  4/30/2025 0822 by Sera SHIELDS  REMEDIOS Best  Outcome: Progressing  4/30/2025 0821 by Sera Best RN  Reactivated  Goal: By discharge: Patient will verbalize 2 strategies to deal with anxiety  Description: Interventions:- Identify any obvious source/trigger to anxiety- Staff will assist patient in applying identified coping technique/skills- Encourage attendance of scheduled groups and activities  4/30/2025 0822 by Sera Best RN  Outcome: Progressing  4/30/2025 0821 by Sera Best RN  Reactivated     Problem: SELF HARM/SUICIDALITY  Goal: Will have no self-injury during hospital stay  Description: INTERVENTIONS:- Q 15 MINUTES: Routine safety checks- Q WAKING SHIFT & PRN: Assess risk to determine if routine checks are adequate to maintain patient safety- Encourage patient to participate actively in care by formulating a plan to combat response to suicidal ideation, identify supports and resources  4/30/2025 0822 by Sera Best RN  Outcome: Progressing  4/30/2025 0821 by Sera Best RN  Reactivated  4/30/2025 0719 by Sera Best RN  Outcome: Completed     Problem: DEPRESSION  Goal: Will be euthymic at discharge  Description: INTERVENTIONS:- Administer medication as ordered- Provide emotional support via 1:1 interaction with staff- Encourage involvement in milieu/groups/activities- Monitor for social isolation  4/30/2025 0822 by Sera Best RN  Outcome: Progressing  4/30/2025 0821 by Sera Best RN  Reactivated  4/30/2025 0719 by Sera Best RN  Outcome: Completed     Problem: ANXIETY  Goal: Will report anxiety at manageable levels  Description: INTERVENTIONS:- Administer medication as ordered- Teach and encourage coping skills- Provide emotional support- Assess patient/family for anxiety and ability to cope  4/30/2025 0822 by Sera Best RN  Outcome: Progressing  4/30/2025 0821 by Sera Best RN  Reactivated  4/30/2025 0719 by Sera Best RN  Outcome:  Completed  Goal: By discharge: Patient will verbalize 2 strategies to deal with anxiety  Description: Interventions:- Identify any obvious source/trigger to anxiety- Staff will assist patient in applying identified coping technique/skills- Encourage attendance of scheduled groups and activities  4/30/2025 0822 by Sera Best RN  Outcome: Progressing  4/30/2025 0821 by Sera Best RN  Reactivated  4/30/2025 0719 by Sera Best RN  Outcome: Completed     Problem: Ineffective Coping  Goal: Participates in unit activities  Description: Interventions:- Provide therapeutic environment - Provide required programming - Redirect inappropriate behaviors   4/30/2025 0822 by Sera Best RN  Outcome: Progressing  4/30/2025 0821 by Sera Best RN  Reactivated     Problem: Ineffective Coping  Goal: Participates in unit activities  Description: Interventions:- Provide therapeutic environment - Provide required programming - Redirect inappropriate behaviors   4/30/2025 0822 by Sera Best RN  Outcome: Progressing  4/30/2025 0821 by Sera Best RN  Reactivated  4/30/2025 0719 by Sera Best RN  Outcome: Completed     Problem: DISCHARGE PLANNING - CARE MANAGEMENT  Goal: Discharge to post-acute care or home with appropriate resources  Description: INTERVENTIONS:- Conduct assessment to determine patient/family and health care team treatment goals, and need for post-acute services based on payer coverage, community resources, and patient preferences, and barriers to discharge- Address psychosocial, clinical, and financial barriers to discharge as identified in assessment in conjunction with the patient/family and health care team- Arrange appropriate level of post-acute services according to patient’s   needs and preference and payer coverage in collaboration with the physician and health care team- Communicate with and update the patient/family, physician, and health care  team regarding progress on the discharge plan- Arrange appropriate transportation to post-acute venues  4/30/2025 0822 by Sera Best RN  Outcome: Progressing  4/30/2025 0821 by Sera Best RN  Reactivated

## 2025-04-30 NOTE — ASSESSMENT & PLAN NOTE
Patient is a 44-year-old woman with past medical history significant for hypertension and hyperlipidemia and past psychiatric history significant for depression and anxiety.  Patient presented to the ED secondary to increased feelings of suicidality with plan to overdose on medications.  She signed a 201 and is currently on [place] 3B for further psychiatric stabilization.     Patient continues to be on track for discharge on Friday.  Will adjust Cymbalta and gabapentin today.  Patient had bowel movement and so we will discontinue standing MiraLAX but maintain senna.    -cymbalta 90 mg QD  -gabapentin 400 TID  -prazosin 5 mg HS  -trazadone 100 mg HS  -SW to refer pt to virtual PHP on d/c

## 2025-04-30 NOTE — NURSING NOTE
Patient refused scheduled Miralax stating that she had a bowel movement last night. Compliant with all other medications. Cooperative. Has been visible in the milieu. Interacting with select peers appropriately. Denies SI/HI, denies AH/VH. Patient will be scheduled for discharge on Friday.

## 2025-04-30 NOTE — PLAN OF CARE
Problem: SELF HARM/SUICIDALITY  Goal: Will have no self-injury during hospital stay  Description: INTERVENTIONS:- Q 15 MINUTES: Routine safety checks- Q WAKING SHIFT & PRN: Assess risk to determine if routine checks are adequate to maintain patient safety- Encourage patient to participate actively in care by formulating a plan to combat response to suicidal ideation, identify supports and resources  Outcome: Completed     Problem: DEPRESSION  Goal: Will be euthymic at discharge  Description: INTERVENTIONS:- Administer medication as ordered- Provide emotional support via 1:1 interaction with staff- Encourage involvement in milieu/groups/activities- Monitor for social isolation  Outcome: Completed     Problem: ANXIETY  Goal: Will report anxiety at manageable levels  Description: INTERVENTIONS:- Administer medication as ordered- Teach and encourage coping skills- Provide emotional support- Assess patient/family for anxiety and ability to cope  Outcome: Completed  Goal: By discharge: Patient will verbalize 2 strategies to deal with anxiety  Description: Interventions:- Identify any obvious source/trigger to anxiety- Staff will assist patient in applying identified coping technique/skills- Encourage attendance of scheduled groups and activities  Outcome: Completed     Problem: Ineffective Coping  Goal: Participates in unit activities  Description: Interventions:- Provide therapeutic environment - Provide required programming - Redirect inappropriate behaviors   Outcome: Completed      Laurene Fleischer

## 2025-05-01 PROCEDURE — 99232 SBSQ HOSP IP/OBS MODERATE 35: CPT | Performed by: PSYCHIATRY & NEUROLOGY

## 2025-05-01 RX ORDER — PRAZOSIN HYDROCHLORIDE 5 MG/1
5 CAPSULE ORAL
Qty: 30 CAPSULE | Refills: 1 | Status: SHIPPED | OUTPATIENT
Start: 2025-05-01

## 2025-05-01 RX ORDER — TRAZODONE HYDROCHLORIDE 100 MG/1
100 TABLET ORAL
Qty: 30 TABLET | Refills: 1 | Status: SHIPPED | OUTPATIENT
Start: 2025-05-01

## 2025-05-01 RX ORDER — GABAPENTIN 400 MG/1
400 CAPSULE ORAL 3 TIMES DAILY
Qty: 90 CAPSULE | Refills: 1 | Status: SHIPPED | OUTPATIENT
Start: 2025-05-01

## 2025-05-01 RX ORDER — DULOXETIN HYDROCHLORIDE 30 MG/1
90 CAPSULE, DELAYED RELEASE ORAL DAILY
Qty: 90 CAPSULE | Refills: 1 | Status: SHIPPED | OUTPATIENT
Start: 2025-05-02

## 2025-05-01 RX ORDER — LOSARTAN POTASSIUM 100 MG/1
100 TABLET ORAL DAILY
Qty: 90 TABLET | Refills: 0 | Status: SHIPPED | OUTPATIENT
Start: 2025-05-01

## 2025-05-01 RX ADMIN — ATORVASTATIN CALCIUM 40 MG: 40 TABLET, FILM COATED ORAL at 08:06

## 2025-05-01 RX ADMIN — Medication 3 MG: at 23:07

## 2025-05-01 RX ADMIN — SENNOSIDES 8.6 MG: 8.6 TABLET, FILM COATED ORAL at 23:07

## 2025-05-01 RX ADMIN — TRAZODONE HYDROCHLORIDE 100 MG: 100 TABLET ORAL at 23:07

## 2025-05-01 RX ADMIN — GABAPENTIN 400 MG: 400 CAPSULE ORAL at 08:06

## 2025-05-01 RX ADMIN — Medication 1000 MCG: at 08:06

## 2025-05-01 RX ADMIN — PRAZOSIN HYDROCHLORIDE 5 MG: 5 CAPSULE ORAL at 23:07

## 2025-05-01 RX ADMIN — GABAPENTIN 400 MG: 400 CAPSULE ORAL at 21:48

## 2025-05-01 RX ADMIN — GABAPENTIN 400 MG: 400 CAPSULE ORAL at 15:50

## 2025-05-01 RX ADMIN — HYDROXYZINE HYDROCHLORIDE 100 MG: 50 TABLET, FILM COATED ORAL at 10:15

## 2025-05-01 RX ADMIN — DULOXETINE 90 MG: 60 CAPSULE, DELAYED RELEASE ORAL at 08:06

## 2025-05-01 RX ADMIN — ALUMINUM HYDROXIDE, MAGNESIUM HYDROXIDE, AND DIMETHICONE 30 ML: 200; 20; 200 SUSPENSION ORAL at 15:50

## 2025-05-01 NOTE — NURSING NOTE
Patient reported feeling indigestion , Prn Maalox 30 ml given. Patient visible on the unit, appears to be in better spirits. Patient reports feeling anxious upon awakening in the morning.Enjoys partaking in social and group activities  with select peers. Denies SI/HI/AH/VH.Compliant with medications and routine vitals

## 2025-05-01 NOTE — PROGRESS NOTES
Progress Note - Behavioral Health   Name: Shireen Rueda 44 y.o. female I MRN: [id number]  Unit/Bed#: U 340-01 I Date of Admission: 4/21/2025   Date of Service: 5/1/2025 I Hospital Day: 9     Assessment & Plan  Major depression, recurrent (HCC)  Patient is a 44-year-old woman with past medical history significant for hypertension and hyperlipidemia and past psychiatric history significant for depression and anxiety.  Patient presented to the ED secondary to increased feelings of suicidality with plan to overdose on medications.  She signed a 201 and is currently on [place] 3B for further psychiatric stabilization.     Patient tolerating medications well.  Will continue to have medication management and outpatient follow-up through PHP.  Patient is on track for discharge tomorrow.    -cymbalta 90 mg QD  -gabapentin 400 TID  -prazosin 5 mg HS  -trazadone 100 mg HS  -SW to refer pt to virtual PHP on d/c  Essential hypertension  Per SLIM  Patient will continue on her home dose of amlodipine 2.5 mg p.o. daily  Patient will continue on her home dose of HCTZ 25 mg p.o. daily  Patient will continue on her losartan 100 mg p.o. daily  Will continue to monitor her blood pressure on ongoing basis and additively agents as needed  Medical clearance for psychiatric admission  Per [facility]  Vital signs stable afebrile patient seen and examined by myself at the bedside labs from 4/18/2025 were reviewed by myself sodium 141 potassium 3.5 creatinine 0.75  Patient medically stable for admit  Please reach out to [facility] with any medical questions or concerns  Mixed hyperlipidemia  Per SLIM    Patient will continue on her home dose of Lipitor 40 mg p.o. daily     Current Medications:    Current Facility-Administered Medications:     amLODIPine (NORVASC) tablet 2.5 mg, Oral, Daily    atorvastatin (LIPITOR) tablet 40 mg, Oral, Daily    cyanocobalamin (VITAMIN B-12) tablet 1,000 mcg, Oral, Daily    DULoxetine (CYMBALTA) delayed release  capsule 90 mg, Oral, Daily    gabapentin (NEURONTIN) capsule 400 mg, Oral, TID    losartan (COZAAR) tablet 100 mg, Oral, Daily    melatonin tablet 3 mg, Oral, HS    prazosin (MINIPRESS) capsule 5 mg, Oral, HS    senna (SENOKOT) tablet 8.6 mg, Oral, HS    traZODone (DESYREL) tablet 100 mg, Oral, HS      Risks/Benefits of Treatment:     Risks, benefits, and possible side effects of medications explained to patient and patient verbalizes understanding and agreement for treatment.    Progress Toward Goals: improving    Treatment Planning:     All current active medications have been reviewed.  Continue to monitor response to treatment and assess for potential side effects of medications.  Encourage group therapy, milieu therapy and occupational therapy.  Collaboration with medical service for medical comorbidities as indicated.  Behavioral Health checks for safety monitoring.  Estimated Discharge Day: 5/2/2025         Subjective     Behavior over the last 24 hours: improved    Patient was visited on unit for continuing care; chart reviewed and discussed with multidisciplinary treatment team.  On approach, the patient was calm and cooperative. Denied any changes in mood, appetite, and energy level. Reported interrupted sleep.  However sleep overall is improved compared to admission Denied A/VH currently.  Denied SI/HI, intent or plan upon direct inquiry at this time.  Patient notes that anxiety overall is getting better.  She still feels a little bit anxious about going home but was able to talk meaningfully about the specific stressors and how they are affecting her.  Still feels ready though for return home tomorrow.  No acute complaints or concerns at this time    Patient continues to be visible in the milieu and interacts with staff and peers. No reports of aggression or self-injurious behavior on unit. No PRN medications used in the past 24 hours.    Patient accepted all offered medications and no adverse effects of  "medications noted or reported.          Sleep: slept off and on  Appetite: normal  Medication side effects: No  ROS: review of systems as noted above in HPI/Subjective report, all other systems are negative    Objective :  Temp:  [96.4 °F (35.8 °C)-97 °F (36.1 °C)] 96.4 °F (35.8 °C)  HR:  [78-82] 82  BP: ()/(52-68) 93/52  Resp:  [16] 16  SpO2:  [95 %-98 %] 95 %  O2 Device: None (Room air)    Mental Status Evaluation:  Appearance: casually dressed, appears consistent with stated age  Motor: no psychomotor disturbances, no gait abnormalities  Behavior: cooperative, interacts with this writer appropriately  Speech: normal rate, rhythm, and volume  Mood: \"better\"  Affect: euthymic, normal range and intensity  Thought Process: organized, linear, and goal-oriented  Thought Content: denies delusions and paranoia  Perception: denies auditory hallucinations, denies visual hallucinations,   Risk Potential: denies suicidal ideation, plan, or intent. Denies homicidal ideation  Sensorium: Oriented to person, place, time, and situation  Cognition: cognitive ability appears intact but was not quantitatively tested  Consciousness: alert and awake  Attention: able to focus without difficulty  Insight: improving  Judgement: improving        Lab Results: I have reviewed the following results:      Administrative Statements     Counseling / Coordination of Care:   Patient's progress discussed with staff in treatment team meeting.  Medication changes reviewed with staff in treatment team meeting.    Portions of the record may have been created with voice recognition software. Occasional wrong word or \"sound a like\" substitutions may have occurred due to the inherent limitations of voice recognition software. Read the chart carefully and recognize, using context, where substitutions have occurred.    Braulio Darden MD 05/01/25  "

## 2025-05-01 NOTE — ASSESSMENT & PLAN NOTE
Patient is a 44-year-old woman with past medical history significant for hypertension and hyperlipidemia and past psychiatric history significant for depression and anxiety.  Patient presented to the ED secondary to increased feelings of suicidality with plan to overdose on medications.  She signed a 201 and is currently on [place] 3B for further psychiatric stabilization.     Patient tolerating medications well.  Will continue to have medication management and outpatient follow-up through PHP.  Patient is on track for discharge tomorrow.    -cymbalta 90 mg QD  -gabapentin 400 TID  -prazosin 5 mg HS  -trazadone 100 mg HS  -SW to refer pt to virtual PHP on d/c

## 2025-05-01 NOTE — NURSING NOTE
Patient has been about the unit. Interacting with peers. Pleasant and appropriate during interaction. Reports waking up several times during the night but stated she was able to fall back to sleep. Norvasc and Cozaar held this morning due to blood pressure not meeting parameters.

## 2025-05-01 NOTE — SOCIAL WORK
[facility] PARTIAL PROGRAM REFERRAL     [facility]    Name and Date of Birth:  Shireen Rueda 44 y.o. 1980    Date of Referral: May 1, 2025    Referral Source (Agency/Name): [id number]    Correct Demographics on file:  Yes     Emergency contact on file: Yes     Insurance on file: Yes     _____________________________________________      Presenting Symptoms and Stressors:      Please describe the reason for Referral: Pt is discharging from [facility]. Pt is struggling with the recent death of her brother.    Stressors: death of brother Several months ago    Symptoms:  depressive symptoms and anxiety    Suicidal Ideation: None at present    Homicidal Ideation: None at present    Depressed Mood: sadness    [place]/Hypomania: None    Psychosis: None    Agitation: No    Appetite Changes: normal appetite    Sleep Disturbance: difficulty sleeping    Access to Weapons:  No    Smoking Status: denies use    Substance Use:  None  If Use : Last use n/a   If Use: Current SA treatment: n/a    Current Psychiatrist or Therapist:    Psychiatrist: None  Therapist: None    Past Psychiatric Treatment: Previous [facility] admission and PHP.    If currently Inpatient - Date of Anticipated discharge: 5/2/25    Diagnoses:  Major Depressive Disorder, recurrent, moderate    Do they Require Ambulatory Assistance: No    Communication Assistance: not required     Legal Issues: None        Naa Huerta

## 2025-05-01 NOTE — TREATMENT TEAM
05/01/25 0937   Team Meeting   Meeting Type Daily Rounds   Team Members Present   Team Members Present Physician;Nurse;   Physician Team Member Adelso   [facility] Member Deanna   Patient/Family Present   Patient Present No   Patient's Family Present No     Pt reported that she woke up a few times. PRN atarax-effective. Pt is medication and meal compliant. Pt denied SI/HI/AVH.

## 2025-05-01 NOTE — SOCIAL WORK
Cm placed called .  requested that Pt is discharged with OP Psychiatry.     will  her up tomorrow at 12pm.  wanted to make sure that Pt has needed resources. Cm informed  that everything would be in the AVS and would be provided to Pt at discharge.

## 2025-05-01 NOTE — DISCHARGE INSTR - OTHER ORDERS
CRISIS INFORMATION  If you are experiencing a mental health emergency, you may call the [facility] Crisis Intervention Office 24 hours a day, 7 days per week at [phone].    In [facility], call [phone].    [facility] is a confidential 24/7 telephone support service manned by trained mental health consumers.  [facility] provides support, a listening ear and can provide information about available services.[facility] specializes in the concerns of mental health consumers, their families and friends.  However, we are also here for anyone who has a mental health concern, is confused about or just doesn't know anything about mental health or where to get information.  To reach [facility], call [phone].    HOW TO GET SUBSTANCE ABUSE HELP:  If you or someone you know has a drug or alcohol problem, there is help:  [facility] Alcohol Abuse Services: [phone]  [facility] Abuse Services: [phone]  An assessment is the first step.   In addition to those listed there are other programs available in the area but assessment is best to determine an appropriate level of care.  If you DO NOT have Medical Assistance (MA) or Private Insurance, an assessment can be scheduled at one of these providers:  [facility]  [address] [place] [address]  [phone]   [facility]  [address] [place] [address]  [phone]   [address] [place] [address]  [phone]   [facility]  [address] [place] [address]  [phone]   [facility]  [address] [place] [address]  [phone]   [facility]  [address] [place] [address]  [phone]     If you HAVE Medical Assistance, an assessment can be scheduled at one of these providers:  [facility]  [address] [place] [address]  [phone]   [facility]  [address] [place] [address]  [phone]   [facility]  [address], [place] [address]  [phone]  [address] [place] [address], [address], [place] [address] [phone]   [address] [address]  [phone]   [address] [place] [address]  [phone]   [facility]  [address] [place] [address]  [phone]   [facility]  [address] [place] [address]  [phone]   [facility]  [address] [place] [address]  [phone]   [facility]  [address] [place] [address]  [phone]     If you HAVE Private Insurance, an assessment can be scheduled at one of these providers.  Please contact these Providers to determine if they are in your network plan:  [facility]  [address], [place] [address]  [phone]   [address] [address]  [phone]   [address] [place] [address]  [phone]   [facility]  [address] [place] [address]  [phone]   [facility]  [address] [place] [address]  [phone]     From the [facility] website [place]guides[place]opioid-epidemic/#GetNaloxone    How do I get naloxone?  Family members and friends can access naloxone by:    Obtaining a prescription from their family doctor  Using the standing order issued by [facility] Phys[facility] General Grecia Kennedy. A standing order is a prescription written for the general public, rather than specifically for an individual.  To use the standing order, print it and take it with you to the pharmacy or have the digital version on your phone. Download the standing order from the Department of [facility] (PDF).    If you are unable to print  it or use the digital version, the standing order is kept on file at many pharmacies. If a pharmacy does not have it on file, they may have the ability to look it up.    Naloxone prescriptions can be filled at most pharmacies. Although the medication might not be available for same-day pickup, it often can be ordered and available within a day or two.    Out Patient Psychiatrist/Therapy Offices:    [facility] - [phone]  [facility]- [phone]  [facility]- [phone]  [facility]- [phone]  [facility] - [phone]  [facility]- [phone]

## 2025-05-01 NOTE — PLAN OF CARE
Problem: DISCHARGE PLANNING  Goal: Discharge to home or other facility with appropriate resources  Description: INTERVENTIONS:- Identify barriers to discharge w/patient and caregiver- Arrange for needed discharge resources and transportation as appropriate- Identify discharge learning needs (meds, wound care, etc.)- Arrange for interpretive services to assist at discharge as needed- Refer to Case Management Department for coordinating discharge planning if the patient needs post-hospital services based on physician/advanced practitioner order or complex needs related to functional status, cognitive ability, or social support system  Outcome: Progressing     Problem: SELF HARM/SUICIDALITY  Goal: Will have no self-injury during hospital stay  Description: INTERVENTIONS:- Q 15 MINUTES: Routine safety checks- Q WAKING SHIFT & PRN: Assess risk to determine if routine checks are adequate to maintain patient safety- Encourage patient to participate actively in care by formulating a plan to combat response to suicidal ideation, identify supports and resources  Outcome: Progressing     Problem: DEPRESSION  Goal: Will be euthymic at discharge  Description: INTERVENTIONS:- Administer medication as ordered- Provide emotional support via 1:1 interaction with staff- Encourage involvement in milieu/groups/activities- Monitor for social isolation  Outcome: Progressing     Problem: ANXIETY  Goal: Will report anxiety at manageable levels  Description: INTERVENTIONS:- Administer medication as ordered- Teach and encourage coping skills- Provide emotional support- Assess patient/family for anxiety and ability to cope  Outcome: Progressing  Goal: By discharge: Patient will verbalize 2 strategies to deal with anxiety  Description: Interventions:- Identify any obvious source/trigger to anxiety- Staff will assist patient in applying identified coping technique/skills- Encourage attendance of scheduled groups and activities  Outcome:  Progressing     Problem: Ineffective Coping  Goal: Participates in unit activities  Description: Interventions:- Provide therapeutic environment - Provide required programming - Redirect inappropriate behaviors   Outcome: Progressing     Problem: DISCHARGE PLANNING - CARE MANAGEMENT  Goal: Discharge to post-acute care or home with appropriate resources  Description: INTERVENTIONS:- Conduct assessment to determine patient/family and health care team treatment goals, and need for post-acute services based on payer coverage, community resources, and patient preferences, and barriers to discharge- Address psychosocial, clinical, and financial barriers to discharge as identified in assessment in conjunction with the patient/family and health care team- Arrange appropriate level of post-acute services according to patient’s   needs and preference and payer coverage in collaboration with the physician and health care team- Communicate with and update the patient/family, physician, and health care team regarding progress on the discharge plan- Arrange appropriate transportation to post-acute venues  Outcome: Progressing     Problem: SELF HARM/SUICIDALITY  Goal: Will have no self-injury during hospital stay  Description: INTERVENTIONS:- Q 15 MINUTES: Routine safety checks- Q WAKING SHIFT & PRN: Assess risk to determine if routine checks are adequate to maintain patient safety- Encourage patient to participate actively in care by formulating a plan to combat response to suicidal ideation, identify supports and resources  Outcome: Progressing     Problem: DEPRESSION  Goal: Will be euthymic at discharge  Description: INTERVENTIONS:- Administer medication as ordered- Provide emotional support via 1:1 interaction with staff- Encourage involvement in milieu/groups/activities- Monitor for social isolation  Outcome: Progressing     Problem: ANXIETY  Goal: Will report anxiety at manageable levels  Description: INTERVENTIONS:-  Administer medication as ordered- Teach and encourage coping skills- Provide emotional support- Assess patient/family for anxiety and ability to cope  Outcome: Progressing  Goal: By discharge: Patient will verbalize 2 strategies to deal with anxiety  Description: Interventions:- Identify any obvious source/trigger to anxiety- Staff will assist patient in applying identified coping technique/skills- Encourage attendance of scheduled groups and activities  Outcome: Progressing

## 2025-05-01 NOTE — NURSING NOTE
Patient reports Maalox effective with no further complaints of indigestion at this time. Maalox  prn effective.

## 2025-05-01 NOTE — DISCHARGE INSTR - APPOINTMENTS
Behavioral Health Nurse Navigator, Faith or Kelly will be calling you after your discharge, on the phone number that you provided.  They will be available as an additional support, if needed.   If you wish to speak with Faith, you may contact her at [phone].

## 2025-05-02 ENCOUNTER — TELEPHONE (OUTPATIENT)
Dept: PSYCHOLOGY | Facility: CLINIC | Age: 45
End: 2025-05-02

## 2025-05-02 VITALS
TEMPERATURE: 96.7 F | WEIGHT: 143.3 LBS | HEART RATE: 98 BPM | RESPIRATION RATE: 17 BRPM | HEIGHT: 61 IN | OXYGEN SATURATION: 96 % | DIASTOLIC BLOOD PRESSURE: 60 MMHG | BODY MASS INDEX: 27.06 KG/M2 | SYSTOLIC BLOOD PRESSURE: 114 MMHG

## 2025-05-02 PROCEDURE — 99238 HOSP IP/OBS DSCHRG MGMT 30/<: CPT | Performed by: PSYCHIATRY & NEUROLOGY

## 2025-05-02 RX ADMIN — AMLODIPINE BESYLATE 2.5 MG: 2.5 TABLET ORAL at 08:03

## 2025-05-02 RX ADMIN — ATORVASTATIN CALCIUM 40 MG: 40 TABLET, FILM COATED ORAL at 08:03

## 2025-05-02 RX ADMIN — Medication 1000 MCG: at 08:03

## 2025-05-02 RX ADMIN — GABAPENTIN 400 MG: 400 CAPSULE ORAL at 08:03

## 2025-05-02 RX ADMIN — LOSARTAN POTASSIUM 100 MG: 25 TABLET, FILM COATED ORAL at 08:03

## 2025-05-02 RX ADMIN — DULOXETINE 90 MG: 60 CAPSULE, DELAYED RELEASE ORAL at 08:03

## 2025-05-02 RX ADMIN — HYDROXYZINE HYDROCHLORIDE 100 MG: 50 TABLET, FILM COATED ORAL at 10:09

## 2025-05-02 NOTE — TREATMENT TEAM
05/02/25 0816   Team Meeting   Meeting Type Daily Rounds   Team Members Present   Team Members Present Physician;Nurse;   Physician Team Member Adelso   [facility] Team Member Deanna   Patient/Family Present   Patient Present No   Patient's Family Present No     Pt is scheduled for discharge today.

## 2025-05-02 NOTE — BH TRANSITION RECORD
Contact Information: If you have any questions, concerns, pended studies, tests and/or procedures, or emergencies regarding your inpatient behavioral health visit. Please contact [facility] [phone]  and ask to speak to a , nurse or physician. A contact is available 24 hours/ 7 days a week at this number.     Summary of Procedures Performed During your Stay:  Below is a list of major procedures performed during your hospital stay and a summary of results:  - Cardiac Procedures/Studies: EKG.  Normal sinus rhythm  Normal ECG  When compared with ECG of 05-Jan-2025 12:05,  No significant change was found  Confirmed by Emmanuel Hidalgo ([id number]) on 4/22/2025 3:01:52 PM     Pending Studies (From admission, onward)      None          Please follow up on the above pending studies with your PCP and/or referring provider.

## 2025-05-02 NOTE — NURSING NOTE
Outpatient appointments and medications reviewed with patient prior to discharge. Medications delivered to the unit for patient from [facility].

## 2025-05-02 NOTE — ASSESSMENT & PLAN NOTE
Patient is a 44-year-old woman with past medical history significant for hypertension and hyperlipidemia and past psychiatric history significant for depression and anxiety.  Patient presented to the ED secondary to increased feelings of suicidality with plan to overdose on medications.  She signed a 201 and is currently on [facility] for further psychiatric stabilization.     Patient tolerating medications well.  Will continue to have medication management and outpatient follow-up through PHP.  Patient is on track for discharge tomorrow.    -cymbalta 90 mg QD  -gabapentin 400 TID  -prazosin 5 mg HS  -trazadone 100 mg HS  -SW to refer pt to virtual PHP on d/c

## 2025-05-02 NOTE — NURSING NOTE
Patient visible on the unit, appears to be depressed and tearful at times. Patient makes needs known,enjoys partaking in group activities with select peers. Patient denies SI/HI/AH/AH at this time. Compliant with medications and routine vitals.

## 2025-05-02 NOTE — SOCIAL WORK
Pt to D/C today. Pt denies SI/HI/AVH. Pt oriented x3. Pt to d/c to home and  will  upon discharge. Pt to follow up with  [facility] on 5/5/25. Scripts sent to  pharmacy and were provided to Pt in hand upon discharge.     Discharge Address: [address] [place] [address] [address] [address]   Phone: [phone]

## 2025-05-02 NOTE — PLAN OF CARE
Problem: DISCHARGE PLANNING  Goal: Discharge to home or other facility with appropriate resources  Description: INTERVENTIONS:- Identify barriers to discharge w/patient and caregiver- Arrange for needed discharge resources and transportation as appropriate- Identify discharge learning needs (meds, wound care, etc.)- Arrange for interpretive services to assist at discharge as needed- Refer to Case Management Department for coordinating discharge planning if the patient needs post-hospital services based on physician/advanced practitioner order or complex needs related to functional status, cognitive ability, or social support system  Outcome: Progressing     Problem: SELF HARM/SUICIDALITY  Goal: Will have no self-injury during hospital stay  Description: INTERVENTIONS:- Q 15 MINUTES: Routine safety checks- Q WAKING SHIFT & PRN: Assess risk to determine if routine checks are adequate to maintain patient safety- Encourage patient to participate actively in care by formulating a plan to combat response to suicidal ideation, identify supports and resources  Outcome: Progressing     Problem: DEPRESSION  Goal: Will be euthymic at discharge  Description: INTERVENTIONS:- Administer medication as ordered- Provide emotional support via 1:1 interaction with staff- Encourage involvement in milieu/groups/activities- Monitor for social isolation  Outcome: Progressing     Problem: ANXIETY  Goal: Will report anxiety at manageable levels  Description: INTERVENTIONS:- Administer medication as ordered- Teach and encourage coping skills- Provide emotional support- Assess patient/family for anxiety and ability to cope  Outcome: Progressing  Goal: By discharge: Patient will verbalize 2 strategies to deal with anxiety  Description: Interventions:- Identify any obvious source/trigger to anxiety- Staff will assist patient in applying identified coping technique/skills- Encourage attendance of scheduled groups and activities  Outcome:  Progressing     Problem: SELF HARM/SUICIDALITY  Goal: Will have no self-injury during hospital stay  Description: INTERVENTIONS:- Q 15 MINUTES: Routine safety checks- Q WAKING SHIFT & PRN: Assess risk to determine if routine checks are adequate to maintain patient safety- Encourage patient to participate actively in care by formulating a plan to combat response to suicidal ideation, identify supports and resources  Outcome: Progressing     Problem: DEPRESSION  Goal: Will be euthymic at discharge  Description: INTERVENTIONS:- Administer medication as ordered- Provide emotional support via 1:1 interaction with staff- Encourage involvement in milieu/groups/activities- Monitor for social isolation  Outcome: Progressing     Problem: ANXIETY  Goal: Will report anxiety at manageable levels  Description: INTERVENTIONS:- Administer medication as ordered- Teach and encourage coping skills- Provide emotional support- Assess patient/family for anxiety and ability to cope  Outcome: Progressing  Goal: By discharge: Patient will verbalize 2 strategies to deal with anxiety  Description: Interventions:- Identify any obvious source/trigger to anxiety- Staff will assist patient in applying identified coping technique/skills- Encourage attendance of scheduled groups and activities  Outcome: Progressing     Problem: Ineffective Coping  Goal: Participates in unit activities  Description: Interventions:- Provide therapeutic environment - Provide required programming - Redirect inappropriate behaviors   Outcome: Progressing     Problem: Ineffective Coping  Goal: Participates in unit activities  Description: Interventions:- Provide therapeutic environment - Provide required programming - Redirect inappropriate behaviors   Outcome: Progressing

## 2025-05-02 NOTE — NURSING NOTE
Patient c/o severe anxiety related to discharge. Requested PRN. Given PRN of Atarax 100 mg po for severe anxiety. Will monitor effectiveness.

## 2025-05-02 NOTE — PLAN OF CARE
Problem: DISCHARGE PLANNING  Goal: Discharge to home or other facility with appropriate resources  Description: INTERVENTIONS:- Identify barriers to discharge w/patient and caregiver- Arrange for needed discharge resources and transportation as appropriate- Identify discharge learning needs (meds, wound care, etc.)- Arrange for interpretive services to assist at discharge as needed- Refer to Case Management Department for coordinating discharge planning if the patient needs post-hospital services based on physician/advanced practitioner order or complex needs related to functional status, cognitive ability, or social support system  Outcome: Completed     Problem: SELF HARM/SUICIDALITY  Goal: Will have no self-injury during hospital stay  Description: INTERVENTIONS:- Q 15 MINUTES: Routine safety checks- Q WAKING SHIFT & PRN: Assess risk to determine if routine checks are adequate to maintain patient safety- Encourage patient to participate actively in care by formulating a plan to combat response to suicidal ideation, identify supports and resources  Outcome: Completed     Problem: DEPRESSION  Goal: Will be euthymic at discharge  Description: INTERVENTIONS:- Administer medication as ordered- Provide emotional support via 1:1 interaction with staff- Encourage involvement in milieu/groups/activities- Monitor for social isolation  Outcome: Completed     Problem: ANXIETY  Goal: Will report anxiety at manageable levels  Description: INTERVENTIONS:- Administer medication as ordered- Teach and encourage coping skills- Provide emotional support- Assess patient/family for anxiety and ability to cope  Outcome: Completed  Goal: By discharge: Patient will verbalize 2 strategies to deal with anxiety  Description: Interventions:- Identify any obvious source/trigger to anxiety- Staff will assist patient in applying identified coping technique/skills- Encourage attendance of scheduled groups and activities  Outcome: Completed      Problem: SELF HARM/SUICIDALITY  Goal: Will have no self-injury during hospital stay  Description: INTERVENTIONS:- Q 15 MINUTES: Routine safety checks- Q WAKING SHIFT & PRN: Assess risk to determine if routine checks are adequate to maintain patient safety- Encourage patient to participate actively in care by formulating a plan to combat response to suicidal ideation, identify supports and resources  Outcome: Completed     Problem: DEPRESSION  Goal: Will be euthymic at discharge  Description: INTERVENTIONS:- Administer medication as ordered- Provide emotional support via 1:1 interaction with staff- Encourage involvement in milieu/groups/activities- Monitor for social isolation  Outcome: Completed     Problem: ANXIETY  Goal: Will report anxiety at manageable levels  Description: INTERVENTIONS:- Administer medication as ordered- Teach and encourage coping skills- Provide emotional support- Assess patient/family for anxiety and ability to cope  Outcome: Completed  Goal: By discharge: Patient will verbalize 2 strategies to deal with anxiety  Description: Interventions:- Identify any obvious source/trigger to anxiety- Staff will assist patient in applying identified coping technique/skills- Encourage attendance of scheduled groups and activities  Outcome: Completed     Problem: Ineffective Coping  Goal: Participates in unit activities  Description: Interventions:- Provide therapeutic environment - Provide required programming - Redirect inappropriate behaviors   Outcome: Completed     Problem: Ineffective Coping  Goal: Participates in unit activities  Description: Interventions:- Provide therapeutic environment - Provide required programming - Redirect inappropriate behaviors   Outcome: Completed

## 2025-05-02 NOTE — TELEPHONE ENCOUNTER
LVM to pt reminding VIRTUAL PHP appointments on 5/5/25. Pt also accepted the Teams appointment links thru email.

## 2025-05-02 NOTE — NURSING NOTE
Patient has been visible in the milieu. Interacting with select peers. Compliant with all morning medications. Denies symptoms. Looking forward to discharge today.

## 2025-05-02 NOTE — DISCHARGE SUMMARY
Discharge Summary -  Behavioral Health   Name: Shireen Rueda 44 y.o. female I MRN: [id number]  Unit/Bed#: -01 I Date of Admission: 4/21/2025   Date of Service: 5/2/2025 I Hospital Day: 10      Assessment & Plan  Major depression, recurrent (HCC)  Patient is a 44-year-old woman with past medical history significant for hypertension and hyperlipidemia and past psychiatric history significant for depression and anxiety.  Patient presented to the ED secondary to increased feelings of suicidality with plan to overdose on medications.  She signed a 201 and is currently on [facility] for further psychiatric stabilization.     Patient tolerating medications well.  Will continue to have medication management and outpatient follow-up through PHP.  Patient is on track for discharge tomorrow.    -cymbalta 90 mg QD  -gabapentin 400 TID  -prazosin 5 mg HS  -trazadone 100 mg HS  -SW to refer pt to virtual PHP on d/c  Essential hypertension  Per SLIM  Patient will continue on her home dose of amlodipine 2.5 mg p.o. daily  Patient will continue on her home dose of HCTZ 25 mg p.o. daily  Patient will continue on her losartan 100 mg p.o. daily  Will continue to monitor her blood pressure on ongoing basis and additively agents as needed  Medical clearance for psychiatric admission  Per [facility]  Vital signs stable afebrile patient seen and examined by myself at the bedside labs from 4/18/2025 were reviewed by myself sodium 141 potassium 3.5 creatinine 0.75  Patient medically stable for admit  Please reach out to [facility] with any medical questions or concerns  Mixed hyperlipidemia  Per SLIM    Patient will continue on her home dose of Lipitor 40 mg p.o. daily        Admission Date: 4/21/2025       Discharge Date: 05/02/25  Attending Psychiatrist: Braulio Darden MD    Admission Diagnosis:    Principal Problem:    Major depression, recurrent (HCC)  Active Problems:    Essential hypertension    Medical clearance for psychiatric  "admission    Mixed hyperlipidemia    Noncompliance      Discharge Diagnosis:     Principal Problem:    Major depression, recurrent (HCC)  Active Problems:    Essential hypertension    Medical clearance for psychiatric admission    Mixed hyperlipidemia    Noncompliance  Resolved Problems:    * No resolved hospital problems. *      Reason for Admission/HPI (as per admission documentation):   Per ED provider note:     Laura Joseph PA-C 4/21/25     Patient with a history of depression presents for psychiatric evaluation. She discontinued her antidepressant several weeks ago due to side effects and reports worsening depression over the last 3 weeks with fleeting SI earlier today. Differential diagnosis includes but is not limited to depression, suicidal ideation, other mood disorder, schizoaffective disorder, medication noncompliance, or insomnia. Patient spoke with the ED crisis worker and signed a 201 for voluntary inpatient mental health treatment. No acute events overnight. Bed search is pending. Patient signed out to oncoming morning provider for final disposition pending bed placement.      Per Crisis worker note:     Nevin Saenz 4/21/25     Patient came to the ED reporting increased depression and overwhelming sadness due to the recent death of her brother 6 months ago. She reports that she was inpatient and then in partial hospital treatment in January and February, but stopped her medicine and didn't follow through with scheduled outpatient, and now feels as if she's just as depressed as she was in the beginning. She says she has a decreased appetite, insomnia, can't concentrate, feels unmotivated for most everything, and also reported thoughts of suicide by medication overdose. She denied HI, psychosis, and substance abuse issues.      She would like to return to inpatient treatment and \"be more honest with myself and the staff this time\". She says she feels she was not honest with herself and focused more " "on getting treatment finished asap than she did on focusing on full participation in treatment and getting all she can from it. She would prefer to return to a BHU at [facility] where she says she \"feels comfortable\" if possible. If no bed is available Tuesday, please speak with her about alternative placements before referring her out of the area.      On admission to Inpatient Psychiatric Unit:     Patient is a 44-year-old woman with past medical history significant for hypertension and hyperlipidemia and past psychiatric history significant for depression and anxiety.  Patient presented to the ED secondary to increased feelings of suicidality with plan to overdose on medications.  She signed a 201 and is currently on [facility] 3[facility] for further psychiatric stabilization.      Patient was seen this morning in the group room.  She is calm and cooperative and pleasant upon approach.  Patient is known to me from prior admission when I was supervising her intake and patient does express remembering me from last time.  She notes that at this time she is feeling depressed like she was back in January.  Patient notes that it is the 6-month anniversary of her brother's passing which was a significant inciting trigger leading to her current admission.     Patient notes that after her discharge the last time she was hospitalized she participated in the virtual PHP which she found helpful.  However she states that she was not honest with herself and rushed through her treatment.  She also notes that while the Zoloft did help with her depression and anxiety it also made her feel more emotionally blunted and \"numb\" than she would like.  She also noticed that she was gaining weight and so she self discontinued it.     At this time patient notes that she is feeling depressed with anhedonic symptoms and low mood as her primary concerns as well as nightmares regarding her brother's passing.  Patient is tearful appropriately so " throughout conversation.  She states that her goals for herself are to participate more fully in her treatment and she would like to be referred back to [facility] on discharge.  In regards to her medications, patient is amenable to starting Cymbalta at this time.  Medication was chosen as it is in a different class compared to Zoloft and I expressed that hopefully this medication will help with her symptoms without the emotional numbing that she experienced on the Zoloft.  I also expressed that we would be starting prazosin at this time given her issues with her sleep.  Patient notes that she was taking trazodone up to 100 mg in the outpatient setting.  I expressed that I would be able to start her on 75 at this time and we can continue to adjust as necessary.  At this time patient denies acute SI HI or AVH but does endorse significant depression.      Past Medical History:   Diagnosis Date    Anemia     Anxiety     Chest tightness or pressure     8/19/2013    Depression 05/14/2017    Hypertension     Vitamin D deficiency      Past Surgical History:   Procedure Laterality Date    CYSTOSCOPY N/A 12/30/2018    Procedure: CYSTOSCOPY;  Surgeon: Laura Bal DO;  Location: [facility];  Service: Gynecology    HYSTERECTOMY N/A 12/30/2018    Procedure: HYSTERECTOMY LAPAROSCOPIC TOTAL (LTH);  Surgeon: Laura Bal DO;  Location: [facility];  Service: Gynecology    OK LAPAROSCOPY W/RMVL ADNEXAL STRUCTURES Bilateral 12/30/2018    Procedure: SALPINGECTOMY, LAPAROSCOPIC;  Surgeon: Laura Bal DO;  Location: [facility];  Service: Gynecology    TONSILLECTOMY      TUBAL LIGATION      2006       Medications:    Current Facility-Administered Medications   Medication Dose Route Frequency Provider Last Rate    acetaminophen  650 mg Oral Q6H PRN Conrado Mariee MD      acetaminophen  650 mg Oral Q4H PRN Conrado Mariee MD      acetaminophen  975 mg Oral Q6H PRN Conrado Mariee MD      aluminum-magnesium hydroxide-simethicone  30 mL Oral Q4H PRN  Dena Brody MD      amLODIPine  2.5 mg Oral Daily Laura Joseph PA-C      atorvastatin  40 mg Oral Daily Laura Joseph PA-C      haloperidol lactate  2.5 mg Intramuscular Q4H PRN Max 4/day Conrado Mariee MD      And    LORazepam  1 mg Intramuscular Q4H PRN Max 4/day Conrado Mariee MD      And    benztropine  0.5 mg Intramuscular Q4H PRN Max 4/day Conrado Mariee MD      haloperidol lactate  5 mg Intramuscular Q4H PRN Max 4/day Conrado Mariee MD      And    LORazepam  2 mg Intramuscular Q4H PRN Max 4/day Conrado Mariee MD      And    benztropine  1 mg Intramuscular Q4H PRN Max 4/day Conrado Mariee MD      benztropine  1 mg Intramuscular Q4H PRN Max 6/day Conrado Mariee MD      benztropine  1 mg Oral Q4H PRN Max 6/day Conrado Mariee MD      cyanocobalamin  1,000 mcg Oral Daily EDER Turk      hydrOXYzine HCL  50 mg Oral Q6H PRN Max 4/day Conrado Mariee MD      Or    diphenhydrAMINE  50 mg Intramuscular Q6H PRN Conrado Mariee MD      DULoxetine  90 mg Oral Daily Braulio Darden MD      gabapentin  400 mg Oral TID Braulio Darden MD      haloperidol  1 mg Oral Q6H PRN Conrado Mariee MD      haloperidol  2.5 mg Oral Q4H PRN Max 4/day Conrado Mariee MD      haloperidol  5 mg Oral Q4H PRN Max 4/day Conrado Mariee MD      hydrOXYzine HCL  100 mg Oral Q6H PRN Max 4/day Conrado Mariee MD      Or    LORazepam  2 mg Intramuscular Q6H PRN Conrado Mariee MD      hydrOXYzine HCL  25 mg Oral Q8H PRN Laura Joseph PA-C      hydrOXYzine HCL  25 mg Oral Q6H PRN Max 4/day Conrado Mariee MD      lactulose  20 g Oral TID PRN Braulio Darden MD      losartan  100 mg Oral Daily Laura Joseph PA-C      melatonin  3 mg Oral HS Conrado Mariee MD      mirtazapine  7.5 mg Oral HS PRN Conrado Mariee MD      nicotine polacrilex  2 mg Oral Q4H PRRAVIN Mariee MD      prazosin  5 mg Oral HS Braulio Darden MD      propranolol  10 mg Oral Q8H PRN Conrado Mariee MD      senna  1 tablet Oral HS Braulio Darden MD      traZODone  100 mg Oral HS Braulio Darden MD      traZODone  50  mg Oral HS PRN Laura Joseph PA-C         Allergies:     No Known Allergies    Hospital Course:  Shireen was admitted to the inpatient psychiatric unit on 4/21/2025. During their hospitalization, Shireen was encouraged to attend groups and interact appropriately and positively with others in the milieu.     Shireen was started on the following psychiatric medications: Cymbalta, gabapentin, prazosin, and trazadone. These medications were titrated up to a therapeutic range as evidenced by a reduction in Shireen's reported psychiatric symptomatology. There were no side effects noted or reported throughout Shireen's psychiatric hospitalization.     Final psychiatric medication regimen is as follows  Cymbalta 90 mg daily  Gabapentin 400 mg 3 times daily  Prazosin 5 mg at bedtime  Trazodone 100 mg at bedtime    At the time of discharge, there were no criteria present through which Shireen could be kept involuntarily for further psychiatric hospitalization. Patient was able to articulate a safety plan upon discharge home, including going to any ED if their symptoms return or worsen, or calling 911. We discussed mitigating factors against suicidal behavior, and the patient was able to articulate these mitigating factors which outweighed any potential exacerbating stressors. Patient explicitly denied suicidal ideation, plan, or intent. They explicitly stated they felt safe to return to the community.     An outpatient discharge/follow up plan was discussed and coordinated between Shireen, this writer, and case management team.    Specific discharge disposition: see AVS.    Vital signs in last 24 hours:    Temp:  [96.4 °F (35.8 °C)-97.6 °F (36.4 °C)] 97.6 °F (36.4 °C)  HR:  [73-82] 81  BP: ()/(52-66) 133/66  Resp:  [16-18] 18  SpO2:  [95 %-97 %] 97 %  O2 Device: None (Room air)    Mental Status Exam on day of discharge:  Appearance: casually dressed, appears consistent with stated age  Motor: no psychomotor disturbances, no gait  "abnormalities  Behavior: cooperative, interacts with this writer appropriately  Speech: normal rate, rhythm, and volume  Mood: \"better\"  Affect: euthymic, normal range and intensity  Thought Process: organized, linear, and goal-oriented  Thought Content: denies delusions and paranoia  Perception: denies auditory hallucinations, denies visual hallucinations, Risk Potential: denies suicidal ideation, plan, or intent. Denies homicidal ideation  Sensorium: Oriented to person, place, time, and situation  Cognition: cognitive ability appears intact but was not quantitatively tested  Consciousness: alert and awake  Attention: able to focus without difficulty  Insight: improved  Judgement: improved    Suicide/Homicide Risk Assessment:    Risk of Harm to Self:   Patient denied suicidal thoughts, intent, plan, or acts of furtherance at the time of discharge.    Risk of Harm to Others:  Patient denied homicidal thoughts or intent at the time of discharge    Laboratory Results: I have personally reviewed the following pertinent lab results.    No results found for this or any previous visit (from the past 48 hours).     Discharge Medications:    Current Discharge Medication List        START taking these medications    Details   DULoxetine (CYMBALTA) 30 mg delayed release capsule Take 3 capsules (90 mg total) by mouth daily  Qty: 90 capsule, Refills: 1    Associated Diagnoses: Major depression, recurrent (HCC)      gabapentin (NEURONTIN) 400 mg capsule Take 1 capsule (400 mg total) by mouth 3 (three) times a day  Qty: 90 capsule, Refills: 1    Associated Diagnoses: Major depression, recurrent (HCC)      prazosin (MINIPRESS) 5 mg capsule Take 1 capsule (5 mg total) by mouth daily at bedtime  Qty: 30 capsule, Refills: 1    Associated Diagnoses: Major depression, recurrent (HCC)              Current Discharge Medication List        STOP taking these medications       cyanocobalamin (VITAMIN B-12) 1000 MCG tablet Comments:   Reason " for Stopping:         hydroCHLOROthiazide 25 mg tablet Comments:   Reason for Stopping:         hydrOXYzine HCL (ATARAX) 25 mg tablet Comments:   Reason for Stopping:         carbamide peroxide (DEBROX) 6.5 % otic solution Comments:   Reason for Stopping:         ergocalciferol (VITAMIN D2) 50,000 units Comments:   Reason for Stopping:         fluticasone (FLONASE) 50 mcg/act nasal spray Comments:   Reason for Stopping:         folic acid (FOLVITE) 1 mg tablet Comments:   Reason for Stopping:         magnesium Oxide (MAG-OX) 400 mg TABS Comments:   Reason for Stopping:         Omega-3 Fatty Acids (OMEGA 3 PO) Comments:   Reason for Stopping:         sertraline (ZOLOFT) 50 mg tablet Comments:   Reason for Stopping:                Current Discharge Medication List        CONTINUE these medications which have CHANGED    Details   losartan (COZAAR) 100 MG tablet Take 1 tablet (100 mg total) by mouth daily  Qty: 90 tablet, Refills: 0    Associated Diagnoses: Essential hypertension      melatonin 3 mg Take 1 tablet (3 mg total) by mouth daily at bedtime  Qty: 30 tablet, Refills: 0    Associated Diagnoses: Insomnia      traZODone (DESYREL) 100 mg tablet Take 1 tablet (100 mg total) by mouth daily at bedtime  Qty: 30 tablet, Refills: 1    Associated Diagnoses: Major depression, recurrent (HCC)              Current Discharge Medication List        CONTINUE these medications which have NOT CHANGED    Details   atorvastatin (LIPITOR) 40 mg tablet TAKE 1 TABLET BY MOUTH EVERY DAY  Qty: 90 tablet, Refills: 2    Associated Diagnoses: Dyslipidemia      amLODIPine (NORVASC) 2.5 mg tablet TAKE 1 TABLET BY MOUTH EVERY DAY  Qty: 90 tablet, Refills: 0    Associated Diagnoses: Essential hypertension                 Discharge instructions/Information to patient and family:   See After Visit Summary for information provided to patient and family.      Provisions for Follow-Up Care:  See after visit summary for information related to  follow-up care and any pertinent home health orders.      Discharge Statement:    I spent 30 minutes discharging the patient. This time was spent on the day of discharge. I had direct contact with the patient on the day of discharge.     Additional documentation is required if more than 30 minutes were spent on discharge:    I had face-to-face contact with the patient and discussed discharge treatment plan  I reviewed the importance of compliance with medications and outpatient treatment after discharge with the patient.  I discussed discharge and treatment plan with the patient, nursing, and case management/social work.  I discussed the medication regimen and possible side effects of the medications with the patient prior to discharge. At the time of discharge they were tolerating psychiatric medications.  I discussed outpatient follow up with the patient as per treatment plan / aftercare summary.  I reviewed elements of the aftercare plan with the patient. I discussed that if symptoms worsen or reoccur, that the patient can return to the emergency room or dial 911 in an emergency.  I reviewed pertinent mitigating vs exacerbating stressors, as well as other risk factors, as they relate to potential suicidal behavior.  I reviewed the patient's hospital course and current psychiatric disease status. As of today, they are now at goal. They are psychiatrically stable for discharge home. The combination of active psychopharmacological medication management, interdisciplinary coordination with case management, and adjunctive milieu and group therapy to augment psychopharmacological efficacy appears to have been successful. The patient's risk of morbidity, and progression or decompensation of psychiatric disease, is lower today as compared to the day of admission.          Braulio Darden MD 05/02/25

## 2025-05-05 ENCOUNTER — TELEMEDICINE (OUTPATIENT)
Dept: PSYCHOLOGY | Facility: CLINIC | Age: 45
End: 2025-05-05
Payer: COMMERCIAL

## 2025-05-05 DIAGNOSIS — F33.1 MODERATE EPISODE OF RECURRENT MAJOR DEPRESSIVE DISORDER (HCC): Primary | ICD-10-CM

## 2025-05-05 DIAGNOSIS — F43.21 GRIEF: ICD-10-CM

## 2025-05-05 DIAGNOSIS — F41.0 PANIC DISORDER: ICD-10-CM

## 2025-05-05 PROCEDURE — 99215 OFFICE O/P EST HI 40 MIN: CPT | Performed by: STUDENT IN AN ORGANIZED HEALTH CARE EDUCATION/TRAINING PROGRAM

## 2025-05-05 PROCEDURE — G0410 GRP PSYCH PARTIAL HOSP 45-50: HCPCS

## 2025-05-05 PROCEDURE — 90791 PSYCH DIAGNOSTIC EVALUATION: CPT

## 2025-05-05 RX ORDER — GABAPENTIN 600 MG/1
600 TABLET ORAL 3 TIMES DAILY
Qty: 90 TABLET | Refills: 0 | Status: SHIPPED | OUTPATIENT
Start: 2025-05-05

## 2025-05-05 NOTE — ASSESSMENT & PLAN NOTE
Continue with prazosin 5 mg nightly, and will increase Neurontin to 600 mg 3 times daily.  Could possibly consider BuSpar as well.  Orders:    gabapentin (Neurontin) 600 MG tablet; Take 1 tablet (600 mg total) by mouth 3 (three) times a day
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
## 2025-05-05 NOTE — GROUP NOTE
Visit Time    Visit Start Time: 0830  Visit Stop Time: 0930  Total Visit Duration:  0 minutes    Subjective:     Patient ID: Shireen Rueda is a 44 y.o. female.     [facility] Clinical Progress Notes      Specialized Services Documentation  Therapist must complete separate progress note for each specific clinical activity in which the individual participated during the day.       EDUCATION THERAPY    Morning group focused on establishing routine and goal review.  Members assessed related to readiness for learning and potential barriers.  Transfer of skills outside of program explored through goal sharing and connection back to the Recovery Model 5 key facets of recovery.  Throughout session, skills introduced, practiced reflecting mindfulness, meditation, movement, and connecting peers in this community.      Shireen Mohit wa in intake at this time  Tx Plan Objective: 1.0, Therapist:  Ian PAYTON Ed.  Visit Time    Visit Start Time: 1330  Visit Stop Time: 1430  Total Visit Duration:  60 minutes    Subjective:     Patient ID: Shireen Rudea is a 44 y.o. female.    Specialized Services Documentation  Therapist must complete separate progress note for each specific clinical activity in which the individual participated during the day.     [facility] Clinical Progress Note     GROUP PSYCHOTHERAPY    Participated actively in psychotherapy group. Group explored treatment day leaning and staff utilized Verbal, A/V, and Demonstration teaching methods. Members shared areas of learning and set a goal outside of program. Time spent encouraging use of WRAP, skill review, and exploring resources in the community.      Shireen Rueda actively shared. They engaged in learning related to wellness tools. Shireen Rueda identified working on sleep regulation as goal for the evening . Positive progress toward treatment objective. Continue group psychotherapy to actively practice learned skills and encourage transfer of knowledge to unstructured  time.   Tx Plan Objective: 1.0, Therapist:  Ian PAYTON Ed.  Visit Time:    Visit Start Time: 930  Visit Stop Time: 1030  Total Visit Duration:  0 minutes    Subjective:     Patient ID: Shireen Rueda is a 44 y.o. female    Specialized Services Documentation  Therapist must complete separate progress note for each specific clinical activity in which the individual participated during the day      [facility] Clinical Progress Note    Group Psychotherapy    Subjective:      This group was facilitated virtually in a private office using HIPAA Compliant and Approved [employer] Teams.  During this session, they, actively engaged in psychoeducational group about the Cycle of Change. The skill helps to mange the cycle of recovery and behavior change focused toward a specified goal. Group explored the cycle of change that can help them to take care of physical and emotional well being on a short term and long term basis. The group talked about understanding the meaning of relapse in regards to physical, intellectual, and emotional expression, regulation , and recognition, and how it affects themselves and others. Teaching on the emphasis of self monitoring and relapse,  the group explored who they can go to for help was brought up as well. Group was encouraged to ask questions in an open forum at the end of group. Measurable progress displayed through engagement in topic.Processing in the course of treatment, they, will continue to engage in psychotherapy to encourage positive self realization.  Treatment Plan Objective 1.1, 1.2, 1.3, 1.4 Therapist: Ian PAYTON Ed.     Shireen was in intake at this time  Tx Plan Objective 1.1, 1.2, 1.4 Therapist Ian THOMPSON Ed

## 2025-05-05 NOTE — BH TREATMENT PLAN
"Assessment/Plan:      Diagnoses and all orders for this visit:    Moderate episode of recurrent major depressive disorder (HCC)    Panic disorder    Grief          Subjective:     Patient ID: Shireen Rueda is a 44 y.o. female.    [facility] Treatment Plan   AREAS OF NEED: Moderate episode of recurrent major depressive disorder, panic disorder and grief as evidenced by isolation, agitation, laying down more,recent suicidal ideation and attempt, difficulties with sleep, isolating, crying, feeling sad and anxious due to grief.  Date Initiated: 05/05/25    Strengths: \"good mom and friend\"     LONG TERM GOAL:   Date Initiated: 05/05/25  1.0 I will identify and share three ways in which my day-to-day functioning has improved since attending program.  Target Date: 6/2/25  Completion Date:       SHORT TERM OBJECTIVES:     Date Initiated: 05/05/25  1.1 I will reach out to at least 2 support groups per week and decide which support group is best for me and will schedule attend one support group.   Revision Date:   Target Date: 5/14/25  Completion Date:     Date Initiated: 05/05/25  1.2  On a daily basis, in order to retain a connection with my brother who passed away I will find one way to honor his memory while navigate the grieving process.   Revision Date:   Target Date: 5/14/25  Completion Date:    Date Initiated: 05/05/25  1.3 I will take medications as prescribed and share questions and concerns if arise.    Revision Date:  Target Date: 5/14/25  Completion Date:     Date Initiated: 05/05/25  1.4 I will identify 3 ways my supports can assist in my recovery and agree to staff/support contact as indicated.    Revision Date:  Target Date: 5/14/25  Completion Date:          7 DAY REVISION:    Date Initiated:  Revision Date:   Target Date:   Completion Date:      PSYCHIATRY:  Date Initiated:  05/05/25  Medication Management and Education       Revision Date:       The person(s) responsible for carrying out the plan is Rubén" DO Ronald and Annabelle Claudio PA-C    NURSING/SYMPTOM EDUCATION:   Date Initiated: 05/05/25  1.1, 1.2. 1.3, 1.4 This RN/Or Therapist will provide wellness/symptoms and skill education groups three to five days weekly to educate Shireen Rueda on signs and symptoms of diagnoses, skills to manage, and medication questions that will be addressed by the treatment team.    Revision date:  The person(s) responsible for carrying out the plan is Prasad Francis; Gwen Jacobs RN, BSN    PSYCHOLOGY:   Date Initiated: 05/05/25       1.1, 1.2, 1.4 Provide psychotherapy group 5 times per week to allow opportunity for Shireen Rueda  to explore stressors and ways of coping.   Revision Date:   The person(s) responsible for carrying out the plan is SHALONDA Britton,AVERY; Ashley Costenbader, MA LMT    ALLIED THERAPY:   Date Initiated: 05/05/25  1.1,1.2 Engage Shireen Rueda in AT group 5 times weekly to encourage development and use of wellness tools to decrease symptoms and promote recovery through meaningful activity.  Revision Date:       The person(s) responsible for carrying out the plan is Zayda Gonzalez [facility] ; BERTHA Lane [facility]    CASE MANAGEMENT:   Date Initiated: 05/05/25      1.0 This  will meet with Shireen Rueda  3-4 times weekly to assess treatment progress, discharge planning, connection to community supports and UR as indicated.  Revision Date:   The person(s) responsible for carrying out the plan is SHALONDA Britton,AVERY    TREATMENT REVIEW/COMMENTS:     DISCHARGE CRITERIA: Identify 3 signs of progress and complete a safety plan.    DISCHARGE PLAN: Connect with identified outpatient providers.   Estimated Length of Stay: 10 treatment days        Diagnosis and Treatment Plan explained to Shireen Shireen relates understanding diagnosis and is agreeable to Treatment Plan.        CLIENT COMMENTS / Please share your thoughts, feelings, need and/or experiences regarding your treatment plan with Staff.   Please see follow up note with comments.    Signatures can be found on [facility] Treatment plan consent form.

## 2025-05-05 NOTE — PSYCH
Virtual Regular VisitName: Shireen Rueda      : 1980      MRN: [id number]  Encounter Provider:  PARTIAL PSYCH PROGRAM  Encounter Date: 2025   Encounter department: [facility] PARTIAL HOSPITALIZATION PROGRAM  :  Assessment & Plan  Panic disorder         Current moderate episode of major depressive disorder, unspecified whether recurrent (HCC)         Grief             Goals addressed in session: see [place] plan          Reason for visit is  PHP virtual care   Recent Visits  No visits were found meeting these conditions.  Showing recent visits within past 7 days and meeting all other requirements  Today's Visits  Date Type Provider Dept   25 Telemedicine Rubén Cardenas DO [facility]   Showing today's visits and meeting all other requirements  Future Appointments  No visits were found meeting these conditions.  Showing future appointments within next 150 days and meeting all other requirements     History of Present Illness     HPI    Past Medical History   Past Medical History:   Diagnosis Date    Anemia     Anxiety     Chest tightness or pressure     2013    Depression 2017    Hypertension     Vitamin D deficiency      Past Surgical History:   Procedure Laterality Date    CYSTOSCOPY N/A 2018    Procedure: CYSTOSCOPY;  Surgeon: Laura Bal DO;  Location: [facility];  Service: Gynecology    HYSTERECTOMY N/A 2018    Procedure: HYSTERECTOMY LAPAROSCOPIC TOTAL (LTH);  Surgeon: Laura Bal DO;  Location: [facility];  Service: Gynecology    SD LAPAROSCOPY W/RMVL ADNEXAL STRUCTURES Bilateral 2018    Procedure: SALPINGECTOMY, LAPAROSCOPIC;  Surgeon: Laura Bal DO;  Location: [facility];  Service: Gynecology    TONSILLECTOMY      TUBAL LIGATION      2006     Current Outpatient Medications   Medication Instructions    amLODIPine (NORVASC) 2.5 mg, Oral, Daily    atorvastatin (LIPITOR) 40 mg tablet TAKE 1 TABLET BY MOUTH EVERY DAY     DULoxetine (CYMBALTA) 90 mg, Oral, Daily    gabapentin (NEURONTIN) 600 mg, Oral, 3 times daily    losartan (COZAAR) 100 mg, Oral, Daily    melatonin 3 mg, Oral, Daily at bedtime    prazosin (MINIPRESS) 5 mg, Oral, Daily at bedtime    traZODone (DESYREL) 100 mg, Oral, Daily at bedtime     No Known Allergies    Objective   LMP 10/17/2018     Video Exam  Physical Exam     Administrative Statements   Encounter provider  PARTIAL PSYCH PROGRAM    The Patient is located at Home and in the following state in which I hold an active license [place].    The patient was identified by name and date of birth. Shireen Rueda was informed that this is a telemedicine visit and that the visit is being conducted through the Microsoft Teams platform. She agrees to proceed..  My office door was closed. No one else was in the room.  She acknowledged consent and understanding of privacy and security of the video platform. The patient has agreed to participate and understands they can discontinue the visit at any time.    I have spent a total time of five hours of group and case management in caring for this patient on the day of the visit/encounter including Counseling / Coordination of care, not including the time spent for establishing the audio/video connection.

## 2025-05-05 NOTE — ASSESSMENT & PLAN NOTE
Offer emotional support.  Would recommend her following up with grief share after partial hospitalization program

## 2025-05-05 NOTE — PSYCH
[facility] Authorization for Treatment        Subjective:     Patient ID: Shireen Rueda is a 44 y.o. female.    According to partial hospitalization benefits information sheet no auth is needed.     Therapist: AVERY Britton

## 2025-05-05 NOTE — GROUP NOTE
Visit Time    Visit Start Time: 1045  Visit Stop Time: 1145  Total Visit Duration: 60 minutes     Subjective:     Patient ID: Shireen Rueda is a 44 y.o. female.    Specialized Services Documentation  Therapist must complete separate progress note for each specific clinical activity in which the individual participated during the day.        Group Psychotherapy     This group was facilitated virtually in a private office using HIPAA Compliant and Approved [employer] Teams.  Shireen Rueda consented to the use of tele-video modality of treatment.    Shireen Rueda was present in psychoeducational group about Depression & Bipolar. The group followed Depression & Bipolar resource/exercise sheets and the video Dark Glasses & Kaleidoscopes. Group was educated on:  Signs and symptoms of Depression & Bipolar as well as different types of Bipolar.   Diagnosis criteria   Causes and treatments of Depression & Bipolar   Management skills for Depression & Bipolar  Discussed various coping skills    Question and answer time allowed. Some progress towards goal noted through participation in group. Continue psychoeducational groups on anxiety and Bipolar to teach the value of learning about diagnosis and treatments available.     Tx Plan Objective:  1.1, 1.2, 1.3, 1.4 Therapist:  Gwen SHAW RN

## 2025-05-05 NOTE — ASSESSMENT & PLAN NOTE
Continue with Cymbalta 90 mg daily, trazodone 100 mg nightly; sitter increasing Cymbalta further, or possibly augmenting with Minipress instead of trazodone, but would be cautious regarding weight gain potential.
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
## 2025-05-05 NOTE — PSYCH
Visit Time    Visit Start Time: 0930  Visit Stop Time: 1020  Total Visit Duration:  50 minutes    Subjective:     Patient ID: Shireen Rueda is a 44 y.o. female.    [facility] Clinical Progress Notes      Specialized Services Documentation  Therapist must complete separate progress note for each specific clinical activity in which the individual participated during the day.       Case Management Note    Current suicide risk : Low     Medications changes/added/denied? No    Treatment session number: Assessment and day 1    Individual Case Management Visit provided today? yes    [facility] follow up physician's orders: Admit to PHP- See 's note         INDIVIDUAL PSYCHOTHERAPY     Met with Shireen Rueda via TEAMS.  Reviewed program, initial paperwork reviewed: Consent for Treatment, PHP handbook, HIPPA, General Consent, Client Bill of Rights, and Smoking/Drug and Alcohol Policy. Release of Information obtained for emergency contact - Jef Rueda () [phone] and PCP and Health Care Coordination Form. Shireen Rueda has hard copies of all paperwork and verbally gave consent. Reviewed and given on call number. PCP notified of admission and health care coordination form sent. Completed initial psycho-social evaluation and initial treatment goals discussed.Program guidelines reviewed.     I,Shireen Rueda,am physically unable to provide a signature; however, I understand the nature of and am in agreement with the documentation presented to me via TEAMS.  I have received a copy through My Chart and/or the [employer].  I freely give verbal consent.  Name of Document (s):Consent for Treatment, PHP handbook, HIPPA, General Consent, Client Bill of Rights, and Smoking/Drug and Alcohol Policy,Health Care Coordination Form, Release of Information   Witness to verbal consent: Fabiana Banks  Witness to verbal consent: River Love

## 2025-05-06 ENCOUNTER — TELEMEDICINE (OUTPATIENT)
Dept: PSYCHOLOGY | Facility: CLINIC | Age: 45
End: 2025-05-06
Payer: COMMERCIAL

## 2025-05-06 DIAGNOSIS — F43.21 GRIEF: ICD-10-CM

## 2025-05-06 DIAGNOSIS — F41.0 PANIC DISORDER: ICD-10-CM

## 2025-05-06 DIAGNOSIS — F33.1 MODERATE EPISODE OF RECURRENT MAJOR DEPRESSIVE DISORDER (HCC): Primary | ICD-10-CM

## 2025-05-06 PROCEDURE — G0177 OPPS/PHP; TRAIN & EDUC SERV: HCPCS

## 2025-05-06 PROCEDURE — G0410 GRP PSYCH PARTIAL HOSP 45-50: HCPCS

## 2025-05-06 PROCEDURE — G0176 OPPS/PHP;ACTIVITY THERAPY: HCPCS

## 2025-05-06 NOTE — GROUP NOTE
"Visit Time    Visit Start Time: 1215  Visit Stop Time: 1315  Total Visit Duration:  35 minutes    Subjective:     Patient ID: Shireen Rueda is a 44 y.o. female.    Specialized Services Documentation  Therapist must complete separate progress note for each specific clinical activity in which the individual participated during the day.     [facility] Clinical Progress Note    Group member participated in music therapy group on destructive vs. healthy behaviors. The group began with a dereje discussion on the song \"Hurt\" and the concept of self-destructive actions. The group then engaged in a discussion on self-destructive actions in their lives and where they hoped to grow. The group then read and discussed the handout Building New Habits and discussed how they could implement the concepts in to building healthy habits in their lives. The group then participated in a dereje replacement for the song \"Flowers\" identifying healthy habits they could use to support themselves in building healthy habits. Continue AT to encourage the formation of healthy habits.       This group was facilitated virtually in a private office using HIPAA Compliant and Approved [employer] Teams.     Shireen actively participated. She did not share but appeared attentive throughout. Some progress toward treatment objective.   Tx Plan Objective: 1.1,1.2,1.4, Therapist:  BERTHA Lane, [place]-BC    "

## 2025-05-06 NOTE — GROUP NOTE
Subjective:     Patient ID: Shireen Rueda is a 44 y.o. female.    [facility] Clinical Progress Notes      Specialized Services Documentation  Therapist must complete separate progress note for each specific clinical activity in which the individual participated during the day.     Visit Time    Visit Start Time: 1045  Visit Stop Time: 1145  Total Visit Duration: 60 minutes    Group Psychotherapy Life Skills ([id number]) Shireen Rueda actively engaged in group focused on their personal narrative using the tree of life tool. The group watched a ELBA talk “The importance of sharing your story” with speaker Keith Mosquera. The group created their own tree of life which represents who they are. They had to write words that answered the questions about each part of the tree which represents them. Group members identified what they learned by doing this activity. Shireen recognized that she needs to be complaint with medication in order to grow. Shireen created a goal to work towards accomplishing the one area that she wants to grow. Group members discussed why it is important to share their story with at least one person. Shireen continues to make progress towards goals through verbal participation in group; to accomplish long term goals continue to utilize skills learned in programming. Continue with psychotherapy to educate and encourage use of wellness tools. Tx Plan Objective: 1.1,1.2 Therapist: Fabiana Banks,SHALONDA, AVERY     This group was facilitated as a hybrid group and Shireen Brownchavezaliya was present virtually . The group was facilitated in a private office using HIPAA Compliant and Approved [employer] Teams. Shireen Rueda consented to the use of tele-video modality of treatment.       EDUCATION THERAPY    Visit Time    Visit Start Time: 0830  Visit Stop Time: 0930  Total Visit Duration: 60 minutes      [id number]    Shireen Johnsonsoila actively shared in morning assessment and goal review. Presented as Receptive related to readiness to learn.  Shireendarrell Johnsonurvashiradha  did complete goal from last treatment day identifying gaining hope and responsibility. Shireen Rueda did not present with any barriers to learning. Throughout morning group, Shireen Rueda participated in journaling prompt and a guided meditation. Shireen Rueda made good progress toward goal. Continue education group to assess willingness to engage, assess transfer of knowledge, and participate in skill development.; co-facilitated with Ashley Costenbader, MA LMT      This group was facilitated as a hybrid group and Shireen Rueda was present virtually . The group was facilitated in a private office using HIPAA Compliant and Approved [employer] Teams. Shireen Rueda consented to the use of tele-video modality of treatment.     Tx Plan Objective: 1.1,1.2, Therapist: AVERY Britton

## 2025-05-06 NOTE — PSYCH
Subjective:     Patient ID: Shireen Rueda is a 44 y.o. female.    [facility] Clinical Progress Notes      Specialized Services Documentation  Therapist must complete separate progress note for each specific clinical activity in which the individual participated during the day.       Visit Time    Visit Start Time: 1228  Visit Stop Time: 1240  Total Visit Duration:  12 minutes      Case Management Note    Current suicide risk : Low     Medications changes/added/denied? No    Treatment session number: 2    Individual Case Management Visit provided today? Yes     [facility] follow up physician's orders: none at this time        INDIVIDUAL PSYCHOTHERAPY   This writer arrived for CM meeting at 1215. This writer called Shireen at 1222 to remind her of CM meeting. Shireen admitted to falling asleep after group. She shared that she slept terrible the night before. Reviewed sleep hygiene techniques. Encouraged her to try exercising or walking after dinner to help her feel more tired. Shireen reports medication compliance. Treatment plan reviewed. Next session follow up to include reviewing treatment plan. Continue individual psychotherapy in order to progress towards goals.     Treatment Plan Objectives addressed: 1.1, 1.2, 1.3, 1.4       SHALONDA Britton, LSW

## 2025-05-06 NOTE — GROUP NOTE
Visit Time    Visit Start Time: 0930  Visit Stop Time: 1030  Total Visit Duration: 60 minutes    Subjective:     Patient ID: Shireen Rueda is a 44 y.o. y.o. female.    [facility] Clinical Progress Notes      Specialized Services Documentation  Therapist must complete separate progress note for each specific clinical activity in which the individual participated during the day.       This group was facilitated virtually in a private office using HIPAA Compliant and Approved [employer] Teams.  Shireen Rueda consented to the use of tele-video modality of treatment.      Group Psychotherapy  Shireen Rueda quietly shared in psychotherapy group focused on the concept of letting go.  During group discussion, Shireen participated in group reading when prompted. She did appear attentive and to engage in PMR exercise.  Group reinforced importance of consistently caring for one’s self.  Group reviewed aspects of acceptance - what it is, and isn't, and ways to walk toward acceptance of challenges. Some beginning progress toward goal.  Continue psychotherapy to increase self awareness and offer opportunity to share/obtain feedback.    Tx Plan Objective: 1.2, Therapist:  Zayda WITT

## 2025-05-06 NOTE — PSYCH
Virtual Regular VisitName: Shireen Rueda      : 1980      MRN: [id number]  Encounter Provider:  PARTIAL PSYCH PROGRAM  Encounter Date: 2025   Encounter department: [facility]  :  Assessment & Plan  Major depressive disorder, moderate, without psychotic features (HCC)         Panic disorder         Grief             Goals addressed in session: see tx plan        Reason for visit is PHP VIRTUAL GROUP DUE TO virtual preference of care     Recent Visits  Date Type Provider Dept   25 Telemedicine Rubén Cardenas DO  [facility]   Showing recent visits within past 7 days and meeting all other requirements  Future Appointments  No visits were found meeting these conditions.  Showing future appointments within next 150 days and meeting all other requirements     History of Present Illness     HPI    Past Medical History   Past Medical History:   Diagnosis Date    Anemia     Anxiety     Chest tightness or pressure     2013    Depression 2017    Hypertension     Vitamin D deficiency      Past Surgical History:   Procedure Laterality Date    CYSTOSCOPY N/A 2018    Procedure: CYSTOSCOPY;  Surgeon: Laura Bal DO;  [facility];  Service: Gynecology    HYSTERECTOMY N/A 2018    Procedure: HYSTERECTOMY LAPAROSCOPIC TOTAL (LTH);  Surgeon: Laura Bal DO;  [facility]: [facility];  Service: Gynecology    VA LAPAROSCOPY W/RMVL ADNEXAL STRUCTURES Bilateral 2018    Procedure: SALPINGECTOMY, LAPAROSCOPIC;  Surgeon: Laura Bal DO;  [facility]: [facility];  Service: Gynecology    TONSILLECTOMY      TUBAL LIGATION      2006     Current Outpatient Medications   Medication Instructions    amLODIPine (NORVASC) 2.5 mg, Oral, Daily    atorvastatin (LIPITOR) 40 mg tablet TAKE 1 TABLET BY MOUTH EVERY DAY    DULoxetine (CYMBALTA) 90 mg, Oral, Daily    gabapentin (NEURONTIN) 600 mg, Oral, 3 times daily    losartan  (COZAAR) 100 mg, Oral, Daily    melatonin 3 mg, Oral, Daily at bedtime    prazosin (MINIPRESS) 5 mg, Oral, Daily at bedtime    traZODone (DESYREL) 100 mg, Oral, Daily at bedtime     No Known Allergies    Objective   LMP 10/17/2018     Video Exam  Physical Exam     Administrative Statements   Encounter provider [facility] PSYCH PROGRAM    The Patient is located at Home and in the following state in which I hold an active license [place].    The patient was identified by name and date of birth. Shireen Rueda was informed that this is a telemedicine visit and that the visit is being conducted through the [employer] Teams platform. She agrees to proceed..  My office door was closed. No one else was in the room.  She acknowledged consent and understanding of privacy and security of the video platform. The patient has agreed to participate and understands they can discontinue the visit at any time.    I spent SIX GROUP HOURS PLUS CASE MANAGEMENT minutes with patient today in which greater than 50% of time was spent in counseling/coordination of care regarding PHP - SEE NOTES

## 2025-05-07 ENCOUNTER — TELEMEDICINE (OUTPATIENT)
Dept: PSYCHOLOGY | Facility: CLINIC | Age: 45
End: 2025-05-07
Payer: COMMERCIAL

## 2025-05-07 DIAGNOSIS — F32.1 CURRENT MODERATE EPISODE OF MAJOR DEPRESSIVE DISORDER, UNSPECIFIED WHETHER RECURRENT (HCC): ICD-10-CM

## 2025-05-07 DIAGNOSIS — F43.21 GRIEF: ICD-10-CM

## 2025-05-07 DIAGNOSIS — F41.0 PANIC DISORDER: Primary | ICD-10-CM

## 2025-05-07 PROCEDURE — G0176 OPPS/PHP;ACTIVITY THERAPY: HCPCS

## 2025-05-07 PROCEDURE — G0177 OPPS/PHP; TRAIN & EDUC SERV: HCPCS

## 2025-05-07 PROCEDURE — G0410 GRP PSYCH PARTIAL HOSP 45-50: HCPCS

## 2025-05-07 NOTE — PSYCH
Virtual Regular VisitName: Shireen Rueda      : 1980      MRN: [id number]  Encounter Provider:  PARTIAL PSYCH PROGRAM  Encounter Date: 2025   Encounter department: [facility]  :  Assessment & Plan  Panic disorder         Current moderate episode of major depressive disorder, unspecified whether recurrent (HCC)         Grief             Goals addressed in session: see TX plan     Reason for visit is PHP virtual care   Recent Visits  Date Type Provider Dept   25 Telemedicine Rubén Cardenas DO  [facility]   Showing recent visits within past 7 days and meeting all other requirements  Future Appointments  No visits were found meeting these conditions.  Showing future appointments within next 150 days and meeting all other requirements     History of Present Illness     HPI    Past Medical History   Past Medical History:   Diagnosis Date    Anemia     Anxiety     Chest tightness or pressure     2013    Depression 2017    Hypertension     Vitamin D deficiency      Past Surgical History:   Procedure Laterality Date    CYSTOSCOPY N/A 2018    Procedure: CYSTOSCOPY;  Surgeon: Laura Bal DO;  [facility];  Service: Gynecology    HYSTERECTOMY N/A 2018    Procedure: HYSTERECTOMY LAPAROSCOPIC TOTAL (LTH);  Surgeon: Laura Bal DO;  [facility];  Service: Gynecology    [place] LAPAROSCOPY W/RMVL ADNEXAL STRUCTURES Bilateral 2018    Procedure: SALPINGECTOMY, LAPAROSCOPIC;  Surgeon: Laura Bal DO;  [facility];  Service: Gynecology    TONSILLECTOMY      TUBAL LIGATION           Current Outpatient Medications   Medication Instructions    amLODIPine (NORVASC) 2.5 mg, Oral, Daily    atorvastatin (LIPITOR) 40 mg tablet TAKE 1 TABLET BY MOUTH EVERY DAY    DULoxetine (CYMBALTA) 90 mg, Oral, Daily    gabapentin (NEURONTIN) 600 mg, Oral, 3 times daily    losartan (COZAAR) 100 mg,  Oral, Daily    melatonin 3 mg, Oral, Daily at bedtime    prazosin (MINIPRESS) 5 mg, Oral, Daily at bedtime    traZODone (DESYREL) 100 mg, Oral, Daily at bedtime     No Known Allergies    Objective   LMP 10/17/2018     Video Exam  Physical Exam     Administrative Statements   Encounter provider [facility] PSYCH PROGRAM    The Patient is located at Home and in the following state in which I hold an active license [place].    The patient was identified by name and date of birth. Shireen Brownchavezaliya was informed that this is a telemedicine visit and that the visit is being conducted through the [employer] Teams platform. She agrees to proceed..  My office door was closed. No one else was in the room.  She acknowledged consent and understanding of privacy and security of the video platform. The patient has agreed to participate and understands they can discontinue the visit at any time.    I have spent a total time of five hours of group and case management in caring for this patient on the day of the visit/encounter including Counseling / Coordination of care, not including the time spent for establishing the audio/video connection.

## 2025-05-07 NOTE — GROUP NOTE
"Chief Complaint   Patient presents with     Prenatal Care     Panel Management     antibody screen, GCT, hgb       Initial /66 (BP Location: Right arm, Cuff Size: Adult Regular)  Pulse 84  Temp 98.4  F (36.9  C) (Temporal)  Wt 196 lb (88.9 kg)  LMP 09/03/2016 (Exact Date)  SpO2 (!) 16%  BMI 34.72 kg/m2 Estimated body mass index is 34.72 kg/(m^2) as calculated from the following:    Height as of 11/17/16: 5' 3\" (1.6 m).    Weight as of this encounter: 196 lb (88.9 kg).  Medication Reconciliation: complete  " Visit Time    Visit Start Time: 1045  Visit Stop Time: 1145  Total Visit Duration: 60 minutes      Subjective:     Patient ID: Shireen Rueda is a 44 y.o. female.     [facility] Clinical Progress Notes      Specialized Services Documentation  Therapist must complete separate progress note for each specific clinical activity in which the individual participated during the day.      Group Psychotherapy - Roadblocks   Shireen Rueda participated with prompts in a psychotherapy group focused on roadblocks to wellness. Today group members focused on the roadblocks and solutions that can be inquired for the mental health wellness. Group members also were to complete their own road map that has their own roadblocks and then identify solutions to overcome their roadblocks. The goal of today was for group members to participate in groups discussion on roadblocks and solutions of their anxiety. This writer encouraged members to openly share and integrate coping skills into their daily routine. Shireen Rueda made good efforts towards progress goals which were displayed through participation, notetaking, and engagement in topic.    Tx Plan Objective: 1.1,1.2,1.4 Therapist: Ashley Costenbader MA LMT

## 2025-05-07 NOTE — PSYCH
Subjective:     Patient ID: Shireen Rueda is a 44 y.o. female.    [facility] Clinical Progress Notes      Specialized Services Documentation  Therapist must complete separate progress note for each specific clinical activity in which the individual participated during the day.       Visit Time    Visit Start Time: NA  Visit Stop Time: NA  Total Visit Duration: NA      Case Management Note    Current suicide risk : Low     Medications changes/added/denied? No    Treatment session number: 3    Individual Case Management Visit provided today? No    [facility] follow up physician's orders: none at this time        INDIVIDUAL PSYCHOTHERAPY     An individual psychotherapy session is not scheduled today with Shireen Rueda ; additionally, they did not request a meeting.  They are aware of the next scheduled 1:1 session.     Treatment Plan Objectives addressed: SHALONDA Piedra, LSW     The sutures will absorb in 5-7 days  Keep dry for 24 hours. After this, may get wet but do not submerge underwater.   Once the steri strips fall off, keep covered with a bandaid  Seek medical attention for any signs of wound infection including redness, pus drainage, or fever    Wound Closure with Sutures in Children    Your child was seen in the Emergency Department with a cut that required closure with stitches (sutures).  These will hold your child’s skin together while it heals.  They also make it less likely that your child will have a scar.    Sutures can be made from natural or synthetic materials. They can be made from a material that your body can break down as your wound heals (absorbable), or they can be made from a material that needs to be removed from your skin (nonabsorbable).  Sutures are strong and can be used for all kinds of wounds. Absorbable sutures may be used to close tissues deep under the skin. Nonabsorbable sutures need to be removed.    3 stitches were placed.      General tips for taking care of a child who has stitches placed:  If your sutures are ABSORBABLE, they should come out on their own.  But, if they are still there in 10 days, they should be removed.    If your sutures are NON-ABSORBABLE, they should be removed in _____ days to prevent a more prominent scar.    (REFERENCE – INCLUDE TIMEFREAME ABOVE  Scalp: 5-7 days  Face: 5 days  Trunk: 7 days  Hand: 7 days  Non-Joint Extremities: 7-10 days  Sole/foot: 10 days  Joint surfaces: 10-14 days)    HOW TO CARE FOR A WOUND  -Take medicines only as told by your doctor.  -If you were prescribed an antibiotic medicine for your wound, finish it all even if you start to feel better.  -It is generally considered better to have a wound gooey and covered (use an antibiotic ointment and cover with gauze or a Band-Aid).  -Wash your hands with soap and water before and after touching your wound.  -Do not soak your wound in water. Do not take baths, swim, or use a hot tub until your doctor says it is okay.  -After 24 hours you can shower.  -Do not take out your own sutures or staples.  -Do not pick at your wound. Picking can cause an infection.  -Keep all follow-up visits as told by your doctor. This is important.    If you notice signs of infection (worsening pain, swelling, surrounding erythema, fevers, pus draining), seek medical attention.      It takes skin about 6 months to fully heal.  To help prevent a prominent scar, be extra cautious about sun exposure; use sunscreen to prevent sunburn or suntan.    Follow up with your pediatrician in 1-2 days to make sure that your child is doing better.    Return to the Emergency Department if your child has:  -Fever or chills.  -Redness, puffiness (swelling), or pain at the site of the wound.  -There is fluid, blood, or pus coming from the wound.  -There is a bad smell coming from the wound.

## 2025-05-07 NOTE — GROUP NOTE
Visit Time    Visit Start Time: 0830  Visit Stop Time: 0930  Total Visit Duration:  60 minutes    Subjective:     Patient ID: Shireen Rueda is a 44 y.o. female.     [facility] Clinical Progress Notes      Specialized Services Documentation  Therapist must complete separate progress note for each specific clinical activity in which the individual participated during the day.       EDUCATION THERAPY    Morning group focused on establishing routine and goal review.  Members assessed related to readiness for learning and potential barriers.  Transfer of skills outside of program explored through goal sharing and connection back to the Recovery Model 5 key facets of recovery.  Throughout session, skills introduced, practiced reflecting mindfulness, meditation, movement, and connecting peers in this community.      Shireen Rueda actively shared.  Identified that they did complete goal from last treatment day identifying she went outside with the dogs, gaining responsibility.  Presented as Receptive related to readiness to learn and did not present with learning barriers. Shireen Rueda participated in mindfulness exercise and gratitude practice. Shireen Rueda made positive progress toward goal. Continue education group to assess willingness to engage, assess transfer of knowledge, and participate in skill development.  Tx Plan Objective: 1.0, Therapist:  Ian PAYTON Ed.  Visit Time    Visit Start Time: 1330  Visit Stop Time: 1430  Total Visit Duration:  60 minutes    Subjective:     Patient ID: Shireen Rueda is a 44 y.o. female.    Specialized Services Documentation  Therapist must complete separate progress note for each specific clinical activity in which the individual participated during the day.     [facility] Clinical Progress Note     GROUP PSYCHOTHERAPY    Participated actively in psychotherapy group. Group explored treatment day leaning and staff utilized Verbal, A/V, and Demonstration teaching methods. Members shared  areas of learning and set a goal outside of program. Time spent encouraging use of WRAP, skill review, and exploring resources in the community.      Shireen Rueda actively shared. They engaged in learning related to wellness tools. Shireen Rueda identified spend time outside with her dogs,  as goal for the evening . Positive progress toward treatment objective. Continue group psychotherapy to actively practice learned skills and encourage transfer of knowledge to unstructured time.   Tx Plan Objective: 1.0, Therapist:  Ian PAYTON Ed.  Visit Time:    Visit Start Time: 1215  Visit Stop Time: 1315  Total Visit Duration:  60 minutes    Subjective:     Patient ID: Shireen Rueda is a 44 y.o. female    Specialized Services Documentation  Therapist must complete separate progress note for each specific clinical activity in which the individual participated during the day      [facility] Clinical Progress Note    Group Psychotherapy          This group was facilitated virtually in a private office using HIPAA compliant and approved [employer] teams  During this session, they, actively engaged in psychoeducational group about unconventional coping strategies. The skills introduced help to maintain and regulate emotions and create healthy non-conventional coping skills. Group discovered strategies that help them to manage emotional stress and create unconventional coping strategies that will help emotional regulation on a short term basis.The group talked about understanding the purpose, and meaning of emotional regulation and the importance of emotional expression, regulation , and recognition, and how it affects themselves and others. Teaching on the skill process of unconventional coping strategies and DBT self-care, members of the group are able to manage short term situations with varied direct tangible coping stratiges. Group was encouraged to ask questions in an open forum at the end of group. Measurable progress  displayed through engagement in topic. They will continue to engage in psychotherapy to encourage positive self realization.     Shireen actively participated in the group discussion during this session  .Positive progress toward treatment objective  Tx Plan Objective 1.1, 1.2, 1.4 Therapist Ian THOMPSON Ed

## 2025-05-07 NOTE — GROUP NOTE
"Visit Time    Visit Start Time: 0930  Visit Stop Time: 1030  Total Visit Duration: 60 minutes    Subjective:     Patient ID: Shireen Rueda is a 44 y.o. y.o. female.    [facility] Clinical Progress Notes      Specialized Services Documentation  Therapist must complete separate progress note for each specific clinical activity in which the individual participated during the day.       This group was facilitated virtually in a private office using HIPAA Compliant and Approved [employer] Teams.  Shireen Rueda consented to the use of tele-video modality of treatment.     Allied Therapy Group  Shireen Rueda actively shared in MTH group exploring DBT skill changing emotional responses. Shireen engaged in task sharing triggers and responses to given emotions.  Group explored differences between justified and unjustified emotions to prompting events as well as effective versus ineffective responses. Group reviewed skill “Opposite Action” related to depression withdraw versus get active and productive choices offered.  She shared about struggling with allowing self to manage emotions and justifying staying stuck in them as she is unsure how to manage them. This writer specifically offered feedback related to grief and using \"grieving time\" in order to manage versus staying stuck. Some beginning effort and progress noted toward goals.  Continue AT to explore healthy emotional regulation and role in practicing wellness tools.   Tx Plan Objective: 1.1,1.2, Therapist:  Zayda WITT       "

## 2025-05-08 ENCOUNTER — TELEMEDICINE (OUTPATIENT)
Dept: PSYCHOLOGY | Facility: CLINIC | Age: 45
End: 2025-05-08
Payer: COMMERCIAL

## 2025-05-08 ENCOUNTER — TELEPHONE (OUTPATIENT)
Dept: FAMILY MEDICINE CLINIC | Facility: CLINIC | Age: 45
End: 2025-05-08

## 2025-05-08 DIAGNOSIS — F43.21 GRIEF: ICD-10-CM

## 2025-05-08 DIAGNOSIS — F32.1 CURRENT MODERATE EPISODE OF MAJOR DEPRESSIVE DISORDER, UNSPECIFIED WHETHER RECURRENT (HCC): ICD-10-CM

## 2025-05-08 DIAGNOSIS — F41.0 PANIC DISORDER: Primary | ICD-10-CM

## 2025-05-08 PROCEDURE — G0410 GRP PSYCH PARTIAL HOSP 45-50: HCPCS

## 2025-05-08 PROCEDURE — G0177 OPPS/PHP; TRAIN & EDUC SERV: HCPCS

## 2025-05-08 NOTE — PSYCH
Virtual Regular VisitName: Shireen Rueda      : 1980      MRN: [id number]  Encounter Provider:  PARTIAL PSYCH PROGRAM  Encounter Date: 2025   Encounter department: [facility] PARTIAL HOSPITALIZATION PROGRAM  :  Assessment & Plan  Panic disorder         Current moderate episode of major depressive disorder, unspecified whether recurrent (HCC)         Grief             Goals addressed in session:  see Tx plan    Reason for visit is  PHP virtual care  Recent Visits  Date Type Provider Dept   25 Telemedicine Rubén Cardenas DO  [facility]   Showing recent visits within past 7 days and meeting all other requirements  Future Appointments  No visits were found meeting these conditions.  Showing future appointments within next 150 days and meeting all other requirements     History of Present Illness     HPI    Past Medical History   Past Medical History:   Diagnosis Date    Anemia     Anxiety     Chest tightness or pressure     2013    Depression 2017    Hypertension     Vitamin D deficiency      Past Surgical History:   Procedure Laterality Date    CYSTOSCOPY N/A 2018    Procedure: CYSTOSCOPY;  Surgeon: Laura Bal DO;  [facility];  Service: Gynecology    HYSTERECTOMY N/A 2018    Procedure: HYSTERECTOMY LAPAROSCOPIC TOTAL (LTH);  Surgeon: Laura Bal DO;  [facility];  Service: Gynecology    OK LAPAROSCOPY W/RMVL ADNEXAL STRUCTURES Bilateral 2018    Procedure: SALPINGECTOMY, LAPAROSCOPIC;  Surgeon: Laura Bal DO;  [facility];  Service: Gynecology    TONSILLECTOMY      TUBAL LIGATION           Current Outpatient Medications   Medication Instructions    amLODIPine (NORVASC) 2.5 mg, Oral, Daily    atorvastatin (LIPITOR) 40 mg tablet TAKE 1 TABLET BY MOUTH EVERY DAY    DULoxetine (CYMBALTA) 90 mg, Oral, Daily    gabapentin (NEURONTIN) 600 mg, Oral, 3 times daily    losartan (COZAAR) 100 mg,  Oral, Daily    melatonin 3 mg, Oral, Daily at bedtime    prazosin (MINIPRESS) 5 mg, Oral, Daily at bedtime    traZODone (DESYREL) 100 mg, Oral, Daily at bedtime     No Known Allergies    Objective   LMP 10/17/2018     Video Exam  Physical Exam     Administrative Statements   Encounter provider [facility] PSYCH PROGRAM    The Patient is located at Home and in the following state in which I hold an active license [place].    The patient was identified by name and date of birth. Shireen Brownchavezaliya was informed that this is a telemedicine visit and that the visit is being conducted through the Microsoft Teams platform. She agrees to proceed..  My office door was closed. No one else was in the room.  She acknowledged consent and understanding of privacy and security of the video platform. The patient has agreed to participate and understands they can discontinue the visit at any time.    I have spent a total time of five hours of group and case management in caring for this patient on the day of the visit/encounter including Counseling / Coordination of care, not including the time spent for establishing the audio/video connection.

## 2025-05-08 NOTE — GROUP NOTE
Visit Time    Visit Start Time: 0930  Visit Stop Time: 1030  Total Visit Duration: 30 minutes - excused for case management    Subjective:     Patient ID: Shireen Rueda is a 44 y.o. y.o. female.    [facility] Clinical Progress Notes      Specialized Services Documentation  Therapist must complete separate progress note for each specific clinical activity in which the individual participated during the day.     This group was facilitated virtually in a private office using HIPAA Compliant and Approved [employer] Teams.  Shireen Rueda consented to the use of tele-video modality of treatment.       Allied Therapy  Shireen Rueda actively shared in music therapy group focused on DBT skill urrutia mind.  Shireen engaged in musical tasks exploring differences between reasonable and emotion mind and ways to get to wise mind. Group explored the benefits of mindfulness and practiced ways to slow down to begin to develop wise mind.  Group reinforced role of “participating with wise mind” in order to prevent getting stuck in the past or fears of the future. She was attentive and engaged without prompts. Some beginning effort toward treatment goal noted.  Continue AT group to encourage healthy skill development and practice of explored strategies.   Tx Plan Objective: 1.2 Therapist:  EDUAR Mtz

## 2025-05-08 NOTE — PSYCH
"Subjective:     Patient ID: Shireen Rueda is a 44 y.o. female.    [facility] Clinical Progress Notes      Specialized Services Documentation  Therapist must complete separate progress note for each specific clinical activity in which the individual participated during the day.       Visit Time    Visit Start Time: 0927  Visit Stop Time: 1001  Total Visit Duration:  34 minutes      Case Management Note    Current suicide risk : Low     Medications changes/added/denied? No    Treatment session number: 4    Individual Case Management Visit provided today? Yes     [facility] follow up physician's orders: none at this time        INDIVIDUAL PSYCHOTHERAPY     Shireen Rueda actively shared in individual therapy.   Shireen Rueda identified that she slept well and only got up once. Reports blood pressure has been below 110 all week. Reports that today it is 79/61 and states she has been having a hard time going to the bathroom.Reports that she was instructed to not take her BP medication if it was below 110. Encouraged Shireen to inform PCP. This writer reached out to   and Shireen was instructed to hold the Prazosin tonight and email this writer tomorrow with how she feels. Shireen reports feeling overwhelmed with questions posed in groups and at home by her family. Discussed creating a \"command center.\" Reviewed things she can do at home to support her wellness such as creating a binder of material from groups, getting a pill organizer, and registering for support groups. Reviewed support groups with Shireen. Registering for griefshare and a virtual grief group assigned as homework.  Good progress toward goal identified. Continue individual psychotherapy in order to progress towards goals.     Treatment Plan Objectives addressed: 1.1, 1.2, 1.3, 1.4       SHALONDA Britton, AVERY       "

## 2025-05-08 NOTE — TELEPHONE ENCOUNTER
Hi, this is Gela Oliver. I'm calling because my blood pressure was 79 / 61 now. I was prescribed the prazosin when I was in the hospital for night tears and flashbacks. So my  advised that I call you guys and let you know. I also am out of my rescue anxiety medication, the Atarax, so I don't know if you can refill that. But anyways, the number where I could be reached at is [phone]. Thank you. Tobi mijares.    Tried calling for clarification of Rx. Left message to  call back

## 2025-05-08 NOTE — GROUP NOTE
Visit Time    Visit Start Time: 1215  Visit Stop Time: 1315  Total Visit Duration: 60 minutes     Subjective:     Patient ID: Shireen Rueda is a 44 y.o. female.    Specialized Services Documentation  Therapist must complete separate progress note for each specific clinical activity in which the individual participated during the day.        Group Psychotherapy    This group was facilitated virtually in a private office using HIPAA Compliant and Approved [employer] Teams.  Shireen Rueda consented to the use of tele-video modality of treatment.      Shireen Rueda quietly participated along with other members learning about urges and how to integrate techniques within their daily lives to minimize or eliminate urges. Members practiced specific techniques and discussed:   What is an urge  Facts about urges  What is urge surfing  Purpose of urge surfing  Using metaphors of water to understand concept  How to urge surf  Additional wellness tips  Supplemental materials/worksheets     Group then participated in urge surfing mediation.Group members shared pieces of information from these sections and identified way they could practice/integrate these skills in their life. Writer encouraged the members of group to utilize the supplemental materials both inside and outside of program. Some efforts towards progress goals which were displayed through participation and engagement in topic.      Tx plan goals 1.1, 1.2   Gwen SHAW RN

## 2025-05-08 NOTE — GROUP NOTE
Visit Time:    Visit Start Time: 1045  Visit Stop Time: 1145  Total Visit Duration:  60 minutes    Subjective:     Patient ID: Shireen Rueda is a 44 y.o. female    Specialized Services Documentation  Therapist must complete separate progress note for each specific clinical activity in which the individual participated during the day      [facility] Clinical Progress Note    Group Psychotherapy          This group was facilitated virtually in a private office using HIPAA Compliant and Approved [employer] Teams.  During this session, they, actively engaged in psychoeducational group about coping with loneliness. The skill helps to identify way to decrease feeling of loneliness and behavior change focused toward a specified goal. Group explored the identifying factors that create loneliness, this process can help them to take care of emotional and intellectual moments of loneliness on a short term and long term basis. The group talked about understanding the meaning of loneliness in regards to physical, intellectual, and emotional expression, regulation , and recognition, and how it affects themselves and others. Teaching on the emphasis of self monitoring and relapse, the group explored who they can go to for help was brought up as well. Group was encouraged to ask questions in an open forum at the end of group.Measurable progress displayed through engagement in topic.Processing in the course of treatment will continue to engage in psychotherapy to encourage positive self realization.     Shireen actively participated in the group discussion during this session  .positive progress toward treatment objective  Tx Plan Objective 1.1, 1.2, 1.4 Therapist Ian THOMPSON Ed

## 2025-05-09 ENCOUNTER — TELEMEDICINE (OUTPATIENT)
Dept: PSYCHOLOGY | Facility: CLINIC | Age: 45
End: 2025-05-09
Payer: COMMERCIAL

## 2025-05-09 DIAGNOSIS — F41.0 PANIC DISORDER: ICD-10-CM

## 2025-05-09 DIAGNOSIS — F33.1 MODERATE EPISODE OF RECURRENT MAJOR DEPRESSIVE DISORDER (HCC): Primary | ICD-10-CM

## 2025-05-09 DIAGNOSIS — F43.21 GRIEF: ICD-10-CM

## 2025-05-09 PROCEDURE — G0177 OPPS/PHP; TRAIN & EDUC SERV: HCPCS

## 2025-05-09 PROCEDURE — G0176 OPPS/PHP;ACTIVITY THERAPY: HCPCS

## 2025-05-09 PROCEDURE — G0410 GRP PSYCH PARTIAL HOSP 45-50: HCPCS

## 2025-05-09 NOTE — GROUP NOTE
Visit Time    Visit Start Time: 0830  Visit Stop Time: 0930  Total Visit Duration:  60 minutes    Subjective:     Patient ID: Shireen Rueda is a 44 y.o. female.     [facility] Clinical Progress Notes      Specialized Services Documentation  Therapist must complete separate progress note for each specific clinical activity in which the individual participated during the day.       EDUCATION THERAPY    Morning group focused on establishing routine and goal review.  Members assessed related to readiness for learning and potential barriers.  Transfer of skills outside of program explored through goal sharing and connection back to the Recovery Model 5 key facets of recovery.  Throughout session, skills introduced, practiced reflecting mindfulness, meditation, movement, and connecting peers in this community.      Shireen Rueda actively shared.  Identified that they did complete goal from last treatment day identifying she assembled some furniture, gaining hope and responsibility.  Presented as Receptive related to readiness to learn and did not present with learning barriers. Shireen Rueda participated in mindfulness exercise and gratitude practice. Shireen Rueda made positive progress toward goal. Continue education group to assess willingness to engage, assess transfer of knowledge, and participate in skill development.  Tx Plan Objective: 1.0, Therapist:  Ian PAYTON Ed.    Visit Time:    Visit Start Time: 930  Visit Stop Time: 1030  Total Visit Duration:  60 minutes    Subjective:     Patient ID: Shireen Rueda is a 44 y.o. female    Specialized Services Documentation  Therapist must complete separate progress note for each specific clinical activity in which the individual participated during the day      [facility] Clinical Progress Note    Group Psychotherapy      Subjective:        This group was facilitated virtually in a private office using HIPAA Compliant and Approved [employer] Teams.      During this session, they  participated actively in a psychotherapy group focused on setting boundaries. The group focused on the interpersonal effectiveness pillar of DBT; specifically, looking at the GEETHA acronym. Participants followed a step by step breakdown of the GEETHA process and were encouraged to engage in open discussion throughout. Group members were given a GEETHA practice worksheet and time to practice. Processing in the course of treatment, they took notes and filled out the practice sheet. Continue to note progress towards goals. Continue with psychotherapy to encourage further development of interpersonal effectiveness skills.     Shireen actively participated in the group on how to implement DEAR MAN  .Positive progress toward treatment objective  Tx Plan Objective 1.1, 1.2, 1.4 Therapist Ian THOMPSON Ed

## 2025-05-09 NOTE — GROUP NOTE
Visit Time    Visit Start Time: 1330  Visit Stop Time: 1430  Total Visit Duration:  60 minutes    Subjective:     Patient ID: Shireen Rueda is a 44 y.o. female.    [facility] Clinical Progress Notes      Specialized Services Documentation  Therapist must complete separate progress note for each specific clinical activity in which the individual participated during the day.       GROUP PSYCHOTHERAPY    Shireen Rueda participated actively in psychotherapy group.  This was observed Consistent throughout the treatment day. They were engaged in learning related to Wellness Tools. Staff utilized Verbal and Written teaching methods.  Shireen Rueda shared area of learning and set a goal for outside of program to spend time with family for mothers day.  Shireen Rueda was able to share items explored during the day and shared in adding to their WRAP.  Ended session with staff led exercise meditation and journal prompt.  Shireen Rueda demonstrated good progress toward goal.  Continue group psychotherapy to actively practice learned skills and encourage transfer of knowledge to unstructured time.    Tx Plan Objective: 1.1,1.2, Therapist: Ashley Costenbader MA LMT

## 2025-05-09 NOTE — TELEPHONE ENCOUNTER
Pt called back, pt is asking for a refill on Hydroxyzine. Also was told by her psychiatrist to call her pcp because she's been having low blood pressure readings since she was at the hospital believe its the prazosin. Offered pt an appointment she said she would prefer getting advise from you. Please advise and thank you

## 2025-05-09 NOTE — GROUP NOTE
"Visit Time    Visit Start Time: 1045  Visit Stop Time: 1145  Total Visit Duration:  60 minutes    Subjective:     Patient ID: Shireen Rueda is a 44 y.o. female.    Specialized Services Documentation  Therapist must complete separate progress note for each specific clinical activity in which the individual participated during the day.     [facility] Clinical Progress Note    Group member participated in an open process music therapy group regarding music auto-biographies. The group was asked to identify songs that carried meaning for different phases of their lives. The group was then asked to share a song from when they first became aware of their mental health struggles, their present self, and a future vision of themself. The group participated in an open processing of the songs shared. Continue AT to encourage the development of self understanding and a hopeful vision of the future.     This group was facilitated virtually in a private office using HIPAA Compliant and Approved [employer] Teams.     Shireen actively participated. She shared the song \"One Sweet Day\" as a song about her grief over the loss of her brother and engaged in group discussion throughout. Some progress toward treatment objective.   Tx Plan Objective: 1.1,1.2,1.4, Therapist:  BERTHA Laen, MT-BC    "

## 2025-05-09 NOTE — GROUP NOTE
Visit Time    Visit Start Time: 1215  Visit Stop Time: 1315  Total Visit Duration: 60 minutes     Subjective:     Patient ID: Shireen Rueda is a 44 y.o. female.    Specialized Services Documentation  Therapist must complete separate progress note for each specific clinical activity in which the individual participated during the day.     Group Psychotherapy    This group was facilitated virtually in a private office using HIPAA Compliant and Approved [employer] Teams.  Shireen Rueda consented to the use of tele-video modality of treatment.    Shireen Rueda actively  participated in group on Wellness Recovery Action Plan. Today, members focused on a review of WRAP's intent, uses and toolbox. Group members then open processed their unique situation and tools they may need to add to WRAP.      Writer encouraged the members of group to continue utilizing the packet to develop plans inside and outside of program.    Tx Objectives: 1.1, 1.2, 1.3, 1.4    Group Facilitator: Gwen SHAW RN

## 2025-05-09 NOTE — PSYCH
Virtual Regular VisitName: Shireen Rueda      : 1980      MRN: [id number]  Encounter Provider:  PARTIAL PSYCH PROGRAM  Encounter Date: 2025   Encounter department: [facility] PARTIAL HOSP[facility]IZATION PROGRAM  :  Assessment & Plan  Moderate episode of recurrent major depressive disorder (HCC)         Panic disorder         Grief             Goals addressed in session: see TX plan     Reason for visit is PHP virtual care   Recent Visits  Date Type Provider Dept   25 Telemedicine Rubén Cardenas DO  [facility]   Showing recent visits within past 7 days and meeting all other requirements  Future Appointments  No visits were found meeting these conditions.  Showing future appointments within next 150 days and meeting all other requirements     History of Present Illness     HPI    Past Medical History   Past Medical History:   Diagnosis Date    Anemia     Anxiety     Chest tightness or pressure     2013    Depression 2017    Hypertension     Vitamin D deficiency      Past Surgical History:   Procedure Laterality Date    CYSTOSCOPY N/A 2018    Procedure: CYSTOSCOPY;  Surgeon: Laura Bal DO;  [facility];  Service: Gynecology    HYSTERECTOMY N/A 2018    Procedure: HYSTERECTOMY LAPAROSCOPIC TOTAL (LTH);  Surgeon: Laura Bal DO;  [facility];  Service: Gynecology    TN LAPAROSCOPY W/RMVL ADNEXAL STRUCTURES Bilateral 2018    Procedure: SALPINGECTOMY, LAPAROSCOPIC;  Surgeon: Laura Bal DO;  [facility];  Service: Gynecology    TONSILLECTOMY      TUBAL LIGATION           Current Outpatient Medications   Medication Instructions    amLODIPine (NORVASC) 2.5 mg, Oral, Daily    atorvastatin (LIPITOR) 40 mg tablet TAKE 1 TABLET BY MOUTH EVERY DAY    DULoxetine (CYMBALTA) 90 mg, Oral, Daily    gabapentin (NEURONTIN) 600 mg, Oral, 3 times daily    losartan (COZAAR) 100 mg, Oral, Daily    melatonin 3 mg,  Oral, Daily at bedtime    prazosin (MINIPRESS) 5 mg, Oral, Daily at bedtime    traZODone (DESYREL) 100 mg, Oral, Daily at bedtime     No Known Allergies    Objective   LMP 10/17/2018     Video Exam  Physical Exam     Administrative Statements   Encounter provider [facility] PSYCH PROGRAM    The Patient is located at Home and in the following state in which I hold an active license [place].    The patient was identified by name and date of birth. Shireen Rueda was informed that this is a telemedicine visit and that the visit is being conducted through the [employer] Teams platform. She agrees to proceed..  My office door was closed. No one else was in the room.  She acknowledged consent and understanding of privacy and security of the video platform. The patient has agreed to participate and understands they can discontinue the visit at any time.    I have spent a total time of five hours of group and case management in caring for this patient on the day of the visit/encounter including Counseling / Coordination of care, not including the time spent for establishing the audio/video connection.

## 2025-05-09 NOTE — PSYCH
Subjective:     Patient ID: Shireen Rueda is a 44 y.o. female.    [facility] Clinical Progress Notes      Specialized Services Documentation  Therapist must complete separate progress note for each specific clinical activity in which the individual participated during the day.       Visit Time    Visit Start Time: NA  Visit Stop Time: NA  Total Visit Duration: NA      Case Management Note    Current suicide risk : Low     Medications changes/added/denied? No    Treatment session number: 5    Individual Case Management Visit provided today? No    [facility] follow up physician's orders: none at this time        INDIVIDUAL PSYCHOTHERAPY     An individual psychotherapy session is not scheduled today with Shireen Rueda ; additionally, they did not request a meeting.  They are aware of the next scheduled 1:1 session.     Treatment Plan Objectives addressed: SHALONDA Piedra, LSW

## 2025-05-12 ENCOUNTER — TELEMEDICINE (OUTPATIENT)
Dept: PSYCHOLOGY | Facility: CLINIC | Age: 45
End: 2025-05-12
Payer: COMMERCIAL

## 2025-05-12 DIAGNOSIS — F33.1 MODERATE EPISODE OF RECURRENT MAJOR DEPRESSIVE DISORDER (HCC): Primary | ICD-10-CM

## 2025-05-12 DIAGNOSIS — F41.0 PANIC DISORDER: ICD-10-CM

## 2025-05-12 DIAGNOSIS — F43.21 GRIEF: ICD-10-CM

## 2025-05-12 PROCEDURE — 99213 OFFICE O/P EST LOW 20 MIN: CPT | Performed by: PHYSICIAN ASSISTANT

## 2025-05-12 PROCEDURE — G0410 GRP PSYCH PARTIAL HOSP 45-50: HCPCS

## 2025-05-12 PROCEDURE — G0177 OPPS/PHP; TRAIN & EDUC SERV: HCPCS

## 2025-05-12 RX ORDER — HYDROCHLOROTHIAZIDE 25 MG/1
25 TABLET ORAL DAILY
COMMUNITY
End: 2025-05-21 | Stop reason: SDUPTHER

## 2025-05-12 RX ORDER — HYDROXYZINE HYDROCHLORIDE 25 MG/1
25 TABLET, FILM COATED ORAL EVERY 6 HOURS PRN
Qty: 50 TABLET | Refills: 0 | Status: SHIPPED | OUTPATIENT
Start: 2025-05-12 | End: 2025-06-11

## 2025-05-12 NOTE — GROUP NOTE
Patient ID: Shireen Rueda is a 44 y.o. female.     [facility] Clinical Progress Notes      Specialized Services Documentation  Therapist must complete separate progress note for each specific clinical activity in which the individual participated during the day.      Group Psychotherapy    [id number]   Total visit duration: 60 minutes    Shireen Rueda participated actively in a psychotherapy group focused on grounding. This group was facilitated virtually in a private office using HIPAA compliant and approved [employer] teams. Shireen Rueda consented to the use of tele-video modality of treatment.  Participants were provided with a definition of grounding and how/why the skill can be useful. The group practiced many different types of grounding techniques and exercises to get hands-on experience Group members were encouraged to engage in open discussion, share, and ask questions throughout. Shireen Rueda identified deep breathing and cold water  as a grounding technique they can practice. Continue to note progress towards goals. Continue with psychotherapy to encourage further development of grounding skills.    Tx Plan Objective 1.1, 1.2, 1.4 Therapist: Ashley Costenbader MA LMT

## 2025-05-12 NOTE — GROUP NOTE
Visit Time    Visit Start Time: 1215  Visit Stop Time: 1315  Total Visit Duration: 60 minutes     Subjective:     Patient ID: Shireen Rueda is a 44 y.o. female.    Specialized Services Documentation  Therapist must complete separate progress note for each specific clinical activity in which the individual participated during the day.        Group Psychotherapy - Anxiety    This group was facilitated virtually in a private office using HIPAA Compliant and Approved [employer] Teams.  Shireen Rueda consented to the use of tele-video modality of treatment.     Shireen Rueda was present in psychoeducational group about anxiety. The group followed a slide show presentation on Anxiety and the Brain. Group was educated on:  Signs and symptoms of anxiety   Causes and treatments of anxiety  How the brain processes anxiety  Group members then discussed anxiety causing situations and positive ways to cope with these stressors.   Coping skills discussed were breathing paired with sitting/noticing with our thoughts and emotions exercise, challenging irrational thoughts, progressive muscle relaxation, Dropping anchor, deep breathing, and imagery.    Question and answer time allowed. Some positive progress towards goal noted through participation in group. Continue psychoeducational groups on anxiety and teach the value of learning about diagnosis and treatments available.     Tx Plan Objective:  1.1, 1.2, 1.3, 1.4 Therapist:  Gwen SHAW RN
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
## 2025-05-12 NOTE — ASSESSMENT & PLAN NOTE
Cont PHP.  Cont neurontin 600 mg tid, prn atarax added today, Prazosin 5 mg q bedtime  Orders:    hydrOXYzine HCL (ATARAX) 25 mg tablet; Take 1 tablet (25 mg total) by mouth every 6 (six) hours as needed for anxiety

## 2025-05-12 NOTE — PSYCH
Subjective:     Patient ID: Shireen Rueda is a 44 y.o. female.    [facility] Clinical Progress Notes      Specialized Services Documentation  Therapist must complete separate progress note for each specific clinical activity in which the individual participated during the day.       Visit Time    Visit Start Time: 1318  Visit Stop Time: 1345  Total Visit Duration:  28 minutes      Case Management Note    Current suicide risk : Low     Medications changes/added/denied? No    Treatment session number: 6    Individual Case Management Visit provided today? Yes     [facility] follow up physician's orders: none at this time        INDIVIDUAL PSYCHOTHERAPY     Shireen Rueda actively shared in individual therapy.   Shireen Rueda reports that her  made her see her mother in-law yesterday and reports that she did not want to go. Shireen shared that she wants to push herself but it was to much to quick. Reports that she went prepared with fidgets and coloring book/pen. Reports that she felt overstimulated and started isolating more. Discussed advocating for herself, using I statements, and starting to go to public areas such as a grocery store/ public park. Shireen discussed lack of motivation and things she can do to motivate herself. Discussed breaking things down into smaller manageable parts.Shireen plans to organize her table today where she keeps her supplies for group and crafts. Encouraged her to keep an accomplishments list to evaluate her progress. Shireen reports that she did not join a support group yet but went through progress towards tx goals.     Treatment Plan Objectives addressed: 1.1, 1.2, 1.3, 1.4       SHALONDA Britton, LSW

## 2025-05-12 NOTE — ASSESSMENT & PLAN NOTE
Cont PHP.        Current Outpatient Medications   Medication Sig Dispense Refill    DULoxetine (CYMBALTA) 30 mg delayed release capsule Take 3 capsules (90 mg total) by mouth daily 90 capsule 1    gabapentin (Neurontin) 600 MG tablet Take 1 tablet (600 mg total) by mouth 3 (three) times a day 90 tablet 0    hydroCHLOROthiazide 25 mg tablet Take 25 mg by mouth daily      hydrOXYzine HCL (ATARAX) 25 mg tablet Take 1 tablet (25 mg total) by mouth every 6 (six) hours as needed for anxiety 50 tablet 0    losartan (COZAAR) 100 MG tablet Take 1 tablet (100 mg total) by mouth daily 90 tablet 0    prazosin (MINIPRESS) 5 mg capsule Take 1 capsule (5 mg total) by mouth daily at bedtime 30 capsule 1    traZODone (DESYREL) 100 mg tablet Take 1 tablet (100 mg total) by mouth daily at bedtime 30 tablet 1    amLODIPine (NORVASC) 2.5 mg tablet TAKE 1 TABLET BY MOUTH EVERY DAY 90 tablet 0    atorvastatin (LIPITOR) 40 mg tablet TAKE 1 TABLET BY MOUTH EVERY DAY 90 tablet 2    melatonin 3 mg Take 1 tablet (3 mg total) by mouth daily at bedtime 30 tablet 0     No current facility-administered medications for this visit.

## 2025-05-12 NOTE — TELEPHONE ENCOUNTER
Addendum to previous phone call: Patient requested refill of atarax. Per review of recent hospital admission this medication was discontinued. Unclear per chart review why this medication was discontinued. Discussed with patient to review with PCP and psychiatrist about appropriateness of refill of medication.

## 2025-05-12 NOTE — GROUP NOTE
Visit Time    Visit Start Time: 0830  Visit Stop Time: 0930  Total Visit Duration:  60 minutes    Subjective:     Patient ID: Shireen Rueda is a 44 y.o. female.     [facility] Clinical Progress Notes      Specialized Services Documentation  Therapist must complete separate progress note for each specific clinical activity in which the individual participated during the day.       EDUCATION THERAPY    Morning group focused on establishing routine and goal review.  Members assessed related to readiness for learning and potential barriers.  Transfer of skills outside of program explored through goal sharing and connection back to the Recovery Model 5 key facets of recovery.  Throughout session, skills introduced, practiced reflecting mindfulness, meditation, movement, and connecting peers in this community.      Shireen Rueda actively shared.  Identified that they did complete goal from last treatment day identifying  spent time outside gaining responsibility.  Presented as Receptive related to readiness to learn and did not present with learning barriers. Shireen Rueda participated in mindfulness exercise and gratitude practice. Shireen Rudea made positie progress toward goal. Continue education group to assess willingness to engage, assess transfer of knowledge, and participate in skill development.  Tx Plan Objective: 1.0, Therapist:  Ian PAYTON Ed.  Visit Time    Visit Start Time: 1330  Visit Stop Time: 1430  Total Visit Duration:  60 minutes    Subjective:     Patient ID: Shireen Rueda is a 44 y.o. female.    Specialized Services Documentation  Therapist must complete separate progress note for each specific clinical activity in which the individual participated during the day.     [facility] Clinical Progress Note     GROUP PSYCHOTHERAPY    Participated actively in psychotherapy group. Group explored treatment day leaning and staff utilized Verbal, A/V, and Demonstration teaching methods. Members shared areas of  learning and set a goal outside of program. Time spent encouraging use of WRAP, skill review, and exploring resources in the community.      Shireen Rueda actively shared. They engaged in learning related to wellness tools. Shireen Rueda identified organizing her art supplies as goal for the evening . Positive progress toward treatment objective. Continue group psychotherapy to actively practice learned skills and encourage transfer of knowledge to unstructured time.   Tx Plan Objective: 1.0, Therapist:  Ian PAYTON Ed.  Visit Time:    Visit Start Time: 930  Visit Stop Time: 1030  Total Visit Duration:  60 minutes    Subjective:     Patient ID: Shireen Rueda is a 44 y.o. female    Specialized Services Documentation  Therapist must complete separate progress note for each specific clinical activity in which the individual participated during the day      [facility] Clinical Progress Note    Group Psychotherapy        Subjective:        This group was facilitated virtually in a private office using HIPAA Compliant and Approved [employer] Teams.  During this session, they  actively engaged in psychoeducational group about the cognitive triad and challenging automatic  negative thoughts that involve cognitive bias, limiting thoughts that are based on a negative perception of ones self. The skill helps to identify negative thoughts and cognitive biases. The outcome being that negative thoughts are challenged in the thought process and regulation of emotion. Group discovered strategies that help them to manage negative thoughts and rethink the negative thoughts when faced with a negative challenge wether the thought is perceived in the outside environment, or internally as negative self-talk. The group talked about understanding the purpose of challenging negative thoughts on a personal and social level and how it affects themselves and others..Teaching on the emphasis of self monitoring and self-care, the group focus is  geared how to go for help when the negative thoughts become overly intrusive. Group was encouraged to ask questions in an open forum at the end of session. Measurable progress displayed through engagement in topic. Processing in the course of treatment will continue to engage in psychotherapy to encourage positive self realization.     Shireen minimally participated in the group and presented as preoccupied with her thoughts at times. Shireen would answer questions if directly called upon in group.  .Limited progress toward treatment objective  Tx Plan Objective 1.1, 1.2, 1.4 Therapist Ian THOMPSON Ed

## 2025-05-13 ENCOUNTER — TELEMEDICINE (OUTPATIENT)
Dept: PSYCHOLOGY | Facility: CLINIC | Age: 45
End: 2025-05-13
Payer: COMMERCIAL

## 2025-05-13 DIAGNOSIS — F41.0 PANIC DISORDER: ICD-10-CM

## 2025-05-13 DIAGNOSIS — F32.1 CURRENT MODERATE EPISODE OF MAJOR DEPRESSIVE DISORDER, UNSPECIFIED WHETHER RECURRENT (HCC): Primary | ICD-10-CM

## 2025-05-13 DIAGNOSIS — F43.21 GRIEF: ICD-10-CM

## 2025-05-13 PROCEDURE — G0176 OPPS/PHP;ACTIVITY THERAPY: HCPCS

## 2025-05-13 PROCEDURE — G0177 OPPS/PHP; TRAIN & EDUC SERV: HCPCS

## 2025-05-13 PROCEDURE — G0410 GRP PSYCH PARTIAL HOSP 45-50: HCPCS

## 2025-05-13 NOTE — PSYCH
Virtual Regular VisitName: Shireen Rueda      : 1980      MRN: [id number]  Encounter Provider:  PARTIAL PSYCH PROGRAM  Encounter Date: 2025   Encounter department: [facility] PARTIAL HOSPITALIZATION PROGRAM  :  Assessment & Plan  Current moderate episode of major depressive disorder, unspecified whether recurrent (HCC)         Panic disorder         Grief             Goals addressed in session: see TX plan          Reason for visit is PHP virtual care    Recent Visits  Date Type Provider Dept   25 Telemedicine Annabelle Claudio PA-C  Partial Hosp [facility]   Showing recent visits within past 7 days and meeting all other requirements  Future Appointments  No visits were found meeting these conditions.  Showing future appointments within next 150 days and meeting all other requirements     History of Present Illness     HPI    Past Medical History   Past Medical History:   Diagnosis Date    Anemia     Anxiety     Chest tightness or pressure     2013    Depression 2017    Hypertension     Vitamin D deficiency      Past Surgical History:   Procedure Laterality Date    CYSTOSCOPY N/A 2018    Procedure: CYSTOSCOPY;  Surgeon: Laura Bal DO;  [facility];  Service: Gynecology    HYSTERECTOMY N/A 2018    Procedure: HYSTERECTOMY LAPAROSCOPIC TOTAL (LTH);  Surgeon: Laura Bal DO;  [facility];  Service: Gynecology    VT LAPAROSCOPY W/RMVL ADNEXAL STRUCTURES Bilateral 2018    Procedure: SALPINGECTOMY, LAPAROSCOPIC;  Surgeon: Laura Bal DO;  [facility];  Service: Gynecology    TONSILLECTOMY      TUBAL LIGATION           Current Outpatient Medications   Medication Instructions    amLODIPine (NORVASC) 2.5 mg, Oral, Daily    atorvastatin (LIPITOR) 40 mg tablet TAKE 1 TABLET BY MOUTH EVERY DAY    DULoxetine (CYMBALTA) 90 mg, Oral, Daily    gabapentin (NEURONTIN) 600 mg, Oral, 3 times daily    hydroCHLOROthiazide 25  mg, Daily    hydrOXYzine HCL (ATARAX) 25 mg, Oral, Every 6 hours PRN    losartan (COZAAR) 100 mg, Oral, Daily    melatonin 3 mg, Oral, Daily at bedtime    prazosin (MINIPRESS) 5 mg, Oral, Daily at bedtime    traZODone (DESYREL) 100 mg, Oral, Daily at bedtime     No Known Allergies    Objective   LMP 10/17/2018     Video Exam  Physical Exam     Administrative Statements   Encounter provider [facility] PSYCH PROGRAM    The Patient is located at Home and in the following state in which I hold an active license [place].    The patient was identified by name and date of birth. Shireen Rueda was informed that this is a telemedicine visit and that the visit is being conducted through the [employer] Teams platform. She agrees to proceed..  My office door was closed. No one else was in the room.  She acknowledged consent and understanding of privacy and security of the video platform. The patient has agreed to participate and understands they can discontinue the visit at any time.    I have spent a total time of five hours of group and case management  in caring for this patient on the day of the visit/encounter including Counseling / Coordination of care, not including the time spent for establishing the audio/video connection.

## 2025-05-13 NOTE — GROUP NOTE
Visit Time    Visit Start Time: 1330  Visit Stop Time: 1430  Total Visit Duration:  60 minutes    Subjective:     Patient ID: Shireen Rueda is a 44 y.o. female.    [facility] Clinical Progress Notes      Specialized Services Documentation  Therapist must complete separate progress note for each specific clinical activity in which the individual participated during the day.       GROUP PSYCHOTHERAPY    Shireen Rueda participated actively in psychotherapy group.  This was observed Consistent throughout the treatment day. They were engaged in learning related to Wellness Tools. Staff utilized Verbal and Written teaching methods.  Shireen Rueda shared area of learning and set a goal for outside of program to empty the .  Shireen Rueda was able to share items explored during the day and shared in adding to their WRAP.  Ended session with staff led exercise energy meditation and journal prompt.  Shireen Rueda demonstrated good progress toward goal.  Continue group psychotherapy to actively practice learned skills and encourage transfer of knowledge to unstructured time.    Tx Plan Objective: 1.1,1.2, Therapist: Ashley Costenbader MA LMT

## 2025-05-13 NOTE — PSYCH
Subjective:     Patient ID: Shireen Rueda is a 44 y.o. female.    [facility] Clinical Progress Notes      Specialized Services Documentation  Therapist must complete separate progress note for each specific clinical activity in which the individual participated during the day.       Visit Time    Visit Start Time: NA  Visit Stop Time: NA  Total Visit Duration: NA      Case Management Note    Current suicide risk : Low     Medications changes/added/denied? No    Treatment session number: 7    Individual Case Management Visit provided today? No    [facility] follow up physician's orders: none at this time        INDIVIDUAL PSYCHOTHERAPY     An individual psychotherapy session is not scheduled today with Shireen Rueda ; additionally, they did not request a meeting.  They are aware of the next scheduled 1:1 session.     Treatment Plan Objectives addressed: SHALONDA Piedra, LSW

## 2025-05-13 NOTE — GROUP NOTE
"Visit Time    Visit Start Time: 0930  Visit Stop Time: 1030  Total Visit Duration:  60 minutes    Subjective:     Patient ID: Shireen Rueda is a 44 y.o. female.    Specialized Services Documentation  Therapist must complete separate progress note for each specific clinical activity in which the individual participated during the day.     [facility] Clinical Progress Note    Group member participated in music therapy group regarding supports. The group participated in a discussion around supports in their lives and areas where they need support. The group participated in a dereje discussion on the song \"Bridge Over Troubled Water\". The group read and discussed a handout on supports. The group listened to \"You've Got a Friend\" and identified the types of support demonstrated within the song.  Continue AT to encourage the development and proactive use of supports.      This group was facilitated virtually in a private office using HIPAA Compliant and Approved [employer] Teams.     Shireen actively participated. She shared her thoughts on the song and engaged in group discussion throughout. Some progress toward treatment objective.   Tx Plan Objective: 1.1,1.2,1.4, Therapist:  BERTHA Lane, MT-BC    "

## 2025-05-13 NOTE — GROUP NOTE
Visit Time:    Visit Start Time: 1215  Visit Stop Time: 1315  Total Visit Duration:  60 minutes    Subjective:     Patient ID: Shireen Rueda is a 44 y.o. female    Specialized Services Documentation  Therapist must complete separate progress note for each specific clinical activity in which the individual participated during the day      [facility] Clinical Progress Note    Group Psychotherapy        Group Therapy    Subjective:          This group was facilitated virtually in a private office using HIPAA Compliant and Approved [employer] Teams.  During this session, they,  actively engaged in psychoeducational group about emotional regulation. The skill helps to maintain and regulate emotional expression/regulation. Group discovered strategies that help them to manage emotional well being on a short term and long term basis. The group talked about understanding the purpose, and meaning of emotional regulation in intellectual and emotional expression, regulation , and recognition, and how it affects themselves and others.. Teaching on the emphasis of self monitoring and self-care, who the group can go to for help was brought up as well. Group was encouraged to ask questions in an open forum at the end of group. Measurable progress displayed through engagement in topic. Processing in the course of treatment will continue to engage in psychotherapy to encourage positive self realization.     Shireen actively participated as an engaged listener during this session  .Positive progress toward treatment objective  Tx Plan Objective 1.1, 1.2, 1.4 Therapist Ian THOMPSON Ed

## 2025-05-13 NOTE — GROUP NOTE
Subjective:     Patient ID: Shireen Reuda is a 44 y.o. female.    [facility] Clinical Progress Notes      Specialized Services Documentation  Therapist must complete separate progress note for each specific clinical activity in which the individual participated during the day.     EDUCATION THERAPY    Visit Time    Visit Start Time: 0830  Visit Stop Time: 0930  Total Visit Duration: 60 minutes      [id number]    Shireen Rueda actively shared in morning assessment and goal review. Presented as Receptive related to readiness to learn. Shireen Rueda  did complete goal from last treatment day identifying gaining responsibility and support. Shireen Rueda did not present with any barriers to learning. Throughout morning group, Shireen Rueda participated in journaling prompt and guided meditation. Shireen Rueda made good progress toward goal. Continue education group to assess willingness to engage, assess transfer of knowledge, and participate in skill development.      This group was facilitated virtually in a private office using HIPAA Compliant and Approved [employer] Teams.  Shireen Rueda consented to the use of tele-video modality of treatment.        Tx Plan Objective: 1.1,1.2, Therapist: AVERY Britton

## 2025-05-13 NOTE — GROUP NOTE
"Visit Time    Visit Start Time: 1045  Visit Stop Time: 1145  Total Visit Duration: 60 minutes     Subjective:     Patient ID: Shireen Rueda is a 44 y.o. female.    Specialized Services Documentation  Therapist must complete separate progress note for each specific clinical activity in which the individual participated during the day.        Group Psychotherapy  This group was facilitated virtually in a private office using HIPAA Compliant and Approved [employer] Teams.  Shireen Rueda  consented to the use of tele-video modality of treatment.  Shireen Rueda actively  shared in psychoeducational group which focused on nutrition to improve physical and mental wellbeing. Topics included:  How our diet affects our mental health  Mental health benefits of healthy eating  Barriers to eating healthy  Ways to overcome barriers  Macro and Micro nutrients   Appropriate serving sizes for all food groups as well as benefits to eating for health       The group then viewed a [employer] talk video titled \"The Brain Intervention at the End of our [place]\" by Dr Zachary Torres. They then discussed ideas on how to improve on their nutrition.  Some progress toward goal noted.  Continue psychoeducation to educate on the importance of making nutrition a priority.  Tx Plan Objectives: 1.1,1.2    Therapist: Gwen SHAW      "

## 2025-05-14 ENCOUNTER — TELEMEDICINE (OUTPATIENT)
Dept: PSYCHOLOGY | Facility: CLINIC | Age: 45
End: 2025-05-14
Payer: COMMERCIAL

## 2025-05-14 DIAGNOSIS — F41.0 PANIC DISORDER: ICD-10-CM

## 2025-05-14 DIAGNOSIS — F33.1 MODERATE EPISODE OF RECURRENT MAJOR DEPRESSIVE DISORDER (HCC): Primary | ICD-10-CM

## 2025-05-14 DIAGNOSIS — F43.21 GRIEF: ICD-10-CM

## 2025-05-14 PROCEDURE — G0176 OPPS/PHP;ACTIVITY THERAPY: HCPCS

## 2025-05-14 PROCEDURE — G0177 OPPS/PHP; TRAIN & EDUC SERV: HCPCS

## 2025-05-14 PROCEDURE — G0410 GRP PSYCH PARTIAL HOSP 45-50: HCPCS

## 2025-05-14 NOTE — BH TREATMENT PLAN
"Assessment/Plan:      Assessment  Diagnoses and all orders for this visit:     Moderate episode of recurrent major depressive disorder (HCC)     Panic disorder     Grief              Subjective:     Subjective  Patient ID: Shireen Rueda is a 44 y.o. female.     [facility] Treatment Plan   AREAS OF NEED: Moderate episode of recurrent major depressive disorder, panic disorder and grief as evidenced by isolation, agitation, laying down more,recent suicidal ideation, difficulties with sleep, isolating, crying, feeling sad and anxious due to grief.    Date Initiated: 05/05/25     Strengths: \"good mom and friend\"         LONG TERM GOAL:   Date Initiated: 05/05/25  1.0 I will identify and share three ways in which my day-to-day functioning has improved since attending program.  Target Date: 6/2/25  Completion Date:         SHORT TERM OBJECTIVES:      Date Initiated: 05/05/25  1.1 I will reach out to at least 2 support groups per week and decide which support group is best for me and will schedule attend one support group.   Revision Date: 5/14/25 -met  Target Date: 5/14/25  Completion Date: 5/14/25 -met     Date Initiated: 05/05/25  1.2  On a daily basis, in order to retain a connection with my brother who passed away I will find one way to honor his memory while navigate the grieving process.   Revision Date: 5/14/25 continue  Target Date: 5/14/25  Completion Date:     Date Initiated: 05/05/25  1.3 I will take medications as prescribed and share questions and concerns if arise.    Revision Date:5/14/25 continue  Target Date: 5/14/25  Completion Date:      Date Initiated: 05/05/25  1.4 I will identify 3 ways my supports can assist in my recovery and agree to staff/support contact as indicated.    Revision Date:5/14/25 continue  Target Date: 5/14/25  Completion Date:            7 DAY REVISION:  1.5  I will attend group daily and actively participate and volunteer in every group in order to build a natural support network and " feel more socially connected to improve my moods.  Date Initiated:5/14/25  Revision Date:   Target Date: 5/23/25  Completion Date:        PSYCHIATRY:  Date Initiated:  05/05/25  Medication Management and Education       Revision Date: 05/14/25        1.3 Continue medication management        The person(s) responsible for carrying out the plan is Rubén Cardenas DO and Annabelle Claudio PA-C     NURSING/SYMPTOM EDUCATION:   Date Initiated: 05/05/25  1.1, 1.2. 1.3, 1.4 This RN/Or Therapist will provide wellness/symptoms and skill education groups three to five days weekly to educate Shireen Rueda on signs and symptoms of diagnoses, skills to manage, and medication questions that will be addressed by the treatment team.    Revision date: 05/14/25   1.1,1.2,1.3,1.4,1.5 Continue to encourage Shireen Rueda to participate in wellness/symptoms and skills education groups daily to learn about symptoms, coping strategies and warning signs to promote relapse prevention.     The person(s) responsible for carrying out the plan is Prasad Francis; Gwen Jacobs RN, BSN     PSYCHOLOGY:   Date Initiated: 05/05/25       1.1, 1.2, 1.4 Provide psychotherapy group 5 times per week to allow opportunity for Shireen Rueda  to explore stressors and ways of coping.   Revision Date:  05/14/25   1.1,1.2,1.4,1.5  Continue to provide psychotherapy group daily to Shireen Rueda and encourage sharing of stressors, skills and positive change.    The person(s) responsible for carrying out the plan is SHALONDA Britton,LSW; Ashley Costenbader, MA LMT     ALLIED THERAPY:   Date Initiated: 05/05/25  1.1,1.2 Engage Shireen Rueda in AT group 5 times weekly to encourage development and use of wellness tools to decrease symptoms and promote recovery through meaningful activity.  Revision Date: 05/14/25   1.1,1.2,1.5 Continue to engage Shireen Rueda to participate in AT group to practice wellness tools within program and transfer to home sharing successes and  barriers through healthy task involvement.        The person(s) responsible for carrying out the plan is Zayda Gonzalez , MT-BC ; BERTHA Lane, MT-BC     CASE MANAGEMENT:   Date Initiated: 05/05/25      1.0 This  will meet with Shireen Rueda  3-4 times weekly to assess treatment progress, discharge planning, connection to community supports and UR as indicated.  Revision Date: 05/14/25   1.0 Continue to meet with Shireen Rueda 3-4 times weekly to assess growth, work toward goals, continued treatment needs, dc planning and use of supports.     The person(s) responsible for carrying out the plan is SHALONDA Britton,JORGEW     TREATMENT REVIEW/COMMENTS:      DISCHARGE CRITERIA: Identify 3 signs of progress and complete a safety plan.    DISCHARGE PLAN: Connect with identified outpatient providers.   Estimated Length of Stay: 10 treatment days          Diagnosis and Treatment Plan explained to Shireen, Shireen relates understanding diagnosis and is agreeable to Treatment Plan.         CLIENT COMMENTS / Please share your thoughts, feelings, need and/or experiences regarding your treatment plan with Staff.  Please see follow up note with comments.     Signatures can be found on [facility] Treatment plan consent form.

## 2025-05-14 NOTE — GROUP NOTE
Visit Time    Visit Start Time: 0830  Visit Stop Time: 0930  Total Visit Duration: 60 minutes    Subjective:     Patient ID: Shireen Rueda is a 44 y.o. female.     [facility] Clinical Progress Notes      Specialized Services Documentation  Therapist must complete separate progress note for each specific clinical activity in which the individual participated during the day.       EDUCATION THERAPY    Morning group focused on establishing routine and goal review.  Members assessed related to readiness for learning and potential barriers.  Transfer of skills outside of program explored through goal sharing and connection back to the Recovery Model 5 key facets of recovery.  Throughout session, skills introduced, practiced reflecting mindfulness, meditation, movement, and connecting peers in this community.      Shireen Rueda actively shared.  Identified that they did not complete goal from last treatment day identifying she did not empty the , wanting to gain hope and responsibility.  Presented as Receptive related to readiness to learn and did not present with learning barriers. Shireen Rueda participated in mindfulness exercise and gratitude practice. Shireen Rudea made limited progress toward goal. Continue education group to assess willingness to engage, assess transfer of knowledge, and participate in skill development.  Tx Plan Objective: 1.0, Therapist:  Ian PAYTON Ed.  Visit Time    Visit Start Time: 1330  Visit Stop Time: 1430  Total Visit Duration: 60 minutes    Subjective:     Patient ID: Shireen Rueda is a 44 y.o. female.    Specialized Services Documentation  Therapist must complete separate progress note for each specific clinical activity in which the individual participated during the day.     [facility] Clinical Progress Note     GROUP PSYCHOTHERAPY    Participated actively in psychotherapy group. Group explored treatment day leaning and staff utilized Verbal, A/V, and Demonstration teaching  methods. Members shared areas of learning and set a goal outside of program. Time spent encouraging use of WRAP, skill review, and exploring resources in the community.      Shireen Rueda actively shared. They engaged in learning related to wellness tools. Shireen Rueda identified emptying the  as goal for the evening . Positive progress toward treatment objective. Continue group psychotherapy to actively practice learned skills and encourage transfer of knowledge to unstructured time.   Tx Plan Objective: 1.0, Therapist:  Ian PAYTON Ed.  Visit Time:    Visit Start Time: 1215  Visit Stop Time: 1315  Total Visit Duration: 20 minutes    Subjective:     Patient ID: Shireen Rueda is a 44 y.o. female    Specialized Services Documentation  Therapist must complete separate progress note for each specific clinical activity in which the individual participated during the day      [facility] Clinical Progress Note    Group Psychotherapy    Subjective:      This group was facilitated virtually in a private office using HIPAA Compliant and Approved [employer] Teams.  During this session, they, actively engaged in psychoeducational group about the william and belief structure. The skill helps to develop and reinforce the core beliefs and william in oneself and spirituality.  Group explored the concept of spirituality and how william  and core beliefs design ones william stricture The group talked about understanding the meaning of william in regards to physical, intellectual, and emotional expression, regulation , and recognition, and how it affects themselves and others. Teaching on the emphasis of self concept  and belief structure,  the group explored who they can go to for help was brought up as well. Group was encouraged to ask questions in an open forum at the end of group. Measurable progress displayed through engagement in topic.Processing in the course of treatment, they, will continue to engage in psychotherapy to  encourage positive self realization.     Shireen ws in case management for a portion of this session. Shireen actively participated in the group activity.  .Positive progress toward treatment objective  Tx Plan Objective 1.1, 1.2, 1.4 Therapist Ian THOMPSON Ed

## 2025-05-14 NOTE — GROUP NOTE
Visit Time    Visit Start Time:0930  Visit Stop Time: 1030  Total Visit Duration: 60 minutes    Subjective:     Patient ID: Shireen Rueda is a 44 y.o. female.    [facility] Clinical Progress Notes      Specialized Services Documentation  Therapist must complete separate progress note for each specific clinical activity in which the individual participated during the day.       Group Psychotherapy Life Skills (0930-1030) - Shireen Rueda actively engaged in group focused on gratitude which was facilitated virtually in a private office using HIPAA Compliant and Approved [employer] Teams. Shireen consented to the use of tele-video modality of treatment and was virtually present for group psychotherapy today. Group members discussed why it is important to think about what we are grateful for. Group members worked on a gratefulness worksheet . Group members learned about the G.L.A.D. technique and gratitude journaling for practicing gratefulness. Shireen stated that she is grateful for her dog and support. Group watched a short video about the 365 grateful project with speaker Nicole Aguilar. We discussed the benefits of thinking, reflecting and talking about what they are grateful for.  Shireen continues to make progress towards goals through verbal participation in group; to accomplish long term goals continue to utilize skills learned in programming. Continue with psychotherapy to educate and encourage use of wellness tools. Tx Plan Objective: 1.1,1.2 Therapist: SHALONDA Britton, JORGEW

## 2025-05-14 NOTE — PSYCH
Virtual Regular VisitName: Shireen Rueda      : 1980      MRN: [id number]  Encounter Provider:  PARTIAL [facility]CH PROGRAM  Encounter Date: 2025   [facility]  :  Assessment & Plan  Moderate episode of recurrent major depressive disorder (HCC)         Panic disorder         Grief                  Reason for visit is PHP virtual care   Recent Visits  Date Type Provider Dept   25 Telemedicine Annabelle Claudio PA-C  [facility]   Showing recent visits within past 7 days and meeting all other requirements  Future Appointments  No visits were found meeting these conditions.  Showing future appointments within next 150 days and meeting all other requirements     History of Present Illness     HPI    Past Medical History   Past Medical History:   Diagnosis Date    Anemia     Anxiety     Chest tightness or pressure     2013    Depression 2017    Hypertension     Vitamin D deficiency      Past Surgical History:   Procedure Laterality Date    CYSTOSCOPY N/A 2018    Procedure: CYSTOSCOPY;  Surgeon: Laura Bal DO;  Location: [facility];  Service: Gynecology    HYSTERECTOMY N/A 2018    Procedure: HYSTERECTOMY LAPAROSCOPIC TOTAL (LTH);  Surgeon: Laura Bal DO;  Location: [facility];  Service: Gynecology    AZ LAPAROSCOPY W/RMVL ADNEXAL STRUCTURES Bilateral 2018    Procedure: SALPINGECTOMY, LAPAROSCOPIC;  Surgeon: Laura Bal DO;  Location: [facility];  Service: Gynecology    TONSILLECTOMY      TUBAL LIGATION           Current Outpatient Medications   Medication Instructions    amLODIPine (NORVASC) 2.5 mg, Oral, Daily    atorvastatin (LIPITOR) 40 mg tablet TAKE 1 TABLET BY MOUTH EVERY DAY    DULoxetine (CYMBALTA) 90 mg, Oral, Daily    gabapentin (NEURONTIN) 600 mg, Oral, 3 times daily    hydroCHLOROthiazide 25 mg, Daily    hydrOXYzine HCL (ATARAX) 25 mg, Oral, Every 6 hours PRN     losartan (COZAAR) 100 mg, Oral, Daily    melatonin 3 mg, Oral, Daily at bedtime    prazosin (MINIPRESS) 5 mg, Oral, Daily at bedtime    traZODone (DESYREL) 100 mg, Oral, Daily at bedtime     Allergies[1]    Objective   LMP 10/17/2018     Video Exam  Physical Exam     Administrative Statements   Encounter provider [facility] PSYCH PROGRAM    The Patient is located at Home and in the following state in which I hold an active license [place].    The patient was identified by name and date of birth. Shireen Rueda was informed that this is a telemedicine visit and that the visit is being conducted through the [employer] Teams platform. She agrees to proceed..  My office door was closed. No one else was in the room.  She acknowledged consent and understanding of privacy and security of the video platform. The patient has agreed to participate and understands they can discontinue the visit at any time.    I have spent a total time of five hours of group and case management in caring for this patient on the day of the visit/encounter including Counseling / Coordination of care, not including the time spent for establishing the audio/video connection.            [1] No Known Allergies

## 2025-05-14 NOTE — PSYCH
"Subjective:     Patient ID: Shireen Rueda is a 44 y.o. female.    [facility] Clinical Progress Notes      Specialized Services Documentation  Therapist must complete separate progress note for each specific clinical activity in which the individual participated during the day.       Visit Time    Visit Start Time: 1215  Visit Stop Time: 1253  Total Visit Duration: 38 minutes      Case Management Note    Current suicide risk : Low     Medications changes/added/denied? No    Treatment session number: 8    Individual Case Management Visit provided today? Yes     [facility] follow up physician's orders: none at this time        INDIVIDUAL PSYCHOTHERAPY     Shireen Rueda actively shared in individual therapy.   Shireen Rueda identified that she has been \" in a funk.\" Shireen shared that she found pictures of her and her  brother from when they were kids. States there wasn't many pictures of them from childhood. Reports that she has been having bad flashbacks of him taking his last breath in her arms. Reports that it was very traumatic for her. States she did register for griefshare and needs to call the person back who runs it and talk to him more indepth. Discussed her shutting down and ruminating on thoughts about her brother. Discussed the importance of her staying active in group and volunteering to help decrease rumination. Discussed with Shireen ways she can keep her brothers memory alive. Shireen shared memories of her brother with this writer. Shireen plans to work on practicing gratitude and creating a memory book of her brother. Shireen plans to purchase a whiteboard sign to hang on her refrigerator and will write on it \"What would Chilo say\" as a reminder to herself about what he would say or tell her to do. Shireen reports that she did not complete her goal last night and stated that he would have said \"Stop being so lazy and get up and do the .\" Treatment plan was updated. Writing sign assigned as homework.Some " progress toward goal identified. Next session follow up to include review of goal 1.5. Continue individual psychotherapy in order to progress towards goals.     I,Shireen Rueda,am physically unable to provide a signature; however, I understand the nature of and am in agreement with the documentation presented to me via TEAMS.  I have received a copy through My Chart and/or the [employer].  I freely give verbal consent.  Name of Document (s): tx plan  Witness to verbal consent: Fabiana Banks  Witness to verbal consent: [place]      Treatment Plan Objectives addressed: 1.1, 1.2, 1.3, 1.4       Fabiana Banks, MSW, LSW

## 2025-05-14 NOTE — TELEPHONE ENCOUNTER
LVM for patient to schedule appointment to discuss medication    Patient can be reached at [phone]

## 2025-05-14 NOTE — GROUP NOTE
Visit Time    Visit Start Time: 1045  Visit Stop Time: 1145  Total Visit Duration: 60 minutes    Subjective:     Patient ID: Shireen Rueda is a 44 y.o. female.    Specialized Services Documentation  Therapist must complete separate progress note for each specific clinical activity in which the individual participated during the day.     [facility] Clinical Progress Note    Group member participated in music therapy group regarding mindfulness. The group participated in a name that tune exercise as an example of mindfulness. The group then read and discussed materials on mindfulness as well as areas they could use mindfulness in their current lives. The group then read and discussed a handout on a music mindfulness exercise.  Continue AT to encourage the development and proactive use of mindfulness coping tools.     This group was facilitated virtually in a private office using HIPAA Compliant and Approved [employer] Teams.      Shireen actively participated. She shared did not share and only participated when called upon. Minimal progress toward treatment objective.   Tx Plan Objective: 1.1,1.2,1.4, Therapist:  BERTHA Lane, MT-BC

## 2025-05-15 ENCOUNTER — TELEMEDICINE (OUTPATIENT)
Dept: PSYCHOLOGY | Facility: CLINIC | Age: 45
End: 2025-05-15
Payer: COMMERCIAL

## 2025-05-15 DIAGNOSIS — F41.0 PANIC DISORDER: ICD-10-CM

## 2025-05-15 DIAGNOSIS — F43.21 GRIEF: ICD-10-CM

## 2025-05-15 DIAGNOSIS — F33.1 MODERATE EPISODE OF RECURRENT MAJOR DEPRESSIVE DISORDER (HCC): Primary | ICD-10-CM

## 2025-05-15 PROCEDURE — G0177 OPPS/PHP; TRAIN & EDUC SERV: HCPCS

## 2025-05-15 PROCEDURE — G0410 GRP PSYCH PARTIAL HOSP 45-50: HCPCS

## 2025-05-15 NOTE — GROUP NOTE
Subjective:     Patient ID: Shireen Rueda is a 44 y.o. female.    [facility] Clinical Progress Notes      Visit Time    Visit Start Time: 1330  Visit Stop Time: 1430  Total Visit Duration: 60 minutes     Subjective:     Patient ID: Shireen Rueda is a 44 y.o. female.    Specialized Services Documentation  Therapist must complete separate progress note for each specific clinical activity in which the individual participated during the day.     [facility] Clinical Progress Note    This group was facilitated virtually in a private office using HIPAA Compliant and Approved [employer] Teams.  Shireen Rueda consented to the use of tele-video modality of treatment.    Shireen Rueda actively shared. She engaged in learning related to wellness tools. Shireen Rueda identified working on sleep hygiene as goal from the evening and cope ahead as a take away from program. Some positive progress toward treatment objective. Continue group psychotherapy to actively practice learned skills and encourage transfer of knowledge to unstructured time.   Tx Plan Objective: 1.1, 1.2, 1.3, 1.4 , Therapist: Gwen SHAW RN

## 2025-05-15 NOTE — GROUP NOTE
Visit Time:    Visit Start Time: 1215  Visit Stop Time: 1315  Total Visit Duration: 45 minutes    Subjective:     Patient ID: Shireen Rueda is a 44 y.o. female    Specialized Services Documentation  Therapist must complete separate progress note for each specific clinical activity in which the individual participated during the day      [facility] Clinical Progress Note    Group Psychotherapy        Subjective:        This group was facilitated virtually in a private office using HIPAA Compliant and Approved [employer] Teams.  During this session, they,  actively engaged in psychoeducational group about employment stressors. The skill helps to create effective work relationships. The group discovered strategies that help them to manage work relationships on a short term and long term basis. The group talked about understanding the purpose, and meaning of work stressors  in intellectual and emotional expression, regulation , and recognition, and how it affects themselves and others.. Teaching on the emphasis of self monitoring , suggestive solutions, verbal and non-verbal communication,and keeping commitments, strategies were discusses to reduce work stressors.  Group was encouraged to ask questions in an open forum at the end of group. Measurable progress displayed through engagement in topic. Processing in the course of treatment  will continue to engage in psychotherapy to encourage positive self realization.     Shireen was in case management for a portion of this session. Shireen actively participated as a frequent participant in role play activities and group discussion.  .Positive progress toward treatment objective  Tx Plan Objective 1.1, 1.2, 1.4 Therapist Ian THOMPSON Ed

## 2025-05-15 NOTE — GROUP NOTE
Visit Time    Visit Start Time: 0930  Visit Stop Time: 1030  Total Visit Duration: 60 minutes    Subjective:     Patient ID: Shireen Rueda is a 44 y.o. female.    [facility] Clinical Progress Notes      Specialized Services Documentation  Therapist must complete separate progress note for each specific clinical activity in which the individual participated during the day.       Group Psychotherapy Life Skills (0930-1030)  Shireen Rueda actively engaged in group focused on core beliefs which was facilitated virtually in a private office using HIPAA Compliant and Approved [employer] Teams. Shireen consented to the use of tele-video modality of treatment and was virtually present for group psychotherapy today. Group members watched a short video, Healing Your Negative Core Beliefs. Group members learned and share about their core beliefs. The group discussed how core beliefs influence their thoughts and behaviors. During the activity the group learned about cognitive reframing and explored ways to reshape their negative core beliefs into positive beliefs. Group members wrote one of their negative core beliefs and had to provide three pieces of evidence that prove the negative core belief untrue.The group learned how to re-frame their irrational thoughts and turn them into positive thoughts.Discussed thought records and how to use them. Shireen did share how she re-framed her irrational thought. Group members were asked to challenge one negative thought per day. hSireen continues to make progress towards goals through verbal participation in group; to accomplish long term goals continue to utilize skills learned in programming. Continue with psychotherapy to educate and encourage use of wellness tools. Tx Plan Objective: 1.1,1.2 Therapist: Fabiana Banks,SHALONDA,JORGEW

## 2025-05-15 NOTE — PSYCH
Virtual Regular VisitName: Shireen Rueda      : 1980      MRN: [id number]  Encounter Provider:  PARTIAL PSYCH PROGRAM  Encounter Date: 5/15/2025   Encounter department: [facility]ITAL[facility]  :  Assessment & Plan  Moderate episode of recurrent major depressive disorder (HCC)         Panic disorder         Grief             Goals addressed in session: see TX plan          Reason for visit is PHP virtual care   Recent Visits  Date Type Provider Dept   25 Telemedicine Annabelle Claudio PA-C  [facility]   Showing recent visits within past 7 days and meeting all other requirements  Future Appointments  No visits were found meeting these conditions.  Showing future appointments within next 150 days and meeting all other requirements     History of Present Illness     HPI    Past Medical History   Past Medical History:   Diagnosis Date    Anemia     Anxiety     Chest tightness or pressure     2013    Depression 2017    Hypertension     Vitamin D deficiency      Past Surgical History:   Procedure Laterality Date    CYSTOSCOPY N/A 2018    Procedure: CYSTOSCOPY;  Surgeon: Laura Bal DO;  [facility];  Service: Gynecology    HYSTERECTOMY N/A 2018    Procedure: HYSTERECTOMY LAPAROSCOPIC TOTAL (LTH);  Surgeon: Laura Bal DO;  [facility];  Service: Gynecology    CO LAPAROSCOPY W/RMVL ADNEXAL STRUCTURES Bilateral 2018    Procedure: SALPINGECTOMY, LAPAROSCOPIC;  Surgeon: Laura Bal DO;  [facility];  Service: Gynecology    TONSILLECTOMY      TUBAL LIGATION           Current Outpatient Medications   Medication Instructions    amLODIPine (NORVASC) 2.5 mg, Oral, Daily    atorvastatin (LIPITOR) 40 mg tablet TAKE 1 TABLET BY MOUTH EVERY DAY    DULoxetine (CYMBALTA) 90 mg, Oral, Daily    gabapentin (NEURONTIN) 600 mg, Oral, 3 times daily    hydroCHLOROthiazide 25 mg, Daily    hydrOXYzine HCL  (ATARAX) 25 mg, Oral, Every 6 hours PRN    losartan (COZAAR) 100 mg, Oral, Daily    melatonin 3 mg, Oral, Daily at bedtime    prazosin (MINIPRESS) 5 mg, Oral, Daily at bedtime    traZODone (DESYREL) 100 mg, Oral, Daily at bedtime     Allergies[1]    Objective   LMP 10/17/2018     Video Exam  Physical Exam     Administrative Statements   Encounter provider [facility] PROGRAM    The Patient is located at Home and in the following state in which I hold an active license [place].    The patient was identified by name and date of birth. Shireen Rueda was informed that this is a telemedicine visit and that the visit is being conducted through the Microsoft Teams platform. She agrees to proceed..  My office door was closed. No one else was in the room.  She acknowledged consent and understanding of privacy and security of the video platform. The patient has agreed to participate and understands they can discontinue the visit at any time.    I have spent a total time of five hours of group and case management in caring for this patient on the day of the visit/encounter including Counseling / Coordination of care, not including the time spent for establishing the audio/video connection.              [1] No Known Allergies

## 2025-05-15 NOTE — PSYCH
Subjective:     Patient ID: Shireen Rueda is a 44 y.o. female.    [facility] Clinical Progress Notes      Specialized Services Documentation  Therapist must complete separate progress note for each specific clinical activity in which the individual participated during the day.       Visit Time    Visit Start Time: 1215  Visit Stop Time: 1230  Total Visit Duration: 15 minutes      Case Management Note    Current suicide risk : Low     Medications changes/added/denied? No    Treatment session number: 9    Individual Case Management Visit provided today? Yes     [facility] follow up physician's orders: none at this time        INDIVIDUAL PSYCHOTHERAPY     Shireen Rueda actively shared in individual therapy.   Shireen Rueda identified that the cope ahead group was really helpful. [facility]en shared her cope ahead plan with this writer. Shireen plans to work on meditation, cleaning her room,and organizing things at home. Shireen reports that she has been using deep breathing, a stress ball, a mint, chew gum, and journaling to help decrease her anxiety. Shireen reports that she would know if she is getting better by having decrease in anxiety, crying spells and being able to complete tasks in one sitting. Shireen shared that she been able to keep more motivated and is more active in groups since the end of group yesterday. Discussed with Shireen how she can prepare for discharge on 5/23. Shireen stated that she will check into joining a virtual support group in addition to griefshare. Coping with Loss workbook and self-soothe bag information was sent. Informed of med check next week and informed that this writer will be off tomorrow. Encouraged her to reach out for additional support if needed. Some progress toward goal identified. Next session follow up to include review of weekend goals. Continue individual psychotherapy in order to progress in treatment.     Treatment Plan Objectives  addressed: 1.1, 1.2, 1.3, 1.4       Fabiana Banks, MSW, LSW

## 2025-05-15 NOTE — GROUP NOTE
Visit Time    Visit Start Time: 0830  Visit Stop Time: 0930  Total Visit Duration: 60 minutes    Subjective:     Patient ID: Shireen Rueda is a 44 y.o. female.    [facility] Clinical Progress Notes      Specialized Services Documentation  Therapist must complete separate progress note for each specific clinical activity in which the individual participated during the day.       This group was facilitated virtually in a private office using HIPAA Compliant and Approved [employer] Teams.  Shireen Rueda consented to the use of tele-video modality of treatment.      EDUCATION THERAPY      Shireen Rueda actively shared in morning assessment and goal review. Presented as Receptive related to readiness to learn. Shireen Rueda  did complete goal from last treatment day identifying gaining hope and responsibility. Shireen Rueda did not present with any barriers to learning. Throughout morning group, Shireen Rueda participated in mindfulness exercise. Shireen Rueda made good progress toward goal. Continue education group to assess willingness to engage, assess transfer of knowledge, and participate in skill development.    Tx Plan Objective: 1.1,1.2, Therapist: Ashley Costenbader MA LMT        Visit Time     Visit Start Time: 1045  Visit Stop Time: 1145  Total Visit Duration:  60 minutes       Subjective:     Patient ID: Shireen Rueda is a 44 y.o. female.     [facility] Clinical Progress Notes      Specialized Services Documentation  Therapist must complete separate progress note for each specific clinical activity in which the individual participated during the day.      This group was facilitated virtually in a private office using HIPAA Compliant and Approved [employer] Teams.  Shireen Rueda consented to the use of tele-video modality of treatment.      Group Psychotherapy - Coping Ahead    Shireen Rueda participated actively in a psychotherapy group focused on coping ahead.  This writer detailed the coping ahead skill with group members and  developed a coping ahead plan together. Following this, members were encouraged to work on their own coping ahead plans. Consistent discussion was encouraged throughout the group duration. Shireen Rueda made good efforts towards progress goals which were displayed through participation, notetaking, and engagement in topic.  Continue to run daily group psychotherapy to meet treatment needs and encourage Shireen Rueda to practice skills outside of program.      Tx Plan Objective:1.1,1.2,1.4  Therapist: Ashley Costenbader MA LMT

## 2025-05-16 ENCOUNTER — TELEMEDICINE (OUTPATIENT)
Dept: PSYCHOLOGY | Facility: CLINIC | Age: 45
End: 2025-05-16
Payer: COMMERCIAL

## 2025-05-16 DIAGNOSIS — F33.1 MODERATE EPISODE OF RECURRENT MAJOR DEPRESSIVE DISORDER (HCC): Primary | ICD-10-CM

## 2025-05-16 DIAGNOSIS — F41.0 PANIC DISORDER: ICD-10-CM

## 2025-05-16 DIAGNOSIS — F43.21 GRIEF: ICD-10-CM

## 2025-05-16 PROCEDURE — G0177 OPPS/PHP; TRAIN & EDUC SERV: HCPCS

## 2025-05-16 PROCEDURE — G0410 GRP PSYCH PARTIAL HOSP 45-50: HCPCS

## 2025-05-16 PROCEDURE — G0176 OPPS/PHP;ACTIVITY THERAPY: HCPCS

## 2025-05-16 NOTE — PSYCH
Subjective:     Patient ID: Shireen Rueda is a 44 y.o. female.    Specialized Services Documentation  Therapist must complete separate progress note for each specific clinical activity in which the individual participated during the day.        Case Management Note    Gwen SHAW RN    Current suicide risk : Low     A case management session is not scheduled today with Shireen Rueda ; additionally, they did not request a CM meeting.  Shireen Rueda is aware of next scheduled 1:1.    Medications changes/added/denied? No    Treatment session number: 10    Individual Case Management Visit provided today? No    [facility] follow up physician's orders: None at this time

## 2025-05-16 NOTE — GROUP NOTE
"Visit Time    Visit Start Time: 1215  Visit Stop Time: 1315  Total Visit Duration: 60 minutes    Subjective:     Patient ID: Shireen Rueda is a 44 y.o. female.    Specialized Services Documentation  Therapist must complete separate progress note for each specific clinical activity in which the individual participated during the day.     [facility] Clinical Progress Note    Group member participated in music therapy group regarding writing letters to one's past self. The group discussed past events/points of concern in their lives they felt the need to address. The group listened to and discussed the song \"Letter to Me\". The group then discussed and worked on letter to self worksheet. The group then listened to and discussed the song \"Starting Over\", and discussed an area they hoped to grow following writing their letter to self.  Continue AT to encourage healthy self reflection.     This group was facilitated virtually in a private office using HIPAA Compliant and Approved [email or web address] Teams.     Shireen actively participated. She shared her letter and how she feels she has never faced the trauma of her past and she feels she is ready to work on this so she can move forward from it. Some progress toward treatment objective.   Tx Plan Objective: 1.1,1.2,1.4, Therapist:  River Viera, BERTHA, MT-BC    "

## 2025-05-16 NOTE — GROUP NOTE
Visit Time    Visit Start Time 1045  Visit Stop Time: 1145  Total Visit Duration: 60 minutes    Subjective:     Patient ID: Shireen Rueda is a 44 y.o. female.     [facility] Clinical Progress Notes      Specialized Services Documentation  Therapist must complete separate progress note for each specific clinical activity in which the individual participated during the day.        GROUP PSYCHOTHERAPY  Group was facilitated virtually in a private office using HIPAA Compliant and Approved [employer] Teams. Group member consented to the use of tele-video modality of treatment and was virtually present for group psychotherapy today.  They attentively listened to STANLEY Loera share her life story as she co-led this session.  Group encouraged power of learning about self, accepting illness and personal responsibility in recovery.  Community resources reviewed in addition to personal resources like the affirmations.  Progress toward goals noted.  Continue psychotherapy to encourage self -awareness and healthy engagement of supports.    TX Plan Objectives: 1.1, 1.2, 1.4   Therapist: Ashley Costenbader MA LMT

## 2025-05-16 NOTE — PSYCH
Virtual Regular VisitName: Shireen Rueda      : 1980      MRN: [id number]  Encounter Provider:  PARTIAL PSYCH PROGRAM  Encounter Date: 2025   Encounter department: [facility]  :  Assessment & Plan  Moderate episode of recurrent major depressive disorder (HCC)         Panic disorder         Grief             Goals addressed in session:      Reason for visit is PHP virtual care    Recent Visits  Date Type Provider Dept   25 Telemedicine Annabelle Claudio PA-C  [facility]   Showing recent visits within past 7 days and meeting all other requirements  Future Appointments  No visits were found meeting these conditions.  Showing future appointments within next 150 days and meeting all other requirements     History of Present Illness     HPI    Past Medical History   Past Medical History:   Diagnosis Date    Anemia     Anxiety     Chest tightness or pressure     2013    Depression 2017    Hypertension     Vitamin D deficiency      Past Surgical History:   Procedure Laterality Date    CYSTOSCOPY N/A 2018    Procedure: CYSTOSCOPY;  Surgeon: Laura Bal DO;  [facility];  Service: Gynecology    HYSTERECTOMY N/A 2018    Procedure: HYSTERECTOMY LAPAROSCOPIC TOTAL (LTH);  Surgeon: Laura Bal DO;  [facility];  Service: Gynecology    MT LAPAROSCOPY W/RMVL ADNEXAL STRUCTURES Bilateral 2018    Procedure: SALPINGECTOMY, LAPAROSCOPIC;  Surgeon: Laura Bal DO;  [facility];  Service: Gynecology    TONSILLECTOMY      TUBAL LIGATION           Current Outpatient Medications   Medication Instructions    amLODIPine (NORVASC) 2.5 mg, Oral, Daily    atorvastatin (LIPITOR) 40 mg tablet TAKE 1 TABLET BY MOUTH EVERY DAY    DULoxetine (CYMBALTA) 90 mg, Oral, Daily    gabapentin (NEURONTIN) 600 mg, Oral, 3 times daily    hydroCHLOROthiazide 25 mg, Daily    hydrOXYzine HCL (ATARAX) 25 mg,  Oral, Every 6 hours PRN    losartan (COZAAR) 100 mg, Oral, Daily    melatonin 3 mg, Oral, Daily at bedtime    prazosin (MINIPRESS) 5 mg, Oral, Daily at bedtime    traZODone (DESYREL) 100 mg, Oral, Daily at bedtime     Allergies[1]    Objective   LMP 10/17/2018     Video Exam  Physical Exam     Administrative Statements   Encounter provider [facility] PSYCH PROGRAM    The Patient is located at Home and in the following state in which I hold an active license [place].    The patient was identified by name and date of birth. Shireen Rueda was informed that this is a telemedicine visit and that the visit is being conducted through the Microsoft Teams platform. She agrees to proceed..  My office door was closed. No one else was in the room.  She acknowledged consent and understanding of privacy and security of the video platform. The patient has agreed to participate and understands they can discontinue the visit at any time.    I have spent a total time of five hours of group and case management in caring for this patient on the day of the visit/encounter including Counseling / Coordination of care, not including the time spent for establishing the audio/video connection.            [1] No Known Allergies

## 2025-05-16 NOTE — GROUP NOTE
Visit Time    Visit Start Time: 0830  Visit Stop Time: 0930  Total Visit Duration: 60 minutes    Subjective:     Patient ID: Shireen Rueda is a 44 y.o. female.     [facility] Clinical Progress Notes      Specialized Services Documentation  Therapist must complete separate progress note for each specific clinical activity in which the individual participated during the day.       EDUCATION THERAPY    Morning group focused on establishing routine and goal review.  Members assessed related to readiness for learning and potential barriers.  Transfer of skills outside of program explored through goal sharing and connection back to the Recovery Model 5 key facets of recovery.  Throughout session, skills introduced, practiced reflecting mindfulness, meditation, movement, and connecting peers in this community.      Shireen Rueda actively shared.  Identified that they did complete goal from last treatment day identifying she worked on sleep regulation gaining hope and responsibility.  Presented as Receptive related to readiness to learn and did not present with learning barriers. Shireen Rueda participated in mindfulness exercise and movement experience. Shireen Rueda made positive progress toward goal. Continue education group to assess willingness to engage, assess transfer of knowledge, and participate in skill development.  Tx Plan Objective: 1.0, Therapist:  Ian PAYTON Ed.  Visit Time    Visit Start Time: 1330  Visit Stop Time: 1430  Total Visit Duration: 60 minutes    Subjective:     Patient ID: Shireen Rueda is a 44 y.o. female.    Specialized Services Documentation  Therapist must complete separate progress note for each specific clinical activity in which the individual participated during the day.     [facility] Clinical Progress Note     GROUP PSYCHOTHERAPY    Participated actively in psychotherapy group. Group explored treatment day leaning and staff utilized Verbal, A/V, and Demonstration teaching methods. Members  shared areas of learning and set a goal outside of program. Time spent encouraging use of WRAP, skill review, and exploring resources in the community.      Shireen Rueda actively shared. They engaged in learning related to wellness tools. Shireen Rueda identified doing her laundry as goal for the evening . Positive progress toward treatment objective. Continue group psychotherapy to actively practice learned skills and encourage transfer of knowledge to unstructured time.   Tx Plan Objective: 1.0, Therapist:  Ian PAYTON Ed.  Visit Time:    Visit Start Time: 930  Visit Stop Time: 1030  Total Visit Duration: 60 minutes    Subjective:     Patient ID: Shireen Rueda is a 44 y.o. female    Specialized Services Documentation  Therapist must complete separate progress note for each specific clinical activity in which the individual participated during the day      [facility] Clinical Progress Note    Group Psychotherapy        This group was facilitated virtually in a private office using HIPAA Compliant and Approved [employer] Teams.  During this session, they actively engaged in psychoeducational group about self affirmations. The skill helps to maintain and regulate positive self affirmations and positive self image. Group discovered strategies and statements that help them to manage positive well being on a short term and long term basis. The group talked about understanding the purpose, and meaning of self affirmation in intellectual and emotional expression, regulation , and recognition, and how it affects themselves and others. Teaching on the emphasis of positive self affirmation and self-care, who the group can go to for help was brought up as well. Group was encouraged to ask questions in an open forum at the end of group. Measurable progress displayed through engagement in topic. Processing in the course of treatment will continue to engage in psychotherapy to encourage positive self realization.  Treatment Plan  Objective 1.1, 1.2, 1.3, 1.4 Therapist: Ian PAYTON Ed.     Shireen actively participated in this activity of producing a self-affirmation for herself to use on a daily basis. At times, Shireen presented as tearful, but was arlin to complete the group goal.   .Positive progress toward treatment objective  Tx Plan Objective 1.1, 1.2, 1.4 Therapist Ian PAYTON. Ed

## 2025-05-19 ENCOUNTER — TELEPHONE (OUTPATIENT)
Age: 45
End: 2025-05-19

## 2025-05-19 ENCOUNTER — TELEMEDICINE (OUTPATIENT)
Dept: PSYCHOLOGY | Facility: CLINIC | Age: 45
End: 2025-05-19
Payer: COMMERCIAL

## 2025-05-19 DIAGNOSIS — F43.21 GRIEF: ICD-10-CM

## 2025-05-19 DIAGNOSIS — F33.1 MODERATE EPISODE OF RECURRENT MAJOR DEPRESSIVE DISORDER (HCC): Primary | ICD-10-CM

## 2025-05-19 DIAGNOSIS — F41.0 PANIC DISORDER: ICD-10-CM

## 2025-05-19 PROCEDURE — G0410 GRP PSYCH PARTIAL HOSP 45-50: HCPCS

## 2025-05-19 PROCEDURE — 99213 OFFICE O/P EST LOW 20 MIN: CPT | Performed by: PHYSICIAN ASSISTANT

## 2025-05-19 PROCEDURE — G0176 OPPS/PHP;ACTIVITY THERAPY: HCPCS

## 2025-05-19 PROCEDURE — G0177 OPPS/PHP; TRAIN & EDUC SERV: HCPCS

## 2025-05-19 NOTE — GROUP NOTE
Visit Time    Visit Start Time: 1215  Visit Stop Time: 1315  Total Visit Duration: 60 minutes    Subjective:     Patient ID: Shireen Rueda is a 44 y.o. female.    [facility] Clinical Progress Notes      Specialized Services Documentation  Therapist must complete separate progress note for each specific clinical activity in which the individual participated during the day.       Group Psychotherapy Life Skills ([id number]) Shireen Rueda actively engaged in group focused on the drama triangle which was facilitated virtually in a private office using HIPAA Compliant and Approved [employer] Teams. Shireen consented to the use of tele-video modality of treatment and was virtually present for group psychotherapy today. The group learned about the drama triangle and the roles of the victim, rescuer, and persecutor. Shireen identified having been in the role of a victim. During the activity group members learned how to stop the drama triangle from continuing. Shireen Rueda  identified ways that they would stop the drama triangle from occuring. Shireen continues to make progress towards goals through verbal participation in group; to accomplish long term goals continue to utilize skills learned in programming. Continue with psychotherapy to educate and encourage use of wellness tools. Tx Plan Objective: 1.1,1.2 Therapist: Fabiana Banks,MSW, LSW

## 2025-05-19 NOTE — GROUP NOTE
Visit Time    Visit Start Time: 0830  Visit Stop Time: 0930  Total Visit Duration: 60 minutes    Subjective:     Patient ID: Shireen Rueda is a 44 y.o. female.     [facility] Clinical Progress Notes      Specialized Services Documentation  Therapist must complete separate progress note for each specific clinical activity in which the individual participated during the day.       EDUCATION THERAPY    Morning group focused on establishing routine and goal review.  Members assessed related to readiness for learning and potential barriers.  Transfer of skills outside of program explored through goal sharing and connection back to the Recovery Model 5 key facets of recovery.  Throughout session, skills introduced, practiced reflecting mindfulness, meditation, movement, and connecting peers in this community.      Shireen Rueda actively shared.  Identified that they did complete goal from last treatment day identifying she started her laundry gaining hope and responsibility.  Presented as Receptive related to readiness to learn and did not present with learning barriers. Shireen Rueda participated in mindfulness exercise and movement experience. Shireen Rueda made adequate progress toward goal. Continue education group to assess willingness to engage, assess transfer of knowledge, and participate in skill development.  Tx Plan Objective: 1.0, Therapist:  Ian PAYTON Ed.  Visit Time    Visit Start Time: 1330  Visit Stop Time: 1430  Total Visit Duration: 60 minutes    Subjective:     Patient ID: Shireen Rueda is a 44 y.o. female.    Specialized Services Documentation  Therapist must complete separate progress note for each specific clinical activity in which the individual participated during the day.     [facility] Clinical Progress Note     GROUP PSYCHOTHERAPY    Participated actively in psychotherapy group. Group explored treatment day leaning and staff utilized Verbal, A/V, and Demonstration teaching methods. Members shared  "areas of learning and set a goal outside of program. Time spent encouraging use of WRAP, skill review, and exploring resources in the community.      Shireen Rueda actively shared. They engaged in learning related to wellness tools. Shireen Rueda identified reaching out to hospice as goal for the evening . Positive progress toward treatment objective. Continue group psychotherapy to actively practice learned skills and encourage transfer of knowledge to unstructured time.   Tx Plan Objective: 1.0, Therapist:  Ian PAYTON Ed.  Visit Time:    Visit Start Time: 930  Visit Stop Time: 1030  Total Visit Duration: 20 minutes    Subjective:     Patient ID: Shireen Rueda is a 44 y.o. female    Specialized Services Documentation  Therapist must complete separate progress note for each specific clinical activity in which the individual participated during the day      [facility] Clinical Progress Note    Group Psychotherapy        Group Therapy    Subjective:      This group was facilitated virtually in a private office using HIPAA Compliant and Approved [employer] Teams During this session, they, consented to the use of tele-video modality of treatment.  During this session, they, actively engaged in psychoeducational group about radical acceptance. The skill helps an individual to learn to accept situations that are out of ones control. Reducing denial of situations and reducing distress..Group discovered strategies that help them to manage emotional well being on a short term and long term basis. The introduction of the concept of \"wise mid\" is used in this process.The group talked about understanding the purpose and meaning radical acceptance with  \"wise mind\" ,regulation , recognition, and how it affects themselves and others..Teaching on the emphasis of radical acceptance, who the group can go to for help was brought up as well. Group was encouraged to ask questions in an open forum at the end of group. Measurable progress " displayed through engagement in topic. Processing in the course of treatment will continue to engage in psychotherapy to encourage positive self realization.     Shireen was in case management for a portion of this session. Shireen  participated in the group discussion at the end of the group session.  .Adequate progress toward treatment objective  Tx Plan Objective 1.1, 1.2, 1.4 Therapist Ian THOMPSON Ed

## 2025-05-19 NOTE — PSYCH
Virtual Regular VisitName: Shireen Rueda      : 1980      MRN: [id number]  Encounter Provider:  PARTIAL PSYCH PROGRAM  Encounter Date: 2025   Encounter department: [facility] PARTIAL HOSPITALIZATION PROGRAM  :  Assessment & Plan  Moderate episode of recurrent major depressive disorder (HCC)    Orders:    Ambulatory referral to Psych Services; Future    Panic disorder    Orders:    Ambulatory referral to Psych Services; Future    Grief    Orders:    Ambulatory referral to Psych Services; Future        Goals addressed in session: see TX plan     Reason for visit is PHP virtual care   Recent Visits  Date Type Provider Dept   25 Telemedicine Annabelle Claudio PA-C  [facility]   Showing recent visits within past 7 days and meeting all other requirements  Today's Visits  Date Type Provider Dept   25 Telemedicine Annabelle Claudio PA-C  [facility]   Showing today's visits and meeting all other requirements  Future Appointments  No visits were found meeting these conditions.  Showing future appointments within next 150 days and meeting all other requirements     History of Present Illness     HPI    Past Medical History   Past Medical History:   Diagnosis Date    Anemia     Anxiety     Chest tightness or pressure     2013    Depression 2017    Hypertension     Vitamin D deficiency      Past Surgical History:   Procedure Laterality Date    CYSTOSCOPY N/A 2018    Procedure: CYSTOSCOPY;  Surgeon: Laura Bal DO;  [facility]: [facility];  Service: Gynecology    HYSTERECTOMY N/A 2018    Procedure: HYSTERECTOMY LAPAROSCOPIC TOTAL (LTH);  Surgeon: Laura Bal DO;  Location: [facility];  Service: Gynecology    IL LAPAROSCOPY W/RMVL ADNEXAL STRUCTURES Bilateral 2018    Procedure: SALPINGECTOMY, LAPAROSCOPIC;  Surgeon: Laura Bal DO;  Location: [facility];  Service: Gynecology    TONSILLECTOMY      TUBAL LIGATION            Current Outpatient Medications   Medication Instructions    amLODIPine (NORVASC) 2.5 mg, Oral, Daily    atorvastatin (LIPITOR) 40 mg tablet TAKE 1 TABLET BY MOUTH EVERY DAY    DULoxetine (CYMBALTA) 90 mg, Oral, Daily    gabapentin (NEURONTIN) 600 mg, Oral, 3 times daily    hydroCHLOROthiazide 25 mg, Daily    hydrOXYzine HCL (ATARAX) 25 mg, Oral, Every 6 hours PRN    losartan (COZAAR) 100 mg, Oral, Daily    melatonin 3 mg, Oral, Daily at bedtime    prazosin (MINIPRESS) 5 mg, Oral, Daily at bedtime    traZODone (DESYREL) 100 mg, Oral, Daily at bedtime     Allergies[1]    Objective   LMP 10/17/2018     Video Exam  Physical Exam     Administrative Statements   Encounter provider  PARTIAL PSYCH PROGRAM    The Patient is located at Home and in the following state in which I hold an active license [place].    The patient was identified by name and date of birth. Shireen Rueda was informed that this is a telemedicine visit and that the visit is being conducted through the Microsoft Teams platform. She agrees to proceed..  My office door was closed. No one else was in the room.  She acknowledged consent and understanding of privacy and security of the video platform. The patient has agreed to participate and understands they can discontinue the visit at any time.    I have spent a total time of five hours of group and case managment in caring for this patient on the day of the visit/encounter including Counseling / Coordination of care, not including the time spent for establishing the audio/video connection.              [1] No Known Allergies

## 2025-05-19 NOTE — PSYCH
"Subjective:     Patient ID: Shireen Rueda is a 44 y.o. female.    [facility] Clinical Progress Notes      Specialized Services Documentation  Therapist must complete separate progress note for each specific clinical activity in which the individual participated during the day.       Visit Time    Visit Start Time: 0930  Visit Stop Time: 1005  Total Visit Duration: 35 minutes      Case Management Note    Current suicide risk : Low     Medications changes/added/denied? No    Treatment session number: 10    Individual Case Management Visit provided today? Yes     [facility] follow up physician's orders: none at this time        INDIVIDUAL PSYCHOTHERAPY     Shireen Rueda actively shared in individual therapy.   Shireen Rueda identified that she had a hard weekend. Reports that she went looking for purple flowers with her  in remembrance of her brother. States she did take her PRN Atarax . Reports that she became tearful and had heighten anxiety. States she tried focusing on her breathing .  was trying to be supportive. Shireen states \" I am frusterated that I take two steps forward and one step back.\" Reports that she recognizes that she is making progress but then has some bad moments. Shireen reports suicidal ideation. Recognizes that they are just thoughts denies plan or intent. States she will keep herself safe today by reaching out to her  and kids. This writer and Shireen worked on grounding techniques as Shireen appeared very tearful and was starting with a panic attack. Shireen used ice and ate something sour to help ground herself. Discussed working on a self-soothe bag or box. Encouraged Shireen to do her own imagery of the beach or find a guided imagery of the beach which Shireen identified as a place she likes to go. [facility].Encouraged to reach out to hospice faith and  as discussed with medication management provider. Shireen does have their phone " number. Encouraged to join a virtual support group until she starts griefshare on 6/3. Safety plan updated. Continue individual psychotherapy in order to progress on tx plan goals.       I,Shireen Rueda,am physically unable to provide a signature; however, I understand the nature of and am in agreement with the documentation presented to me via TEAMS.  I have received a copy through My Chart and/or the [employer].  I freely give verbal consent.  Name of Document (s): safety plan  Witness to verbal consent: Fabiana Banks  Witness to verbal consent: River Viera    Treatment Plan Objectives addressed: 1.1, 1.2, 1.3, 1.4       Fabiana Banks, MSW, LSW

## 2025-05-19 NOTE — GROUP NOTE
Visit Time    Visit Start Time: 1045  Visit Stop Time: 1145  Total Visit Duration: 60 minutes    Subjective:     Patient ID: Shireen Rueda is a 44 y.o. female.    Specialized Services Documentation  Therapist must complete separate progress note for each specific clinical activity in which the individual participated during the day.     [facility] Clinical Progress Note    Group member actively participated in music therapy group regarding self-validation. The group participated in a dereje discussion on the song “Hated”. Group members were asked to share examples of times where they felt invalidated by others. The group then read and discussed the handout on self validation and related the content to their experience. The group participated in a dereje rewrite of the song “I am Light”, identifying affirmations for self-validation. Continue AT to encourage the development and proactive use of self-validating techniques.      This group was facilitated virtually in a private office using HIPAA Compliant and Approved [employer] Teams.     Shireen participated.  She did not share but appeared attentive throughout. Minimal progress toward treatment objective.   Tx Plan Objective: 1.1,1.2,1.4, Therapist:  BERTHA Lane, MT-BC

## 2025-05-19 NOTE — TELEPHONE ENCOUNTER
Contacted patient in regards to ASAP/STAT Referral in attempts to verify patient's needs of services and schedule soonest available appointment. Writer left vm to call intake at [phone]    Please transfer to intake to offer soonest appt.     Attempt #1

## 2025-05-19 NOTE — BH CRISIS PLAN
Client Name: Shireen Rueda       Client YOB: 1980    [employer] Safety Plan      Creation Date: 1/24/25 Update Date: 3/19/26   Created By: River Viear Last Updated By: Fabiana Banks      Step 1: Warning Signs:   Warning Signs   Sleep difficulties   poor appetite   poor focus/concentration   tearfulness   shaking   isolation   no motivation   Suicidal ideation            Step 2: Internal Coping Strategies:   Internal Coping Strategies   breathing techniques   meditaion   coloring   tapping   massaging hands   self soothe box   sour candy   fidgets   ice   cinnamon   walk            Step 3: People and social settings that provide distraction:   Name Contact Information    in phone   kids in phone    Places   Home   being with my dogs   beach           Step 4: People whom I can ask for help during a crisis:      Name Contact Information    My family in phone    friend Thong in phone    In-Laws in phone      Step 5: Professionals or agencies I can contact during a crisis:      Clinican/Agency Name Phone Emergency Contact    Crisis [phone]     Nenita Velásquez -[phone]       [facility] Emergency Department Emergency Department Phone Emergency Department Address    [facility] [phone] [address] [address]        Crisis Phone Numbers:   Suicide Prevention Lifeline: Call or Text  988 Crisis Text Line: Text HOME to 100-214   Please note: [facility] do not have a separate number for Child/Adolescent specific crisis. If your county is not listed under Child/Adolescent, please call the adult number for your county      Adult Crisis Numbers: Child/Adolescent Crisis Numbers   [facility]: [phone] [facility]: [phone]   [facility]: [phone] [facility]: [phone]   [facility]: [phone] [place]: [phone]   [facility]: [phone] [place] [facility]: [phone]   [place] [facility]: [phone]    [facility]: [phone]   [facility]: [phone]   [facility]: [phone] (daytime) [phone] (after hours, weekends, holidays)      Step 6: Making the environment safer (plan for lethal means safety):   Patient did not identify any lethal methods: Yes     Optional: What is most important to me and worth living for?   my family     Michelle Safety Plan. Rossy Kowalski and Ventura Rodriguez. Used with permission of the authors.
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
## 2025-05-20 ENCOUNTER — TELEPHONE (OUTPATIENT)
Age: 45
End: 2025-05-20

## 2025-05-20 ENCOUNTER — TELEMEDICINE (OUTPATIENT)
Dept: PSYCHOLOGY | Facility: CLINIC | Age: 45
End: 2025-05-20
Payer: COMMERCIAL

## 2025-05-20 DIAGNOSIS — F41.0 PANIC DISORDER: Primary | ICD-10-CM

## 2025-05-20 DIAGNOSIS — F43.21 GRIEF: ICD-10-CM

## 2025-05-20 DIAGNOSIS — F33.1 MODERATE EPISODE OF RECURRENT MAJOR DEPRESSIVE DISORDER (HCC): ICD-10-CM

## 2025-05-20 PROCEDURE — G0177 OPPS/PHP; TRAIN & EDUC SERV: HCPCS

## 2025-05-20 PROCEDURE — G0410 GRP PSYCH PARTIAL HOSP 45-50: HCPCS

## 2025-05-20 NOTE — GROUP NOTE
"    Visit Time    Visit Start Time: 0930  Visit Stop Time: 1030  Total Visit Duration: 60 minutes     Subjective:     Patient ID: Shireen Rueda is a 44 y.o. female.    Specialized Services Documentation  Therapist must complete separate progress note for each specific clinical activity in which the individual participated during the day.      Group Psychotherapy  This group was facilitated virtually in a private office using HIPAA Compliant and Approved [employer] Active Teams.  Shireen Rueda consented to the use of tele-video modality of treatment.  Shireen Rueda actively participated in psychoeducation group this afternoon which focused on sleep hygiene.  Group then identified sleep hygiene habits that are currently effective and habits they wish to incorporate into their night time routine to promote sleep hygiene.  This writer explained the importance of quality sleep in relation to wellness.  Sleep diary and additional tips on sleep hygiene were discussed.  Then, a video titled \"How to Improve Your Sleep\" was viewed. Some positive progress toward goal observed.  Continue psychoeducation group to increase awareness of good sleeping habits to promote wellness.      Tx Plan Objectives: 1.1, 1.2      Gwen SHAW RN    "

## 2025-05-20 NOTE — PSYCH
Virtual Regular VisitName: Shireen Rueda      : 1980      MRN: [id number]  Encounter Provider:  PARTIAL PSYCH PROGRAM  Encounter Date: 2025   Encounter department: [facility]  :  Assessment & Plan  Panic disorder         Grief         Moderate episode of recurrent major depressive disorder (HCC)             Goals addressed in session:  see TX plan     Reason for visit is  PHP virtual Care   Recent Visits  Date Type Provider Dept   25 Telemedicine Annbaelle Claudio PA-C  [facility]   Showing recent visits within past 7 days and meeting all other requirements  Future Appointments  No visits were found meeting these conditions.  Showing future appointments within next 150 days and meeting all other requirements     History of Present Illness     HPI    Past Medical History   Past Medical History:   Diagnosis Date    Anemia     Anxiety     Chest tightness or pressure     2013    Depression 2017    Hypertension     Vitamin D deficiency      Past Surgical History:   Procedure Laterality Date    CYSTOSCOPY N/A 2018    Procedure: CYSTOSCOPY;  Surgeon: Laura Bal DO;  [facility];  Service: Gynecology    HYSTERECTOMY N/A 2018    Procedure: HYSTERECTOMY LAPAROSCOPIC TOTAL (LTH);  Surgeon: Laura Bal DO;  [facility];  Service: Gynecology    SC LAPAROSCOPY W/RMVL ADNEXAL STRUCTURES Bilateral 2018    Procedure: SALPINGECTOMY, LAPAROSCOPIC;  Surgeon: Laura Bal DO;  [facility];  Service: Gynecology    TONSILLECTOMY      TUBAL LIGATION           Current Outpatient Medications   Medication Instructions    amLODIPine (NORVASC) 2.5 mg, Oral, Daily    atorvastatin (LIPITOR) 40 mg tablet TAKE 1 TABLET BY MOUTH EVERY DAY    DULoxetine (CYMBALTA) 90 mg, Oral, Daily    gabapentin (NEURONTIN) 600 mg, Oral, 3 times daily    hydroCHLOROthiazide 25 mg, Daily    hydrOXYzine HCL  (ATARAX) 25 mg, Oral, Every 6 hours PRN    losartan (COZAAR) 100 mg, Oral, Daily    melatonin 3 mg, Oral, Daily at bedtime    prazosin (MINIPRESS) 5 mg, Oral, Daily at bedtime    traZODone (DESYREL) 100 mg, Oral, Daily at bedtime     Allergies[1]    Objective   LMP 10/17/2018     Video Exam  Physical Exam     Administrative Statements   Encounter provider [facility] PROGRAM    The Patient is located at Home and in the following state in which I [place] an active license [place].    The patient was identified by name and date of birth. Shireen Rueda was informed that this is a telemedicine visit and that the visit is being conducted through the Microsoft Teams platform. She agrees to proceed..  My office door was closed. No one else was in the room.  She acknowledged consent and understanding of privacy and security of the video platform. The patient has agreed to participate and understands they can discontinue the visit at any time.    I have spent a total time of five hours of group and case management  in caring for this patient on the day of the visit/encounter including Counseling / Coordination of care, not including the time spent for establishing the audio/video connection.           [1] No Known Allergies

## 2025-05-20 NOTE — PSYCH
Subjective:     Patient ID: Shireen Rueda is a 44 y.o. female.    [facility] Clinical Progress Notes      Specialized Services Documentation  Therapist must complete separate progress note for each specific clinical activity in which the individual participated during the day.       Visit Time    Visit Start Time: 1430  Visit Stop Time: 1445  Total Visit Duration: 15 minutes      Case Management Note    Current suicide risk : Low     Medications changes/added/denied? No    Treatment session number: 11    Individual Case Management Visit provided today? Yes     [facility] follow up physician's orders: none at this time        INDIVIDUAL PSYCHOTHERAPY     Shireen Rueda actively shared in individual therapy.   Shireen Rueda identified that she was doing better than yesterday. She shared that she talked with her  about what it would look like for her to be better and what she can do everyday to be better then the day before. Shireen inquired about who could extend her FMLA if she is not feeling ready to return to work just yet. This writer encouraged Shireen to reach out to the provider who signed her out on FMLA. Shireen is unsure of who completed her paperwork. This writer encouraged her to call this afternoon and find out.Shireen states she wants to go back to work but her  and herself don't think she is ready to return. Discussed outpatient providers and was informed the out Psychiatrist has reached out to another provider regarding therapy and psychiatry. This writer asked Shireen if she would be able to come in-person to program the last few days but Shireen states she is unable to because of not having a car. Her  and daughter would be unable to drive her due to working. Next session follow up to include treatment plan update. Continue individual psychotherapy in order to progress towards goals.     Treatment Plan Objectives addressed: 1.1, 1.2, 1.3, 1.4       SHALONDA Britton, JORGEW

## 2025-05-20 NOTE — GROUP NOTE
Subjective:     Patient ID: Shireen Rueda is a 44 y.o. female.    [facility] Clinical Progress Notes      Specialized Services Documentation  Therapist must complete separate progress note for each specific clinical activity in which the individual participated during the day.     GROUP PSYCHOTHERAPY    Visit Time    Visit Start Time: 1330  Visit Stop Time: 1430  Total Visit Duration: 60 minutes      [id number]    Shireen Rueda participated actively in psychotherapy group.  This was observedConsistent throughout the treatment day. They were engaged in learning related to Illness, Medication, Aftercare, and Wellness Tools. Staff utilized Verbal, Written, A/V, and Demonstration teaching methods.  Shireen Rueda shared area of learning and set a goal for outside of program to be present and make dinner for her daughters birthday.  Shireen Rueda was able to share items explored during the day.  Ended session with staff led exercise mindfulness.  Shireen Rueda demonstrated good progress toward goal.  Continue group psychotherapy to actively practice learned skills and encourage transfer of knowledge to unstructured time.        This group was facilitated virtually in a private office using HIPAA Compliant and Approved [employer] Teams.  Shireen Rueda consented to the use of tele-video modality of treatment.        Tx Plan Objective: 1.1,1.2,1.3,1.4 Therapist: AVERY Britton

## 2025-05-20 NOTE — GROUP NOTE
Visit Time    Visit Start Time: 0830  Visit Stop Time: 0930  Total Visit Duration: 60 minutes    Subjective:     Patient ID: Shireen Rueda is a 44 y.o. female.    [facility] Clinical Progress Notes      Specialized Services Documentation  Therapist must complete separate progress note for each specific clinical activity in which the individual participated during the day.       This group was facilitated virtually in a private office using HIPAA Compliant and Approved [employer] Teams.  Shireen Rueda consented to the use of tele-video modality of treatment.      EDUCATION THERAPY      Shireen Rueda actively shared in morning assessment and goal review. Presented as Receptive related to readiness to learn. Shireen Rueda  did not complete goal from last treatment day identifying gaining responsibility. Shireen Rueda did not present with any barriers to learning. Throughout morning group, Shireen Rueda participated in mindfulness exercise. Shireen Rueda made good progress toward goal. Continue education group to assess willingness to engage, assess transfer of knowledge, and participate in skill development.    Tx Plan Objective: 1.1,1.2, Therapist: Ashley Costenbader MA LMT        Visit Time    Visit Start Time: 1045  Visit Stop Time: 1145  Total Visit Duration: 60 minutes  Subjective:     Patient ID: Shireen Rueda is a 44 y.o. female.     [facility] Clinical Progress Notes      Specialized Services Documentation  Therapist must complete separate progress note for each specific clinical activity in which the individual participated during the day.      Group Psychotherapy - Worry Time   Shireen Rueda participated with prompts in a psychotherapy group focused on Worry Time. This group was facilitated virtually in a private office using HIPAA compliant and approved [employer] teams. Shireen Rueda consented to the use of tele-video modality of treatment.   This writer utilized time to openly discus the effects of worrying on mental,  emotional, and physical health. Group member then reviewed provided documents on the use of worry time and the various steps required to successfully complete a scheduled worry time. This writer encouraged open dialogue throughout the discussion to facilitate learning through the use of personal experience and applying said experience directly to the skill. Group members were encouraged to complete their very own worry time plan to utilize on a daily basis. Shireen Rueda made fair efforts towards progress goals which were displayed through active participation, notetaking, or engagement in topic. Continue to run daily group psychotherapy to meet treatment needs and encourage Shireen Rueda to practice skills outside of program.      Tx Plan Objective: 1.1,1.2,1.4  Therapist: Ashley Costenbader MA LMT

## 2025-05-20 NOTE — GROUP NOTE
Visit Time:    Visit Start Time: 1215  Visit Stop Time: 1315  Total Visit Duration: 60 minutes    Subjective:     Patient ID: Shireen Rueda is a 44 y.o. female    Specialized Services Documentation  Therapist must complete separate progress note for each specific clinical activity in which the individual participated during the day      [facility] Clinical Progress Note    Group Psychotherapy      Subjective:        This group was facilitated virtually in a private office using HIPAA Compliant and Approved [employer] Teams.      During this session, they participated actively in a psychotherapy group focused on setting boundaries. The group focused on the interpersonal effectiveness pillar of DBT; specifically, looking at the GEETHA acronym. Participants followed a step by step breakdown of the GEETHA process and were encouraged to engage in open discussion throughout. Group members were given a GEETHA practice worksheet and time to practice. Processing in the course of treatment, they took notes and filled out the practice sheet. Continue to note progress towards goals. Continue with psychotherapy to encourage further development of interpersonal effectiveness skills.     Shireen actively participated in the [employer] activities session.  .Positive progress toward treatment objective  Tx Plan Objective 1.1, 1.2, 1.4 Therapist Ian THOMPSON Ed

## 2025-05-20 NOTE — TELEPHONE ENCOUNTER
Patient has been added to the Medication Management and Talk Therapy wait list with a referral.    Insurance: [facility]  Insurance Type:    Commercial [x]   Medicaid []   [facility] (if applicable)   Medicare []  Location Preference: None  Provider Preference: None  Virtual: Yes [x] No [x]  Were outside resources sent: Yes [x] No []

## 2025-05-21 ENCOUNTER — DOCUMENTATION (OUTPATIENT)
Dept: OTHER | Facility: HOSPITAL | Age: 45
End: 2025-05-21

## 2025-05-21 ENCOUNTER — TELEPHONE (OUTPATIENT)
Dept: FAMILY MEDICINE CLINIC | Facility: CLINIC | Age: 45
End: 2025-05-21

## 2025-05-21 ENCOUNTER — TELEMEDICINE (OUTPATIENT)
Dept: PSYCHOLOGY | Facility: CLINIC | Age: 45
End: 2025-05-21
Payer: COMMERCIAL

## 2025-05-21 DIAGNOSIS — F41.0 PANIC DISORDER: ICD-10-CM

## 2025-05-21 DIAGNOSIS — I10 ESSENTIAL HYPERTENSION: Primary | ICD-10-CM

## 2025-05-21 DIAGNOSIS — F33.1 MODERATE EPISODE OF RECURRENT MAJOR DEPRESSIVE DISORDER (HCC): Primary | ICD-10-CM

## 2025-05-21 DIAGNOSIS — F43.21 GRIEF: ICD-10-CM

## 2025-05-21 PROCEDURE — G0177 OPPS/PHP; TRAIN & EDUC SERV: HCPCS

## 2025-05-21 PROCEDURE — G0410 GRP PSYCH PARTIAL HOSP 45-50: HCPCS

## 2025-05-21 PROCEDURE — G0176 OPPS/PHP;ACTIVITY THERAPY: HCPCS

## 2025-05-21 RX ORDER — HYDROCHLOROTHIAZIDE 25 MG/1
25 TABLET ORAL DAILY
Qty: 30 TABLET | Refills: 1 | Status: SHIPPED | OUTPATIENT
Start: 2025-05-21

## 2025-05-21 NOTE — GROUP NOTE
Visit Time    Visit Start Time: 0830  Visit Stop Time: 0930  Total Visit Duration: 60 minutes    Subjective:     Patient ID: Shireen Rueda is a 44 y.o. female.     [facility] Clinical Progress Notes      Specialized Services Documentation  Therapist must complete separate progress note for each specific clinical activity in which the individual participated during the day.       EDUCATION THERAPY    Morning group focused on establishing routine and goal review.  Members assessed related to readiness for learning and potential barriers.  Transfer of skills outside of program explored through goal sharing and connection back to the Recovery Model 5 key facets of recovery.  Throughout session, skills introduced, practiced reflecting mindfulness, meditation, movement, and connecting peers in this community.      Shireen Rueda actively shared.  Identified that they did complete goal from last treatment day identifying she made dinner gaining hope.  Presented as Receptive related to readiness to learn and did not present with learning barriers. Shireen Rueda participated in mindfulness exercise and gratitude practice. Shireen Rueda made positive progress toward goal. Continue education group to assess willingness to engage, assess transfer of knowledge, and participate in skill development.  Tx Plan Objective: 1.0, Therapist:  Ian PAYTON Ed.  Visit Time    Visit Start Time: 1330  Visit Stop Time: 1430  Total Visit Duration: 30 minutes    Subjective:     Patient ID: Shireen Rueda is a 44 y.o. female.    Specialized Services Documentation  Therapist must complete separate progress note for each specific clinical activity in which the individual participated during the day.     [facility] Clinical Progress Note     GROUP PSYCHOTHERAPY    Participated actively in psychotherapy group. Group explored treatment day leaning and staff utilized Verbal, A/V, and Demonstration teaching methods. Members shared areas of learning and set a  goal outside of program. Time spent encouraging use of WRAP, skill review, and exploring resources in the community.      Shireen Rueda was with her  for a portion of this session. Shireen actively shared. They engaged in learning related to wellness tools. Shireen Rueda identified filling out a questionnaire for her  as goal for the evening . Positive progress toward treatment objective. Continue group psychotherapy to actively practice learned skills and encourage transfer of knowledge to unstructured time.   Tx Plan Objective: 1.0, Therapist:  Ian PAYTON Ed.  Visit Time:    Visit Start Time: 1215  Visit Stop Time: 1315  Total Visit Duration: 60 minutes    Subjective:     Patient ID: Shireen Rueda is a 44 y.o. female    Specialized Services Documentation  Therapist must complete separate progress note for each specific clinical activity in which the individual participated during the day      [facility] Clinical Progress Note    Group Psychotherapy          [facility] Clinical Progress Notes    This group was facilitated virtually in a private office using HIPAA Compliant and Approved [employer] Teams.  During this session, they, actively engaged in psychoeducational group about distress tolerances. The skills introduced help to maintain and regulate emotional fear and distress. Group discovered strategies that help them to manage emotional fear and distress on a short term and long term basis. The group talked about understanding the purpose, and meaning of emotional fear and distress in intellectual and emotional expression, regulation , and recognition, and how it affects themselves and others. Teaching on the skill process of ACCEPTS and DBT self-care, members of the group are able to go  for help or how to manage situations were discussed. Group was encouraged to ask questions in an open forum at the end of group.Measurable progress displayed through engagement in topic. Processing in the  course of treatment will continue in psychotherapy to encourage positive self realization.     Shireen actively participated as an engaged listener   .Adequate progress toward treatment objective  Tx Plan Objective 1.1, 1.2, 1.4 Therapist Ian THOMPSON Ed

## 2025-05-21 NOTE — PSYCH
Virtual Regular VisitName: Shireen Rueda      : 1980      MRN: [id number]  Encounter Provider:  PARTIAL PSYCH PROGRAM  Encounter Date: 2025   Encounter department: [facility] PARTIAL HOSPITALIZATION PROGRAM  :  Assessment & Plan  Moderate episode of recurrent major depressive disorder (HCC)         Panic disorder         Grief             Goals addressed in session: see TX plan     Reason for visit is  PHP virtual care   Recent Visits  Date Type Provider Dept   25 Telemedicine Annabelle Claudio PA-C  [facility]   Showing recent visits within past 7 days and meeting all other requirements  Future Appointments  No visits were found meeting these conditions.  Showing future appointments within next 150 days and meeting all other requirements     History of Present Illness     HPI    Past Medical History   Past Medical History:   Diagnosis Date    Anemia     Anxiety     Chest tightness or pressure     2013    Depression 2017    Hypertension     Vitamin D deficiency      Past Surgical History:   Procedure Laterality Date    CYSTOSCOPY N/A 2018    Procedure: CYSTOSCOPY;  Surgeon: Laura Bal DO;  [facility];  Service: Gynecology    HYSTERECTOMY N/A 2018    Procedure: HYSTERECTOMY LAPAROSCOPIC TOTAL (LTH);  Surgeon: Laura Bal DO;  [facility];  Service: Gynecology    RI LAPAROSCOPY W/RMVL ADNEXAL STRUCTURES Bilateral 2018    Procedure: SALPINGECTOMY, LAPAROSCOPIC;  Surgeon: Laura Bal DO;  [facility];  Service: Gynecology    TONSILLECTOMY      TUBAL LIGATION           Current Outpatient Medications   Medication Instructions    amLODIPine (NORVASC) 2.5 mg, Oral, Daily    atorvastatin (LIPITOR) 40 mg tablet TAKE 1 TABLET BY MOUTH EVERY DAY    DULoxetine (CYMBALTA) 90 mg, Oral, Daily    gabapentin (NEURONTIN) 600 mg, Oral, 3 times daily    hydroCHLOROthiazide 25 mg, Daily    hydrOXYzine HCL  (ATARAX) 25 mg, Oral, Every 6 hours PRN    losartan (COZAAR) 100 mg, Oral, Daily    melatonin 3 mg, Oral, Daily at bedtime    prazosin (MINIPRESS) 5 mg, Oral, Daily at bedtime    traZODone (DESYREL) 100 mg, Oral, Daily at bedtime     Allergies[1]    Objective   LMP 10/17/2018     Video Exam  Physical Exam     Administrative Statements   Encounter provider [facility] PROGRAM    The Patient is located at Home and in the following state in which I hold an active license [place].    The patient was identified by name and date of birth. Shireen Rueda was informed that this is a telemedicine visit and that the visit is being conducted through the Microsoft Teams platform. She agrees to proceed..  My office door was closed. No one else was in the room.  She acknowledged consent and understanding of privacy and security of the video platform. The patient has agreed to participate and understands they can discontinue the visit at any time.    I have spent a total time of five hours of group and case management  in caring for this patient on the day of the visit/encounter including Counseling / Coordination of care, not including the time spent for establishing the audio/video connection.              [1] No Known Allergies

## 2025-05-21 NOTE — TELEPHONE ENCOUNTER
Pt called requesting refill on the hydroCHLOROthiazide tablet 25 mg, medication was discontinued when pt was in the hospital. Please advise and thank you.

## 2025-05-21 NOTE — PSYCH
Subjective:     Patient ID: Shireen Rueda is a 44 y.o. female.    [facility] Clinical Progress Notes      Specialized Services Documentation  Therapist must complete separate progress note for each specific clinical activity in which the individual participated during the day.       Visit Time    Visit Start Time: 1201  Visit Stop Time: 1206  Total Visit Duration: 5 minutes    Visit Time    Visit Start Time: 1243  Visit Stop Time: 1308  Total Visit Duration: 26 minutes        Case Management Note    Current suicide risk : Low     Medications changes/added/denied? No    Treatment session number: 12    Individual Case Management Visit provided today? Yes     [facility] follow up physician's orders: none at this time        INDIVIDUAL PSYCHOTHERAPY     Shireen Rueda actively shared in individual therapy.   Shireen Rueda identified that she is scared about discharging on Friday.Discussed that this program is not meant to be long term and the need for more effort to be put into recovery.Shireen shared that she is struggling and feels stuck. Shireen and this writer addressed suggestions previously discussed. Shireen reports that she knows she needs additional support and should sign up for another support group. She did report that she contacted the person who is running the griefshare and gave him the additional information that was requested. Shireen shared that she has been wanting to hold onto everything lately and her daughters plan on coming over to help her declutter. This writer and Shireen discussed outpatient follow up appointment. This writer informed Shireen about the DFBT program and sent her the paperwork that needs to be completed. This writer was in touch with multiple people and Shireen in order to schedule outpatient appointments.Shireen agreed for MARILYN's to be completed for new outpatient therapist and psychiatrist . Shireen reports that she called her PCP to see if her PCP signed her out of work but has yet to hear back. Encouraged Shireen to  contact [employer]. Informed Shireen that [employer] paperwork (Attending Physician Behavioral Health statement form) was received. Calling [employer] assigned as homework. Good progress toward goal identified. Next session follow up to include review of discharge material. Continue individual psychotherapy in order to prepare for discharge.     I,Shireen Johnsoncelestinealiya,am physically unable to provide a signature; however, I understand the nature of and am in agreement with the documentation presented to me via TEAMS.  I have received a copy through My Chart and/or the [employer] Postal [employer].  I freely give verbal consent.  Name of Document (s): MARILYN  Witness to verbal consent: Fabiana Banks  Witness to verbal consent:River Viera      Treatment Plan Objectives addressed: 1.1, 1.2, 1.3, 1.4       Fabiana Banks, MSW, LSW

## 2025-05-21 NOTE — TELEPHONE ENCOUNTER
[employer] received from JNAET regarding scheduling pt for OP services post discharge from [facility], 5/23/25. Outreach made to CM via secure chat in an attempt to schedule services. CM informed writer that [facility] psychiatrist recommended pt for SFPT, which is scheduled by a particular IC. CM added IC to chat, IC offered to schedule services for SFBT and MM.

## 2025-05-21 NOTE — GROUP NOTE
Visit Time    Visit Start Time: 1045  Visit Stop Time: 1145  Total Visit Duration: 60 minutes    Subjective:     Patient ID: Shireen Rueda is a 44 y.o. female.    [facility] Clinical Progress Notes      Specialized Services Documentation  Therapist must complete separate progress note for each specific clinical activity in which the individual participated during the day.       Group Psychotherapy Life Skills ([id number]) Shireen Rueda actively engaged in group focused on the empowerment triangle. The group learned about the empowerment triangle and the roles of the creator, , and challenger. The group examined how to shift their mindset from their role(s) in the drama triangle to their role(s) in the Empowerment [place]. Shireen discussed how they would shift their mindset from their role in the drama triangle to their role in the empowerment triangle by showing up to support groups, taking her medication as prescribed,and reaching out to her support. Participants shared what makes them feel empowered. Shireen continues to make progress towards goals through verbal participation in group; to accomplish long term goals continue to utilize skills learned in programming. Continue with psychotherapy to educate and encourage use of wellness tools.     This group was facilitated as a hybrid group and Shireen Brownchavezaliya was present virtually . The group was facilitated in a private office using HIPAA Compliant and Approved [place] Teams. Shireen Rueda consented to the use of tele-video modality of treatment.       Tx Plan Objective: 1.1,1.2 Therapist: SHALONDA Britton,AVERY

## 2025-05-21 NOTE — GROUP NOTE
Visit Time    Visit Start Time: 0930  Visit Stop Time: 1030  Total Visit Duration: 60 minutes    Subjective:     Patient ID: Shireen Rueda is a 44 y.o. female.    Specialized Services Documentation  Therapist must complete separate progress note for each specific clinical activity in which the individual participated during the day.     [facility] Clinical Progress Note    Group member participated in music therapy group regarding progressive muscle relaxation and making art to music. The group participated in a progressive muscle relaxation. The group then participated in art making to music. The group then shared and discussed their art with the group relating it to their experience and the music. Continue AT to encourage the development and proactive use of relaxation and coping skills.     This group was facilitated virtually in a private office using HIPAA Compliant and Approved [employer] Teams.      Shireen actively participated. She shared her art for the 2nd song and engaged in discussion on her hesitance and guilt in engaging in positive experiences in the wake of her brothers death and engaged in group discussion. Some progress toward treatment objective.   Tx Plan Objective: 1.1,1.2,1.4, Therapist:  BERTHA Lane, MT-BC

## 2025-05-21 NOTE — TELEPHONE ENCOUNTER
ASAP referral  Received: Today  [facility] Intake  Hel,  I wanted to follow up regarding the referral for this patient. She will be discharging from [facility] on Friday. Jaciray Hdez reached out to Shireen to schedule the ASAP/STAT appointment and left her a voicemail. When [facility] was contacted the second time by Bibiana Rush she was told there is a 9-10 months waiting list. Since she was a ASAP/STAT referral can she please be scheduled sooner?    Thank you,  AVERY Britton

## 2025-05-22 ENCOUNTER — TELEMEDICINE (OUTPATIENT)
Dept: PSYCHOLOGY | Facility: CLINIC | Age: 45
End: 2025-05-22
Payer: COMMERCIAL

## 2025-05-22 ENCOUNTER — TELEPHONE (OUTPATIENT)
Age: 45
End: 2025-05-22

## 2025-05-22 DIAGNOSIS — F33.1 MODERATE EPISODE OF RECURRENT MAJOR DEPRESSIVE DISORDER (HCC): Primary | ICD-10-CM

## 2025-05-22 DIAGNOSIS — F43.21 GRIEF: ICD-10-CM

## 2025-05-22 DIAGNOSIS — F41.0 PANIC DISORDER: ICD-10-CM

## 2025-05-22 PROCEDURE — G0177 OPPS/PHP; TRAIN & EDUC SERV: HCPCS

## 2025-05-22 PROCEDURE — G0410 GRP PSYCH PARTIAL HOSP 45-50: HCPCS

## 2025-05-22 PROCEDURE — G0176 OPPS/PHP;ACTIVITY THERAPY: HCPCS

## 2025-05-22 NOTE — PSYCH
Virtual Regular VisitName: Shireen Rueda      : 1980      MRN: [id number]  Encounter Provider:  PARTIAL PSYCH PROGRAM  Encounter Date: 2025   Encounter department: [facility] PARTIAL HOSPITALIZATION PROGRAM  :  Assessment & Plan  Moderate episode of recurrent major depressive disorder (HCC)         Panic disorder         Grief             Goals addressed in session: see TX plan        Reason for visit is PHP virtual care    Recent Visits  Date Type Provider Dept   25 Telemedicine Annabelle Claudio PA-C  [facility]   Showing recent visits within past 7 days and meeting all other requirements  Future Appointments  No visits were found meeting these conditions.  Showing future appointments within next 150 days and meeting all other requirements     History of Present Illness     HPI    Past Medical History   Past Medical History[1]  Past Surgical History[2]  Current Outpatient Medications   Medication Instructions    atorvastatin (LIPITOR) 40 mg tablet TAKE 1 TABLET BY MOUTH EVERY DAY    DULoxetine (CYMBALTA) 90 mg, Oral, Daily    gabapentin (NEURONTIN) 600 mg, Oral, 3 times daily    hydroCHLOROthiazide 25 mg, Oral, Daily    hydrOXYzine HCL (ATARAX) 25 mg, Oral, Every 6 hours PRN    losartan (COZAAR) 100 mg, Oral, Daily    melatonin 3 mg, Oral, Daily at bedtime    prazosin (MINIPRESS) 5 mg, Oral, Daily at bedtime    traZODone (DESYREL) 100 mg, Oral, Daily at bedtime     Allergies[3]    Objective   LMP 10/17/2018     Video Exam  Physical Exam     Administrative Statements   Encounter provider  PARTIAL PSYCH PROGRAM    The Patient is located at Home and in the following state in which I hold an active license [place].    The patient was identified by name and date of birth. Shireen Rueda was informed that this is a telemedicine visit and that the visit is being conducted through the Microsoft Teams platform. She agrees to proceed..  My office door was closed. No one else  was in the room.  She acknowledged consent and understanding of privacy and security of the video platform. The patient has agreed to participate and understands they can discontinue the visit at any time.    I have spent a total time of five hours of group and case management in caring for this patient on the day of the visit/encounter including Counseling / Coordination of care, not including the time spent for establishing the audio/video connection.              [1]   Past Medical History:  Diagnosis Date    Anemia     Anxiety     Chest tightness or pressure     8/19/2013    Depression 05/14/2017    Hypertension     Vitamin D deficiency    [2]   Past Surgical History:  Procedure Laterality Date    CYSTOSCOPY N/A 12/30/2018    Procedure: CYSTOSCOPY;  Surgeon: Laura Bal DO;  [facility];  Service: Gynecology    HYSTERECTOMY N/A 12/30/2018    Procedure: HYSTERECTOMY LAPAROSCOPIC TOTAL (LTH);  Surgeon: Laura Bal DO;  [facility];  Service: Gynecology    [place] LAPAROSCOPY W/RMVL ADNEXAL STRUCTURES Bilateral 12/30/2018    Procedure: SALPINGECTOMY, LAPAROSCOPIC;  Surgeon: Laura Bal DO;  [facility];  Service: Gynecology    TONSILLECTOMY      TUBAL LIGATION      2006   [3] No Known Allergies

## 2025-05-22 NOTE — PSYCH
Subjective:     Patient ID: Shireen Rueda is a 44 y.o. female.    [facility] Clinical Progress Notes      Specialized Services Documentation  Therapist must complete separate progress note for each specific clinical activity in which the individual participated during the day.       Visit Time    Visit Start Time: NA  Visit Stop Time: NA  Total Visit Duration: NA      Case Management Note    Current suicide risk : Low     Medications changes/added/denied? No    Treatment session number: 13    Individual Case Management Visit provided today? No    [facility] follow up physician's orders: none at this time        INDIVIDUAL PSYCHOTHERAPY     An individual psychotherapy session is not scheduled today with Shireen Rueda ; additionally, they did not request a meeting.  They are aware of the next scheduled 1:1 session. Questionnaire for SFBT was sent to Jaci Hdez.    Treatment Plan Objectives addressed: SHALONDA Piedra, LSW

## 2025-05-22 NOTE — GROUP NOTE
Visit Time    Visit Start Time: 1045  Visit Stop Time: 1145  Total Visit Duration: 60 minutes    Subjective:     Patient ID: Shireen Rueda is a 44 y.o. female.    Specialized Services Documentation  Therapist must complete separate progress note for each specific clinical activity in which the individual participated during the day.     [facility] Clinical Progress Note    Group members participated in music therapy group regarding healthy and unhealthy relationships. The group participated in a conversation about the health of the relationships in their life. The group then participated in a series of dereje discussions on songs demonstrating healthy and unhealthy relationships. The group read and discussed two handouts regarding relationship green flags and destructive/interfering relationships. Continue AT to encourage the development and maintenance of healthy relationships.     This group was facilitated virtually in a private office using HIPAA Compliant and Approved [employer] Teams.     Shireen actively participated. She did not share but appeared attentive throughout. Some progress toward treatment objective.   Tx Plan Objective: 1.1,1.2,1.4, Therapist:  BERTHA Lane, MT-BC

## 2025-05-22 NOTE — TELEPHONE ENCOUNTER
Pt removed from MM wait list. Pt scheduled, 5/29/25 with Dr. Lucretia Tamayo.   Pt is currently scheduled for SFBT, which consists of 12 weekly sessions. Pt remains on TT wait list in the event further TT services are needed.

## 2025-05-22 NOTE — GROUP NOTE
Visit Time    Visit Start Time: 1330  Visit Stop Time: 1430  Total Visit Duration: 60 minutes    Subjective:     Patient ID: Shireen Rueda is a 44 y.o. female.    [facility] Clinical Progress Notes      Specialized Services Documentation  Therapist must complete separate progress note for each specific clinical activity in which the individual participated during the day.       This group was facilitated virtually in a private office using HIPAA Compliant and Approved [employer] Teams.  Shireen Rueda consented to the use of tele-video modality of treatment.      GROUP PSYCHOTHERAPY    Shireen Rueda participated actively in psychotherapy group.  This was observed Consistent throughout the treatment day. They were engaged in learning related to Wellness Tools. Staff utilized Verbal teaching methods.  Shireen Rueda shared area of learning and set a goal for outside of program to go get nails done with daughter.  Shireen Rueda was able to share items explored during the day and shared in adding to their WRAP.  Ended session with staff led exercise mindful meditation.  Shireen Rueda demonstrated good progress toward goal.  Continue group psychotherapy to actively practice learned skills and encourage transfer of knowledge to unstructured time.    Tx Plan Objective: 1.1,1.2, Therapist: Ashley Costenbader MA LMT

## 2025-05-22 NOTE — GROUP NOTE
Visit Time    Visit Start Time: 0930  Visit Stop Time: 1030  Total Visit Duration: 60 minutes     Subjective:     Patient ID: Shireen Rueda is a 44 y.o. female.    Specialized Services Documentation  Therapist must complete separate progress note for each specific clinical activity in which the individual participated during the day.     Group Psychotherapy    This group was facilitated virtually in a private office using HIPAA Compliant and Approved [employer] Teams.  Shireen Rueda consented to the use of tele-video modality of treatment.    Shireen Rueda actively participated in group on Wellness Recovery Action Plan. Today, members focused on a quick review of WRAP's intent, use, toolbox  and daily plan. Then reviewed the following sections with group members  and encouraged to fill in coping tools from their toolbox for planned responses:     Identification of triggers and stressors  Early warning signs  When things are breaking down and or getting worse    Group members shared pieces of information from these sections and identified their importance. Writer encouraged the members of group to continue utilizing the packet to develop plans inside and outside of program.    Tx Objectives: 1.1, 1.2, 1.3, 1.4    Group Facilitator: Gwen SHAW RN

## 2025-05-22 NOTE — GROUP NOTE
Visit Time:    Visit Start Time: 1215  Visit Stop Time: 1315  Total Visit Duration: 60 minutes    Subjective:     Patient ID: Shireen Rueda is a 44 y.o. female    Specialized Services Documentation  Therapist must complete separate progress note for each specific clinical activity in which the individual participated during the day      [facility] Clinical Progress Note    Group Therapy:          Subjective:    This group was facilitated virtually in a private office using HIPAA Compliant and Approved GPal Teams.name@ consented to the use of tele-video modality of treatment.  During this session, they,  actively engaged in psychoeducational group about the PLEASE skill. The skill helps to maintain and regulate emotional expression/regulation. Group discovered strategies that help them to take care of physical and emotional well being on a short term and long term basis. The group talked about understanding the purpose, and meaning of self care in regards to physical, intellectual, and emotional expression, regulation , and recognition, and how it affects themselves and others.. Teaching on the emphasis of self monitoring and self-care, who the group can go to for help was brought up as well. Group was encouraged to ask questions in an open forum at the end of group. Measurable progress displayed through engagement in topic. Pt will continue to engage in psychotherapy to encourage self realization in treatment.     Shireen actively participated in the group discussions and was supportive of other group members  .Positive progress toward treatment objective  Tx Plan Objective 1.1, 1.2, 1.4 Therapist Ian THOMPSON Ed

## 2025-05-22 NOTE — GROUP NOTE
Subjective:     Patient ID: Shireen Rueda is a 44 y.o. female.    [facility] Clinical Progress Notes      Specialized Services Documentation  Therapist must complete separate progress note for each specific clinical activity in which the individual participated during the day.     EDUCATION THERAPY    Visit Time    Visit Start Time: 0830  Visit Stop Time: 0930  Total Visit Duration: 60 minutes      [id number]    Shireen Rueda actively shared in morning assessment and goal review. Presented as Receptive related to readiness to learn. Shireen Rueda  did complete goal from last treatment day identifying gaining advocacy, responsibility, and support. Shireen Rueda did not present with any barriers to learning. Throughout morning group, Shireen Rueda participated in mindfulness exercise. Shireen Rueda made progress toward goal. Continue education group to assess willingness to engage, assess transfer of knowledge, and participate in skill development.      This group was facilitated virtually in a private office using HIPAA Compliant and Approved [employer] Teams.  Shireen Rueda consented to the use of tele-video modality of treatment.        Tx Plan Objective: 1.1,1.2, Therapist: AVERY Britton

## 2025-05-23 ENCOUNTER — TELEMEDICINE (OUTPATIENT)
Dept: PSYCHOLOGY | Facility: CLINIC | Age: 45
End: 2025-05-23
Payer: COMMERCIAL

## 2025-05-23 ENCOUNTER — TELEPHONE (OUTPATIENT)
Dept: FAMILY MEDICINE CLINIC | Facility: CLINIC | Age: 45
End: 2025-05-23

## 2025-05-23 DIAGNOSIS — F43.21 GRIEF: ICD-10-CM

## 2025-05-23 DIAGNOSIS — F33.1 MODERATE EPISODE OF RECURRENT MAJOR DEPRESSIVE DISORDER (HCC): Primary | ICD-10-CM

## 2025-05-23 DIAGNOSIS — F41.0 PANIC DISORDER: ICD-10-CM

## 2025-05-23 NOTE — PSYCH
Visit Time    Visit Start Time: 1130  Visit Stop Time: 1200  Total Visit Duration: 30 minutes    Subjective:     Patient ID: Shireen Rueda is a 44 y.o. female.    [facility] Clinical Progress Notes      Specialized Services Documentation  Therapist must complete separate progress note for each specific clinical activity in which the individual participated during the day.     Case Management Note    AVERY Britton    Current suicide risk : Low     CM reviewed Crisis Plan, discharge instructions, medication list and crisis information with Shireen Rueda.  See discharge summary for treatment details. Aftercare providers to receive discharge summary.  STD paperwork sent to [employer].     I,Shireen Rueda,am physically unable to provide a signature; however, I understand the nature of and am in agreement with the documentation presented to me via TEAMS.  I have received a copy through My Chart and/or the [employer].  I freely give verbal consent.  Name of Document (s): Safety Plan, discharge instructions, medication list , MARILYN completed for [employer] for STD  Witness to verbal consent: Fabiana Banks  Witness to verbal consent: River Viera    Medications changes/added/denied? No    Treatment session number: 14    Individual Case Management Visit provided today? Yes     [facility] follow up physician's orders: Discharge to outpatient level of care- per Dr.Jean Cardenas

## 2025-05-23 NOTE — PSYCH
"  Subjective:     Patient ID: Shireen Rueda is a 44 y.o. female.    [facility] Discharge Summary:   Admission Date: 5/5/25  Patient was referred by [facility] unit  Discharge Date: 05/23/25   Was this a routine discharge? yes   Diagnosis: Axis I:   1. Moderate episode of recurrent major depressive disorder (HCC)        2. Panic disorder        3. Grief           Treating Physician: Dr.Jean Cardenas    Treatment Complications: struggles with processing her grief/loss of her brother, struggled with implementing coping skills, lack of follow through    Presenting Need:   HPI:         Pre-morbid level of function and History of Present Illness:   As per Dr. Cardenas:   Subjective:     Shireen Rueda is a 44 y.o. female with past psychiatric history of depression, grief and panic disorder is admitted to [facility] referred by [facility], patient admitted from 4/21-5/02.     TodayShireen Rueda reports still struggling with her brother's death.  States it was recently 6 month anniversary, and she also had stopped her medications.  States she ended up going to the hospital to get more help, now on medications again after being there 11 days.  States she was having thoughts of overdosing; states her family did find out she was lying about taking the medications, as she had been noncompliant for several days.  States her  is providing more support and going to manage her medications, asked for more resources.  States they are understanding of her grief, but they are not as understanding of the people on the unit.  She admits that she talked to another patient on the unit who was struggling with alcoholism and had a sister, and he said that her letting him know how she has been feeling since her brother's death was going to impact him to change.  She wishes that she still had her brother, and states \"I do not want him to be gone \".  She states that she does not think she could have changed to " "help him and is trying to be more accepting of his death.  States she did not go to any grief groups.  States she felt like she was just telling people she was doing okay, and now realizes she needs to work on herself.  States she misses her brother a lot, they would text multiple times a week.   Psychosocial Stressors include       As per this writer: Shireen Rueda is a 44 y.o. female using she/her pronouns referred to [facility] via [facility] inpatient unit due to depression grief and panic disorder.She reports that she has been struggling with her brother's passing which was in November 2024. She stated that the 1st week of April it was his 6 months anniversary of his passing. She reports that a reminder came up on her phone of pictures of him on that day from the past years. She reports that she ended up stopping her medications around that same time. She reports that she told her  her plans to overdose on her pills right after Easter. She admitted to this writer that she did actually attempt to overdose on 3 of her husbands pain pills prior to IP stay. She reports that her family now keeps all the pills in the house locked away.  She reports that she wants to work on being more honest with how she is feeling with her family. Shireen states that she told people what they wanted to hear in order to get done sooner. She reports that she tried the coping skills while she was here last time but did not want to \"dive deeper\" into things. Shireen states \" I don't know how to live or be happy without him. \" Shireen reports that \"the grief is consuming me.\"   Shireen reports that she has been isolating away from her best friend since December . Her symptoms are isolation, agitation, laying down more,recent suicidal ideation and attempt, difficulties with sleep, isolating, crying, feeling sad and anxious. Denies current SI, HI  and SIB.      As per Shireen Rueda: \"the grief is consuming me.\"     Course of treatment " "includes:    group counseling, medication management, individual case management, allied therapy, psychoeducation, and psychiatric evaluation    Treatment Progress: Shireen Rueda attended 14 PHP days in which Shireen was encouraged to challenge negative thoughts, engage in healthy coping strategies and learn ways to manage her symptoms. Shireen was a passive participant in program and in individual work. Shireen struggled to share in groups and had superficial responses. After eight days in program she did begin to open up more. Shireen minimally worked on suggestions and practiced coping skills.Shireen would often state that she was \"feeling stuck.\" Several resources and options were presented to Shireen but she struggled with implementation of skills and follow through. By the end of her program stay she started to be more proactive in her recovery. Shireen found meditation and breathing techniques to be helpful coping skills. Shireen reported that she enjoyed journaling in the morning during group but did not try implementing journaling at home. Shireen stated she would like to start journaling daily in the mornings and start her day off with some positivity. Shireen was able to identify personal issues but struggled with being able to problem solve options. Shireen shared improvement through being able to get out of bed in the morning and being consistent with taking her medication daily. Shireen reports that yesterday was the first time she has left the house in a while and went into a public place . Shireen plans to start attending grief share the first week of June. She was encouraged to attend a virtual grief or mental health support group until she starts grief share.Shireen stated that she is starting to \"take baby steps\" in her mental health recovery. Shireen's PHQ-9 was a 26 upon the start of the program and at the time of discharge was a 21. Their YADIRA-7 was a 20 at the start of program and at the time of discharge was a 19. Denied SI, HI, and psychosis. Aftercare " providers to receive summary.      Aftercare recommendations include:     Current Psychiatrist/Therapist:   Medication Management:   [facility] Anthony Tamayo  [address]  [place], [place], [id number] [phone] (P)  [phone] (F)  Follow up: 5/29/25 at 10:30am (virtual)     Outpatient Therapy:   [facility]  Juana Hubbard  [address] [place] [address]  [place] [id number] [phone]  Follow up: 6/16/25 at 8:00am (virtual)        Primary Care Physician:   Nenita Velásquez MD   [address]  [facility] [address]   (p) [phone]   (f) [phone]      Outpatient and/or [facility] and Other Community Resources Used (CTT, ICM, VNA): Griefshare    Discharge Medications include:  Current Outpatient Medications:     atorvastatin (LIPITOR) 40 mg tablet, TAKE 1 TABLET BY MOUTH EVERY DAY, Disp: 90 tablet, Rfl: 2    DULoxetine (CYMBALTA) 30 mg delayed release capsule, Take 3 capsules (90 mg total) by mouth daily, Disp: 90 capsule, Rfl: 1    gabapentin (Neurontin) 600 MG tablet, Take 1 tablet (600 mg total) by mouth 3 (three) times a day, Disp: 90 tablet, Rfl: 0    hydroCHLOROthiazide 25 mg tablet, Take 1 tablet (25 mg total) by mouth in the morning., Disp: 30 tablet, Rfl: 1    hydrOXYzine HCL (ATARAX) 25 mg tablet, Take 1 tablet (25 mg total) by mouth every 6 (six) hours as needed for anxiety, Disp: 50 tablet, Rfl: 0    losartan (COZAAR) 100 MG tablet, Take 1 tablet (100 mg total) by mouth daily, Disp: 90 tablet, Rfl: 0    melatonin 3 mg, Take 1 tablet (3 mg total) by mouth daily at bedtime, Disp: 30 tablet, Rfl: 0    prazosin (MINIPRESS) 5 mg capsule, Take 1 capsule (5 mg total) by mouth daily at bedtime, Disp: 30 capsule, Rfl: 1    traZODone (DESYREL) 100 mg tablet, Take 1 tablet (100 mg total) by mouth daily at bedtime, Disp: 30 tablet, Rfl: 1

## 2025-05-23 NOTE — GROUP NOTE
"Visit Time    Visit Start Time: 1215  Visit Stop Time: 1315  Total Visit Duration: 60 minutes    Subjective:     Patient ID: Shireen Rueda is a 44 y.o. female.    [facility] Clinical Progress Notes      Specialized Services Documentation  Therapist must complete separate progress note for each specific clinical activity in which the individual participated during the day.       Group Psychotherapy Life Skills  ([id number])  Shireen Rueda actively engaged in group focused on anxiety and stress solutions which was facilitated virtually in a private office using HIPAA Compliant and Approved [employer] Teams. Shireen consented to the use of tele-video modality of treatment and was virtually present for group psychotherapy today. Group members practiced CBT and Mindfulness strategies to manage stress by completing short-activities drawn from \"The Anxiety and Stress Solution Deck\".Group members got to choose a card from one of the four focused areas of CBT: challenge your thoughts, change your behaviors, clarify your feelings, and create calmness. Shireen chose a card from the change your behavior pile and practiced the tools listed on the card. Group members were encouraged to provide feedback on the exercise. Shireen continues to make progress towards goals through verbal participation in group; to accomplish long term goals continue to utilize skills learned in programming. Continue with psychotherapy to educate and encourage use of wellness tools. Tx Plan Objective: 1.1,1.2 Therapist: SHALONDA Britton , LSPAUL    "

## 2025-05-23 NOTE — GROUP NOTE
"Visit Time    Visit Start Time: 1045  Visit Stop Time: 1145  Total Visit Duration: 45 minutes    Subjective:     Patient ID: Shireen Rueda is a 44 y.o. female.    Specialized Services Documentation  Therapist must complete separate progress note for each specific clinical activity in which the individual participated during the day.     [facility] Clinical Progress Note    Group member actively participated in music therapy group regarding grief and loss. The group participated in a discussion about their own experiences with grief and loss. The group then participated in a dereje discussion on the songs \"[employer]\" and \"Because of You\" and discussed how each song approaches the topic of grief. The group read and discussed handouts regarding the stages of grief and general facts about grief.  Group members were encouraged to share methods of coping with grief that have been successful for them in the past.  Continue AT to encourage the development and proactive use of coping techniques.      This group was facilitated virtually in a private office using HIPAA Compliant and Approved [employer] Teams.     Shireen participated. She did not share but appeared attentive throughout. Minimal progress toward treatment objective.   Tx Plan Objective: 1.1,1.2,1.4, Therapist:  River Viera, BERTHA, MT-BC    "

## 2025-05-23 NOTE — PSYCH
[facility] Discharge Instructions:   Disposition: Home  Address: [address] [place] [address] [address]  [place] [address] .   Diagnosis:  1. Moderate episode of recurrent major depressive disorder (HCC)        2. Panic disorder        3. Grief          .   Allergies (Drug/Food): No Known Allergies    Activity: No recommendations   Diet:no recommendations  Smoking Cessation:not a smoker   Diagnostic/Laboratory Orders: None    Vaccines: If you received a vaccine, please notify your family physician on your next visit. For more information, please call [phone].     Follow-up appointments/Referrals:     Current Psychiatrist/Therapist:   Medication Management:   [facility]  Lucretia [address]  [place], [place], [address] [phone] (P)  [phone] (F)  Follow up: 5/29/25 at 10:30am (virtual)     Outpatient Therapy:   [facility]  Juana Quilcene  [address]  [place] [address] [phone]  Follow up: 6/16/25 at 8:00am (virtual)       Primary Care Physician:   Nenita Velásquez MD   [address] [place] [address]   (p) [phone]   (f) [phone]      Outpatient and/or Partial and Other Community Resources Used (CTT, ICM, VNA): Griefshare    [facility] [phone]    Intake/Referral/Evaluation (Non-Emergency) *NON INSURED FOR FUNDING: Psychiatric: [phone], [facility]: [phone], [facility]: [phone] and [facility]: [phone]. Crisis Intervention (Emergency) County Service: Psychiatric: [phone], [place]: [phone], [place]: [phone], [facility]: [phone], [place]: [phone] and [facility]: [phone]. _________________________________  National Crisis Intervention Hotline: 1-160.417.7853.  National Suicide Crisis Hotline: 1-229.122.9831 or 988.     I, the undersigned, have received and understand the above instructions.         Patient/Rep Signature: __________________________________       Date/Time: ______________       Physician Signature: ____________________________________      Date/Time: ______________               Signature: ________________________________       Date/Time: ______________

## 2025-05-23 NOTE — PSYCH
"Assessment/Plan:      Assessment  Diagnoses and all orders for this visit:     Moderate episode of recurrent major depressive disorder (HCC)     Panic disorder     Grief              Subjective:     Subjective  Patient ID: Shireen Rueda is a 44 y.o. female.     [facility] Treatment Plan   AREAS OF NEED: Moderate episode of recurrent major depressive disorder, panic disorder and grief as evidenced by isolation, agitation, laying down more,recent suicidal ideation, difficulties with sleep, isolating, crying, feeling sad and anxious due to grief.     Date Initiated: 05/05/25     Strengths: \"good mom and friend\"         LONG TERM GOAL:   Date Initiated: 05/05/25  1.0 I will identify and share three ways in which my day-to-day functioning has improved since attending program.  Target Date: 6/2/25  Completion Date: 5/23/25-discharge        SHORT TERM OBJECTIVES:      Date Initiated: 05/05/25  1.1 I will reach out to at least 2 support groups per week and decide which support group is best for me and will schedule attend one support group.   Revision Date: 5/14/25 -met  Target Date: 5/14/25  Completion Date: 5/14/25 -met; 5/23/25-discharge     Date Initiated: 05/05/25  1.2  On a daily basis, in order to retain a connection with my brother who passed away I will find one way to honor his memory while navigate the grieving process.   Revision Date: 5/14/25 continue  Target Date: 5/14/25  Completion Date:5/23/25-discharge     Date Initiated: 05/05/25  1.3 I will take medications as prescribed and share questions and concerns if arise.    Revision Date:5/14/25 continue  Target Date: 5/14/25  Completion Date: 5/23/25-discharge     Date Initiated: 05/05/25  1.4 I will identify 3 ways my supports can assist in my recovery and agree to staff/support contact as indicated.    Revision Date:5/14/25 continue  Target Date: 5/14/25  Completion Date:5/23/25-discharge            7 DAY REVISION:  1.5  I will attend group daily and actively " participate and volunteer in every group in order to build a natural support network and feel more socially connected to improve my moods.  Date Initiated:5/14/25  Revision Date: 5/23/25-discharge  Target Date: 5/23/25  Completion Date: 5/23/25-discharge        PSYCHIATRY:  Date Initiated:  05/05/25  Medication Management and Education       Revision Date: 05/14/25        1.3 Continue medication management         The person(s) responsible for carrying out the plan is Rubén Cardenas DO and Annabelle Claudio PA-C     NURSING/SYMPTOM EDUCATION:   Date Initiated: 05/05/25  1.1, 1.2. 1.3, 1.4 This RN/Or Therapist will provide wellness/symptoms and skill education groups three to five days weekly to educate Shireen Rueda on signs and symptoms of diagnoses, skills to manage, and medication questions that will be addressed by the treatment team.    Revision date: 05/14/25   1.1,1.2,1.3,1.4,1.5 Continue to encourage Shireen Rueda to participate in wellness/symptoms and skills education groups daily to learn about symptoms, coping strategies and warning signs to promote relapse prevention.      The person(s) responsible for carrying out the plan is Prasad Francis; Gwen Jacobs RN, BSN     PSYCHOLOGY:   Date Initiated: 05/05/25       1.1, 1.2, 1.4 Provide psychotherapy group 5 times per week to allow opportunity for Shireen Rueda  to explore stressors and ways of coping.   Revision Date:  05/14/25   1.1,1.2,1.4,1.5  Continue to provide psychotherapy group daily to Shireen Rueda and encourage sharing of stressors, skills and positive change.     The person(s) responsible for carrying out the plan is Fabiana Banks, MSW,LSW; Ashley Costenbader, MA LMT     ALLIED THERAPY:   Date Initiated: 05/05/25  1.1,1.2 Engage Shireen Rueda in AT group 5 times weekly to encourage development and use of wellness tools to decrease symptoms and promote recovery through meaningful activity.  Revision Date: 05/14/25   1.1,1.2,1.5 Continue to engage Shireen  Mohit to participate in AT group to practice wellness tools within program and transfer to home sharing successes and barriers through healthy task involvement.         The person(s) responsible for carrying out the plan is ROLANDA MtzBC ; BERTHA Lane, [facility]     CASE MANAGEMENT:   Date Initiated: 05/05/25      1.0 This  will meet with Shireen Rueda  3-4 times weekly to assess treatment progress, discharge planning, connection to community supports and UR as indicated.  Revision Date: 05/14/25  1.0 Continue to meet with Shireen Rueda 3-4 times weekly to assess growth, work toward goals, continued treatment needs, dc planning and use of supports.      The person(s) responsible for carrying out the plan is SHALONDA Britton,AVERY     TREATMENT REVIEW/COMMENTS:      DISCHARGE CRITERIA: Identify 3 signs of progress and complete a safety plan.    DISCHARGE PLAN: Connect with identified outpatient providers.   Estimated Length of Stay: 10 treatment days          Diagnosis and Treatment Plan explained to Shireen, Shireen relates understanding diagnosis and is agreeable to Treatment Plan.         CLIENT COMMENTS / Please share your thoughts, feelings, need and/or experiences regarding your treatment plan with Staff.  Please see follow up note with comments.     Signatures can be found on [facility] Treatment plan consent form.

## 2025-05-23 NOTE — TELEPHONE ENCOUNTER
Folder (To be completed by) -Dr. Velásquez     Name of Form - [employer]        Form to be Faxed [phone]    Patient was made aware of the 7-10 business day form policy.

## 2025-05-23 NOTE — PSYCH
Virtual Regular VisitName: Shireen Rueda      : 1980      MRN: [id number]  Encounter Provider:  PARTIAL PSYCH PROGRAM  Encounter Date: 2025   [facility]  :  Assessment & Plan  Moderate episode of recurrent major depressive disorder (HCC)         Panic disorder         Grief             Goals addressed in session: see [place] plan           Reason for visit is PHP virtual care    Recent Visits  Date Type Provider Dept   25 Telemedicine Annabelle Claudio PA-C  [facility]   Showing recent visits within past 7 days and meeting all other requirements  Today's Visits  Date Type Provider Dept   25 Telephone Crystal MD [facility]   Showing today's visits and meeting all other requirements  Future Appointments  No visits were found meeting these conditions.  Showing future appointments within next 150 days and meeting all other requirements     History of Present Illness     HPI    Past Medical History   Past Medical History[1]  Past Surgical History[2]  Current Outpatient Medications   Medication Instructions    atorvastatin (LIPITOR) 40 mg tablet TAKE 1 TABLET BY MOUTH EVERY DAY    DULoxetine (CYMBALTA) 90 mg, Oral, Daily    gabapentin (NEURONTIN) 600 mg, Oral, 3 times daily    hydroCHLOROthiazide 25 mg, Oral, Daily    hydrOXYzine HCL (ATARAX) 25 mg, Oral, Every 6 hours PRN    losartan (COZAAR) 100 mg, Oral, Daily    melatonin 3 mg, Oral, Daily at bedtime    prazosin (MINIPRESS) 5 mg, Oral, Daily at bedtime    traZODone (DESYREL) 100 mg, Oral, Daily at bedtime     Allergies[3]    Objective   LMP 10/17/2018     Video Exam  Physical Exam     Administrative Statements   Encounter provider  PARTIAL PSYCH PROGRAM    The Patient is located at Home and in the following state in which I hold an active license [place].    The patient was identified by name and date of birth. Shireen Rueda was informed that  this is a telemedicine visit and that the visit is being conducted through Telephone.  My office door was closed. No one else was in the room.  She acknowledged consent and understanding of privacy and security of the video platform. The patient has agreed to participate and understands they can discontinue the visit at any time.    I have spent a total time of five hours of group and case management  in caring for this patient on the day of the visit/encounter including Counseling / Coordination of care, not including the time spent for establishing the audio/video connection.              [1]   Past Medical History:  Diagnosis Date    Anemia     Anxiety     Chest tightness or pressure     8/19/2013    Depression 05/14/2017    Hypertension     Vitamin D deficiency    [2]   Past Surgical History:  Procedure Laterality Date    CYSTOSCOPY N/A 12/30/2018    Procedure: CYSTOSCOPY;  Surgeon: Laura Bal DO;  Location: [facility];  Service: Gynecology    HYSTERECTOMY N/A 12/30/2018    Procedure: HYSTERECTOMY LAPAROSCOPIC TOTAL (LTH);  Surgeon: Laura Bal DO;  Location: [facility];  Service: Gynecology    CA LAPAROSCOPY W/RMVL ADNEXAL STRUCTURES Bilateral 12/30/2018    Procedure: SALPINGECTOMY, LAPAROSCOPIC;  Surgeon: Laura Bal DO;  Location: [facility];  Service: Gynecology    TONSILLECTOMY      TUBAL LIGATION      2006   [3] No Known Allergies

## 2025-05-23 NOTE — GROUP NOTE
Visit Time    Visit Start Time: 0830  Visit Stop Time: 0930  Total Visit Duration: 60 minutes    Subjective:     Patient ID: Shireen Rueda is a 44 y.o. female.     [facility] Clinical Progress Notes      Specialized Services Documentation  Therapist must complete separate progress note for each specific clinical activity in which the individual participated during the day.       EDUCATION THERAPY    Morning group focused on establishing routine and goal review.  Members assessed related to readiness for learning and potential barriers.  Transfer of skills outside of program explored through goal sharing and connection back to the Recovery Model 5 key facets of recovery.  Throughout session, skills introduced, practiced reflecting mindfulness, meditation, movement, and connecting peers in this community.      Shireen Rueda actively shared.  Identified that they did complete goal from last treatment day identifying she went to get her nails done, gaining hope and responsibility.  Presented as Receptive related to readiness to learn and did not present with learning barriers. Shireen Rueda participated in mindfulness exercise and gratitude practice. Shireen Rueda made positive progress toward goal. Continue education group to assess willingness to engage, assess transfer of knowledge, and participate in skill development.  Tx Plan Objective: 1.0, Therapist:  Ina PAYTON Ed.  Visit Time    Visit Start Time: 1330  Visit Stop Time: 1430  Total Visit Duration: 60 minutes    Subjective:     Patient ID: Shireen Rueda is a 44 y.o. female.    Specialized Services Documentation  Therapist must complete separate progress note for each specific clinical activity in which the individual participated during the day.     [facility] Clinical Progress Note     GROUP PSYCHOTHERAPY    Participated actively in psychotherapy group. Group explored treatment day leaning and staff utilized Verbal, A/V, and Demonstration teaching methods. Members  shared areas of learning and set a goal outside of program. Time spent encouraging use of WRAP, skill review, and exploring resources in the community.      Shireen Rueda actively shared. They engaged in learning related to wellness tools. Shireen Rueda identified watching a movie with her family  as goal for the evening . Positive progress toward treatment objective. Continue group psychotherapy to actively practice learned skills and encourage transfer of knowledge to unstructured time.   Tx Plan Objective: 1.0, Therapist:  Ian PAYTON Ed.  Visit Time:    Visit Start Time: 930  Visit Stop Time: 1030  Total Visit Duration: 60 minutes    Subjective:     Patient ID: Shireen Rueda is a 44 y.o. female    Specialized Services Documentation  Therapist must complete separate progress note for each specific clinical activity in which the individual participated during the day      [facility] Clinical Progress Note    Group Psychotherapy        This group was facilitated virtually in a private office using HIPAA Compliant and Approved [employer] Teams.  During this session, they, actively engaged in psychoeducational group about conflict resolution. The skill helps to develop skills to create resolution with self and others with whom one may interact with. Group discovered strategies that help them to develop positive assertiveness skills on a short term and long term basis. The group talked about understanding the purpose, and meaning of conflict resolution in intellectual and emotional expression, regulation , and recognition, and how it affects themselves and others.Teaching on the emphasis of self monitoring and resolution techniques, discussions on how to create positive resolutions through effective assertive tools were discussed. Group was encouraged to ask questions in an open forum at the end of group. Measurable progress displayed through engagement in topic. Processing in the course of treatment will continue to  engage in psychotherapy to encourage positive conflict resolution.     Shireen actively participated in the group role plays and group discussion  .Positive progress toward treatment objective  Tx Plan Objective 1.1, 1.2, 1.4 Therapist Ian THOMPSON Ed

## 2025-05-29 ENCOUNTER — TELEMEDICINE (OUTPATIENT)
Dept: PSYCHIATRY | Facility: CLINIC | Age: 45
End: 2025-05-29
Payer: COMMERCIAL

## 2025-05-29 ENCOUNTER — TELEPHONE (OUTPATIENT)
Dept: FAMILY MEDICINE CLINIC | Facility: CLINIC | Age: 45
End: 2025-05-29

## 2025-05-29 DIAGNOSIS — F41.0 PANIC DISORDER: ICD-10-CM

## 2025-05-29 DIAGNOSIS — F33.1 MODERATE EPISODE OF RECURRENT MAJOR DEPRESSIVE DISORDER (HCC): Primary | ICD-10-CM

## 2025-05-29 DIAGNOSIS — F43.21 GRIEF: ICD-10-CM

## 2025-05-29 DIAGNOSIS — G47.00 INSOMNIA, UNSPECIFIED TYPE: ICD-10-CM

## 2025-05-29 PROCEDURE — 90792 PSYCH DIAG EVAL W/MED SRVCS: CPT | Performed by: PSYCHIATRY & NEUROLOGY

## 2025-05-29 RX ORDER — ESZOPICLONE 1 MG/1
1 TABLET, FILM COATED ORAL
Qty: 10 TABLET | Refills: 0 | Status: SHIPPED | OUTPATIENT
Start: 2025-05-29

## 2025-05-29 RX ORDER — BUSPIRONE HYDROCHLORIDE 10 MG/1
10 TABLET ORAL 2 TIMES DAILY
Qty: 60 TABLET | Refills: 1 | Status: SHIPPED | OUTPATIENT
Start: 2025-05-29

## 2025-05-29 RX ORDER — GABAPENTIN 600 MG/1
600 TABLET ORAL 3 TIMES DAILY
Qty: 90 TABLET | Refills: 0 | Status: SHIPPED | OUTPATIENT
Start: 2025-05-29
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
## 2025-05-29 NOTE — TELEPHONE ENCOUNTER
----- Message from Nenita Velásquez MD sent at 5/29/2025  4:05 PM EDT -----  Hello, Please let patient know she will need to be seen before [place] paperwork can be filled out. She has not seen any physician since being released and I am unsure if she is able to go back to work or not with out evaluation.All the best Dr. Velásquez

## 2025-05-30 ENCOUNTER — TELEPHONE (OUTPATIENT)
Dept: PSYCHIATRY | Facility: CLINIC | Age: 45
End: 2025-05-30

## 2025-05-30 NOTE — TELEPHONE ENCOUNTER
Called and left M for patient stating that her appointment time on July 10 is at 9:00 am instead of 10:30 am. Left a call back number if this does not work for patient. LVS

## 2025-05-30 NOTE — BH TREATMENT PLAN
TREATMENT PLAN (Medication Management Only)        [facility]    Name and Date of Birth:  Shireen Rueda 44 y.o. 1980  MRN: [id number]  Date of Treatment Plan: May 29, 2025  Diagnosis/Diagnoses:    1. Moderate episode of recurrent major depressive disorder (HCC)    2. Panic disorder    3. Grief    4. Insomnia, unspecified type      Strengths/Personal Resources for Self-Care: supportive family, supportive friends, average or above intelligence, independence, motivation for treatment.  Area/Areas of need (in own words): anxiety symptoms, depressive symptoms, lack of energy  1. Long Term Goal:   alleviate anxiety, alleviate depression.  Target Date:6 months - 11/30/2025  Person/Persons responsible for completion of goal: Shireen  2.  Short Term Objective (s) - How will we reach this goal?:   A.  Provider new recommended medication/dosage changes and/or continue medication(s): continue current medications as prescribed.  B.  Attend medication management appointments regularly..  C.  Attend group sessions regularly..  Target Date:3 months - 8/30/2025  Person/Persons Responsible for Completion of Goal: Shireen  Progress Towards Goals: initiating treatment  Treatment Modality: medication management every 6 weeks, referral for individual psychotherapy  Review due 180 days from date of this plan: 3 months - 8/30/2025  Expected length of service: ongoing treatment unless revised  My Physician/PA/NP and I have developed this plan together and I agree to work on the goals and objectives. I understand the treatment goals that were developed for my treatment.   Electronic Signatures: on file (unless signed below)    Lucretia Tamayo MD 05/29/25

## 2025-06-03 ENCOUNTER — DOCUMENTATION (OUTPATIENT)
Dept: PSYCHOLOGY | Facility: CLINIC | Age: 45
End: 2025-06-03

## 2025-06-03 NOTE — PROGRESS NOTES
Visit Time    Visit Start Time: 1553    Subjective:     Patient ID: Shireen Rueda is a 44 y.o. female.    [facility] Clinical Progress Notes      Specialized Services Documentation  Therapist must complete separate progress note for each specific clinical activity in which the individual participated during the day.     Zero Suicide Follow Up Action    This writer attempted to speak with Shireen Rueda today - 7 days post discharge from [facility].   Reviewed crisis number on message and requested return call.    Fabiana Banks

## 2025-06-06 ENCOUNTER — DOCUMENTATION (OUTPATIENT)
Dept: PSYCHOLOGY | Facility: CLINIC | Age: 45
End: 2025-06-06

## 2025-06-06 NOTE — PROGRESS NOTES
Subjective:     Patient ID: Shireen Rueda is a 44 y.o. female.    [facility] Clinical Progress Notes      Specialized Services Documentation  Therapist must complete separate progress note for each specific clinical activity in which the individual participated during the day.     Zero Suicide Follow Up Action    This writer spoke with Shireen Rueda 6/5/25- post discharge from [facility].   Reviewed crisis information.  Shireen Rueda reports taking medication. If no, counseled to take medication as prescribed.  Shireen Rueda reports awareness of aftercare appointments.    Fabiana Banks

## 2025-06-09 ENCOUNTER — OFFICE VISIT (OUTPATIENT)
Dept: FAMILY MEDICINE CLINIC | Facility: CLINIC | Age: 45
End: 2025-06-09

## 2025-06-09 VITALS
OXYGEN SATURATION: 98 % | WEIGHT: 160.6 LBS | TEMPERATURE: 97.8 F | SYSTOLIC BLOOD PRESSURE: 118 MMHG | RESPIRATION RATE: 16 BRPM | HEART RATE: 96 BPM | BODY MASS INDEX: 30.35 KG/M2 | DIASTOLIC BLOOD PRESSURE: 89 MMHG

## 2025-06-09 DIAGNOSIS — I10 ESSENTIAL HYPERTENSION: ICD-10-CM

## 2025-06-09 DIAGNOSIS — F41.8 DEPRESSION WITH ANXIETY: Primary | ICD-10-CM

## 2025-06-09 PROBLEM — E66.01 SEVERE OBESITY (BMI 35.0-39.9) WITH COMORBIDITY (HCC): Status: ACTIVE | Noted: 2025-06-09

## 2025-06-09 PROCEDURE — 99213 OFFICE O/P EST LOW 20 MIN: CPT | Performed by: FAMILY MEDICINE

## 2025-06-09 NOTE — ASSESSMENT & PLAN NOTE
Patient with a history of hypertension on HCTZ 25 p.o. daily, losartan 100 mg daily  Patient previously on amlodipine 2.5 daily however this is currently being held as patient was started on Minipress twice daily for nightmares   We will continue patient on HCTZ and losartan.  Will DC amlodipine as blood pressures are well-controlled on current regimen

## 2025-06-09 NOTE — PROGRESS NOTES
Name: Shireen Rueda      : 1980      MRN: [id number]  Encounter Provider: Nenita Velásquez MD  Encounter Date: 2025   Encounter department: [facility] [place]  :  Assessment & Plan  Depression with anxiety  - Patient hospitalized at the beginning of May for depression and anxiety.  During hospitalization patient started on Cymbalta 60 mg daily, gabapentin 400 3 times daily, prazosin 5 mg nightly and trazodone 100 mg nightly.   - After being released from the hospital patient then participated in partial program which ended last week. During present program crisis plan was made.  - Patient follows with psychiatry for medical management  - On exam today patient very tearful.  Patient has desires to work but knows she is not quite ready.  Patient reports that she has good days and bad days.  Bad days patient reports she stays in bed.  Patient is working to have more good days and wants to get better but is still working through the process.    Plan  - Continue medicine given by psychiatry  - Reviewed crisis plan; patient denies any SI on exam today  - Follow-up in 2 weeks until patient can start with therapist.  Patient reports that in the next 2 to 3 weeks she is starting a 12-week therapy program  -Patient would like help from social work.  Understand the process for disability as patient has been unable to continue wearing given her current episode of depression and anxiety                                                                                                                                                                              Orders:    Ambulatory Referral to Social Work Care Management Program; Future    Essential hypertension  Patient with a history of hypertension on HCTZ 25 p.o. daily, losartan 100 mg daily  Patient previously on amlodipine 2.5 daily however this is currently being held as patient was started on Minipress twice daily for  nightmares   We will continue patient on HCTZ and losartan.  Will DC amlodipine as blood pressures are well-controlled on current regimen                  History of Present Illness   Depression  This is a chronic problem. The problem occurs constantly. Pertinent negatives include no abdominal pain, fatigue, joint swelling, myalgias, neck pain, urinary symptoms or vomiting.   Hypertension  This is a chronic problem. The problem is controlled. Pertinent negatives include no neck pain. There are no compliance problems.      Review of Systems   Constitutional:  Negative for fatigue.   Gastrointestinal:  Negative for abdominal pain and vomiting.   Musculoskeletal:  Negative for joint swelling, myalgias and neck pain.       Objective   /89 (BP Location: Left arm, Patient Position: Sitting, Cuff Size: Standard)   Pulse 96   Temp 97.8 °F (36.6 °C)   Resp 16   Wt 72.8 kg (160 lb 9.6 oz)   LMP 10/17/2018   SpO2 98%   BMI 30.35 kg/m²      Physical Exam  Vitals and nursing note reviewed.   Constitutional:       General: She is not in acute distress.     Appearance: She is well-developed.     Cardiovascular:      Rate and Rhythm: Normal rate and regular rhythm.      Heart sounds: No murmur heard.  Pulmonary:      Effort: Pulmonary effort is normal. No respiratory distress.      Breath sounds: Normal breath sounds.   Abdominal:      Palpations: Abdomen is soft.      Tenderness: There is no abdominal tenderness.     Neurological:      Mental Status: She is alert.     Psychiatric:         Attention and Perception: Attention normal. She is attentive.         Mood and Affect: Mood is depressed. Affect is tearful.         Speech: Speech normal. She is communicative. Speech is not delayed or slurred.         Behavior: Behavior normal. Behavior is not agitated, aggressive, withdrawn, hyperactive or combative. Behavior is cooperative.         Thought Content: Thought content normal. Thought content does not include  homicidal or suicidal ideation. Thought content does not include homicidal or suicidal plan.

## 2025-06-09 NOTE — ASSESSMENT & PLAN NOTE
- Patient hospitalized at the beginning of May for depression and anxiety.  During hospitalization patient started on Cymbalta 60 mg daily, gabapentin 400 3 times daily, prazosin 5 mg nightly and trazodone 100 mg nightly.   - After being released from the hospital patient then participated in partial program which ended last week. During present program crisis plan was made.  - Patient follows with psychiatry for medical management  - On exam today patient very tearful.  Patient has desires to work but knows she is not quite ready.  Patient reports that she has good days and bad days.  Bad days patient reports she stays in bed.  Patient is working to have more good days and wants to get better but is still working through the process.    Plan  - Continue medicine given by psychiatry  - Reviewed crisis plan; patient denies any SI on exam today  - Follow-up in 2 weeks until patient can start with therapist.  Patient reports that in the next 2 to 3 weeks she is starting a 12-week therapy program  -Patient would like help from social work.  Understand the process for disability as patient has been unable to continue wearing given her current episode of depression and anxiety                                                                                                                                                                              Orders:    Ambulatory Referral to Social Work Care Management Program; Future

## 2025-06-10 ENCOUNTER — PATIENT OUTREACH (OUTPATIENT)
Dept: FAMILY MEDICINE CLINIC | Facility: CLINIC | Age: 45
End: 2025-06-10

## 2025-06-10 NOTE — PROGRESS NOTES
SWCM received referral from provider to assist patient with needs, requesting information on disability.    SWCM completed chart review. SWCM called patient to follow up and assist with needs. SWCM unable to reach patient. Left voice message requesting return call. Contact information provided.       SWCM will continue to follow up and remain available to assist as needed.

## 2025-06-11 ENCOUNTER — PATIENT OUTREACH (OUTPATIENT)
Dept: FAMILY MEDICINE CLINIC | Facility: CLINIC | Age: 45
End: 2025-06-11

## 2025-06-11 NOTE — PROGRESS NOTES
SWCM received incoming call from patient requesting return call. SWCM completed chart review. SWCM called patient to follow up and assist with needs. SWCM unable to reach patient. Left voice message requesting return call. Contact information provided.     SWCM will continue to follow up and remain available to assist as needed.

## 2025-06-12 ENCOUNTER — TELEPHONE (OUTPATIENT)
Age: 45
End: 2025-06-12

## 2025-06-12 NOTE — TELEPHONE ENCOUNTER
Patient is calling regarding cancelling an appointment.    Date/Time: 6/16/25 at 8am [facility] NP appt    Reason: patient cannot make appt    Patient was rescheduled: YES [] NO [x]  If yes, when was Patient reschedule for: n/a, writer contacted intake and was directed to send msg to [facility] for r/s.     Patient requesting call back to reschedule: YES [x] NO []

## 2025-06-13 DIAGNOSIS — I10 ESSENTIAL HYPERTENSION: ICD-10-CM

## 2025-06-13 RX ORDER — HYDROCHLOROTHIAZIDE 25 MG/1
25 TABLET ORAL DAILY
Qty: 90 TABLET | Refills: 1 | Status: SHIPPED | OUTPATIENT
Start: 2025-06-13

## 2025-06-18 ENCOUNTER — PATIENT OUTREACH (OUTPATIENT)
Dept: FAMILY MEDICINE CLINIC | Facility: CLINIC | Age: 45
End: 2025-06-18

## 2025-06-18 DIAGNOSIS — E78.5 DYSLIPIDEMIA: ICD-10-CM

## 2025-06-18 RX ORDER — ATORVASTATIN CALCIUM 40 MG/1
40 TABLET, FILM COATED ORAL DAILY
Qty: 90 TABLET | Refills: 2 | Status: SHIPPED | OUTPATIENT
Start: 2025-06-18

## 2025-06-18 NOTE — PROGRESS NOTES
GRAYSON GONZALES called patient to follow up and assist with needs. GRAYSON GONZALES unable to reach patient (x2). Left voice message requesting return call. Contact information provided. GRAYSON GONZALES sent UTR letter. GRAYSON GONZALES closed case and removed self from care team.      GRAYSON GONZALES will remain available to assist as needed.

## 2025-06-18 NOTE — LETTER
06/18/25    Dear Shireen Rueda,    I am a Care Manager with [facility] [place]  [facility] [place]  [address] [address] [phone].      We have made several attempts to call you by phone. It is important that you contact , Alise More at [phone] so that we can assist with your care needs.     Sincerely,       Beverly Garza LCSW

## 2025-06-20 DIAGNOSIS — F41.0 PANIC DISORDER: ICD-10-CM

## 2025-06-20 DIAGNOSIS — F33.1 MODERATE EPISODE OF RECURRENT MAJOR DEPRESSIVE DISORDER (HCC): ICD-10-CM

## 2025-06-20 NOTE — TELEPHONE ENCOUNTER
Patient is still waiting for a return call to reschedule appointment.     Please contact patient @     [phone] (Mobile)

## 2025-06-22 RX ORDER — BUSPIRONE HYDROCHLORIDE 10 MG/1
10 TABLET ORAL 2 TIMES DAILY
Qty: 180 TABLET | Refills: 1 | OUTPATIENT
Start: 2025-06-22

## 2025-06-24 ENCOUNTER — TELEPHONE (OUTPATIENT)
Dept: FAMILY MEDICINE CLINIC | Facility: CLINIC | Age: 45
End: 2025-06-24

## 2025-06-24 NOTE — TELEPHONE ENCOUNTER
Patient called and left a message on the clinical line asking for a return call from Dr. Velásquez. Did try to call patient for more information on what the call is about, left message to return call to office for more information. Patient can be reached at [phone].

## 2025-06-26 ENCOUNTER — OFFICE VISIT (OUTPATIENT)
Dept: FAMILY MEDICINE CLINIC | Facility: CLINIC | Age: 45
End: 2025-06-26

## 2025-06-26 VITALS
RESPIRATION RATE: 18 BRPM | DIASTOLIC BLOOD PRESSURE: 82 MMHG | SYSTOLIC BLOOD PRESSURE: 118 MMHG | WEIGHT: 172.8 LBS | BODY MASS INDEX: 32.65 KG/M2 | TEMPERATURE: 97.6 F | OXYGEN SATURATION: 97 % | HEART RATE: 108 BPM

## 2025-06-26 DIAGNOSIS — Z13.228 SCREENING FOR METABOLIC DISORDER: ICD-10-CM

## 2025-06-26 DIAGNOSIS — R40.0 DAYTIME SLEEPINESS: ICD-10-CM

## 2025-06-26 DIAGNOSIS — F41.8 DEPRESSION WITH ANXIETY: Primary | ICD-10-CM

## 2025-06-26 PROCEDURE — 99213 OFFICE O/P EST LOW 20 MIN: CPT | Performed by: FAMILY MEDICINE

## 2025-06-26 NOTE — PROGRESS NOTES
"Name: Shireen Rueda      : 1980      MRN: [id number]  Encounter Provider: Nenita Velásquez MD  Encounter Date: 2025   Encounter department: [facility] [place]  :  Assessment & Plan  Depression with anxiety  Depression Screening Follow-up Plan: Patient's depression screening was positive with a PHQ-2 score of 5. Their PHQ-9 score was 10. Patient with underlying depression and was advised to continue current medications as prescribed.    -Pt currently on Cymbalta 60 mg daily, gabapentin 400 3 times daily, prazosin 5 mg nightly and trazodone 100 mg nightly.   - Pt recently finished partial program where crisis plan was made.  - Patient follows with psychiatry for medical management  - Today patient is still very tearful on exam.  Patient has desires to work and be \"productive member of society\" but knows she is not ready.  Patient reports still having both good and bad days.  Bad days patient reports she stays in bed and unable to do ADLs alone.  Patient now spends days if  is not home with mother-in-law for assistance.  Patient is starting a 12-week therapy program plan next week     Plan  - Continue medicine given by psychiatry  - Reviewed crisis plan; patient denies any SI on exam today  - ER and return precautions discussed with patient                                      Daytime sleepiness  Patient with daytime sleepiness likely secondary to depression  We will have patient seen by sleep medicine  Orders:    Ambulatory Referral to Sleep Medicine; Future    Screening for metabolic disorder    Orders:    Hemoglobin A1C; Future           History of Present Illness   Depression  This is a chronic problem. The problem occurs constantly. The problem has been waxing and waning. Pertinent negatives include no chest pain or coughing.     Review of Systems   Respiratory:  Negative for cough.    Cardiovascular:  Negative for chest pain.   Psychiatric/Behavioral:  " Positive for decreased concentration, depression and sleep disturbance. Negative for agitation, dysphoric mood, hallucinations and suicidal ideas. The patient is not nervous/anxious and is not hyperactive.        Objective   /82 (BP Location: Left arm, Patient Position: Sitting, Cuff Size: Standard)   Pulse (!) 108   Temp 97.6 °F (36.4 °C) (Temporal)   Resp 18   Wt 78.4 kg (172 lb 12.8 oz)   LMP 10/17/2018   SpO2 97%   BMI 32.65 kg/m²      Physical Exam  Vitals and nursing note reviewed.     Neurological:      Mental Status: She is alert.     Psychiatric:         Mood and Affect: Mood is depressed. Mood is not anxious or elated. Affect is tearful. Affect is not blunt or angry.         Speech: Speech normal.         Behavior: Behavior is cooperative.         Thought Content: Thought content normal.         Cognition and Memory: Cognition normal.         Judgment: Judgment normal. Judgment is impulsive.

## 2025-06-26 NOTE — ASSESSMENT & PLAN NOTE
"Depression Screening Follow-up Plan: Patient's depression screening was positive with a PHQ-2 score of 5. Their PHQ-9 score was 10. Patient with underlying depression and was advised to continue current medications as prescribed.    -Pt currently on Cymbalta 60 mg daily, gabapentin 400 3 times daily, prazosin 5 mg nightly and trazodone 100 mg nightly.   - Pt recently finished partial program where crisis plan was made.  - Patient follows with psychiatry for medical management  - Today patient is still very tearful on exam.  Patient has desires to work and be \"productive member of society\" but knows she is not ready.  Patient reports still having both good and bad days.  Bad days patient reports she stays in bed and unable to do ADLs alone.  Patient now spends days if  is not home with mother-in-law for assistance.  Patient is starting a 12-week therapy program plan next week     Plan  - Continue medicine given by psychiatry  - Reviewed crisis plan; patient denies any SI on exam today  - ER and return precautions discussed with patient                                      "

## 2025-06-30 DIAGNOSIS — F41.0 PANIC DISORDER: ICD-10-CM

## 2025-06-30 RX ORDER — HYDROXYZINE HYDROCHLORIDE 25 MG/1
25 TABLET, FILM COATED ORAL EVERY 6 HOURS PRN
Qty: 50 TABLET | Refills: 0 | OUTPATIENT
Start: 2025-06-30

## 2025-07-01 ENCOUNTER — NURSE TRIAGE (OUTPATIENT)
Dept: OTHER | Facility: OTHER | Age: 45
End: 2025-07-01

## 2025-07-01 DIAGNOSIS — F33.9 MAJOR DEPRESSION, RECURRENT (HCC): ICD-10-CM

## 2025-07-01 RX ORDER — DULOXETIN HYDROCHLORIDE 30 MG/1
90 CAPSULE, DELAYED RELEASE ORAL DAILY
Qty: 45 CAPSULE | Refills: 0 | Status: SHIPPED | OUTPATIENT
Start: 2025-07-01 | End: 2025-07-10

## 2025-07-02 ENCOUNTER — TELEMEDICINE (OUTPATIENT)
Dept: BEHAVIORAL/MENTAL HEALTH CLINIC | Facility: CLINIC | Age: 45
End: 2025-07-02
Payer: COMMERCIAL

## 2025-07-02 DIAGNOSIS — F33.1 MODERATE EPISODE OF RECURRENT MAJOR DEPRESSIVE DISORDER (HCC): Primary | ICD-10-CM

## 2025-07-02 DIAGNOSIS — F41.0 PANIC DISORDER: ICD-10-CM

## 2025-07-02 DIAGNOSIS — F43.21 GRIEF: ICD-10-CM

## 2025-07-02 DIAGNOSIS — F41.9 ANXIETY: ICD-10-CM

## 2025-07-02 PROCEDURE — 90791 PSYCH DIAGNOSTIC EVALUATION: CPT | Performed by: COUNSELOR

## 2025-07-02 NOTE — TELEPHONE ENCOUNTER
"Regarding: Refill Medication  ----- Message from Kelly ACHARYA sent at 7/1/2025  7:42 PM EDT -----  \"I thought I had enough of medication for my Cymbalta and I don't and [facility] not been [facility]. I don't have any left and if I can just get a couple until I get a hold of my doctor, I'd be really happy. I'm just looking for someone to help me\"  Medication Refill Request       Medication: DULoxetine (CYMBALTA) 30 mg delayed release capsule     Dose/Frequency:  Take 3 capsules (90 mg total) by mouth daily    Quantity: 90 capsule (30 day supply)    Pharmacy: [facility]/pharmacy #[id number] - [place], [place] - [address]   Phone: [phone]  Fax: [phone]      Office:   [ ] PCP/Provider -   [ x] Specialty/Provider -     Does the patient have enough for 3 days?   [ ] Yes- Send to POD (normal priority)   [ x] No - Send as HP to POD    "
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
## 2025-07-02 NOTE — BH TREATMENT PLAN
"Solution-Focused Brief TherapyTreatment Plan    Shireen Rueda  1980    Date of Initial Psychotherapy Assessment: 7/2/2025  Date of Current Treatment Plan: 07/02/25  Treatment Plan Target Date: 12/29/2025  Treatment Plan Expiration Date: 12/29/2025    Diagnosis:  1. Moderate episode of recurrent major depressive disorder (HCC)        2. Grief        3. Anxiety        4. Panic disorder            Strengths: Shireen is strong, resilient, caring for others    Area(s) of Need:  Shireen needs to begin a healthy grieving process around losses in her life.     Long Term Goal 1 (as identified in the goals record sheet): Shireen would like to feel \"normal again, less stuck\" She would like to talk through her feelings without judgement.    Stage of Change: Preparation    Target Date for completion: 12/29/2025    Anticipated therapeutic modalities: solution focused    People identified to complete this goal: DEL Mansfield, Shireen Rueda,     Objective 1: (identify the means of measuring success in meeting the objective): Shireen will reengage in activities with  family, friends, and others.    Objective 2: (identify the means of measuring success in meeting the objective): Shireen will Begin verbalizing feelings  associated with the loss.        I am currently under the care of a [facility] psychiatric provider: no    My [facility] psychiatric provider is: none    I am currently taking psychiatric medications: Yes, as prescribed    Medication Management Objective 1: Continue taking medications as prescribed by psychiatric provider.  Medication Management Objective 2: Continue seeing psychiatric provider as scheduled.    I feel that I will be ready for discharge from mental health care when I reach the following (measurable goal/objective): Shireen willResolve the loss, reengaging in old relationships and initiating new  contacts with others.    For children and adults who have a legal guardian:  Has there been any change to custody orders " and/or guardianship status? N/A. If yes, attach updated documentation.    I have reviewed my Crisis Plan and have been offered a copy of this plan    Behavioral Health Treatment Plan [facility]: Diagnosis and Treatment Plan explained to Shireen Rueda acknowledges an understanding of their diagnosis. Shireen Rueda agrees to this treatment plan.    I have been offered a copy of this Treatment Plan. yes

## 2025-07-02 NOTE — PSYCH
PSYCHIATRIC EVALUATION     Name: Shireen Rueda      : 1980      MRN: [id number]  Encounter Provider: DEL Mansfield  Encounter Date: 2025   Encounter department: [facility] [place]    Insurance: Payor: [employer] / Plan: [employer] / Product Type: [facility] /      Reason for visit:   Chief Complaint   Patient presents with    Virtual Regular Visit     :  Assessment & Plan  Moderate episode of recurrent major depressive disorder (HCC)         Grief         Anxiety         Panic disorder         Moderate episode of recurrent major depressive disorder (HCC)         Grief         Anxiety         Panic disorder           Visit Time  Visit Start Time: ***  Visit Stop Time: ***  Total Visit Duration: {Psych Total Visit Time:86632}    {PSY Voice Recognition Statement - Use Only if Dictating (Optional):01845}    DEL Mansfield 25

## 2025-07-02 NOTE — TELEPHONE ENCOUNTER
"REASON FOR CONVERSATION: Medication Refill    SYMPTOMS: none    OTHER HEALTH INFORMATION: totally out of cymbalta 90 mg daily    PROTOCOL DISPOSITION: Call PCP Now    CARE ADVICE PROVIDED: communicated with on call who sent in 15 day supply. Relayed to patient along with reminder of visit on 7/10 w Dr Quintero. Patient verbalized understanding.    PRACTICE FOLLOW-UP: please provider further refill as appropriate.      Reason for Disposition   [1] Prescription refill request for ESSENTIAL medicine (i.e., likelihood of harm to patient if not taken) AND [2] triager unable to refill per department policy    Answer Assessment - Initial Assessment Questions  1. DRUG NAME: \"What medicine do you need to have refilled?\"      Cymbalta 90 mg    2. REFILLS REMAINING: \"How many refills are remaining?\" (Note: The label on the medicine or pill bottle will show how many refills are remaining. If there are no refills remaining, then a renewal may be needed.)      0        4. PRESCRIBING HCP: \"Who prescribed it?\" Reason: If prescribed by specialist, call should be referred to that group.      Inpatient psych    5. SYMPTOMS: \"Do you have any symptoms?\"      Denies    Protocols used: Medication Refill and Renewal Call-Adult-    "

## 2025-07-08 DIAGNOSIS — F41.0 PANIC DISORDER: ICD-10-CM

## 2025-07-08 DIAGNOSIS — F33.1 MODERATE EPISODE OF RECURRENT MAJOR DEPRESSIVE DISORDER (HCC): ICD-10-CM

## 2025-07-08 DIAGNOSIS — F33.9 MAJOR DEPRESSION, RECURRENT (HCC): ICD-10-CM

## 2025-07-08 RX ORDER — PRAZOSIN HYDROCHLORIDE 5 MG/1
5 CAPSULE ORAL
Qty: 30 CAPSULE | Refills: 0 | Status: CANCELLED | OUTPATIENT
Start: 2025-07-08

## 2025-07-08 RX ORDER — BUSPIRONE HYDROCHLORIDE 10 MG/1
10 TABLET ORAL 2 TIMES DAILY
Qty: 60 TABLET | Refills: 0 | Status: CANCELLED | OUTPATIENT
Start: 2025-07-08

## 2025-07-08 RX ORDER — DULOXETIN HYDROCHLORIDE 30 MG/1
90 CAPSULE, DELAYED RELEASE ORAL DAILY
Qty: 45 CAPSULE | Refills: 0 | Status: CANCELLED | OUTPATIENT
Start: 2025-07-08 | End: 2025-07-23

## 2025-07-08 RX ORDER — HYDROXYZINE HYDROCHLORIDE 25 MG/1
25 TABLET, FILM COATED ORAL EVERY 6 HOURS PRN
Qty: 50 TABLET | Refills: 0 | Status: CANCELLED | OUTPATIENT
Start: 2025-07-08 | End: 2025-08-07

## 2025-07-08 RX ORDER — TRAZODONE HYDROCHLORIDE 100 MG/1
100 TABLET ORAL
Qty: 30 TABLET | Refills: 0 | Status: CANCELLED | OUTPATIENT
Start: 2025-07-08

## 2025-07-08 RX ORDER — GABAPENTIN 600 MG/1
600 TABLET ORAL 3 TIMES DAILY
Qty: 90 TABLET | Refills: 0 | Status: CANCELLED | OUTPATIENT
Start: 2025-07-08

## 2025-07-08 NOTE — TELEPHONE ENCOUNTER
Reason for call:   [x] Refill   [] Prior Auth  [x] Other: Previously prescribed by different provider. Patient is scheduled to see provider on Thursday 7/10/25    Office:   [] PCP/Provider -   [x] Specialty[facility] Tamayo MD      Medication: 1/6: busPIRone (BUSPAR) 10 mg tablet Take 1 tablet (10 mg total) by mouth 2 (two) times a day     2/6: DULoxetine (CYMBALTA) 30 mg delayed release capsule Take 3 capsules (90 mg total) by mouth daily for 15 days (Recently filled on 7/1/25 for 15 days)    3/6: gabapentin (Neurontin) 600 MG tablet Take 1 tablet (600 mg total) by mouth 3 (three) times a day     4/6: hydrOXYzine HCL (ATARAX) 25 mg tablet Take 1 tablet (25 mg total) by mouth every 6 (six) hours as needed for anxiety     5/6: prazosin (MINIPRESS) 5 mg capsule Take 1 capsule (5 mg total) by mouth daily at bedtime     6/6: traZODone (DESYREL) 100 mg tablet Take 1 tablet (100 mg total) by mouth daily at bedtime     Pharmacy[id number] [place] [place] [facility] [address]   Does the patient have enough for 3 days?   [] Yes   [x] No - Send as HP to POD    Mail Away Pharmacy   Does the patient have enough for 10 days?   [] Yes   [] No - Send as HP to POD

## 2025-07-09 ENCOUNTER — TELEMEDICINE (OUTPATIENT)
Dept: BEHAVIORAL/MENTAL HEALTH CLINIC | Facility: CLINIC | Age: 45
End: 2025-07-09
Payer: COMMERCIAL

## 2025-07-09 DIAGNOSIS — F43.21 GRIEF: ICD-10-CM

## 2025-07-09 DIAGNOSIS — F41.0 PANIC DISORDER: ICD-10-CM

## 2025-07-09 DIAGNOSIS — F33.1 MODERATE EPISODE OF RECURRENT MAJOR DEPRESSIVE DISORDER (HCC): Primary | ICD-10-CM

## 2025-07-09 DIAGNOSIS — F41.9 ANXIETY: ICD-10-CM

## 2025-07-09 PROCEDURE — 90837 PSYTX W PT 60 MINUTES: CPT | Performed by: COUNSELOR

## 2025-07-09 NOTE — PSYCH
"Behavioral Health Psychotherapy Progress Note    Psychotherapy Provided: Individual Psychotherapy      Diagnosis ICD-10-CM Associated Orders   1. Moderate episode of recurrent major depressive disorder (HCC)  F33.1       2. Anxiety  F41.9       3. Panic disorder  F41.0       4. Grief  F43.21           Goals addressed in session: Goal 1     DATA: Shireen checked in session for individual therapy. Shireen reported she had a rough few days and the last 2 days, she did not shower. She said she wanted to reach out to her brother who  9 months ago, his online friend who he was friends with for about 10 years. She said she did reach out to \"Thaddeus\" and found out she had stage 4 cancer. She said she was sad due to Thaddeus being a great person and felt bet she could do noting about it. She did discuss how she becomes upset when her  and daughters talk about her like she is not there. Encouraged her to express how she feels to them due to her stating they should know how she feels. Encouraged her to journal feelings and spending time with others. She said there were exceptions when she was struggling with her emotions and her daughter was able to help calm her. She said today she will be working on her disability paperwork and calling her . She was cooperative and engaged in session  During this session, this clinician used the following therapeutic modalities: Solution-Focused Therapy    Substance Abuse was not addressed during this session. If the client is diagnosed with a co-occurring substance use disorder, please indicate any changes in the frequency or amount of use: none. Stage of change for addressing substance use diagnoses: No substance use/Not applicable    ASSESSMENT:  Shireen presents with a Euthymic/ normal and Anxious mood. Shireen's affect is Normal range and intensity, which is congruent, with their mood and the content of the session. The client has not made progress on their goals as evidenced by " "Continuing to offer support, active listening, processing and validating feelings in session. Continue to use healthy coping skill and open communication to reduce stress, depression and anxiety..    Shireen presents with a none risk of suicide, none risk of self-harm, and none risk of harm to others.    For any risk assessment that surpasses a \"low\" rating, a safety plan must be developed.    A safety plan was indicated: no  If yes, describe in detail none    PLAN: Between sessions, Shireen will Continue to use healthy coping skills and open communication to reduce stress, depression and anxiety.. At the next session, the therapist will use Supportive Psychotherapy to address stress, depression and anxiety.    Behavioral Health Treatment Plan and Discharge Planning: Shireen is aware of and agrees to continue to work on their treatment plan. They have identified and are working toward their discharge goals. yes    Visit start and stop times:    07/09/25  Start Time: 0753  Stop Time: 0855  Total Visit Time: 62 minutes Solution-Focused Brief Therapy Goal Progress Chart    07/09/25  This is one of up to three goals to track. Remember, a score of zero means no progress has been made toward a goal, a score of ten means a score has been reached fully, and a score of five is exactly retirement between the two.    Goal Number: 1 Goal: 1    Session Date Rating   2 7/9/25 1       Completed by:  [] Child  [] Parent/Guardian  [x] Other (please specify): writer   Solution-Focused Brief Therapy Goal Rating Sheet    07/09/25  How close are you to the goals you want to get to?    On a scale from zero to ten, please indicate the number below that best describes how close you are to reaching your goal today. Remember, a score of zero means no progress has been made toward a goal, a score of ten means a goal has been reached fully, and a score of five is exactly retirement between the two.    YOUR FIRST GOAL  Enter a brief description of goal and goal " "number as recorded on the Goals Record Sheet  Number 1 Goal 1 Rating 1  Solution-Focused Brief Therapy Goals Record Sheet    07/09/25  In coming to this service, what are some of the problems you want help with or goals you want to get to?    Goal Number Goal Description   1 \"To feel normal again, less unstuck, less angry\"   2 N/a   3 N/a     If you have any other goals, please list them here:  none    Completed by:  [] Child  [] Parent/Guardian  [x] Other (please specify): writer Administrative Statements   Encounter provider DEL Mansfield    The Patient is located at Home and in the following state in which I hold an active license [place].    The patient was identified by name and date of birth. Shireen Rueda was informed that this is a telemedicine visit and that the visit is being conducted through the Epic Embedded platform. She agrees to proceed..  My office door was closed. No one else was in the room.  She acknowledged consent and understanding of privacy and security of the video platform. The patient has agreed to participate and understands they can discontinue the visit at any time.    I have spent a total time of 62 minutes in caring for this patient on the day of the visit/encounter including Counseling / Coordination of care, not including the time spent for establishing the audio/video connection.    "

## 2025-07-09 NOTE — PSYCH
"Virtual Regular VisitName: Shireen Rueda      : 1980      MRN: [id number]  Encounter Provider: DEL Mansfield  Encounter Date: 2025   Encounter department: [facility] [place]  :  Assessment & Plan  Moderate episode of recurrent major depressive disorder (HCC)         Anxiety         Panic disorder         Grief         Moderate episode of recurrent major depressive disorder (HCC)         Anxiety         Panic disorder         Grief               Goals addressed in session: Goal 1  During this session, this clinician used the following therapeutic modalities: Client-centered Therapy and Solution-Focused Therapy    Substance Abuse was not addressed during this session. If the client is diagnosed with a co-occurring substance use disorder, please indicate any changes in the frequency or amount of use: none. Stage of change for addressing substance use diagnoses: No substance use/Not applicable    ASSESSMENT:  Shireen presents with a Euthymic/ normal, Anxious, and Dysthymic mood. Shireen's affect is Normal range and intensity and Tearful, which is congruent, with their mood and the content of the session. The client has made progress on their goals as evidenced by Continue to use healthy coping skills and open communication to reduce stress, depression and anxiety..    Shireen presents with a none risk of suicide, none risk of self-harm, and none risk of harm to others.    For any risk assessment that surpasses a \"low\" rating, a safety plan must be developed.    A safety plan was indicated: no  If yes, describe in detail none    PLAN: Between sessions, Shireen will Continue to use healthy coping skills and open communication to reduce stress, depression and anxiety.. At the next session, the therapist will use Solution-Focused Therapy to address stress, depression and anxiety..    Behavioral Health Treatment Plan [facility]: Diagnosis and Treatment Plan explained to Shireen, Shireen relates " understanding diagnosis and is agreeable to Treatment Plan. Yes     Depression Follow-up Plan Completed: No     Reason for visit is   Chief Complaint   Patient presents with    Virtual Regular Visit      Recent Visits  Date Type Provider Dept   07/02/25 Telemedicine DEL Mansfield [facility] [place]   Showing recent visits within past 7 days and meeting all other requirements  Today's Visits  Date Type Provider Dept   07/09/25 Telemedicine DEL Mansfield [facility] [place]   Showing today's visits and meeting all other requirements  Future Appointments  No visits were found meeting these conditions.  Showing future appointments within next 150 days and meeting all other requirements     History of Present Illness     HPI    Past Medical History   Past Medical History:   Diagnosis Date    Anemia     Anxiety     Chest tightness or pressure     8/19/2013    Depression 05/14/2017    Hypertension     Vitamin D deficiency      Past Surgical History:   Procedure Laterality Date    CYSTOSCOPY N/A 12/30/2018    Procedure: CYSTOSCOPY;  Surgeon: Laura Bal DO;  Location: [facility];  Service: Gynecology    HYSTERECTOMY N/A 12/30/2018    Procedure: HYSTERECTOMY LAPAROSCOPIC TOTAL (LTH);  Surgeon: Laura Bal DO;  Location: [facility];  Service: Gynecology    ID LAPAROSCOPY W/RMVL ADNEXAL STRUCTURES Bilateral 12/30/2018    Procedure: SALPINGECTOMY, LAPAROSCOPIC;  Surgeon: Laura Bal DO;  Location: [facility];  Service: Gynecology    TONSILLECTOMY      TUBAL LIGATION      2006     Current Outpatient Medications   Medication Instructions    atorvastatin (LIPITOR) 40 mg, Oral, Daily    busPIRone (BUSPAR) 10 mg, Oral, 2 times daily    DULoxetine (CYMBALTA) 90 mg, Oral, Daily    eszopiclone (LUNESTA) 1 mg, Oral, Daily at bedtime PRN, Can take up to 3 tablets. Take immediately before bedtime    gabapentin (NEURONTIN) 600 mg, Oral, 3 times daily    hydroCHLOROthiazide 25  mg, Oral, Daily    hydrOXYzine HCL (ATARAX) 25 mg, Oral, Every 6 hours PRN    losartan (COZAAR) 100 mg, Oral, Daily    prazosin (MINIPRESS) 5 mg, Oral, Daily at bedtime    traZODone (DESYREL) 100 mg, Oral, Daily at bedtime     No Known Allergies    Objective   LMP 10/17/2018     Video Exam  Physical Exam     Administrative Statements   Encounter provider DEL Mansfield    The Patient is located at Home and in the following state in which I hold an active license [place].    The patient was identified by name and date of birth. Shireen Rueda was informed that this is a telemedicine visit and that the visit is being conducted through the Epic Embedded platform. She agrees to proceed..  My office door was closed. No one else was in the room.  She acknowledged consent and understanding of privacy and security of the video platform. The patient has agreed to participate and understands they can discontinue the visit at any time.    I have spent a total time of 62 minutes in caring for this patient on the day of the visit/encounter including Counseling / Coordination of care, not including the time spent for establishing the audio/video connection.    Visit Time  Start Time: 0753  Stop Time: 0855  Total Visit Time: 62 minutes

## 2025-07-10 ENCOUNTER — TELEMEDICINE (OUTPATIENT)
Dept: PSYCHIATRY | Facility: CLINIC | Age: 45
End: 2025-07-10
Payer: COMMERCIAL

## 2025-07-10 DIAGNOSIS — F41.0 PANIC DISORDER: ICD-10-CM

## 2025-07-10 DIAGNOSIS — F33.1 MODERATE EPISODE OF RECURRENT MAJOR DEPRESSIVE DISORDER (HCC): Primary | ICD-10-CM

## 2025-07-10 DIAGNOSIS — F33.9 MAJOR DEPRESSION, RECURRENT (HCC): ICD-10-CM

## 2025-07-10 PROCEDURE — 99213 OFFICE O/P EST LOW 20 MIN: CPT | Performed by: PSYCHIATRY & NEUROLOGY

## 2025-07-10 RX ORDER — BUSPIRONE HYDROCHLORIDE 15 MG/1
15 TABLET ORAL 2 TIMES DAILY
Qty: 60 TABLET | Refills: 1 | Status: SHIPPED | OUTPATIENT
Start: 2025-07-10

## 2025-07-10 RX ORDER — HYDROXYZINE HYDROCHLORIDE 25 MG/1
25 TABLET, FILM COATED ORAL EVERY 6 HOURS PRN
Qty: 30 TABLET | Refills: 0 | Status: SHIPPED | OUTPATIENT
Start: 2025-07-10 | End: 2025-08-09

## 2025-07-10 RX ORDER — PRAZOSIN HYDROCHLORIDE 5 MG/1
5 CAPSULE ORAL
Qty: 90 CAPSULE | Refills: 1 | Status: SHIPPED | OUTPATIENT
Start: 2025-07-10

## 2025-07-10 RX ORDER — GABAPENTIN 600 MG/1
600 TABLET ORAL 3 TIMES DAILY
Qty: 270 TABLET | Refills: 0 | Status: SHIPPED | OUTPATIENT
Start: 2025-07-10

## 2025-07-10 RX ORDER — DULOXETIN HYDROCHLORIDE 30 MG/1
90 CAPSULE, DELAYED RELEASE ORAL DAILY
Qty: 270 CAPSULE | Refills: 0 | Status: SHIPPED | OUTPATIENT
Start: 2025-07-10 | End: 2025-10-08

## 2025-07-10 RX ORDER — TRAZODONE HYDROCHLORIDE 100 MG/1
150 TABLET ORAL
Qty: 45 TABLET | Refills: 1 | Status: SHIPPED | OUTPATIENT
Start: 2025-07-10

## 2025-07-10 NOTE — ASSESSMENT & PLAN NOTE
Orders:    busPIRone (BUSPAR) 15 mg tablet; Take 1 tablet (15 mg total) by mouth in the morning and 1 tablet (15 mg total) in the evening.    gabapentin (Neurontin) 600 MG tablet; Take 1 tablet (600 mg total) by mouth 3 (three) times a day    hydrOXYzine HCL (ATARAX) 25 mg tablet; Take 1 tablet (25 mg total) by mouth every 6 (six) hours as needed for anxiety

## 2025-07-10 NOTE — ASSESSMENT & PLAN NOTE
Orders:    busPIRone (BUSPAR) 15 mg tablet; Take 1 tablet (15 mg total) by mouth in the morning and 1 tablet (15 mg total) in the evening.

## 2025-07-10 NOTE — PSYCH
MEDICATION MANAGEMENT NOTE    Name: Shireen Rueda      : 1980      MRN: [id number]  Encounter Provider: Lucretia Tamayo MD  Encounter Date: 7/10/2025   Encounter department: [facility]    Insurance: Payor: [employer] / Plan: [employer] / Product Type: [facility] /      Reason for Visit:   Chief Complaint   Patient presents with    Virtual Regular Visit    Depression    Anxiety    grief     :  Assessment & Plan  Panic disorder    Orders:    busPIRone (BUSPAR) 15 mg tablet; Take 1 tablet (15 mg total) by mouth in the morning and 1 tablet (15 mg total) in the evening.    gabapentin (Neurontin) 600 MG tablet; Take 1 tablet (600 mg total) by mouth 3 (three) times a day    hydrOXYzine HCL (ATARAX) 25 mg tablet; Take 1 tablet (25 mg total) by mouth every 6 (six) hours as needed for anxiety    Moderate episode of recurrent major depressive disorder (HCC)    Orders:    busPIRone (BUSPAR) 15 mg tablet; Take 1 tablet (15 mg total) by mouth in the morning and 1 tablet (15 mg total) in the evening.    Major depression, recurrent (HCC)    Orders:    DULoxetine (CYMBALTA) 30 mg delayed release capsule; Take 3 capsules (90 mg total) by mouth daily    prazosin (MINIPRESS) 5 mg capsule; Take 1 capsule (5 mg total) by mouth daily at bedtime    traZODone (DESYREL) 100 mg tablet; Take 1.5 tablets (150 mg total) by mouth daily at bedtime        Treatment Recommendations:  Increase Buspar to 15mg PO BID for anxiety  Continue Cymbalta 90mg PO daily for depression/anxiety  Continue Gabapentin 600mg TID for anxiety for now - if improvements happen with buspar, will discontinue gabapentin  Continue Prazosin 5mg PO HS for PTSD related nightmares  Lunesta caused metallic taste in mouth so was discontinued  Increase Trazodone to 150mg PO HS. Can try up to 200mg PO HS for sleep  Patient has been referred to sleep medicine for evaluation by PCP  Educated about diagnosis  and treatment modalities. Verbalizes understanding and agreement with the treatment plan.  Discussed self monitoring of symptoms, and symptom monitoring tools.  Discussed medications and if treatment adjustment was needed or desired.  Medication management every 6 weeks  Aware of 24 hour and weekend coverage for urgent situations accessed by calling [facility] main practice number  Continue psychotherapy with own therapist  I am scheduling this patient out for greater than 3 months: No    Medications Risks/Benefits:      Risks, Benefits And Possible Side Effects Of Medications:    Risks, benefits, and possible side effects of medications explained to Shireen and she (or legal representative) verbalizes understanding and agreement for treatment.    Controlled Medication Discussion:     Shireen has been filling controlled prescriptions on time as prescribed according to [place] Prescription Drug Monitoring Program.      History of Present Illness     Shireen is seen today for a follow up for Major Depressive Disorder, Generalized Anxiety Disorder, Panic Disorder, and PTSD.  She reports lunesta was causing metallic taste in her mouth so she stopped it. Trazodone helps her fall asleep but trouble staying asleep and is tired. Her PCP has referred her for sleep study and sleep medicine specialist. We discussed trying a higher dose of trazodone for now while she waits for sleep medicine to schedule her. She states buspar has made little difference on her anxiety so we discussed increasing further as dose may not be high enough. She also states the anniversary of her brother's death recently passed so she has been a bit more sad recently. She appeared less tearful today compared to last appointment. She continues to attend therapy once a week and has been discussing her childhood trauma and her brother's death. She feels it is slowly helping and likes her therapist.    She denies suicidal ideation, intent or  plan at present; denies homicidal ideation, intent or plan at present.    She denies auditory hallucinations, denies visual hallucinations, denies delusions.    She denies any side effects from medications.    HPI ROS Appetite Changes and Sleep:     She reports difficulty sleeping, interrupted sleep, normal appetite, decreased energy    Review Of Systems: A review of systems is obtained and is negative except for the pertinent positives listed in HPI/Subjective above.      Current Rating Scores:         Areas of Improvement: reviewed in HPI/Subjective Section and reviewed in Assessment and Plan Section      Past Medical History:   Diagnosis Date    Anemia     Anxiety     Chest tightness or pressure     8/19/2013    Depression 05/14/2017    Hypertension     Vitamin D deficiency      Past Surgical History:   Procedure Laterality Date    CYSTOSCOPY N/A 12/30/2018    Procedure: CYSTOSCOPY;  Surgeon: Laura Bal DO;  [facility];  Service: Gynecology    HYSTERECTOMY N/A 12/30/2018    Procedure: HYSTERECTOMY LAPAROSCOPIC TOTAL (LTH);  Surgeon: Laura Bal DO;  [facility];  Service: Gynecology    AK LAPAROSCOPY W/RMVL ADNEXAL STRUCTURES Bilateral 12/30/2018    Procedure: SALPINGECTOMY, LAPAROSCOPIC;  Surgeon: Laura Bal DO;  [facility];  Service: Gynecology    TONSILLECTOMY      TUBAL LIGATION      2006     Allergies: No Known Allergies    Current Outpatient Medications   Medication Instructions    atorvastatin (LIPITOR) 40 mg, Oral, Daily    busPIRone (BUSPAR) 15 mg, Oral, 2 times daily    DULoxetine (CYMBALTA) 90 mg, Oral, Daily    gabapentin (NEURONTIN) 600 mg, Oral, 3 times daily    hydroCHLOROthiazide 25 mg, Oral, Daily    hydrOXYzine HCL (ATARAX) 25 mg, Oral, Every 6 hours PRN    losartan (COZAAR) 100 mg, Oral, Daily    prazosin (MINIPRESS) 5 mg, Oral, Daily at bedtime    traZODone (DESYREL) 150 mg, Oral, Daily at bedtime        Substance Abuse History:    Tobacco, Alcohol  and Drug Use History     Tobacco Use    Smoking status: Never    Smokeless tobacco: Never    Tobacco comments:     social smoker, less than 1 pack per weekend   Vaping Use    Vaping status: Never Used   Substance Use Topics    Alcohol use: Yes     Alcohol/week: 3.0 standard drinks of alcohol     Types: 3 Glasses of wine per week     Comment: on the weekends/socially    Drug use: No     Alcohol Use: Not At Risk (7/8/2024)    AUDIT-C     Frequency of Alcohol Consumption: Monthly or less     Average Number of Drinks: 1 or 2     Frequency of Binge Drinking: Less than monthly       Social History:    Social History     Socioeconomic History    Marital status: /Civil Union     Spouse name: Not on file    Number of children: 3    Years of education: Not on file    Highest education level: High school graduate   Occupational History    Not on file   Other Topics Concern    Not on file   Social History Narrative    Uses safety equipment-seatbelts        Family Psychiatric History:     Family History   Problem Relation Name Age of Onset    No Known Problems Mother      Drug abuse Father Mitchel Colon     Heart disease Father Mitchel Colon     Hypertension Father Mitchel Colon     Heart attack Father Mitchel Colon 37    No Known Problems Sister      Alcohol abuse Brother      Alcohol abuse Maternal Aunt      No Known Problems Maternal Aunt      No Known Problems Maternal Aunt      No Known Problems Maternal Aunt      No Known Problems Maternal Aunt      No Known Problems Paternal Aunt      No Known Problems Paternal Aunt      Alcohol abuse Maternal Uncle      Bipolar disorder Paternal Uncle      No Known Problems Maternal Grandfather      No Known Problems Maternal Grandmother      No Known Problems Paternal Grandfather      No Known Problems Paternal Grandmother      Suicide Attempts Cousin      No Known Problems Daughter      No Known Problems Daughter      No Known Problems Son      No Known Problems Half-Sister      Breast  cancer Neg Hx      Colon cancer Neg Hx      Ovarian cancer Neg Hx      Endometrial cancer Neg Hx         Medical History Reviewed by provider this encounter:  Allergies  Meds  Med Hx  Fam Hx          Objective   LMP 10/17/2018      Mental Status Evaluation:    Appearance age appropriate, casually dressed, dressed appropriately   Behavior cooperative, mildly anxious   Speech normal rate, normal volume, normal pitch, spontaneous   Mood improved   Affect normal range and intensity, appropriate   Thought Processes organized, goal directed   Thought Content no overt delusions   Perceptual Disturbances: no auditory hallucinations, no visual hallucinations   Abnormal Thoughts  Risk Potential Suicidal ideation - None  Homicidal ideation - None  Potential for aggression - No   Orientation oriented to person, place, time/date, and situation   Memory recent and remote memory grossly intact   Consciousness alert and awake   Attention Span Concentration Span attention span and concentration are age appropriate   Intellect appears to be of average intelligence   Insight intact   Judgement intact   Muscle Strength and  Gait unable to assess today due to virtual visit   Motor activity no abnormal movements   Language no difficulty naming common objects, no difficulty repeating a phrase   Fund of Knowledge adequate knowledge of current events  adequate fund of knowledge regarding past history  adequate fund of knowledge regarding vocabulary        Laboratory Results: I have personally reviewed all pertinent laboratory/tests results        Suicide/Homicide Risk Assessment:    Risk of Harm to Self:  Based on today's assessment, Shireen presents the following risk of harm to self: minimal    Risk of Harm to Others:  Based on today's assessment, Shireen presents the following risk of harm to others: minimal    The following interventions are recommended: Continue medication management. Continue psychotherapy. No other intervention changes  indicated at this time.    Psychotherapy Provided:     Individual psychotherapy provided: No    Treatment Plan:    Completed and signed during the session: Not applicable - Treatment Plan to be completed by [facility] therapist.    Goals: Progress towards Treatment Plan goals - Yes, progressing, as evidenced by subjective findings in HPI/Subjective Section and in Assessment and Plan Section    Depression Follow-up Plan Completed: No    Note Share:    This note was not shared with the patient due to reasonable likelihood of causing patient harm    Administrative Statements   Administrative Statements   Encounter provider Lucretia Tamayo MD    The Patient is located at Home and in the following state in which I hold an active license [place].    The patient was identified by name and date of birth. Shireen Rueda was informed that this is a telemedicine visit and that the visit is being conducted through the Epic Embedded platform. She agrees to proceed..  My office door was closed. No one else was in the room.  She acknowledged consent and understanding of privacy and security of the video platform. The patient has agreed to participate and understands they can discontinue the visit at any time.    I have spent a total time of 20 minutes in caring for this patient on the day of the visit/encounter including Documenting in the medical record, Reviewing/placing orders in the medical record (including tests, medications, and/or procedures), and Obtaining or reviewing history  , not including the time spent for establishing the audio/video connection.    Visit Time  Visit Start Time: 8:55am  Visit Stop Time: 9:15am  Total Visit Duration: 20 minutes        Lucretia Tamayo MD 07/10/25

## 2025-07-11 ENCOUNTER — TELEPHONE (OUTPATIENT)
Dept: PSYCHIATRY | Facility: CLINIC | Age: 45
End: 2025-07-11

## 2025-07-11 NOTE — TELEPHONE ENCOUNTER
Received [employer] Attending Physician Statement forms via fax for [employer]. MARILYN is needed called patient to let her know.  Informed pt she can go to any  [facility] and sign the MARIYLN there. Emailed to Dr. Tamayo. Scanned blank forms in media

## 2025-07-11 NOTE — TELEPHONE ENCOUNTER
Patient stopped by office to sign MARILYN.    This writer looked at previous encounters and scanned paperwork to try and assist patient in completing MARILYN    MARILYN has been scanned into the media.

## 2025-07-11 NOTE — TELEPHONE ENCOUNTER
Patient called in returning their providers call to clear up some questions about the paperwork for them.    Patient stated they have received the message about the MARILYN and are going to go into the office and take care of that some time soon.    Writer reached out to the provider and stated they would be reaching out to the patient momentarily.

## 2025-07-14 ENCOUNTER — TELEPHONE (OUTPATIENT)
Dept: PSYCHIATRY | Facility: CLINIC | Age: 45
End: 2025-07-14

## 2025-07-14 NOTE — TELEPHONE ENCOUNTER
Procedure Code: 74145   Charge Description: Forms/Letter that require 16 to 30 minutes to complete   Amount Charged 25.00   Billed in Unit Charge Entry? Yes         Called patient and she was made aware that a form fee of $25.00 is required.  Patient knows to go onto her mychart to make payment.  MARILYN is in media.  Patient aware we will fax forms after receipt of payment.

## 2025-07-14 NOTE — TELEPHONE ENCOUNTER
Patient called and reported that she wanted to make sure that forms have been faxed. Please inform pt. Of status.

## 2025-07-15 ENCOUNTER — TELEPHONE (OUTPATIENT)
Dept: PSYCHIATRY | Facility: CLINIC | Age: 45
End: 2025-07-15

## 2025-07-16 ENCOUNTER — TELEPHONE (OUTPATIENT)
Age: 45
End: 2025-07-16

## 2025-07-16 ENCOUNTER — TELEMEDICINE (OUTPATIENT)
Dept: BEHAVIORAL/MENTAL HEALTH CLINIC | Facility: CLINIC | Age: 45
End: 2025-07-16
Payer: COMMERCIAL

## 2025-07-16 DIAGNOSIS — F41.9 ANXIETY: ICD-10-CM

## 2025-07-16 DIAGNOSIS — F43.21 GRIEF: ICD-10-CM

## 2025-07-16 DIAGNOSIS — F33.1 MODERATE EPISODE OF RECURRENT MAJOR DEPRESSIVE DISORDER (HCC): Primary | ICD-10-CM

## 2025-07-16 DIAGNOSIS — F41.0 PANIC DISORDER: ICD-10-CM

## 2025-07-16 PROCEDURE — 90837 PSYTX W PT 60 MINUTES: CPT | Performed by: COUNSELOR

## 2025-07-16 NOTE — PSYCH
"Behavioral Health Psychotherapy Progress Note    Psychotherapy Provided: Individual Psychotherapy      Diagnosis ICD-10-CM Associated Orders   1. Moderate episode of recurrent major depressive disorder (HCC)  F33.1       2. Grief  F43.21       3. Anxiety  F41.9       4. Panic disorder  F41.0           Goals addressed in session:      DATA: Shireen checked in session for individual therapy. Continue to offer support, active listening, processing and validating feelings in session. Continue to use healthy coping skill and open communication to reduce stress, depression and anxiety.  During this session, this clinician used the following therapeutic modalities: Client-centered Therapy and Supportive Psychotherapy    Substance Abuse was not addressed during this session. If the client is diagnosed with a co-occurring substance use disorder, please indicate any changes in the frequency or amount of use: none. Stage of change for addressing substance use diagnoses: No substance use/Not applicable    ASSESSMENT:  Shireen presents with a Euthymic/ normal, Anxious, and Dysthymic mood. Shireen's affect is Normal range and intensity and Tearful, which is congruent, with their mood and the content of the session. The client has made progress on their goals as evidenced by Continue to offer support, active listening, processing and validating feelings in session. Continue to use healthy coping skill and open communication to reduce stress, depression and anxiety..    Shireen presents with a none risk of suicide, none risk of self-harm, and none risk of harm to others.    For any risk assessment that surpasses a \"low\" rating, a safety plan must be developed.    A safety plan was indicated: no  If yes, describe in detail none    PLAN: Between sessions, Shireen will Continue to use healthy coping skills and open communication to reduce stress, depression and anxiety.. At the next session, the therapist will use Solution-Focused Therapy to address " "stress, depression and anxiety.    Behavioral Health Treatment Plan and Discharge Planning: Shireen is aware of and agrees to continue to work on their treatment plan. They have identified and are working toward their discharge goals. yes    Visit start and stop times:    07/16/25      Solution-Focused Brief Therapy Goal Progress Chart    07/16/25  This is one of up to three goals to track. Remember, a score of zero means no progress has been made toward a goal, a score of ten means a score has been reached fully, and a score of five is exactly care home between the two.    Goal Number: 1 Goal: 1    Session Date Rating   3 7/16/25 1       Completed by:  [] Child  [] Parent/Guardian  [x] Other (please specify): writer   Solution-Focused Brief Therapy Goal Rating Sheet    07/16/25  How close are you to the goals you want to get to?    On a scale from zero to ten, please indicate the number below that best describes how close you are to reaching your goal today. Remember, a score of zero means no progress has been made toward a goal, a score of ten means a goal has been reached fully, and a score of five is exactly care home between the two.    YOUR FIRST GOAL  Enter a brief description of goal and goal number as recorded on the Goals Record Sheet  Number 1 Goal 1 Rating 1    Completed by:  [] Child  [] Parent/Guardian   Other (please specify): [x]writer   Solution-Focused Brief Therapy Goals Record Sheet    07/16/25  In coming to this service, what are some of the problems you want help with or goals you want to get to?    Goal Number Goal Description   1 \"To feel normal again, unstuck.\"   2 N/a   3 N/a     If you have any other goals, please list them here:  none    Completed by:  [] Child  [] Parent/Guardian  [x] Other (please specify): writer   Administrative Statements   Encounter provider DEL [place]    The Patient is located at Home and in the following state in which I hold an active license [place].    The patient " was identified by name and date of birth. Shireen Rueda was informed that this is a telemedicine visit and that the visit is being conducted through the Epic Embedded platform. She agrees to proceed..  My office door was closed. No one else was in the room.  She acknowledged consent and understanding of privacy and security of the video platform. The patient has agreed to participate and understands they can discontinue the visit at any time.    I have spent a total time of 60 minutes in caring for this patient on the day of the visit/encounter including Counseling / Coordination of care, not including the time spent for establishing the audio/video connection.

## 2025-07-16 NOTE — PSYCH
Virtual Regular VisitName: Shireen Rueda      : 1980      MRN: [id number]  Encounter Provider: DEL [place]  Encounter Date: 2025   Encounter department: [facility] [place]  :  Assessment & Plan  Moderate episode of recurrent major depressive disorder (HCC)         Grief         Anxiety         Panic disorder         Moderate episode of recurrent major depressive disorder (HCC)         Grief         Anxiety         Panic disorder               Goals addressed in session: Goal 1     DATA: Shireen checked in session for individual therapy.Shireen reported she her mood has been up and down this past week. She said she had a psychiatrist appointment and her medications increased. She said her brother's friend Thaddeus has a few weeks to live and said when she found out she felt sad and depressed. She said she wanted to visit her, however she said her  does not feel like it is a good idea to visit her due to the state she is currently in. She said her daughter would drive her to see Thaddeus. She discussed blaming herself for her brother's death and feeling she could have done more for him. Discussed reaching out to Thaddeus's  if Thaddeus would want to have visitors prior to her just going to see Thaddeus. She said she did buy a journal however she did not start writing in it. She was cooperative and engaged in session. Continue to offer support, active listening, processing and validating feelings in session. Continue to use healthy coping skill and open communication to reduce stress, depression and anxiety.  During this session, this clinician used the following therapeutic modalities: Solution-Focused Therapy    Substance Abuse was not addressed during this session. If the client is diagnosed with a co-occurring substance use disorder, please indicate any changes in the frequency or amount of use: none. Stage of change for addressing substance use diagnoses: No substance use/Not  "applicable    ASSESSMENT:  Shireen presents with a Euthymic/ normal, Anxious, and Dysthymic mood. Shireen's affect is Normal range and intensity and Tearful, which is congruent, with their mood and the content of the session. The client has made progress on their goals as evidenced by Continue to offer support, active listening, processing and validating feelings in session. Continue to use healthy coping skill and open communication to reduce stress, depression and anxiety..    Shireen presents with a none risk of suicide, none risk of self-harm, and none risk of harm to others.    For any risk assessment that surpasses a \"low\" rating, a safety plan must be developed.    A safety plan was indicated: no  If yes, describe in detail none    PLAN: Between sessions, Shireen will Continue to use healthy coping skills and open communication to reduce stress, depression and anxiety.. At the next session, the therapist will use Client-centered Therapy, Solution-Focused Therapy, and Supportive Psychotherapy to address stress, depression and anxiety.    Behavioral Health Treatment Plan [facility]: Diagnosis and Treatment Plan explained to Shireen, Shireen relates understanding diagnosis and is agreeable to Treatment Plan. Yes     Depression Follow-up Plan Completed: No     Reason for visit is   Chief Complaint   Patient presents with    Virtual Regular Visit      Recent Visits  Date Type Provider Dept   07/15/25 Telephone [facility] [place] [place]   07/09/25 Telemedicine DEL [facility] [place]   Showing recent visits within past 7 days and meeting all other requirements  Today's Visits  Date Type Provider Dept   07/16/25 Telemedicine DEL [place]rap[facility] [place]   Showing today's visits and meeting all other requirements  Future Appointments  No visits were found meeting these conditions.  Showing future appointments within next 150 days and meeting all other " requirements     History of Present Illness     HPI    Past Medical History   Past Medical History:   Diagnosis Date    Anemia     Anxiety     Chest tightness or pressure     8/19/2013    Depression 05/14/2017    Hypertension     Vitamin D deficiency      Past Surgical History:   Procedure Laterality Date    CYSTOSCOPY N/A 12/30/2018    Procedure: CYSTOSCOPY;  Surgeon: Laura Bal DO;  [facility];  Service: Gynecology    HYSTERECTOMY N/A 12/30/2018    Procedure: HYSTERECTOMY LAPAROSCOPIC TOTAL (LTH);  Surgeon: Laura Bal DO;  [facility];  Service: Gynecology    DE LAPAROSCOPY W/RMVL ADNEXAL STRUCTURES Bilateral 12/30/2018    Procedure: SALPINGECTOMY, LAPAROSCOPIC;  Surgeon: Laura Bal DO;  [facility];  Service: Gynecology    TONSILLECTOMY      TUBAL LIGATION      2006     Current Outpatient Medications   Medication Instructions    atorvastatin (LIPITOR) 40 mg, Oral, Daily    busPIRone (BUSPAR) 15 mg, Oral, 2 times daily    DULoxetine (CYMBALTA) 90 mg, Oral, Daily    gabapentin (NEURONTIN) 600 mg, Oral, 3 times daily    hydroCHLOROthiazide 25 mg, Oral, Daily    hydrOXYzine HCL (ATARAX) 25 mg, Oral, Every 6 hours PRN    losartan (COZAAR) 100 mg, Oral, Daily    prazosin (MINIPRESS) 5 mg, Oral, Daily at bedtime    traZODone (DESYREL) 150 mg, Oral, Daily at bedtime     No Known Allergies    Objective   LMP 10/17/2018     Video Exam  Physical Exam     Administrative Statements   Encounter provider DEL [place]    The Patient is located at Home and in the following state in which I hold an active license [place].    The patient was identified by name and date of birth. Shireen Brownchavezaliya was informed that this is a telemedicine visit and that the visit is being conducted through the Epic Embedded platform. She agrees to proceed..  My office door was closed. No one else was in the room.  She acknowledged consent and understanding of privacy and security of the video platform. The  patient has agreed to participate and understands they can discontinue the visit at any time.    I have spent a total time of 60 minutes in caring for this patient on the day of the visit/encounter including Counseling / Coordination of care, not including the time spent for establishing the audio/video connection.    Visit Time  Start Time: 0800  Stop Time: 0900  Total Visit Time: 60 minutes

## 2025-07-18 ENCOUNTER — NURSE TRIAGE (OUTPATIENT)
Dept: OTHER | Facility: OTHER | Age: 45
End: 2025-07-18

## 2025-07-19 NOTE — TELEPHONE ENCOUNTER
"REASON FOR CONVERSATION: Sore Throat    SYMPTOMS: Sore throat    OTHER HEALTH INFORMATION: Patient calling in to find out if she is able to take anything OTC along with her prescribed Buspar, cymbalta, neurontin, prazosin, trazodone, and hydroxyzine.    PROTOCOL DISPOSITION: See PCP Within 3 Days in Office (overriding Home Care)    CARE ADVICE PROVIDED: Contacted on call provider for recommendations of what patient can take for pain with prescribed medications.   Advised to use OTC tylenol/motrin for pain and gargling with warm salt water. Recommended patient be seen in office on Monday and to continue to monitor if symptoms worsen or fever occurs. If so patient should go to ER.  Patient verbalized understanding.    Appointment scheduled for 7/21/25 at 9:00 AM with Jazmin Parr MD.    PRACTICE FOLLOW-UP: None      Reason for Disposition   [1] Sore throat with cough/cold symptoms AND [2] present < 5 days    Answer Assessment - Initial Assessment Questions  1. ONSET: \"When did the throat start hurting?\" (Hours or days ago)         Today    2. SEVERITY: \"How bad is the sore throat?\" (Scale 1-10; mild, moderate or severe)        Moderate    3. STREP EXPOSURE: \"Has there been any exposure to strep within the past week?\" If Yes, ask: \"What type of contact occurred?\"         Deneis    4.  VIRAL SYMPTOMS: \"Are there any symptoms of a cold, such as a runny nose, cough, hoarse voice or red eyes?\"         Hoarse raspy voice, post nasal drip, dry cough, headache    5. FEVER: \"Do you have a fever?\" If Yes, ask: \"What is your temperature, how was it measured, and when did it start?\"        Denies    6. PUS ON THE TONSILS: \"Is there pus on the tonsils in the back of your throat?\"        Denies    7. OTHER SYMPTOMS: \"Do you have any other symptoms?\" (e.g., difficulty breathing, headache, rash)        Denies difficulty breathing or rash    8. PREGNANCY: \"Is there any chance you are pregnant?\" \"When was your last menstrual " "period?\"        Denies    Tried gargling with warm salt water earlier.    Patient calling in to find out if she is able to take anything OTC along with her prescribed Buspar, cymbalta, neurontin, prazosin, trazodone, and hydroxyzine.    Protocols used: Sore Throat-Adult-    "

## 2025-07-21 ENCOUNTER — TELEPHONE (OUTPATIENT)
Dept: FAMILY MEDICINE CLINIC | Facility: CLINIC | Age: 45
End: 2025-07-21

## 2025-07-24 ENCOUNTER — TELEMEDICINE (OUTPATIENT)
Dept: BEHAVIORAL/MENTAL HEALTH CLINIC | Facility: CLINIC | Age: 45
End: 2025-07-24
Payer: COMMERCIAL

## 2025-07-24 DIAGNOSIS — F41.9 ANXIETY: ICD-10-CM

## 2025-07-24 DIAGNOSIS — F33.1 MODERATE EPISODE OF RECURRENT MAJOR DEPRESSIVE DISORDER (HCC): ICD-10-CM

## 2025-07-24 DIAGNOSIS — F43.21 GRIEF: ICD-10-CM

## 2025-07-24 DIAGNOSIS — F41.8 DEPRESSION WITH ANXIETY: Primary | ICD-10-CM

## 2025-07-24 DIAGNOSIS — F41.0 PANIC DISORDER: ICD-10-CM

## 2025-07-24 PROCEDURE — 90837 PSYTX W PT 60 MINUTES: CPT | Performed by: COUNSELOR

## 2025-07-24 NOTE — TELEPHONE ENCOUNTER
Nataliia from pt ins called in to rec an update regarding pt MR. Writer let her that it has been processed by [facility]

## 2025-07-24 NOTE — PSYCH
Administrative Statements   Encounter provider [place]    The Patient is located at Home and in the following state in which I hold an active license [place].    The patient was identified by name and date of birth. Shireen Rueda was informed that this is a telemedicine visit and that the visit is being conducted through the Epic Embedded platform. She agrees to proceed..  My office door was closed. No one else was in the room.  She acknowledged consent and understanding of privacy and security of the video platform. The patient has agreed to participate and understands they can discontinue the visit at any time.    I have spent a total time of 60 minutes in caring for this patient on the day of the visit/encounter including Counseling / Coordination of care, not including the time spent for establishing the audio/video connection.  Behavioral Health Psychotherapy Progress Note    Psychotherapy Provided: Individual Psychotherapy     1. Depression with anxiety        2. Panic disorder        3. Anxiety        4. Moderate episode of recurrent major depressive disorder (HCC)        5. Grief            Goals addressed in session: Goal 1     DATA: Shireen checked in session for individual therapy. Shireen reported she continues to struggle with grief, depression and anxiety symptoms. She said this past Monday, she wrote a letter to her  due to worried about what he would say to her. She said she wants to stop taking her medications due to feeling no relief as well as gaining weight. She said her  and daughters talked with her to continue to take the medications as prescribed. She said she cries daily, anxious, shaky and noticed her heart fluttering. She said he joints are also hurting. Writer asked about the healing grief workbooks and she said she struggles with focus. Suggested small accomplishments through the day and to try mindfulness techniques due to when she is by herself or others, she will think about  "her brother and the memories. Encouraged her to continue to take medication as prescribed and to reach out to family when she is struggling with emotions. Continue to offer support, active listening, processing and validating feelings in session. Continue to use healthy coping skill and open communication to reduce stress, depression and anxiety.  During this session, this clinician used the following therapeutic modalities: Solution-Focused Therapy    Substance Abuse was not addressed during this session. If the client is diagnosed with a co-occurring substance use disorder, please indicate any changes in the frequency or amount of use: none. Stage of change for addressing substance use diagnoses: No substance use/Not applicable    ASSESSMENT:  Shireen Rueda presents with a Anxious and Dysthymic mood.     her affect is Tearful, which is congruent, with her mood and the content of the session. The client has made progress on their goals.     Shireen Rueda presents with a none risk of suicide, none risk of self-harm, and none risk of harm to others.    For any risk assessment that surpasses a \"low\" rating, a safety plan must be developed.    A safety plan was indicated: no  If yes, describe in detail none    PLAN: Between sessions, Shireen Rueda will Continue to use healthy coping skills and open communication to reduce stress, depression and anxiety.. At the next session, the therapist will use Solution-Focused Therapy to address stress, depression and anxiety.    Behavioral Health Treatment Plan and Discharge Planning: Shireen Rueda is aware of and agrees to continue to work on their treatment plan. They have identified and are working toward their discharge goals. yes    Depression Follow-up Plan Completed: No    Visit start and stop times:    07/24/25    Solution-Focused Brief Therapy Goal Progress Chart    07/24/25  This is one of up to three goals to track. Remember, a score of zero means no progress has been made " toward a goal, a score of ten means a score has been reached fully, and a score of five is exactly alf between the two.    Goal Number: 1 Goal: 1    Session Date Rating   4 7/24/25 1       Completed by:  [] Child  [] Parent/Guardian  [x] Other (please specify): writer   Solution-Focused Brief Therapy Goal Rating Sheet    07/24/25  How close are you to the goals you want to get to?    On a scale from zero to ten, please indicate the number below that best describes how close you are to reaching your goal today. Remember, a score of zero means no progress has been made toward a goal, a score of ten means a goal has been reached fully, and a score of five is exactly alf between the two.    YOUR FIRST GOAL  Enter a brief description of goal and goal number as recorded on the Goals Record Sheet  Number 1 Goal 1 Rating 1    Completed by:  [] Child  [] Parent/Guardian  [x] Other (please specify): writer     Solution-Focused Brief Therapy Goals Record Sheet    07/24/25  In coming to this service, what are some of the problems you want help with or goals you want to get to?    Goal Number Goal Description   1 To feel unstuck, normal again   2 N/a   3 N/a     If you have any other goals, please list them here:  none    Completed by:  [] Child  [] Parent/Guardian  [x] Other (please specify): writer   Start Time: 0930  Stop Time: 1030  Total Visit Time: 60 minutes

## 2025-07-24 NOTE — PSYCH
Virtual Regular VisitName: Shireen Rueda      : 1980      MRN: [id number]  Encounter Provider: DEL [place]  Encounter Date: 2025   Encounter department: [facility]ER[facility]  :  Assessment & Plan  Depression with anxiety         Panic disorder         Anxiety         Moderate episode of recurrent major depressive disorder (HCC)         Grief         Depression with anxiety         Panic disorder         Anxiety         Moderate episode of recurrent major depressive disorder (HCC)         Grief            Reason for visit is   Chief Complaint   Patient presents with    Virtual Regular Visit      Recent Visits  Date Type Provider Dept   25 Telephone Gurnider Calero DO  [facility]   Showing recent visits within past 7 days and meeting all other requirements  Today's Visits  Date Type Provider Dept   25 Telemedicine [facility] [place] [facility] Therapist Hany Mejia   Showing today's visits and meeting all other requirements  Future Appointments  No visits were found meeting these conditions.  Showing future appointments within next 150 days and meeting all other requirements     History of Present Illness     HPI    Past Medical History   Past Medical History:   Diagnosis Date    Anemia     Anxiety     Chest tightness or pressure     2013    Depression 2017    Hypertension     Vitamin D deficiency      Past Surgical History:   Procedure Laterality Date    CYSTOSCOPY N/A 2018    Procedure: CYSTOSCOPY;  Surgeon: Laura Bal DO;  Location: [facility];  Service: Gynecology    HYSTERECTOMY N/A 2018    Procedure: HYSTERECTOMY LAPAROSCOPIC TOTAL (LTH);  Surgeon: Laura Bal DO;  Location: [facility];  Service: Gynecology    WA LAPAROSCOPY W/RMVL ADNEXAL STRUCTURES Bilateral 2018    Procedure: SALPINGECTOMY, LAPAROSCOPIC;  Surgeon: Laura Bal DO;  Location: [facility];  Service: Gynecology     TONSILLECTOMY      TUBAL LIGATION      2006     Current Outpatient Medications   Medication Instructions    atorvastatin (LIPITOR) 40 mg, Oral, Daily    busPIRone (BUSPAR) 15 mg, Oral, 2 times daily    DULoxetine (CYMBALTA) 90 mg, Oral, Daily    gabapentin (NEURONTIN) 600 mg, Oral, 3 times daily    hydroCHLOROthiazide 25 mg, Oral, Daily    hydrOXYzine HCL (ATARAX) 25 mg, Oral, Every 6 hours PRN    losartan (COZAAR) 100 mg, Oral, Daily    prazosin (MINIPRESS) 5 mg, Oral, Daily at bedtime    traZODone (DESYREL) 150 mg, Oral, Daily at bedtime     No Known Allergies    Objective   LMP 10/17/2018     Video Exam  Physical Exam     Administrative Statements   Encounter provider [place]    The Patient is located at Home and in the following state in which I hold an active license [place].    The patient was identified by name and date of birth. Shireen Rueda was informed that this is a telemedicine visit and that the visit is being conducted through the Epic Embedded platform. She agrees to proceed..  My office door was closed. No one else was in the room.  She acknowledged consent and understanding of privacy and security of the video platform. The patient has agreed to participate and understands they can discontinue the visit at any time.    I have spent a total time of 60 minutes in caring for this patient on the day of the visit/encounter including Counseling / Coordination of care, not including the time spent for establishing the audio/video connection.    Visit Time  Start Time: 0930  Stop Time: 1030  Total Visit Time: 60 minutes

## 2025-07-29 ENCOUNTER — OFFICE VISIT (OUTPATIENT)
Dept: FAMILY MEDICINE CLINIC | Facility: CLINIC | Age: 45
End: 2025-07-29

## 2025-07-29 VITALS
HEART RATE: 93 BPM | OXYGEN SATURATION: 97 % | DIASTOLIC BLOOD PRESSURE: 82 MMHG | TEMPERATURE: 97.5 F | SYSTOLIC BLOOD PRESSURE: 120 MMHG | BODY MASS INDEX: 34.2 KG/M2 | RESPIRATION RATE: 18 BRPM | WEIGHT: 181 LBS

## 2025-07-29 DIAGNOSIS — F41.8 DEPRESSION WITH ANXIETY: ICD-10-CM

## 2025-07-29 DIAGNOSIS — Z02.89 ENCOUNTER FOR COMPLETION OF FORM WITH PATIENT: Primary | ICD-10-CM

## 2025-07-29 PROCEDURE — 99213 OFFICE O/P EST LOW 20 MIN: CPT | Performed by: FAMILY MEDICINE

## 2025-07-30 ENCOUNTER — TELEMEDICINE (OUTPATIENT)
Dept: BEHAVIORAL/MENTAL HEALTH CLINIC | Facility: CLINIC | Age: 45
End: 2025-07-30
Payer: COMMERCIAL

## 2025-07-30 DIAGNOSIS — F33.1 MODERATE EPISODE OF RECURRENT MAJOR DEPRESSIVE DISORDER (HCC): Primary | ICD-10-CM

## 2025-07-30 DIAGNOSIS — F41.9 ANXIETY: ICD-10-CM

## 2025-07-30 DIAGNOSIS — F43.21 GRIEF: ICD-10-CM

## 2025-07-30 PROCEDURE — 90837 PSYTX W PT 60 MINUTES: CPT | Performed by: COUNSELOR

## 2025-07-31 ENCOUNTER — TELEPHONE (OUTPATIENT)
Dept: PSYCHIATRY | Facility: CLINIC | Age: 45
End: 2025-07-31

## 2025-08-01 DIAGNOSIS — F33.9 MAJOR DEPRESSION, RECURRENT (HCC): ICD-10-CM

## 2025-08-01 DIAGNOSIS — F33.1 MODERATE EPISODE OF RECURRENT MAJOR DEPRESSIVE DISORDER (HCC): ICD-10-CM

## 2025-08-01 DIAGNOSIS — F41.0 PANIC DISORDER: ICD-10-CM

## 2025-08-04 RX ORDER — BUSPIRONE HYDROCHLORIDE 15 MG/1
TABLET ORAL
Qty: 180 TABLET | Refills: 1 | OUTPATIENT
Start: 2025-08-04

## 2025-08-04 RX ORDER — TRAZODONE HYDROCHLORIDE 100 MG/1
150 TABLET ORAL
Qty: 135 TABLET | Refills: 1 | OUTPATIENT
Start: 2025-08-04

## 2025-08-05 ENCOUNTER — PATIENT OUTREACH (OUTPATIENT)
Dept: FAMILY MEDICINE CLINIC | Facility: CLINIC | Age: 45
End: 2025-08-05

## 2025-08-06 ENCOUNTER — TELEMEDICINE (OUTPATIENT)
Dept: BEHAVIORAL/MENTAL HEALTH CLINIC | Facility: CLINIC | Age: 45
End: 2025-08-06
Payer: COMMERCIAL

## 2025-08-06 DIAGNOSIS — F41.0 PANIC DISORDER: ICD-10-CM

## 2025-08-06 DIAGNOSIS — F41.9 ANXIETY: ICD-10-CM

## 2025-08-06 DIAGNOSIS — F33.1 MODERATE EPISODE OF RECURRENT MAJOR DEPRESSIVE DISORDER (HCC): ICD-10-CM

## 2025-08-06 DIAGNOSIS — F41.8 DEPRESSION WITH ANXIETY: Primary | ICD-10-CM

## 2025-08-06 PROCEDURE — 90837 PSYTX W PT 60 MINUTES: CPT | Performed by: COUNSELOR

## 2025-08-07 ENCOUNTER — TELEPHONE (OUTPATIENT)
Dept: BEHAVIORAL/MENTAL HEALTH CLINIC | Facility: CLINIC | Age: 45
End: 2025-08-07

## 2025-08-11 ENCOUNTER — TELEPHONE (OUTPATIENT)
Dept: PSYCHIATRY | Facility: CLINIC | Age: 45
End: 2025-08-11

## 2025-08-13 ENCOUNTER — TELEMEDICINE (OUTPATIENT)
Dept: BEHAVIORAL/MENTAL HEALTH CLINIC | Facility: CLINIC | Age: 45
End: 2025-08-13
Payer: COMMERCIAL

## 2025-08-20 ENCOUNTER — TELEMEDICINE (OUTPATIENT)
Dept: BEHAVIORAL/MENTAL HEALTH CLINIC | Facility: CLINIC | Age: 45
End: 2025-08-20
Payer: COMMERCIAL

## 2025-08-20 DIAGNOSIS — F33.1 MODERATE EPISODE OF RECURRENT MAJOR DEPRESSIVE DISORDER (HCC): ICD-10-CM

## 2025-08-20 DIAGNOSIS — F43.21 GRIEF: ICD-10-CM

## 2025-08-20 DIAGNOSIS — F41.9 ANXIETY: Primary | ICD-10-CM

## 2025-08-20 DIAGNOSIS — F41.0 PANIC DISORDER: ICD-10-CM

## 2025-08-20 PROCEDURE — 90837 PSYTX W PT 60 MINUTES: CPT | Performed by: COUNSELOR

## 2025-08-21 ENCOUNTER — TELEPHONE (OUTPATIENT)
Age: 45
End: 2025-08-21

## 2025-08-21 ENCOUNTER — TELEMEDICINE (OUTPATIENT)
Dept: PSYCHIATRY | Facility: CLINIC | Age: 45
End: 2025-08-21
Payer: COMMERCIAL

## 2025-08-21 DIAGNOSIS — F41.0 PANIC DISORDER: ICD-10-CM

## 2025-08-21 DIAGNOSIS — F43.21 GRIEF: ICD-10-CM

## 2025-08-21 DIAGNOSIS — F43.10 POST TRAUMATIC STRESS DISORDER (PTSD): ICD-10-CM

## 2025-08-21 DIAGNOSIS — F33.1 MODERATE EPISODE OF RECURRENT MAJOR DEPRESSIVE DISORDER (HCC): Primary | ICD-10-CM

## 2025-08-21 DIAGNOSIS — G47.00 INSOMNIA, UNSPECIFIED TYPE: ICD-10-CM

## 2025-08-21 PROCEDURE — 99213 OFFICE O/P EST LOW 20 MIN: CPT | Performed by: PSYCHIATRY & NEUROLOGY

## 2025-08-21 RX ORDER — CLONAZEPAM 0.25 MG/1
0.25 TABLET, ORALLY DISINTEGRATING ORAL DAILY PRN
Qty: 20 TABLET | Refills: 0 | Status: SHIPPED | OUTPATIENT
Start: 2025-08-21

## 2025-08-21 RX ORDER — BUSPIRONE HYDROCHLORIDE 15 MG/1
15 TABLET ORAL 2 TIMES DAILY
Qty: 60 TABLET | Refills: 1 | Status: SHIPPED | OUTPATIENT
Start: 2025-08-21

## 2025-08-22 RX ORDER — MIRTAZAPINE 15 MG/1
15 TABLET, FILM COATED ORAL
Qty: 30 TABLET | Refills: 1 | Status: SHIPPED | OUTPATIENT
Start: 2025-08-22

## (undated) DEVICE — TUBING SUCTION 5MM X 12 FT

## (undated) DEVICE — SUT VICRYL 0 CT-1 27 IN J260H

## (undated) DEVICE — SYRINGE 10ML LL

## (undated) DEVICE — MAYO STAND COVER: Brand: CONVERTORS

## (undated) DEVICE — ENDOPATH XCEL BLADELESS TROCARS WITH STABILITY SLEEVES: Brand: ENDOPATH XCEL

## (undated) DEVICE — HEAVY DUTY TABLE COVER: Brand: CONVERTORS

## (undated) DEVICE — SCD SEQUENTIAL COMPRESSION COMFORT SLEEVE MEDIUM KNEE LENGTH: Brand: KENDALL SCD

## (undated) DEVICE — SYRINGE 50ML LL

## (undated) DEVICE — ENSEAL LAPAROSCOPIC TISSUE SEALER G2 ARTICULATING  CURVED JAW FOR USE WITH G2 GENERATOR 5MM DIAMETER 35CM SHAFT LENGTH: Brand: ENSEAL

## (undated) DEVICE — INTENDED FOR TISSUE SEPARATION, AND OTHER PROCEDURES THAT REQUIRE A SHARP SURGICAL BLADE TO PUNCTURE OR CUT.: Brand: BARD-PARKER SAFETY BLADES SIZE 11, STERILE

## (undated) DEVICE — GLOVE INDICATOR PI UNDERGLOVE SZ 8 BLUE

## (undated) DEVICE — ADHESIVE SKN CLSR HISTOACRYL FLEX 0.5ML LF

## (undated) DEVICE — GLOVE SRG BIOGEL ECLIPSE 7

## (undated) DEVICE — CYSTO TUBING SINGLE IRRIGATION

## (undated) DEVICE — CHLORHEXIDINE 4PCT 4 OZ

## (undated) DEVICE — SUT STRATAFIX SPIRAL 2-0 CT-1 30 CM SXPP1B410

## (undated) DEVICE — 1820 FOAM BLOCK NEEDLE COUNTER: Brand: DEVON

## (undated) DEVICE — TRAY FOLEY 16FR  SILICONE W/DRAINAGE BAG SURESTEP

## (undated) DEVICE — INSUFLATION TUBING INSUFLOW (LEXION)

## (undated) DEVICE — X-RAY DETECTABLE SPONGES,16 PLY: Brand: VISTEC

## (undated) DEVICE — BETHLEHEM UNIVERSAL GYN LAP PK: Brand: CARDINAL HEALTH

## (undated) DEVICE — SUT MONOCRYL 4-0 PS-2 27 IN Y426H

## (undated) DEVICE — PENCIL ELECTROSURG E-Z CLEAN -0035H

## (undated) DEVICE — LIGHT HANDLE COVER SLEEVE DISP BLUE STELLAR

## (undated) DEVICE — ELECTRODE LAP BLADE CRV E-Z CLEAN 33CM -0019

## (undated) DEVICE — UTERINE MANIPULATOR RUMI 6.7 X 10 CM

## (undated) DEVICE — OCCLUDER COLPO-PNEUMO

## (undated) DEVICE — 3000CC GUARDIAN II: Brand: GUARDIAN

## (undated) DEVICE — GLOVE INDICATOR PI UNDERGLOVE SZ 7.5 BLUE

## (undated) DEVICE — IRRIG ENDO FLO TUBING

## (undated) DEVICE — CHLORAPREP HI-LITE 26ML ORANGE

## (undated) DEVICE — ENDOPATH XCEL UNIVERSAL TROCAR STABLILITY SLEEVES: Brand: ENDOPATH XCEL

## (undated) DEVICE — ENDOPATH PNEUMONEEDLE INSUFFLATION NEEDLES WITH LUER LOCK CONNECTORS 120MM: Brand: ENDOPATH